# Patient Record
Sex: MALE | Race: BLACK OR AFRICAN AMERICAN | Employment: UNEMPLOYED
[De-identification: names, ages, dates, MRNs, and addresses within clinical notes are randomized per-mention and may not be internally consistent; named-entity substitution may affect disease eponyms.]

---

## 2017-01-01 ENCOUNTER — HOME CARE VISIT (OUTPATIENT)
Dept: SCHEDULING | Facility: HOME HEALTH | Age: 62
End: 2017-01-01
Payer: MEDICARE

## 2017-01-02 ENCOUNTER — HOME CARE VISIT (OUTPATIENT)
Dept: SCHEDULING | Facility: HOME HEALTH | Age: 62
End: 2017-01-02
Payer: MEDICARE

## 2017-01-02 VITALS
HEART RATE: 85 BPM | OXYGEN SATURATION: 99 % | SYSTOLIC BLOOD PRESSURE: 190 MMHG | TEMPERATURE: 98 F | DIASTOLIC BLOOD PRESSURE: 94 MMHG | RESPIRATION RATE: 18 BRPM

## 2017-01-02 VITALS
TEMPERATURE: 98 F | SYSTOLIC BLOOD PRESSURE: 166 MMHG | HEART RATE: 77 BPM | DIASTOLIC BLOOD PRESSURE: 81 MMHG | OXYGEN SATURATION: 98 % | RESPIRATION RATE: 17 BRPM

## 2017-01-02 PROCEDURE — G0151 HHCP-SERV OF PT,EA 15 MIN: HCPCS

## 2017-01-02 PROCEDURE — G0300 HHS/HOSPICE OF LPN EA 15 MIN: HCPCS

## 2017-01-04 ENCOUNTER — HOME CARE VISIT (OUTPATIENT)
Dept: SCHEDULING | Facility: HOME HEALTH | Age: 62
End: 2017-01-04
Payer: MEDICARE

## 2017-01-04 VITALS
SYSTOLIC BLOOD PRESSURE: 190 MMHG | DIASTOLIC BLOOD PRESSURE: 88 MMHG | RESPIRATION RATE: 18 BRPM | HEART RATE: 82 BPM | OXYGEN SATURATION: 98 %

## 2017-01-04 PROCEDURE — G0151 HHCP-SERV OF PT,EA 15 MIN: HCPCS

## 2017-01-04 PROCEDURE — G0300 HHS/HOSPICE OF LPN EA 15 MIN: HCPCS

## 2017-01-05 ENCOUNTER — OFFICE VISIT (OUTPATIENT)
Dept: SURGERY | Age: 62
End: 2017-01-05

## 2017-01-05 VITALS
HEIGHT: 77 IN | DIASTOLIC BLOOD PRESSURE: 87 MMHG | OXYGEN SATURATION: 99 % | BODY MASS INDEX: 37.19 KG/M2 | SYSTOLIC BLOOD PRESSURE: 158 MMHG | HEART RATE: 99 BPM | WEIGHT: 315 LBS

## 2017-01-05 DIAGNOSIS — S31.819D WOUND OF BUTTOCK, RIGHT, SUBSEQUENT ENCOUNTER: ICD-10-CM

## 2017-01-05 DIAGNOSIS — S31.829D WOUND OF BUTTOCK, LEFT, SUBSEQUENT ENCOUNTER: Primary | ICD-10-CM

## 2017-01-05 NOTE — MR AVS SNAPSHOT
Visit Information Date & Time Provider Department Dept. Phone Encounter #  
 1/5/2017  1:00 PM Ela Escobedo MD [facility] [address]. Beltran Jones [phone] [id number] Follow-up Instructions Return in about 3 weeks (around 1/26/2017). Follow-up and Disposition History Your Appointments 1/19/2017 10:20 AM  
ESTABLISHED PATIENT with Ela Escobedo MD  
[facility] Dr. Beltran Jones ([facility]) Appt Note: abscess (2wk f/u) [address] [facility] [address] [address]  
[address], [address], [address] [facility] [address] [address] Upcoming Health Maintenance Date Due Hepatitis C Screening 1955 LIPID PANEL Q1 1955 FOOT EXAM Q1 4/8/1963 MICROALBUMIN Q1 4/8/1963 EYE EXAM RETINAL OR DILATED Q1 4/8/1963 DTaP/Tdap/Td series (1 - Tdap) 4/8/1974 FOBT Q 1 YEAR AGE 50-75 4/8/2003 ZOSTER VACCINE AGE 60> 4/8/2013 Pneumococcal 19-64 Highest Risk (2 of 3 - PCV13) 8/1/2013 INFLUENZA AGE 9 TO ADULT 8/1/2016 HEMOGLOBIN A1C Q6M 11/10/2016 Allergies as of 1/5/2017  Review Complete On: 1/5/2017 By: Ela Escobedo MD  
 No Known Allergies Current Immunizations  Reviewed on 8/23/2013 Name Date Pneumococcal Vaccine (Unspecified Type) 8/1/2012 Not reviewed this visit You Were Diagnosed With   
  
 Codes Comments Wound of buttock, left, subsequent encounter    -  Primary ICD-10-CM: B06.597I ICD-9-CM: V58.89, 877.0 Wound of buttock, right, subsequent encounter     ICD-10-CM: S31.819D ICD-9-CM: V58.89, 877.0 Vitals BP Pulse Height(growth percentile) Weight(growth percentile) SpO2 BMI  
 158/87 99 6' 5\" (1.956 m) 316 lb (143.3 kg) 99% 37.47 kg/m2 Smoking Status Never Smoker BMI and BSA Data Body Mass Index Body Surface Area  
 37.47 kg/m 2 2.79 m 2 Preferred Pharmacy Pharmacy Name Phone [facility] [address], [place] [address] Enri[place] [phone] Your Updated Medication List  
  
   
This list is accurate as of: 1/5/17  4:34 PM.  Always use your most recent med list.  
  
  
  
  
 acetaminophen 325 mg tablet Commonly known as:  TYLENOL Take 2 Tabs by mouth every four (4) hours as needed for Pain or Fever. alcohol swabs Padm Commonly known as:  ALCOHOL PADS  
1 Each by Apply Externally route two (2) times a day. amLODIPine 10 mg tablet Commonly known as:  Stacey Citron Take 1 Tab by mouth daily. carvedilol 25 mg tablet Commonly known as:  Romelia Golas Take 1 Tab by mouth two (2) times daily (with meals) for 30 days. digoxin 0.125 mg tablet Commonly known as:  LANOXIN Take 0.125 mg by mouth daily. Indications: afib  
  
 docusate sodium 100 mg capsule Commonly known as:  Gabriela Mule Take 1 Cap by mouth daily as needed for Constipation for up to 30 days. ferrous sulfate 325 mg (65 mg iron) tablet Take 1 Tab by mouth two (2) times daily (with meals). insulin lispro protamine/insulin lispro 100 unit/mL (75-25) injection Commonly known as:  HUMALOG MIX 75/25  
60 Units by SubCUTAneous route Before breakfast and dinner. Insulin Syringe-Needle U-100 0.5 mL 29 gauge x 1/2\" Syrg Commonly known as:  INSULIN SYRINGE  
1 Each by Does Not Apply route two (2) times a day. L. acidoph & paracasei- S therm- Bifido 8 billion cell Cap cap Commonly known as:  ROSY-Q Take 1 Cap by mouth daily. oxyCODONE-acetaminophen 5-325 mg per tablet Commonly known as:  PERCOCET Take 1-2 Tabs by mouth every four (4) hours as needed. Max Daily Amount: 12 Tabs. polyethylene glycol 17 gram packet Commonly known as:  Phyllis Boer Take 1 Packet by mouth daily for 20 days. pravastatin 20 mg tablet Commonly known as:  PRAVACHOL Take 20 mg by mouth nightly. Follow-up Instructions Return in about 3 weeks (around 1/26/2017). To-Do List   
 01/06/2017 To Be Determined Appointment with Belinda Mcgrath LPN at [place] [address]  
  
 01/08/2017 To Be Determined Appointment with Josephine Jiménez RN at [place] [address]  
  
 01/09/2017 To Be Determined Appointment with Efraín Marshall PT at [place] [address]  
  
 01/11/2017 To Be Determined Appointment with Josephine Jiménez RN at [place] [address]  
  
 01/11/2017 To Be Determined Appointment with Efraín Marshall PT at [place] [address]  
  
 01/13/2017 To Be Determined Appointment with Josephine Jiménez RN at [place] [address]  
  
 01/15/2017 To Be Determined Appointment with Josephine Jiménez RN at [place] [address]  
  
 01/17/2017 To Be Determined Appointment with Josephine Jiménez RN at [place] [address]  
  
 01/19/2017 To Be Determined Appointment with Josephine Jiménez RN at [place] [address]  
  
 01/21/2017 To Be Determined Appointment with Josephine Jiménez RN at [place] [address]  
  
 01/23/2017 To Be Determined Appointment with Josephine Jiménez RN at [place] [address]  
  
 01/25/2017 To Be Determined Appointment with Josephine Jiménez RN at [place] [address]  
  
 01/27/2017 To Be Determined Appointment with Josephine Jiménez RN at [place] [address] Introducing [facility] & HEALTH SERVICES! Snehal Deluca introduces Witsbits patient portal. Now you can access parts of your medical record, email your doctor's office, and request medication refills online. 1. In your internet browser, go to [email or web address] 2. Click on the First Time User? Click Here link in the Sign In box. You will see the New Member Sign Up page. 3. Enter your Revokom Access Code exactly as it appears below. You will not need to use this code after youve completed the sign-up process. If you do not sign up before the expiration date, you must request a new code. · Revokom Access Code: [email or web address] Expires: 3/18/2017 11:16 AM 
 
4. Enter the last four digits of your Social Security Number (xxxx) and Date of Birth (mm/dd/yyyy) as indicated and click Submit. You will be taken to the next sign-up page. 5. Create a Revokom ID. This will be your Revokom login ID and cannot be changed, so think of one that is secure and easy to remember. 6. Create a Revokom password. You can change your password at any time. 7. Enter your Password Reset Question and Answer. This can be used at a later time if you forget your password. 8. Enter your e-mail address. You will receive e-mail notification when new information is available in [address] 9. Click Sign Up. You can now view and download portions of your medical record. 10. Click the Download Summary menu link to download a portable copy of your medical information. If you have questions, please visit the Frequently Asked Questions section of the Revokom website. Remember, Revokom is NOT to be used for urgent needs. For medical emergencies, dial 911. Now available from your iPhone and Android! Please provide this summary of care documentation to your next provider. Your primary care clinician is listed as Phys Other. If you have any questions after today's visit, please call [phone].

## 2017-01-05 NOTE — PROGRESS NOTES
The patient was seen in the hospital where he had incision and drainage of bilateral buttock abscesses associated with diabetes out of control. He has been followed by home health with packing changes. He was converted to 3 times per week, alginate silver packing and dry overlay dressing. The patient reports no problems. On examination both cavity sites show clean granulation and look quite good. On the left buttock the cavity is about 5 cm deep x 4 cm x 1 cm. It was repacked with Aquacel AG and covered by dry gauze. The right buttock cavity is about 4 x 5 cm width and length by about 1.5 cm deep. It was packed with Aquacel AG and covered with dry gauze. The patient will continue with 3 time per week alginate silver packing and dressing. He will see me again in follow up in 3 weeks. Final Diagnosis:  Open wounds right and left buttocks. MedDATA/gwo     Total Evaluation and Management time utilized (excluding any procedure time)  was 15 minutes, with 55 % in counseling and/or coordination of care.     Kyle Castanon MD

## 2017-01-06 ENCOUNTER — HOME CARE VISIT (OUTPATIENT)
Dept: SCHEDULING | Facility: HOME HEALTH | Age: 62
End: 2017-01-06
Payer: MEDICARE

## 2017-01-06 VITALS
RESPIRATION RATE: 18 BRPM | SYSTOLIC BLOOD PRESSURE: 168 MMHG | OXYGEN SATURATION: 97 % | TEMPERATURE: 98.2 F | HEART RATE: 89 BPM | DIASTOLIC BLOOD PRESSURE: 74 MMHG

## 2017-01-06 PROCEDURE — G0300 HHS/HOSPICE OF LPN EA 15 MIN: HCPCS

## 2017-01-08 ENCOUNTER — HOME CARE VISIT (OUTPATIENT)
Dept: SCHEDULING | Facility: HOME HEALTH | Age: 62
End: 2017-01-08
Payer: MEDICARE

## 2017-01-08 PROCEDURE — G0300 HHS/HOSPICE OF LPN EA 15 MIN: HCPCS

## 2017-01-09 VITALS
RESPIRATION RATE: 12 BRPM | DIASTOLIC BLOOD PRESSURE: 88 MMHG | SYSTOLIC BLOOD PRESSURE: 172 MMHG | OXYGEN SATURATION: 97 % | TEMPERATURE: 98 F | HEART RATE: 74 BPM

## 2017-01-09 VITALS
HEART RATE: 77 BPM | TEMPERATURE: 97.8 F | DIASTOLIC BLOOD PRESSURE: 88 MMHG | SYSTOLIC BLOOD PRESSURE: 160 MMHG | OXYGEN SATURATION: 98 % | RESPIRATION RATE: 17 BRPM

## 2017-01-10 ENCOUNTER — HOME CARE VISIT (OUTPATIENT)
Dept: SCHEDULING | Facility: HOME HEALTH | Age: 62
End: 2017-01-10
Payer: MEDICARE

## 2017-01-10 PROCEDURE — G0300 HHS/HOSPICE OF LPN EA 15 MIN: HCPCS

## 2017-01-11 ENCOUNTER — HOME CARE VISIT (OUTPATIENT)
Dept: SCHEDULING | Facility: HOME HEALTH | Age: 62
End: 2017-01-11
Payer: MEDICARE

## 2017-01-11 VITALS
OXYGEN SATURATION: 97 % | HEART RATE: 92 BPM | RESPIRATION RATE: 16 BRPM | DIASTOLIC BLOOD PRESSURE: 84 MMHG | SYSTOLIC BLOOD PRESSURE: 168 MMHG | TEMPERATURE: 97 F

## 2017-01-11 VITALS
SYSTOLIC BLOOD PRESSURE: 150 MMHG | DIASTOLIC BLOOD PRESSURE: 82 MMHG | OXYGEN SATURATION: 98 % | RESPIRATION RATE: 16 BRPM | HEART RATE: 72 BPM

## 2017-01-11 PROCEDURE — G0151 HHCP-SERV OF PT,EA 15 MIN: HCPCS

## 2017-01-12 ENCOUNTER — HOME CARE VISIT (OUTPATIENT)
Dept: SCHEDULING | Facility: HOME HEALTH | Age: 62
End: 2017-01-12
Payer: MEDICARE

## 2017-01-12 PROCEDURE — G0300 HHS/HOSPICE OF LPN EA 15 MIN: HCPCS

## 2017-01-13 VITALS
SYSTOLIC BLOOD PRESSURE: 170 MMHG | DIASTOLIC BLOOD PRESSURE: 82 MMHG | TEMPERATURE: 97.9 F | OXYGEN SATURATION: 98 % | RESPIRATION RATE: 17 BRPM | HEART RATE: 77 BPM

## 2017-01-14 ENCOUNTER — HOME CARE VISIT (OUTPATIENT)
Dept: SCHEDULING | Facility: HOME HEALTH | Age: 62
End: 2017-01-14
Payer: MEDICARE

## 2017-01-14 VITALS
RESPIRATION RATE: 18 BRPM | OXYGEN SATURATION: 98 % | SYSTOLIC BLOOD PRESSURE: 180 MMHG | DIASTOLIC BLOOD PRESSURE: 96 MMHG | HEART RATE: 80 BPM | TEMPERATURE: 98.6 F

## 2017-01-14 PROCEDURE — G0299 HHS/HOSPICE OF RN EA 15 MIN: HCPCS

## 2017-01-16 ENCOUNTER — HOME CARE VISIT (OUTPATIENT)
Dept: SCHEDULING | Facility: HOME HEALTH | Age: 62
End: 2017-01-16
Payer: MEDICARE

## 2017-01-16 PROCEDURE — G0300 HHS/HOSPICE OF LPN EA 15 MIN: HCPCS

## 2017-01-17 VITALS
DIASTOLIC BLOOD PRESSURE: 80 MMHG | TEMPERATURE: 98.2 F | OXYGEN SATURATION: 97 % | SYSTOLIC BLOOD PRESSURE: 170 MMHG | HEART RATE: 77 BPM | RESPIRATION RATE: 18 BRPM

## 2017-01-18 ENCOUNTER — HOME CARE VISIT (OUTPATIENT)
Dept: SCHEDULING | Facility: HOME HEALTH | Age: 62
End: 2017-01-18
Payer: MEDICARE

## 2017-01-18 PROCEDURE — G0300 HHS/HOSPICE OF LPN EA 15 MIN: HCPCS

## 2017-01-19 ENCOUNTER — OFFICE VISIT (OUTPATIENT)
Dept: SURGERY | Age: 62
End: 2017-01-19

## 2017-01-19 DIAGNOSIS — Z91.199 NO-SHOW FOR APPOINTMENT: Primary | ICD-10-CM

## 2017-01-20 ENCOUNTER — HOME CARE VISIT (OUTPATIENT)
Dept: SCHEDULING | Facility: HOME HEALTH | Age: 62
End: 2017-01-20
Payer: MEDICARE

## 2017-01-21 VITALS
HEART RATE: 76 BPM | OXYGEN SATURATION: 97 % | SYSTOLIC BLOOD PRESSURE: 146 MMHG | DIASTOLIC BLOOD PRESSURE: 92 MMHG | TEMPERATURE: 98 F | RESPIRATION RATE: 18 BRPM

## 2017-01-21 VITALS
TEMPERATURE: 97.8 F | HEART RATE: 77 BPM | RESPIRATION RATE: 17 BRPM | SYSTOLIC BLOOD PRESSURE: 170 MMHG | DIASTOLIC BLOOD PRESSURE: 80 MMHG | OXYGEN SATURATION: 97 %

## 2017-01-23 ENCOUNTER — HOME CARE VISIT (OUTPATIENT)
Dept: SCHEDULING | Facility: HOME HEALTH | Age: 62
End: 2017-01-23
Payer: MEDICARE

## 2017-01-23 PROCEDURE — G0300 HHS/HOSPICE OF LPN EA 15 MIN: HCPCS

## 2017-01-24 VITALS
TEMPERATURE: 97.8 F | DIASTOLIC BLOOD PRESSURE: 80 MMHG | OXYGEN SATURATION: 97 % | HEART RATE: 77 BPM | SYSTOLIC BLOOD PRESSURE: 172 MMHG | RESPIRATION RATE: 18 BRPM

## 2017-01-25 ENCOUNTER — HOME CARE VISIT (OUTPATIENT)
Dept: SCHEDULING | Facility: HOME HEALTH | Age: 62
End: 2017-01-25
Payer: MEDICARE

## 2017-01-25 PROCEDURE — G0300 HHS/HOSPICE OF LPN EA 15 MIN: HCPCS

## 2017-01-26 VITALS
SYSTOLIC BLOOD PRESSURE: 168 MMHG | OXYGEN SATURATION: 97 % | TEMPERATURE: 97.8 F | HEART RATE: 9 BPM | DIASTOLIC BLOOD PRESSURE: 80 MMHG | RESPIRATION RATE: 17 BRPM

## 2017-01-27 ENCOUNTER — HOME CARE VISIT (OUTPATIENT)
Dept: HOME HEALTH SERVICES | Facility: HOME HEALTH | Age: 62
End: 2017-01-27
Payer: MEDICARE

## 2017-01-27 PROCEDURE — G0300 HHS/HOSPICE OF LPN EA 15 MIN: HCPCS

## 2017-01-30 ENCOUNTER — HOME CARE VISIT (OUTPATIENT)
Dept: SCHEDULING | Facility: HOME HEALTH | Age: 62
End: 2017-01-30
Payer: MEDICARE

## 2017-01-30 VITALS
SYSTOLIC BLOOD PRESSURE: 170 MMHG | HEART RATE: 87 BPM | TEMPERATURE: 97.8 F | OXYGEN SATURATION: 98 % | RESPIRATION RATE: 17 BRPM | DIASTOLIC BLOOD PRESSURE: 68 MMHG

## 2017-01-30 PROCEDURE — G0300 HHS/HOSPICE OF LPN EA 15 MIN: HCPCS

## 2017-01-31 VITALS
OXYGEN SATURATION: 98 % | DIASTOLIC BLOOD PRESSURE: 86 MMHG | TEMPERATURE: 98.2 F | SYSTOLIC BLOOD PRESSURE: 170 MMHG | HEART RATE: 88 BPM | RESPIRATION RATE: 17 BRPM

## 2017-02-03 ENCOUNTER — HOME CARE VISIT (OUTPATIENT)
Dept: SCHEDULING | Facility: HOME HEALTH | Age: 62
End: 2017-02-03
Payer: MEDICARE

## 2017-02-03 PROCEDURE — G0300 HHS/HOSPICE OF LPN EA 15 MIN: HCPCS

## 2017-02-06 ENCOUNTER — HOSPITAL ENCOUNTER (INPATIENT)
Age: 62
LOS: 4 days | Discharge: HOME HEALTH | DRG: 623 | End: 2017-02-10
Attending: SURGERY | Admitting: SURGERY
Payer: MEDICARE

## 2017-02-06 ENCOUNTER — HOME CARE VISIT (OUTPATIENT)
Dept: SCHEDULING | Facility: HOME HEALTH | Age: 62
End: 2017-02-06
Payer: MEDICARE

## 2017-02-06 ENCOUNTER — ANESTHESIA EVENT (OUTPATIENT)
Dept: SURGERY | Age: 62
End: 2017-02-06

## 2017-02-06 ENCOUNTER — ANESTHESIA (OUTPATIENT)
Dept: SURGERY | Age: 62
End: 2017-02-06

## 2017-02-06 ENCOUNTER — HOSPITAL ENCOUNTER (INPATIENT)
Age: 62
Setting detail: SURGERY ADMIT
End: 2017-02-06
Attending: SURGERY | Admitting: SURGERY

## 2017-02-06 ENCOUNTER — HOME CARE VISIT (OUTPATIENT)
Dept: HOME HEALTH SERVICES | Facility: HOME HEALTH | Age: 62
End: 2017-02-06
Payer: MEDICARE

## 2017-02-06 ENCOUNTER — ANESTHESIA EVENT (OUTPATIENT)
Dept: SURGERY | Age: 62
DRG: 623 | End: 2017-02-06
Payer: MEDICARE

## 2017-02-06 ENCOUNTER — ANESTHESIA (OUTPATIENT)
Dept: SURGERY | Age: 62
DRG: 623 | End: 2017-02-06
Payer: MEDICARE

## 2017-02-06 VITALS
HEART RATE: 72 BPM | RESPIRATION RATE: 16 BRPM | OXYGEN SATURATION: 98 % | TEMPERATURE: 97.8 F | DIASTOLIC BLOOD PRESSURE: 82 MMHG | SYSTOLIC BLOOD PRESSURE: 170 MMHG

## 2017-02-06 PROBLEM — L02.212 ABSCESS OF LOWER BACK: Status: ACTIVE | Noted: 2017-02-06

## 2017-02-06 PROBLEM — L02.415 ABSCESS OF RIGHT THIGH: Status: ACTIVE | Noted: 2017-02-06

## 2017-02-06 LAB
ALBUMIN SERPL BCP-MCNC: 2.9 G/DL (ref 3.5–5)
ALBUMIN/GLOB SERPL: 0.9 {RATIO} (ref 1.1–2.2)
ALP SERPL-CCNC: 160 U/L (ref 45–117)
ALT SERPL-CCNC: 27 U/L (ref 12–78)
ANION GAP BLD CALC-SCNC: 8 MMOL/L (ref 5–15)
AST SERPL W P-5'-P-CCNC: 13 U/L (ref 15–37)
BILIRUB SERPL-MCNC: 0.3 MG/DL (ref 0.2–1)
BUN SERPL-MCNC: 27 MG/DL (ref 6–20)
BUN/CREAT SERPL: 9 (ref 12–20)
CALCIUM SERPL-MCNC: 8.8 MG/DL (ref 8.5–10.1)
CHLORIDE SERPL-SCNC: 105 MMOL/L (ref 97–108)
CO2 SERPL-SCNC: 25 MMOL/L (ref 21–32)
CREAT SERPL-MCNC: 2.88 MG/DL (ref 0.7–1.3)
ERYTHROCYTE [DISTWIDTH] IN BLOOD BY AUTOMATED COUNT: 13.1 % (ref 11.5–14.5)
GLOBULIN SER CALC-MCNC: 3.4 G/DL (ref 2–4)
GLUCOSE BLD STRIP.AUTO-MCNC: 224 MG/DL (ref 65–100)
GLUCOSE BLD STRIP.AUTO-MCNC: 284 MG/DL (ref 65–100)
GLUCOSE SERPL-MCNC: 337 MG/DL (ref 65–100)
HCT VFR BLD AUTO: 31.2 % (ref 36.6–50.3)
HGB BLD-MCNC: 10.4 G/DL (ref 12.1–17)
MCH RBC QN AUTO: 29.2 PG (ref 26–34)
MCHC RBC AUTO-ENTMCNC: 33.3 G/DL (ref 30–36.5)
MCV RBC AUTO: 87.6 FL (ref 80–99)
PLATELET # BLD AUTO: 151 K/UL (ref 150–400)
POTASSIUM SERPL-SCNC: 4.2 MMOL/L (ref 3.5–5.1)
PROT SERPL-MCNC: 6.3 G/DL (ref 6.4–8.2)
RBC # BLD AUTO: 3.56 M/UL (ref 4.1–5.7)
SERVICE CMNT-IMP: ABNORMAL
SERVICE CMNT-IMP: ABNORMAL
SODIUM SERPL-SCNC: 138 MMOL/L (ref 136–145)
WBC # BLD AUTO: 6.7 K/UL (ref 4.1–11.1)

## 2017-02-06 PROCEDURE — 77030019908 HC STETH ESOPH SIMS -A: Performed by: ANESTHESIOLOGY

## 2017-02-06 PROCEDURE — 87186 SC STD MICRODIL/AGAR DIL: CPT | Performed by: SURGERY

## 2017-02-06 PROCEDURE — 74011000250 HC RX REV CODE- 250

## 2017-02-06 PROCEDURE — 80053 COMPREHEN METABOLIC PANEL: CPT | Performed by: SURGERY

## 2017-02-06 PROCEDURE — 93005 ELECTROCARDIOGRAM TRACING: CPT

## 2017-02-06 PROCEDURE — 77030018836 HC SOL IRR NACL ICUM -A: Performed by: SURGERY

## 2017-02-06 PROCEDURE — 77030026438 HC STYL ET INTUB CARD -A: Performed by: ANESTHESIOLOGY

## 2017-02-06 PROCEDURE — 77030008684 HC TU ET CUF COVD -B: Performed by: ANESTHESIOLOGY

## 2017-02-06 PROCEDURE — 74011636637 HC RX REV CODE- 636/637: Performed by: SURGERY

## 2017-02-06 PROCEDURE — 77030019895 HC PCKNG STRP IODO -A: Performed by: SURGERY

## 2017-02-06 PROCEDURE — 87077 CULTURE AEROBIC IDENTIFY: CPT | Performed by: SURGERY

## 2017-02-06 PROCEDURE — 77030011640 HC PAD GRND REM COVD -A: Performed by: SURGERY

## 2017-02-06 PROCEDURE — 87205 SMEAR GRAM STAIN: CPT | Performed by: SURGERY

## 2017-02-06 PROCEDURE — 74011250636 HC RX REV CODE- 250/636

## 2017-02-06 PROCEDURE — G0300 HHS/HOSPICE OF LPN EA 15 MIN: HCPCS

## 2017-02-06 PROCEDURE — 65270000029 HC RM PRIVATE

## 2017-02-06 PROCEDURE — 87147 CULTURE TYPE IMMUNOLOGIC: CPT | Performed by: SURGERY

## 2017-02-06 PROCEDURE — 82962 GLUCOSE BLOOD TEST: CPT

## 2017-02-06 PROCEDURE — 0JBL0ZZ EXCISION OF RIGHT UPPER LEG SUBCUTANEOUS TISSUE AND FASCIA, OPEN APPROACH: ICD-10-PCS | Performed by: SURGERY

## 2017-02-06 PROCEDURE — 74011250636 HC RX REV CODE- 250/636: Performed by: SURGERY

## 2017-02-06 PROCEDURE — 36415 COLL VENOUS BLD VENIPUNCTURE: CPT | Performed by: SURGERY

## 2017-02-06 PROCEDURE — 76210000006 HC OR PH I REC 0.5 TO 1 HR: Performed by: SURGERY

## 2017-02-06 PROCEDURE — 76060000033 HC ANESTHESIA 1 TO 1.5 HR: Performed by: SURGERY

## 2017-02-06 PROCEDURE — 0JB70ZZ EXCISION OF BACK SUBCUTANEOUS TISSUE AND FASCIA, OPEN APPROACH: ICD-10-PCS | Performed by: SURGERY

## 2017-02-06 PROCEDURE — 85027 COMPLETE CBC AUTOMATED: CPT | Performed by: SURGERY

## 2017-02-06 PROCEDURE — 76010000149 HC OR TIME 1 TO 1.5 HR: Performed by: SURGERY

## 2017-02-06 RX ORDER — LIDOCAINE HYDROCHLORIDE 10 MG/ML
0.1 INJECTION, SOLUTION EPIDURAL; INFILTRATION; INTRACAUDAL; PERINEURAL AS NEEDED
Status: CANCELLED | OUTPATIENT
Start: 2017-02-06

## 2017-02-06 RX ORDER — SODIUM CHLORIDE, SODIUM LACTATE, POTASSIUM CHLORIDE, CALCIUM CHLORIDE 600; 310; 30; 20 MG/100ML; MG/100ML; MG/100ML; MG/100ML
25 INJECTION, SOLUTION INTRAVENOUS CONTINUOUS
Status: CANCELLED | OUTPATIENT
Start: 2017-02-06 | End: 2017-02-07

## 2017-02-06 RX ORDER — FENTANYL CITRATE 50 UG/ML
50 INJECTION, SOLUTION INTRAMUSCULAR; INTRAVENOUS AS NEEDED
Status: CANCELLED | OUTPATIENT
Start: 2017-02-06

## 2017-02-06 RX ORDER — MIDAZOLAM HYDROCHLORIDE 1 MG/ML
1 INJECTION, SOLUTION INTRAMUSCULAR; INTRAVENOUS AS NEEDED
Status: CANCELLED | OUTPATIENT
Start: 2017-02-06

## 2017-02-06 RX ORDER — HYDROMORPHONE HYDROCHLORIDE 2 MG/ML
INJECTION, SOLUTION INTRAMUSCULAR; INTRAVENOUS; SUBCUTANEOUS AS NEEDED
Status: DISCONTINUED | OUTPATIENT
Start: 2017-02-06 | End: 2017-02-07 | Stop reason: HOSPADM

## 2017-02-06 RX ORDER — DIPHENHYDRAMINE HYDROCHLORIDE 50 MG/ML
12.5 INJECTION, SOLUTION INTRAMUSCULAR; INTRAVENOUS AS NEEDED
Status: ACTIVE | OUTPATIENT
Start: 2017-02-06 | End: 2017-02-06

## 2017-02-06 RX ORDER — FENTANYL CITRATE 50 UG/ML
25 INJECTION, SOLUTION INTRAMUSCULAR; INTRAVENOUS
Status: CANCELLED | OUTPATIENT
Start: 2017-02-06

## 2017-02-06 RX ORDER — HYDROCODONE BITARTRATE AND ACETAMINOPHEN 5; 325 MG/1; MG/1
1 TABLET ORAL
Status: DISCONTINUED | OUTPATIENT
Start: 2017-02-06 | End: 2017-02-07

## 2017-02-06 RX ORDER — MIDAZOLAM HYDROCHLORIDE 1 MG/ML
INJECTION, SOLUTION INTRAMUSCULAR; INTRAVENOUS AS NEEDED
Status: DISCONTINUED | OUTPATIENT
Start: 2017-02-06 | End: 2017-02-07 | Stop reason: HOSPADM

## 2017-02-06 RX ORDER — SODIUM CHLORIDE 9 MG/ML
50 INJECTION, SOLUTION INTRAVENOUS CONTINUOUS
Status: DISCONTINUED | OUTPATIENT
Start: 2017-02-06 | End: 2017-02-07

## 2017-02-06 RX ORDER — DEXTROSE 50 % IN WATER (D50W) INTRAVENOUS SYRINGE
12.5-25 AS NEEDED
Status: DISCONTINUED | OUTPATIENT
Start: 2017-02-06 | End: 2017-02-10 | Stop reason: HOSPADM

## 2017-02-06 RX ORDER — SODIUM CHLORIDE, SODIUM LACTATE, POTASSIUM CHLORIDE, CALCIUM CHLORIDE 600; 310; 30; 20 MG/100ML; MG/100ML; MG/100ML; MG/100ML
25 INJECTION, SOLUTION INTRAVENOUS CONTINUOUS
Status: DISCONTINUED | OUTPATIENT
Start: 2017-02-07 | End: 2017-02-07 | Stop reason: HOSPADM

## 2017-02-06 RX ORDER — NALOXONE HYDROCHLORIDE 0.4 MG/ML
0.4 INJECTION, SOLUTION INTRAMUSCULAR; INTRAVENOUS; SUBCUTANEOUS AS NEEDED
Status: DISCONTINUED | OUTPATIENT
Start: 2017-02-06 | End: 2017-02-10 | Stop reason: HOSPADM

## 2017-02-06 RX ORDER — SODIUM CHLORIDE 0.9 % (FLUSH) 0.9 %
5-10 SYRINGE (ML) INJECTION EVERY 8 HOURS
Status: DISCONTINUED | OUTPATIENT
Start: 2017-02-06 | End: 2017-02-07

## 2017-02-06 RX ORDER — MAGNESIUM SULFATE 100 %
4 CRYSTALS MISCELLANEOUS AS NEEDED
Status: DISCONTINUED | OUTPATIENT
Start: 2017-02-06 | End: 2017-02-10 | Stop reason: HOSPADM

## 2017-02-06 RX ORDER — METOPROLOL TARTRATE 25 MG/1
TABLET, FILM COATED ORAL 2 TIMES DAILY
Status: ON HOLD | COMMUNITY
End: 2017-02-07 | Stop reason: CLARIF

## 2017-02-06 RX ORDER — ONDANSETRON 2 MG/ML
INJECTION INTRAMUSCULAR; INTRAVENOUS AS NEEDED
Status: DISCONTINUED | OUTPATIENT
Start: 2017-02-06 | End: 2017-02-07 | Stop reason: HOSPADM

## 2017-02-06 RX ORDER — LIDOCAINE HYDROCHLORIDE 20 MG/ML
INJECTION, SOLUTION EPIDURAL; INFILTRATION; INTRACAUDAL; PERINEURAL AS NEEDED
Status: DISCONTINUED | OUTPATIENT
Start: 2017-02-06 | End: 2017-02-07 | Stop reason: HOSPADM

## 2017-02-06 RX ORDER — HYDROMORPHONE HYDROCHLORIDE 1 MG/ML
.2-.5 INJECTION, SOLUTION INTRAMUSCULAR; INTRAVENOUS; SUBCUTANEOUS
Status: CANCELLED | OUTPATIENT
Start: 2017-02-06

## 2017-02-06 RX ORDER — PROPOFOL 10 MG/ML
INJECTION, EMULSION INTRAVENOUS AS NEEDED
Status: DISCONTINUED | OUTPATIENT
Start: 2017-02-06 | End: 2017-02-07 | Stop reason: HOSPADM

## 2017-02-06 RX ORDER — SUCCINYLCHOLINE CHLORIDE 20 MG/ML
INJECTION INTRAMUSCULAR; INTRAVENOUS AS NEEDED
Status: DISCONTINUED | OUTPATIENT
Start: 2017-02-06 | End: 2017-02-07 | Stop reason: HOSPADM

## 2017-02-06 RX ORDER — GLIMEPIRIDE 1 MG/1
TABLET ORAL
Status: ON HOLD | COMMUNITY
End: 2017-02-07

## 2017-02-06 RX ORDER — GLYCOPYRROLATE 0.2 MG/ML
INJECTION INTRAMUSCULAR; INTRAVENOUS AS NEEDED
Status: DISCONTINUED | OUTPATIENT
Start: 2017-02-06 | End: 2017-02-07 | Stop reason: HOSPADM

## 2017-02-06 RX ORDER — HYDROMORPHONE HYDROCHLORIDE 1 MG/ML
.2-.5 INJECTION, SOLUTION INTRAMUSCULAR; INTRAVENOUS; SUBCUTANEOUS
Status: DISCONTINUED | OUTPATIENT
Start: 2017-02-06 | End: 2017-02-07 | Stop reason: HOSPADM

## 2017-02-06 RX ORDER — FENTANYL CITRATE 50 UG/ML
25 INJECTION, SOLUTION INTRAMUSCULAR; INTRAVENOUS
Status: DISCONTINUED | OUTPATIENT
Start: 2017-02-06 | End: 2017-02-07 | Stop reason: HOSPADM

## 2017-02-06 RX ORDER — FENTANYL CITRATE 50 UG/ML
INJECTION, SOLUTION INTRAMUSCULAR; INTRAVENOUS AS NEEDED
Status: DISCONTINUED | OUTPATIENT
Start: 2017-02-06 | End: 2017-02-07 | Stop reason: HOSPADM

## 2017-02-06 RX ORDER — HYDROMORPHONE HYDROCHLORIDE 1 MG/ML
0.2 INJECTION, SOLUTION INTRAMUSCULAR; INTRAVENOUS; SUBCUTANEOUS
Status: DISCONTINUED | OUTPATIENT
Start: 2017-02-06 | End: 2017-02-07

## 2017-02-06 RX ORDER — ONDANSETRON 2 MG/ML
4 INJECTION INTRAMUSCULAR; INTRAVENOUS AS NEEDED
Status: CANCELLED | OUTPATIENT
Start: 2017-02-06

## 2017-02-06 RX ORDER — SODIUM CHLORIDE 0.9 % (FLUSH) 0.9 %
5-10 SYRINGE (ML) INJECTION AS NEEDED
Status: DISCONTINUED | OUTPATIENT
Start: 2017-02-06 | End: 2017-02-07

## 2017-02-06 RX ORDER — ONDANSETRON 2 MG/ML
4 INJECTION INTRAMUSCULAR; INTRAVENOUS AS NEEDED
Status: DISCONTINUED | OUTPATIENT
Start: 2017-02-06 | End: 2017-02-07 | Stop reason: HOSPADM

## 2017-02-06 RX ORDER — ROCURONIUM BROMIDE 10 MG/ML
INJECTION, SOLUTION INTRAVENOUS AS NEEDED
Status: DISCONTINUED | OUTPATIENT
Start: 2017-02-06 | End: 2017-02-07 | Stop reason: HOSPADM

## 2017-02-06 RX ORDER — SODIUM CHLORIDE, SODIUM LACTATE, POTASSIUM CHLORIDE, CALCIUM CHLORIDE 600; 310; 30; 20 MG/100ML; MG/100ML; MG/100ML; MG/100ML
25 INJECTION, SOLUTION INTRAVENOUS CONTINUOUS
Status: CANCELLED | OUTPATIENT
Start: 2017-02-06

## 2017-02-06 RX ORDER — INSULIN LISPRO 100 [IU]/ML
INJECTION, SOLUTION INTRAVENOUS; SUBCUTANEOUS EVERY 6 HOURS
Status: DISCONTINUED | OUTPATIENT
Start: 2017-02-06 | End: 2017-02-07

## 2017-02-06 RX ORDER — DIPHENHYDRAMINE HYDROCHLORIDE 50 MG/ML
12.5 INJECTION, SOLUTION INTRAMUSCULAR; INTRAVENOUS AS NEEDED
Status: CANCELLED | OUTPATIENT
Start: 2017-02-06 | End: 2017-02-06

## 2017-02-06 RX ORDER — VANCOMYCIN HYDROCHLORIDE
1250 EVERY 24 HOURS
Status: DISCONTINUED | OUTPATIENT
Start: 2017-02-07 | End: 2017-02-08 | Stop reason: DRUGHIGH

## 2017-02-06 RX ORDER — SODIUM CHLORIDE, SODIUM LACTATE, POTASSIUM CHLORIDE, CALCIUM CHLORIDE 600; 310; 30; 20 MG/100ML; MG/100ML; MG/100ML; MG/100ML
INJECTION, SOLUTION INTRAVENOUS
Status: DISCONTINUED | OUTPATIENT
Start: 2017-02-06 | End: 2017-02-07 | Stop reason: HOSPADM

## 2017-02-06 RX ADMIN — INSULIN LISPRO 3 UNITS: 100 INJECTION, SOLUTION INTRAVENOUS; SUBCUTANEOUS at 18:59

## 2017-02-06 RX ADMIN — GLYCOPYRROLATE 0.2 MG: 0.2 INJECTION INTRAMUSCULAR; INTRAVENOUS at 22:39

## 2017-02-06 RX ADMIN — INSULIN LISPRO 5 UNITS: 100 INJECTION, SOLUTION INTRAVENOUS; SUBCUTANEOUS at 16:24

## 2017-02-06 RX ADMIN — SUCCINYLCHOLINE CHLORIDE 180 MG: 20 INJECTION INTRAMUSCULAR; INTRAVENOUS at 22:47

## 2017-02-06 RX ADMIN — Medication 10 ML: at 16:26

## 2017-02-06 RX ADMIN — SODIUM CHLORIDE, SODIUM LACTATE, POTASSIUM CHLORIDE, CALCIUM CHLORIDE: 600; 310; 30; 20 INJECTION, SOLUTION INTRAVENOUS at 22:36

## 2017-02-06 RX ADMIN — ONDANSETRON 4 MG: 2 INJECTION INTRAMUSCULAR; INTRAVENOUS at 23:11

## 2017-02-06 RX ADMIN — LIDOCAINE HYDROCHLORIDE 80 MG: 20 INJECTION, SOLUTION EPIDURAL; INFILTRATION; INTRACAUDAL; PERINEURAL at 22:47

## 2017-02-06 RX ADMIN — MIDAZOLAM HYDROCHLORIDE 2 MG: 1 INJECTION, SOLUTION INTRAMUSCULAR; INTRAVENOUS at 22:39

## 2017-02-06 RX ADMIN — ROCURONIUM BROMIDE 10 MG: 10 INJECTION, SOLUTION INTRAVENOUS at 22:47

## 2017-02-06 RX ADMIN — PROPOFOL 200 MG: 10 INJECTION, EMULSION INTRAVENOUS at 22:47

## 2017-02-06 RX ADMIN — FENTANYL CITRATE 100 MCG: 50 INJECTION, SOLUTION INTRAMUSCULAR; INTRAVENOUS at 22:47

## 2017-02-06 RX ADMIN — SODIUM CHLORIDE 50 ML/HR: 900 INJECTION, SOLUTION INTRAVENOUS at 18:27

## 2017-02-06 RX ADMIN — VANCOMYCIN HYDROCHLORIDE 3000 MG: 10 INJECTION, POWDER, LYOPHILIZED, FOR SOLUTION INTRAVENOUS at 18:28

## 2017-02-06 RX ADMIN — HYDROMORPHONE HYDROCHLORIDE 1 MG: 2 INJECTION, SOLUTION INTRAMUSCULAR; INTRAVENOUS; SUBCUTANEOUS at 23:30

## 2017-02-06 NOTE — PROGRESS NOTES
Primary Nurse Butch Kinsey and Nahomi River RN performed a dual skin assessment on this patient . Patient has dressings on right thigh, right and left lower back area.

## 2017-02-06 NOTE — PROGRESS NOTES
Pharmacy Automatic Renal Dosing Protocol - Antimicrobials      Indication for Antimicrobials: Abcess thigh and back  Current Regimen of Each Antimicrobial (Start Day & Day of Therapy):  Vancomycin (started 2/ day1)    Significant cultures: ND    CAPD, Intermittent Hemodialysis or Renal Replacement Therapy: NA  Paralysis, amputations, malnutrition: NA  Recent Labs      17   1426   CREA  2.88*   BUN  27*   WBC  6.7     Temp (24hrs), Av.4 °F (36.9 °C), Min:98 °F (36.7 °C), Max:98.7 °F (37.1 °C)    Creatinine Clearance (ml/min): 33      Goal Vancomycin Level(s): 10-15      Impression/Plan: Vancomycin loading dose of 3 grams then 1250 mg every 24 hours. Pharmacy will follow daily and adjust doses of monitored medications as appropriate for renal function and/or serum levels.     Thank you,  Nga Nagel, [facility]

## 2017-02-06 NOTE — ANESTHESIA PREPROCEDURE EVALUATION
Anesthetic History   No history of anesthetic complications            Review of Systems / Medical History  Patient summary reviewed, nursing notes reviewed and pertinent labs reviewed    Pulmonary  Within defined limits                 Neuro/Psych   Within defined limits           Cardiovascular    Hypertension        Dysrhythmias : atrial fibrillation      Exercise tolerance: >4 METS  Comments: ? Septal MI    Ejection fraction was  estimated to be 55 %.    GI/Hepatic/Renal         Renal disease: CRI       Endo/Other    Diabetes    Obesity and morbid obesity     Other Findings   Comments: Abscess Right Thigh and Right Lower Back  Hx splenic lymphoma  Hx Hairy cell leukemia, in remission   Vitamin D deficiency         Physical Exam    Airway  Mallampati: III  TM Distance: 4 - 6 cm  Neck ROM: normal range of motion   Mouth opening: Normal     Cardiovascular  Regular rate and rhythm,  S1 and S2 normal,  no murmur, click, rub, or gallop             Dental  No notable dental hx       Pulmonary  Breath sounds clear to auscultation               Abdominal  GI exam deferred       Other Findings            Anesthetic Plan    ASA: 3  Anesthesia type: general          Induction: Intravenous  Anesthetic plan and risks discussed with: Patient

## 2017-02-06 NOTE — PROGRESS NOTES
Surgery    Patient with abscesses of right thigh outer aspect and right lower back. Plan I and D and debridement in OR. Risks and benefits reviewed. Start Vanc emperically.     Mirza Meyers MD

## 2017-02-06 NOTE — IP AVS SNAPSHOT
Current Discharge Medication List  
  
Take these medications at their scheduled times Dose & Instructions Dispensing Information Comments Morning Noon Evening Bedtime  
 amLODIPine 10 mg tablet Commonly known as:  Francisca Eagles Your next dose is: Today, Tomorrow Other:  ____________ Dose:  10 mg Take 10 mg by mouth daily. Refills:  0  
     
   
   
   
  
 aspirin delayed-release 81 mg tablet Your next dose is: Today, Tomorrow Other:  ____________ Dose:  81 mg Take 1 Tab by mouth daily. Quantity:  30 Tab Refills:  0  
     
   
   
   
  
 carvedilol 25 mg tablet Commonly known as:  Gillermina Begun Your next dose is: Today, Tomorrow Other:  ____________ Dose:  25 mg Take 25 mg by mouth two (2) times daily (with meals). Refills:  0  
     
   
   
   
  
 cephALEXin 500 mg capsule Commonly known as:  Pavan Drum Your next dose is: Today, Tomorrow Other:  ____________ Dose:  500 mg Take 1 Cap by mouth four (4) times daily for 7 days. Quantity:  28 Cap Refills:  0  
     
   
   
   
  
 digoxin 0.125 mg tablet Commonly known as:  LANOXIN Your next dose is: Today, Tomorrow Other:  ____________ Dose:  0.125 mg Take 0.125 mg by mouth daily. Refills:  0  
     
   
   
   
  
 pravastatin 20 mg tablet Commonly known as:  PRAVACHOL Your next dose is: Today, Tomorrow Other:  ____________ Dose:  20 mg Take 20 mg by mouth nightly. Refills:  0 Take these medications as needed Dose & Instructions Dispensing Information Comments Morning Noon Evening Bedtime  
 oxyCODONE-acetaminophen 5-325 mg per tablet Commonly known as:  PERCOCET Your next dose is: Today, Tomorrow Other:  ____________ Dose:  1-2 Tab Take 1-2 Tabs by mouth every four (4) hours as needed for Pain.  Max Daily Amount: 12 Tabs. Quantity:  20 Tab Refills:  0 Take these medications as directed Dose & Instructions Dispensing Information Comments Morning Noon Evening Bedtime  
 insulin lispro protamine/insulin lispro 100 unit/mL (75-25) injection Commonly known as:  HUMALOG MIX 75/25 Your next dose is: Today, Tomorrow Other:  ____________ 65 units subcutaneous injection twice daily before meals (before breakfast and dinner) Quantity:  1 Vial  
Refills:  2 Where to Get Your Medications These medications were sent to [facility]/pharmacy #[id number]- [address], [address], [address] [place] [address] Hours:  24-hours Phone:  [phone]  
  cephALEXin 500 mg capsule  
 insulin lispro protamine/insulin lispro 100 unit/mL (75-25) injection Information about where to get these medications is not yet available ! Ask your nurse or doctor about these medications  
  aspirin delayed-release 81 mg tablet  
 oxyCODONE-acetaminophen 5-325 mg per tablet

## 2017-02-06 NOTE — IP AVS SNAPSHOT
[address] [place] [phone] Patient: Long Estrada 
MRN: [id number] :1955 You are allergic to the following No active allergies Recent Documentation Height Weight BMI Smoking Status 1.956 m 138 kg 36.08 kg/m2 Never Smoker Emergency Contacts Name Discharge Info Relation Home Work Mobile Margareth Donaldson     [phone] About your hospitalization You were admitted on:  2017 You last received care in the:  \[facility]\"" 2 GENERAL SURGERY You were discharged on:  February 10, 2017 Unit phone number:  [phone] Why you were hospitalized Your primary diagnosis was:  Not on File Your diagnoses also included:  Abscess Of Lower Back, Abscess Of Right Thigh Providers Seen During Your Hospitalizations Provider Role Specialty Primary office phone Julian Nye MD Attending Provider General Surgery [phone] Your Primary Care Physician (PCP) Primary Care Physician Office Phone Office Fax Atiya Jay [phone] Follow-up Information Follow up With Details Comments Contact Info Tyrone Velarde NP   [address] [place] [address] [phone] [place] [facility] 227 On 2017 THIS IS YOUR HOME HEALTH AGENCY. IF YOU DO NOT HEAR FROM THEM WITHIN 24-48 HOURS PLEASE CONTACT THEM DIRECTLY. [address] [address] [phone] Current Discharge Medication List  
  
START taking these medications Dose & Instructions Dispensing Information Comments Morning Noon Evening Bedtime  
 aspirin delayed-release 81 mg tablet Your next dose is: Today, Tomorrow Other:  _________ Dose:  81 mg Take 1 Tab by mouth daily. Quantity:  30 Tab Refills:  0  
     
   
   
   
  
 cephALEXin 500 mg capsule Commonly known as:  Ruth Zahidaper Your next dose is: Today, Tomorrow Other:  _________ Dose:  500 mg Take 1 Cap by mouth four (4) times daily for 7 days. Quantity:  28 Cap Refills:  0  
     
   
   
   
  
 insulin lispro protamine/insulin lispro 100 unit/mL (75-25) injection Commonly known as:  HUMALOG MIX 75/25 Your next dose is: Today, Tomorrow Other:  _________ 65 units subcutaneous injection twice daily before meals (before breakfast and dinner) Quantity:  1 Vial  
Refills:  2 CONTINUE these medications which have NOT CHANGED Dose & Instructions Dispensing Information Comments Morning Noon Evening Bedtime  
 amLODIPine 10 mg tablet Commonly known as:  Rudene Post Your next dose is: Today, Tomorrow Other:  _________ Dose:  10 mg Take 10 mg by mouth daily. Refills:  0  
     
   
   
   
  
 carvedilol 25 mg tablet Commonly known as:  Shiva Lawman Your next dose is: Today, Tomorrow Other:  _________ Dose:  25 mg Take 25 mg by mouth two (2) times daily (with meals). Refills:  0  
     
   
   
   
  
 digoxin 0.125 mg tablet Commonly known as:  LANOXIN Your next dose is: Today, Tomorrow Other:  _________ Dose:  0.125 mg Take 0.125 mg by mouth daily. Refills:  0  
     
   
   
   
  
 oxyCODONE-acetaminophen 5-325 mg per tablet Commonly known as:  PERCOCET Your next dose is: Today, Tomorrow Other:  _________ Dose:  1-2 Tab Take 1-2 Tabs by mouth every four (4) hours as needed for Pain. Max Daily Amount: 12 Tabs. Quantity:  20 Tab Refills:  0  
     
   
   
   
  
 pravastatin 20 mg tablet Commonly known as:  PRAVACHOL Your next dose is: Today, Tomorrow Other:  _________ Dose:  20 mg Take 20 mg by mouth nightly. Refills:  0 STOP taking these medications HumuLIN 70/30 100 unit/mL (70-30) injection Generic drug:  insulin NPH/insulin regular Where to Get Your Medications These medications were sent to [facility]/pharmacy #[id number]- [address], [address] Emilee Friedman  [address] [place] [address], [address] [place] [address] Hours:  24-hours Phone:  [phone]  
  cephALEXin 500 mg capsule  
 insulin lispro protamine/insulin lispro 100 unit/mL (75-25) injection Information on where to get these meds will be given to you by the nurse or doctor. ! Ask your nurse or doctor about these medications  
  aspirin delayed-release 81 mg tablet  
 oxyCODONE-acetaminophen 5-325 mg per tablet Discharge Instructions Discharge Instructions After Debridement of Abscess Take antibiotics as prescribed. Do not drive while taking narcotic pain medication. Home Health for dressing changes daily. It is OK to shower. Follow diabetic diet. Monitor your blood sugar at home. Take pain medicines as prescribed as needed. Call for follow up appointment in 1 week  - [phone] See your primary MD in 1 week. If you have any problems, call Dr Jamie Monzon at [phone] or [phone] (after hours) EPI Garvin MD 
 
 
 
 
 
 
Discharge Orders None Introducing [facility] & HEALTH SERVICES! Jessica Nevarez introduces Video Passports patient portal. Now you can access parts of your medical record, email your doctor's office, and request medication refills online. 1. In your internet browser, go to [email or web address] 2. Click on the First Time User? Click Here link in the Sign In box. You will see the New Member Sign Up page. 3. Enter your Video Passports Access Code exactly as it appears below. You will not need to use this code after youve completed the sign-up process. If you do not sign up before the expiration date, you must request a new code. · gogamingo Access Code: [email or web address] Expires: 5/7/2017 12:30 PM 
 
4. Enter the last four digits of your Social Security Number (xxxx) and Date of Birth (mm/dd/yyyy) as indicated and click Submit. You will be taken to the next sign-up page. 5. Create a gogamingo ID. This will be your gogamingo login ID and cannot be changed, so think of one that is secure and easy to remember. 6. Create a gogamingo password. You can change your password at any time. 7. Enter your Password Reset Question and Answer. This can be used at a later time if you forget your password. 8. Enter your e-mail address. You will receive e-mail notification when new information is available in [address] 9. Click Sign Up. You can now view and download portions of your medical record. 10. Click the Download Summary menu link to download a portable copy of your medical information. If you have questions, please visit the Frequently Asked Questions section of the gogamingo website. Remember, gogamingo is NOT to be used for urgent needs. For medical emergencies, dial 911. Now available from your iPhone and Android! General Information Please provide this summary of care documentation to your next provider. Patient Signature:  ____________________________________________________________ Date:  ____________________________________________________________  
  
Angela Wooten Provider Signature:  ____________________________________________________________ Date:  ____________________________________________________________

## 2017-02-07 VITALS
OXYGEN SATURATION: 97 % | SYSTOLIC BLOOD PRESSURE: 168 MMHG | HEART RATE: 68 BPM | TEMPERATURE: 97.8 F | DIASTOLIC BLOOD PRESSURE: 88 MMHG | RESPIRATION RATE: 17 BRPM

## 2017-02-07 LAB
ANION GAP BLD CALC-SCNC: 10 MMOL/L (ref 5–15)
ATRIAL RATE: 79 BPM
BUN SERPL-MCNC: 23 MG/DL (ref 6–20)
BUN/CREAT SERPL: 9 (ref 12–20)
CALCIUM SERPL-MCNC: 8.2 MG/DL (ref 8.5–10.1)
CALCULATED P AXIS, ECG09: 57 DEGREES
CALCULATED R AXIS, ECG10: 9 DEGREES
CALCULATED T AXIS, ECG11: 12 DEGREES
CHLORIDE SERPL-SCNC: 107 MMOL/L (ref 97–108)
CO2 SERPL-SCNC: 22 MMOL/L (ref 21–32)
CREAT SERPL-MCNC: 2.45 MG/DL (ref 0.7–1.3)
DIAGNOSIS, 93000: NORMAL
GLUCOSE BLD STRIP.AUTO-MCNC: 172 MG/DL (ref 65–100)
GLUCOSE BLD STRIP.AUTO-MCNC: 233 MG/DL (ref 65–100)
GLUCOSE BLD STRIP.AUTO-MCNC: 270 MG/DL (ref 65–100)
GLUCOSE BLD STRIP.AUTO-MCNC: 307 MG/DL (ref 65–100)
GLUCOSE BLD STRIP.AUTO-MCNC: 340 MG/DL (ref 65–100)
GLUCOSE SERPL-MCNC: 201 MG/DL (ref 65–100)
P-R INTERVAL, ECG05: 168 MS
POTASSIUM SERPL-SCNC: 4.3 MMOL/L (ref 3.5–5.1)
Q-T INTERVAL, ECG07: 362 MS
QRS DURATION, ECG06: 102 MS
QTC CALCULATION (BEZET), ECG08: 415 MS
SERVICE CMNT-IMP: ABNORMAL
SODIUM SERPL-SCNC: 139 MMOL/L (ref 136–145)
VENTRICULAR RATE, ECG03: 79 BPM

## 2017-02-07 PROCEDURE — 82962 GLUCOSE BLOOD TEST: CPT

## 2017-02-07 PROCEDURE — 74011250637 HC RX REV CODE- 250/637: Performed by: SURGERY

## 2017-02-07 PROCEDURE — 74011636637 HC RX REV CODE- 636/637: Performed by: HOSPITALIST

## 2017-02-07 PROCEDURE — 77010033678 HC OXYGEN DAILY

## 2017-02-07 PROCEDURE — 36415 COLL VENOUS BLD VENIPUNCTURE: CPT | Performed by: SURGERY

## 2017-02-07 PROCEDURE — 65270000029 HC RM PRIVATE

## 2017-02-07 PROCEDURE — 80048 BASIC METABOLIC PNL TOTAL CA: CPT | Performed by: SURGERY

## 2017-02-07 PROCEDURE — 74011250636 HC RX REV CODE- 250/636: Performed by: SURGERY

## 2017-02-07 PROCEDURE — 74011636637 HC RX REV CODE- 636/637: Performed by: SURGERY

## 2017-02-07 PROCEDURE — 74011250637 HC RX REV CODE- 250/637: Performed by: HOSPITALIST

## 2017-02-07 RX ORDER — CLONIDINE HYDROCHLORIDE 0.1 MG/1
0.1 TABLET ORAL 2 TIMES DAILY
Status: ON HOLD | COMMUNITY
End: 2017-02-08

## 2017-02-07 RX ORDER — DIGOXIN 125 MCG
0.12 TABLET ORAL DAILY
Status: DISCONTINUED | OUTPATIENT
Start: 2017-02-07 | End: 2017-02-10 | Stop reason: HOSPADM

## 2017-02-07 RX ORDER — METOPROLOL TARTRATE 25 MG/1
25 TABLET, FILM COATED ORAL 2 TIMES DAILY
Status: DISCONTINUED | OUTPATIENT
Start: 2017-02-07 | End: 2017-02-08

## 2017-02-07 RX ORDER — OXYCODONE AND ACETAMINOPHEN 5; 325 MG/1; MG/1
1-2 TABLET ORAL
Status: DISCONTINUED | OUTPATIENT
Start: 2017-02-07 | End: 2017-02-10 | Stop reason: HOSPADM

## 2017-02-07 RX ORDER — DIGOXIN 125 MCG
0.12 TABLET ORAL DAILY
COMMUNITY
End: 2017-03-08 | Stop reason: SDUPTHER

## 2017-02-07 RX ORDER — ONDANSETRON 2 MG/ML
4 INJECTION INTRAMUSCULAR; INTRAVENOUS
Status: DISCONTINUED | OUTPATIENT
Start: 2017-02-07 | End: 2017-02-10 | Stop reason: HOSPADM

## 2017-02-07 RX ORDER — AMLODIPINE BESYLATE 5 MG/1
10 TABLET ORAL DAILY
Status: DISCONTINUED | OUTPATIENT
Start: 2017-02-07 | End: 2017-02-10 | Stop reason: HOSPADM

## 2017-02-07 RX ORDER — OXYCODONE AND ACETAMINOPHEN 5; 325 MG/1; MG/1
1-2 TABLET ORAL
Status: ON HOLD | COMMUNITY
End: 2017-02-10

## 2017-02-07 RX ORDER — SODIUM CHLORIDE 0.9 % (FLUSH) 0.9 %
5-10 SYRINGE (ML) INJECTION EVERY 8 HOURS
Status: DISCONTINUED | OUTPATIENT
Start: 2017-02-07 | End: 2017-02-10 | Stop reason: HOSPADM

## 2017-02-07 RX ORDER — SODIUM CHLORIDE 9 MG/ML
50 INJECTION, SOLUTION INTRAVENOUS CONTINUOUS
Status: DISCONTINUED | OUTPATIENT
Start: 2017-02-07 | End: 2017-02-07

## 2017-02-07 RX ORDER — AMLODIPINE BESYLATE 10 MG/1
10 TABLET ORAL DAILY
COMMUNITY
End: 2017-03-08 | Stop reason: SDUPTHER

## 2017-02-07 RX ORDER — SODIUM CHLORIDE 0.9 % (FLUSH) 0.9 %
5-10 SYRINGE (ML) INJECTION AS NEEDED
Status: DISCONTINUED | OUTPATIENT
Start: 2017-02-07 | End: 2017-02-10 | Stop reason: HOSPADM

## 2017-02-07 RX ORDER — ASPIRIN 81 MG/1
81 TABLET ORAL DAILY
Status: DISCONTINUED | OUTPATIENT
Start: 2017-02-07 | End: 2017-02-10 | Stop reason: HOSPADM

## 2017-02-07 RX ORDER — PRAVASTATIN SODIUM 20 MG/1
20 TABLET ORAL
COMMUNITY
End: 2017-03-08 | Stop reason: SDUPTHER

## 2017-02-07 RX ORDER — CARVEDILOL 25 MG/1
25 TABLET ORAL 2 TIMES DAILY WITH MEALS
Status: ON HOLD | COMMUNITY
End: 2017-05-24 | Stop reason: DRUGHIGH

## 2017-02-07 RX ORDER — OXYCODONE HYDROCHLORIDE 5 MG/1
5 CAPSULE ORAL
Status: ON HOLD | COMMUNITY
End: 2017-02-07

## 2017-02-07 RX ORDER — DIPHENHYDRAMINE HYDROCHLORIDE 50 MG/ML
12.5 INJECTION, SOLUTION INTRAMUSCULAR; INTRAVENOUS
Status: DISCONTINUED | OUTPATIENT
Start: 2017-02-07 | End: 2017-02-10 | Stop reason: HOSPADM

## 2017-02-07 RX ORDER — INSULIN LISPRO 100 [IU]/ML
INJECTION, SOLUTION INTRAVENOUS; SUBCUTANEOUS
Status: DISCONTINUED | OUTPATIENT
Start: 2017-02-07 | End: 2017-02-10 | Stop reason: HOSPADM

## 2017-02-07 RX ADMIN — Medication 10 ML: at 14:50

## 2017-02-07 RX ADMIN — INSULIN LISPRO 60 UNITS: 100 INJECTION, SUSPENSION SUBCUTANEOUS at 10:01

## 2017-02-07 RX ADMIN — INSULIN LISPRO 3 UNITS: 100 INJECTION, SOLUTION INTRAVENOUS; SUBCUTANEOUS at 12:36

## 2017-02-07 RX ADMIN — INSULIN LISPRO 60 UNITS: 100 INJECTION, SUSPENSION SUBCUTANEOUS at 18:30

## 2017-02-07 RX ADMIN — INSULIN LISPRO 5 UNITS: 100 INJECTION, SOLUTION INTRAVENOUS; SUBCUTANEOUS at 06:28

## 2017-02-07 RX ADMIN — SODIUM CHLORIDE 50 ML/HR: 900 INJECTION, SOLUTION INTRAVENOUS at 01:13

## 2017-02-07 RX ADMIN — METOPROLOL TARTRATE 25 MG: 25 TABLET ORAL at 23:02

## 2017-02-07 RX ADMIN — AMLODIPINE BESYLATE 10 MG: 5 TABLET ORAL at 10:01

## 2017-02-07 RX ADMIN — INSULIN LISPRO 7 UNITS: 100 INJECTION, SOLUTION INTRAVENOUS; SUBCUTANEOUS at 18:30

## 2017-02-07 RX ADMIN — METOPROLOL TARTRATE 25 MG: 25 TABLET ORAL at 10:01

## 2017-02-07 RX ADMIN — DIGOXIN 0.12 MG: 125 TABLET ORAL at 10:01

## 2017-02-07 RX ADMIN — INSULIN LISPRO 2 UNITS: 100 INJECTION, SOLUTION INTRAVENOUS; SUBCUTANEOUS at 00:30

## 2017-02-07 RX ADMIN — Medication 10 ML: at 23:02

## 2017-02-07 RX ADMIN — VANCOMYCIN HYDROCHLORIDE 1250 MG: 10 INJECTION, POWDER, LYOPHILIZED, FOR SOLUTION INTRAVENOUS at 18:59

## 2017-02-07 RX ADMIN — INSULIN LISPRO 4 UNITS: 100 INJECTION, SOLUTION INTRAVENOUS; SUBCUTANEOUS at 23:08

## 2017-02-07 RX ADMIN — ASPIRIN 81 MG: 81 TABLET, COATED ORAL at 10:01

## 2017-02-07 NOTE — PERIOP NOTES
Handoff Report from Operating Room to PACU    Report received from Gracy Guallpa CRNA and Radha Carney RN regarding Edouard López.      Surgeon(s):  Ela Escobedo MD  And Procedure(s) (LRB):  INCISION AND Marcelino Calmar lower back and right thigh abscesses (Right)  confirmed   with allergies and dressings discussed. Anesthesia type, drugs, patient history, complications, estimated blood loss, vital signs, intake and output, and last pain medication, lines and temperature were reviewed.

## 2017-02-07 NOTE — ANESTHESIA POSTPROCEDURE EVALUATION
Post-Anesthesia Evaluation and Assessment    Patient: Madie Torres MRN: [id number]  SSN: [id number]    YOB: 1955  Age: 59 y.o. Sex: male       Cardiovascular Function/Vital Signs  Visit Vitals    /83 (BP 1 Location: Left arm, BP Patient Position: At rest)    Pulse 82    Temp 37.2 °C (98.9 °F)    Resp 11    Ht 6' 5\" (1.956 m)    Wt 138 kg (304 lb 3.8 oz)    SpO2 100%    BMI 36.08 kg/m2       Patient is status post general anesthesia for Procedure(s):  INCISION AND DRAINAGEright lower back and right thigh abscesses. Nausea/Vomiting: None    Postoperative hydration reviewed and adequate. Pain:  Pain Scale 1: Numeric (0 - 10) (02/07/17 0045)  Pain Intensity 1: 0 (02/07/17 0045)   Managed    Neurological Status:   Neuro (WDL): Exceptions to WDL (02/07/17 0045)  Neuro  Neurologic State: Alert;Drowsy; Eyes open spontaneously (02/07/17 0045)  Orientation Level: Oriented to person;Oriented to place;Oriented to situation (02/07/17 0045)  Cognition: Follows commands (02/07/17 0045)  Speech: Clear (02/07/17 0045)  LUE Motor Response: Purposeful (02/07/17 0045)  LLE Motor Response: Purposeful (02/07/17 0045)  RUE Motor Response: Purposeful (02/07/17 0045)  RLE Motor Response: Purposeful (02/07/17 0045)   At baseline    Mental Status and Level of Consciousness: Arousable    Pulmonary Status:   O2 Device: Nasal cannula (02/07/17 0045)   Adequate oxygenation and airway patent    Complications related to anesthesia: None    Post-anesthesia assessment completed.  No concerns    Signed By: Ray De Jesus MD     February 7, 2017

## 2017-02-07 NOTE — PROGRESS NOTES
End of Shift Nursing Note    Bedside shift change report given to Meka(oncoming nurse) by Rachelle Arshad (offgoing nurse). Report included the following information SBAR, Kardex, Intake/Output and MAR. Zone Phone:       Significant changes during shift:       Non-emergent issues for physician to address:        Number times ambulated in hallway past shift: 0      Number of times OOB to chair past shift: 1    POD #:      Vital Signs:    Temp: 98.6 °F (37 °C)     Pulse (Heart Rate): 74     BP: 155/80     Resp Rate: 16     O2 Sat (%): 98 %    Lines & Drains:     Urinary Catheter? NO   Placement Date:    Medical Necessity:   Central Line? NO   Placement Date:    Medical Necessity:   PICC Line? NO   Placement Date:    Medical Necessity:     NG tube [] in [] removed [] not applicable   Drains [] in [] removed [] not applicable     Skin Integrity:      Wounds: yes   Dressings Present: yes    Wound Concerns: no      GI:    Current diet:  DIET NPO Except Meds    Nausea: NO  Vomiting: NO  Bowel Sounds: YES  Flatus: YES  Last Bowel Movement: yesterday   Appearance:     Respiratory:  Supplemental O2: NO      Device:    via  Liters/min     Incentive Spirometer: NO  Volume:   Coughing and Deep Breathing: YES  Oral Care: YES  Understanding (patient/family education): YES   Getting out of bed: YES  Head of bed elevation: YES    Patient Safety:    Falls Score: 1  Mobility Score: 1  Bed Alarm On? NO  Sitter? NO      Opportunity for questions and clarification was given to oncoming nurse. Patient bed is in semi fowlers position, side rails are up x 2  , door & observation blinds open as needed, call bell within reach and patient not in distress.     [facility]

## 2017-02-07 NOTE — PROGRESS NOTES
Surgery    No complaint. T max 99, VSS. Dressings in place at operative sites. BID outer dressing changes. I will change packing tomorrow.     Preet Hernandez MD

## 2017-02-07 NOTE — CONSULTS
Hospitalist Consultation Note    NAME: Cesar Leigh   :  1955   MRN:  [id number]     Date/Time:  2017 9:28 AM    Patient PCP: Pearl Fong NP    I have been asked to see this patient by the attending, Dr. Courtney Avalos , for advice/opinion re: DM.  My advice is:  ________________________________________________________________________   Assessment &  Plan:  NOTE:  Patient has a second MRN  [id number]    Uncontrolled DM type 2 with hyperglycemia, POA  --uncontrolled A1c 10.3 5/10/16, down from 12.1 10/15  --this is in part what is contributing to recurrent skin abscesses. Fasting serum glucose this .  --resume home insulin 70/30 60 units SQ BID. Continue medium dose SSI. Check A1c. Would like to see A1c come down to 6 to 7 eventually. --encourage patient to start exercise regimen of brisk walking 30 minutes each day for weight loss and diabetes control.  --he needs to get new glucometer and monitor his blood sugars at discharge; report he keeps losing glucometer at home      CKD stage 3-4  --baseline Cr at 2.3. Was 2.88 yesterday and now down to 2.44 with IVF. Continue to monitor. --ideally should be on an ACE-I or ARB given DM and proteinuria but defer this to Dr. Ruchi Ozuna his nephrologist since always has some degree of ARF in last 2 admissions. HTN  --resume amlodipine    P. afib  --currently in SR. Resume metoprolol, digoxin. --CHADS2 score = 2. He should be on aspirin. Used to be on it but had epistaxis last year and was told to stop. Will resume aspirin 81mg daily and monitor. Chronic anemia  --hgb 10.4, baseline 9-11. Hx hairy cell leukemia  and splenic lymphoma  --treated with chemotherapy by Dr. Marily Marroquin but has not followed up with her in long time. CT abdomen/pelvis 2016 with normal spleen    Recurrent skin abscesses, POA  --s/p debridement right lateral thigh and right lower back. On vancomycin d#2.   Await wound cultures, Gram stain with GPC. Attempted to do med reconciliation with patient. He reports having bag of meds with him yesterday (only 2 meds entered in current chart by nurse) which has been sent home. Reviewed discharge meds from 12/16 and tried to piece together his PTA med list as best could be done from patient's memory recall. Pharmacy consulted to reconcile pta med. Dr. Curt Jensen will follow with you starting 2/8/17       Subjective:   CHIEF COMPLAINT:  \"skin abscesses\"    HISTORY OF PRESENT ILLNESS:     Zoltan Espinoza is a 59 y.o.  male admitted yesterday from Dr. Ojeda [facility] office for right lateral thigh and right lower back skin abscess and underwent debridement of skin and SQ tissues. Started on vancomycin. WBC 6.7. Admitted 12/2016 with sepsis and buttock abscess; wound culture at that time grew out MSSA, was treated with vancomycin and Unasyn and discharged on Augmentin. Report wound care done by [facility] every other day. The current new abscesses arose about 3 weeks ago. Denies fever or chills. Eating well. Does not check his blood sugars at home because has lost 2 glucometers. Denies any hypoglycemia symptoms. Says blood sugars were better on last admission than before and has lost a little weight. We are consulted for DM management. He reports taking 70/30 units twice day. Does not think he is taking glimepiride as is currently entered into his chart or any other oral DM meds. Past Medical History   Diagnosis Date    Abscess of buttock 12/2016     wound culture grew out MSSA    CKD stage 4 due to type 2 diabetes mellitus (HCC)     Diabetes (HCC)     Paroxysmal a-fib ([facility] [address]       History reviewed. No pertinent past surgical history. Social History   Substance Use Topics    Smoking status: Not on file    Smokeless tobacco: Not on file    Alcohol use Not on file    , retired.     Family History   Problem Relation Age of Onset    Diabetes Mother     Hypertension Father       No Known Allergies     Prior to Admission medications    Medication Sig Start Date End Date Taking? Authorizing Provider   oxyCODONE (OXYIR) 5 mg capsule Take 5 mg by mouth every six (6) hours as needed. Yes Historical Provider   insulin NPH/insulin regular (HUMULIN 70/30) 100 unit/mL (70-30) injection 60 Units by SubCUTAneous route Before breakfast and dinner. Yes Historical Provider   amLODIPine (NORVASC) 10 mg tablet Take 10 mg by mouth daily. Yes Historical Provider   digoxin (LANOXIN) 0.125 mg tablet Take 0.125 mg by mouth daily. Yes Historical Provider   pravastatin (PRAVACHOL) 20 mg tablet Take 20 mg by mouth nightly. Yes Historical Provider   metoprolol tartrate (LOPRESSOR) 25 mg tablet Take  by mouth two (2) times a day. Dosage unknown   Yes Historical Provider   glimepiride (AMARYL) 1 mg tablet Take  by mouth Daily (before breakfast).  Dosage unknown    Historical Provider     Current Facility-Administered Medications   Medication Dose Route Frequency    metoprolol tartrate (LOPRESSOR) tablet 25 mg  25 mg Oral BID    sodium chloride (NS) flush 5-10 mL  5-10 mL IntraVENous Q8H    sodium chloride (NS) flush 5-10 mL  5-10 mL IntraVENous PRN    oxyCODONE-acetaminophen (PERCOCET) 5-325 mg per tablet 1-2 Tab  1-2 Tab Oral Q4H PRN    ondansetron (ZOFRAN) injection 4 mg  4 mg IntraVENous Q4H PRN    diphenhydrAMINE (BENADRYL) injection 12.5 mg  12.5 mg IntraVENous Q4H PRN    insulin lispro protamine/insulin lispro (HUMALOG MIX 75/25) injection 60 Units  60 Units SubCUTAneous ACB&D    insulin lispro (HUMALOG) injection   SubCUTAneous AC&HS    digoxin (LANOXIN) tablet 0.125 mg  0.125 mg Oral DAILY    amLODIPine (NORVASC) tablet 10 mg  10 mg Oral DAILY    naloxone (NARCAN) injection 0.4 mg  0.4 mg IntraVENous PRN    0.9% sodium chloride infusion  50 mL/hr IntraVENous CONTINUOUS    glucose chewable tablet 16 g  4 Tab Oral PRN    dextrose (D50W) injection syrg 12.5-25 g  12.5-25 g IntraVENous PRN    glucagon (GLUCAGEN) injection 1 mg  1 mg IntraMUSCular PRN    vancomycin (VANCOCIN) 1250 mg in  ml infusion  1,250 mg IntraVENous Q24H      REVIEW OF SYSTEMS:  BOLD = POSITIVE; negative = normal text  General:  fever, chills, sweats, weakness, weight loss/gain  Eyes: blurred vision, eye pain, loss of vision, diplopia  Ear Nose and Throat: rhinorrhea, pharyngitis, otalgia, tinnitus, speech or swallowing difficulties  Respiratory: cough, sputum production, SOB, wheezing, NAVARRO, pleuritic pain  Cardiology:   chest pain, palpitations, orthopnea, PND, edema, syncope   Gastrointestinal: abdominal pain, N/V, dysphagia, change in bowel habits, bleeding  Genitourinary: frequency, urgency, dysuria, hematuria, incontinence  Muskuloskeletal : arthralgia, myalgia  Hematology:  easy bruising, bleeding, lymphadenopathy  Dermatological: rash, ulceration, mole change, new lesion  Endocrine: hot flashes or polydipsia  Neurological:  headache, dizziness, confusion, focal weakness, paresthesia, memory loss, gait disturbance  Psychological:  anxiety, depression, agitation      Objective:   VITALS:    Visit Vitals    /82 (BP 1 Location: Left arm, BP Patient Position: At rest)    Pulse 76    Temp 98.1 °F (36.7 °C)    Resp 18    Ht 6' 5\" (1.956 m)    Wt 138 kg (304 lb 3.8 oz)    SpO2 96%    BMI 36.08 kg/m2     Temp (24hrs), Av.5 °F (36.9 °C), Min:97.8 °F (36.6 °C), Max:99.1 °F (37.3 °C)    PHYSICAL EXAM:    General:    Alert, overweight pleasant male, cooperative, no distress, appears stated age. HEENT: Atraumatic, anicteric sclerae, pink conjunctivae     No oral ulcers, mucosa moist, throat clear. Hearing intact. Neck:  Supple, symmetrical,  thyroid: non tender  Lungs:   Clear to auscultation bilaterally. No Wheezing or Rhonchi. No rales. Chest wall:  No tenderness  No Accessory muscle use. Heart:   Regular  rhythm,  No  murmur   No gallop. No edema  Abdomen:   Protuberant, non-tender. Bowel sounds normal. No masses  Extremities: No cyanosis. No clubbing  Skin:     Not pale Not Jaundiced  Dressing on right thigh clean. Psych:  Good insight. Not depressed. Not anxious or agitated. Neurologic: EOMs intact. No facial asymmetry. No aphasia or slurred speech. Symmetrical strength, Alert and oriented X 3.     ________________________________________________________________________  Care Plan discussed with:    Comments   Patient x    Family      RN x    Care Manager                    Consultant:      ________________________________________________________________________  TOTAL TIME:  45 minutes  ________________________________________________________________________  Citlalli Maynard MD      Procedures: see electronic medical records for all procedures/Xrays and details which were not copied into this note but were reviewed prior to creation of Plan. LAB DATA REVIEWED:    Recent Results (from the past 24 hour(s))   CBC W/O DIFF    Collection Time: 02/06/17  2:26 PM   Result Value Ref Range    WBC 6.7 4.1 - 11.1 K/uL    RBC 3.56 (L) 4.10 - 5.70 M/uL    HGB 10.4 (L) 12.1 - 17.0 g/dL    HCT 31.2 (L) 36.6 - 50.3 %    MCV 87.6 80.0 - 99.0 FL    MCH 29.2 26.0 - 34.0 PG    MCHC 33.3 30.0 - 36.5 g/dL    RDW 13.1 11.5 - 14.5 %    PLATELET 371 761 - 609 K/uL   METABOLIC PANEL, COMPREHENSIVE    Collection Time: 02/06/17  2:26 PM   Result Value Ref Range    Sodium 138 136 - 145 mmol/L    Potassium 4.2 3.5 - 5.1 mmol/L    Chloride 105 97 - 108 mmol/L    CO2 25 21 - 32 mmol/L    Anion gap 8 5 - 15 mmol/L    Glucose 337 (H) 65 - 100 mg/dL    BUN 27 (H) 6 - 20 MG/DL    Creatinine 2.88 (H) 0.70 - 1.30 MG/DL    BUN/Creatinine ratio 9 (L) 12 - 20      GFR est AA 27 (L) >60 ml/min/1.73m2    GFR est non-AA 22 (L) >60 ml/min/1.73m2    Calcium 8.8 8.5 - 10.1 MG/DL    Bilirubin, total 0.3 0.2 - 1.0 MG/DL    ALT (SGPT) 27 12 - 78 U/L    AST (SGOT) 13 (L) 15 - 37 U/L    Alk.  phosphatase 160 (H) 45 - 117 U/L    Protein, total 6.3 (L) 6.4 - 8.2 g/dL    Albumin 2.9 (L) 3.5 - 5.0 g/dL    Globulin 3.4 2.0 - 4.0 g/dL    A-G Ratio 0.9 (L) 1.1 - 2.2     EKG, 12 LEAD, INITIAL    Collection Time: 02/06/17  3:19 PM   Result Value Ref Range    Ventricular Rate 79 BPM    Atrial Rate 79 BPM    P-R Interval 168 ms    QRS Duration 102 ms    Q-T Interval 362 ms    QTC Calculation (Bezet) 415 ms    Calculated P Axis 57 degrees    Calculated R Axis 9 degrees    Calculated T Axis 12 degrees    Diagnosis       Normal sinus rhythm  Nonspecific T wave abnormality  No previous ECGs available     GLUCOSE, POC    Collection Time: 02/06/17  4:17 PM   Result Value Ref Range    Glucose (POC) 284 (H) 65 - 100 mg/dL    Performed by Amelie Bustillos, POC    Collection Time: 02/06/17  6:55 PM   Result Value Ref Range    Glucose (POC) 224 (H) 65 - 100 mg/dL    Performed by [id number] [employer] , WOUND Walker Fluke STAIN    Collection Time: 02/06/17 11:22 PM   Result Value Ref Range    Special Requests: NO SPECIAL REQUESTS      GRAM STAIN OCCASIONAL  WBCS SEEN        GRAM STAIN OCCASIONAL  GRAM POSITIVE COCCI  IN CLUSTERS        Culture result: PENDING    CULTURE, WOUND Walker Fluke STAIN    Collection Time: 02/06/17 11:28 PM   Result Value Ref Range    Special Requests: NO SPECIAL REQUESTS      GRAM STAIN 4+  WBCS SEEN        GRAM STAIN 4+  GRAM POSITIVE COCCI  IN CLUSTERS        Culture result: PENDING    GLUCOSE, POC    Collection Time: 02/07/17 12:14 AM   Result Value Ref Range    Glucose (POC) 172 (H) 65 - 100 mg/dL    Performed by [id number], BASIC    Collection Time: 02/07/17  4:44 AM   Result Value Ref Range    Sodium 139 136 - 145 mmol/L    Potassium 4.3 3.5 - 5.1 mmol/L    Chloride 107 97 - 108 mmol/L    CO2 22 21 - 32 mmol/L    Anion gap 10 5 - 15 mmol/L    Glucose 201 (H) 65 - 100 mg/dL    BUN 23 (H) 6 - 20 MG/DL    Creatinine 2.45 (H) 0.70 - 1.30 MG/DL    BUN/Creatinine ratio 9 (L) 12 - 20      GFR est AA 33 (L) >60 ml/min/1.73m2    GFR est non-AA 27 (L) >60 ml/min/1.73m2    Calcium 8.2 (L) 8.5 - 10.1 MG/DL   GLUCOSE, POC    Collection Time: 02/07/17  6:04 AM   Result Value Ref Range    Glucose (POC) 270 (H) 65 - 100 mg/dL    Performed by Alvin Rowell (PCT)

## 2017-02-07 NOTE — ANESTHESIA PREPROCEDURE EVALUATION
Anesthetic History   No history of anesthetic complications            Review of Systems / Medical History  Patient summary reviewed, nursing notes reviewed and pertinent labs reviewed    Pulmonary  Within defined limits                 Neuro/Psych   Within defined limits           Cardiovascular    Hypertension              Exercise tolerance: >4 METS     GI/Hepatic/Renal         Renal disease: CRI       Endo/Other    Diabetes    Morbid obesity     Other Findings   Comments: Hairy cell leukemia           Physical Exam    Airway  Mallampati: III  TM Distance: 4 - 6 cm  Neck ROM: normal range of motion   Mouth opening: Normal     Cardiovascular  Regular rate and rhythm,  S1 and S2 normal,  no murmur, click, rub, or gallop             Dental  No notable dental hx       Pulmonary  Breath sounds clear to auscultation               Abdominal  GI exam deferred       Other Findings            Anesthetic Plan    ASA: 3  Anesthesia type: general    Monitoring Plan: BIS      Induction: Intravenous  Anesthetic plan and risks discussed with: Patient

## 2017-02-07 NOTE — PROGRESS NOTES
Pharmacy Medication Reconciliation     The patient's significant other, Nannette Cuevas was interviewed regarding current PTA medication list.    Recommendations/Findings: The following amendments were made to the patient's active medication list on file at [address]:     1)  Additions:       Clonidine       Carvedilol       Oxy/APAP    2)  Deletions:       Glimepiride       Metoprolol       Oxycodone    3)  Changes:       none      The patient was questioned regarding use of any other inhalers, topical products, over the counter medications, herbal medications, vitamin products or ophthalmic/nasal/otic medication use. Prior to Admission Medications   Prescriptions Last Dose Informant Patient Reported? Taking? amLODIPine (NORVASC) 10 mg tablet   Yes Yes   Sig: Take 10 mg by mouth daily. carvedilol (COREG) 25 mg tablet   Yes Yes   Sig: Take 25 mg by mouth two (2) times daily (with meals). cloNIDine HCl (CATAPRES) 0.1 mg tablet   Yes Yes   Sig: Take 0.1 mg by mouth two (2) times a day. digoxin (LANOXIN) 0.125 mg tablet   Yes Yes   Sig: Take 0.125 mg by mouth daily. insulin NPH/insulin regular (HUMULIN 70/30) 100 unit/mL (70-30) injection   Yes Yes   Si Units by SubCUTAneous route Before breakfast and dinner. oxyCODONE-acetaminophen (PERCOCET) 5-325 mg per tablet   Yes Yes   Sig: Take 1-2 Tabs by mouth every four (4) hours as needed for Pain.   pravastatin (PRAVACHOL) 20 mg tablet   Yes Yes   Sig: Take 20 mg by mouth nightly.       Facility-Administered Medications: None          Thank you,  Marek Anthony, [facility]

## 2017-02-07 NOTE — BRIEF OP NOTE
BRIEF OPERATIVE NOTE    Date of Procedure: 2/6/2017   Preoperative Diagnosis: abscess right hip  Postoperative Diagnosis: abscess right thigh and right lower back    Procedure(s):  Debridement of skin and subcutaneous tissues right lower back and right thigh abscesses  Surgeon(s) and Role:     * Scott Mahmood MD - Primary            Surgical Staff:  Circ-1: Tony Rabago RN  Circ-Relief: Naseem Markham RN  Scrub Tech-1: [employer]  Event Time In   Incision Start 2312   Incision Close 2352     Anesthesia: General   Estimated Blood Loss: 75 ml  Specimens:   ID Type Source Tests Collected by Time Destination   1 : right lower back abscess Abscess Abscess  Scott Mahmood MD 2/6/2017 2322 Microbiology   2 : right thigh abscess Abscess Abscess  Scott Mahmood MD 2/6/2017 2328 Microbiology      Findings: Purulence in subcutaneous tissues. Sites packed open.    Complications: none  Implants: * No implants in log *

## 2017-02-07 NOTE — PROGRESS NOTES
TRANSFER - IN REPORT:    Verbal report received from [address] [place] [address] (name) on Carin Means  being received from PACU (unit) for routine post - op      Report consisted of patients Situation, Background, Assessment and   Recommendations(SBAR). Information from the following report(s) SBAR, Kardex, OR Summary, Intake/Output, MAR and Recent Results was reviewed with the receiving nurse. Opportunity for questions and clarification was provided. Assessment completed upon patients arrival to unit and care assumed.

## 2017-02-07 NOTE — PROGRESS NOTES
The patient is a 59year old male who was admitted for with history of sepsis from abscesses of both buttocks associated with diabetes out of control. He has a history of PEDRO on ckd Baseline   Lactic acid normal, Acute on chronic renal failure, Hypertension, Diabetes uncontrolled, yperlipidemia: POA, Hairy cell leukemia on remission and Morbid obesity.   Care Management Interventions  PCP Verified by CM: Yes (Dr. Teri Stout, a month ago)  Transition of Care Consult (CM Consult): Discharge Planning  MyChart Signup: No  Discharge Durable Medical Equipment: No  Physical Therapy Consult: No  Occupational Therapy Consult: No  Speech Therapy Consult: No  Current Support Network: Other (lives with friend who assists patient as needed. received home health from [facility] for wound care PTA)  Plan discussed with Pt/Family/Caregiver: Yes (the patient plans to go home at discharge. )   CM will follow for discharge planning.   Pb Sharma RN #[phone]

## 2017-02-07 NOTE — WOUND CARE
Wound care consulted by staff nurse for x2 abscesses I&D'd last evening by Dr Naya Rosenberg. Dr Naya Rosenberg has since rounded on patient and wants outer dressings changed BID and states in his note that he will remove packing tomorrow. Dr Naya Rosenberg can re-consult wound care nurses if our services are needed.   Loree Xavier RN, CWON, zone ph# [phone]

## 2017-02-07 NOTE — OP NOTES
[facility]   [address] [facility], [address] [place] [address] Ave   OP NOTE       Name:  Kristen Quiñonez   MR#:  [id number]   :  1955   Account #:  [de-identified]    Surgery Date:  2017   Date of Adm:  2017       PREOPERATIVE DIAGNOSES: Abscesses, right anterior thigh and   right lower back. POSTOPERATIVE DIAGNOSES: Abscesses, right anterior thigh and   right lower back. PROCEDURES PERFORMED: Excisional debridement of skin and   subcutaneous tissue at abscess sites, right anterior thigh and right   lower back. ANESTHESIA: General.    SURGEON: Ela Escobedo MD      DESCRIPTION OF PROCEDURE: The patient was taken to the   operating room, placed on the operating table in supine position. General endotracheal anesthesia was induced. The patient was then   turned to a left lateral decubitus position. The patient had had draining sites, anterior thigh on the right and the   right lower back with associated pain. He had history of previous   subcutaneous abscesses. The patient had poorly controlled diabetes. Attention was first turned to the back. There was an opening about 5   mm across from which pus was draining. There was induration in this   area about 4 cm across. There was no overt necrosis of the skin. A clamp was passed through the opening and the cavity was palpated   with the clamp. An incision was then made longitudinally, which was   about 4 cm in length and covered the entire width of the cavity. This   exposed some necrotic subcutaneous tissue. There was also some   tissue which was almost sponge like and containing pus. I excised a   portion of the overlying skin using a scalpel and performed excisional   debridement of all the necrotic subcutaneous tissue as well as the   most severely infected subcutaneous tissue back to normal-appearing   subcutaneous tissue.  After debridement, the wound was opened about   4 cm across and was about 2 cm deep with an irregular contour. Hemostasis was obtained by electrocautery. The wound was then   packed first with a sterile 4 x 4. Attention was then turned to the right anterior thigh. Induration here   was about 5 cm across. There were also a 5 cm opening. The cavity   was explored by clamp. I had taken a culture of the right lower back   site and I also took culture from the right anterior thigh site. A   longitudinal incision was made, opening the cavity to its full diameter. . The necrotic   subcutaneous tissue in small quantities was found and this was   debrided away sharply with scissors. All the tissues then appeared viable. Then,   hemostasis was obtained by electrocautery. Some skin was also   excised with subcutaneous tissue at the thigh. The remaining wound at   the end of the procedure was about 4 cm across and about 2 cm deep   with irregular contour. I did not find evidence of any further deep   tracking at either site. The process seemed limited to the skin and   subcutaneous tissue. Sharp excisional debridement had been carried   out with a scalpel and scissors at the thigh site. Both sites were then packed with 1/2 inch iodoform and covered by dry   gauze. The patient tolerated the surgery and was taken to recovery   room in stable condition. SPECIMENS REMOVED: Debrided skin and subcutaneous tissue   from abscess sites to the right thigh and right lower back. ESTIMATED BLOOD LOSS: 75 mL.         MD JUAN Calderon / MARY GRACE   D:  02/07/2017   15:12   T:  02/07/2017   15:49   Job #:  [id number]

## 2017-02-07 NOTE — H&P
Surgery History and Physical    Subjective:      Porsha Shrestha is a 59 y.o. male with history of sepsis from abscesses of both buttocks associated with diabetes out of control. He presented to the office today with draining abscesses, one lateral right thigh and the other right lower back. He has not had fever. He is eating well. He denies anginal chest pain or dyspnea. PMH - PEDRO on ckd Baseline   Lactic acid normal  Acute on chronic renal failure  Hypertension  Diabetes uncontrolled  Hyperlipidemia: POA   Hairy cell leukemia on remission  Morbid obesity         PSH - drainage of abscess right and left buttocks Dec 2016     No family history on file. Social History   Substance Use Topics    Smoking status: Not on file    Smokeless tobacco: Not on file    Alcohol use Not on file      Prior to Admission medications    Not on File      No Known Allergies    Review of Systems:  Pertinent items are noted in the History of Present Illness. Objective:      Patient Vitals for the past 8 hrs:   BP Temp Pulse Resp SpO2 Height Weight   17 1922 155/80 98.6 °F (37 °C) 74 16 98 % - -   17 1539 - - - - - 6' 5\" (1.956 m) 138 kg (304 lb 3.8 oz)   17 1532 165/87 98 °F (36.7 °C) 75 16 93 % - -   17 1316 (!) 154/92 98.7 °F (37.1 °C) 83 16 97 % - -       Temp (24hrs), Av.4 °F (36.9 °C), Min:98 °F (36.7 °C), Max:98.7 °F (37.1 °C)      Physical Exam:  WD AA man,  NAD  HEENT - No jaundice. Lungs - Clear right and left  Heart - Regular without murmur  Abd - Soft, non tender, no mass. Ext - Purulent draining site right lateral thigh with induration diameter 4 cm. Back - right lower back at top of buttock - 5 cm induration with central open area draining pus. Left buttock - granulating 0.5 x 2.5 cm site, clean  Neuro - Alert, O x 3, moves all ext, speech normal        Assessment:     Two abscesses with some possible local necrosis, diabetes.     Plan:     Incision and drainage and debridement of abscess right thigh and right lower back. Risks discussed with patient in the office today.     Signed By: Stuart Garcia MD     February 6, 2017

## 2017-02-07 NOTE — PERIOP NOTES
TRANSFER - OUT REPORT:    Verbal report given to EMMY Ackerman (name) on Elsa Harrington  being transferred to [place] [id number] (unit) for routine post - op       Report consisted of patients Situation, Background, Assessment and   Recommendations(SBAR). Information from the following report(s) SBAR, Kardex, OR Summary, Procedure Summary, Intake/Output and MAR was reviewed with the receiving nurse. Lines:   Peripheral IV 02/06/17 Right Hand (Active)   Site Assessment Clean, dry, & intact 2/7/2017 12:48 AM   Phlebitis Assessment 0 2/7/2017 12:48 AM   Infiltration Assessment 0 2/7/2017 12:48 AM   Dressing Status Clean, dry, & intact 2/7/2017 12:48 AM   Dressing Type Tape;Transparent 2/7/2017 12:48 AM   Hub Color/Line Status Pink; Infusing 2/7/2017 12:48 AM        Opportunity for questions and clarification was provided.       Patient transported with:   O2 @ 3 liters  Registered Nurse

## 2017-02-08 LAB
ANION GAP BLD CALC-SCNC: 9 MMOL/L (ref 5–15)
BUN SERPL-MCNC: 20 MG/DL (ref 6–20)
BUN/CREAT SERPL: 8 (ref 12–20)
CALCIUM SERPL-MCNC: 8.7 MG/DL (ref 8.5–10.1)
CHLORIDE SERPL-SCNC: 108 MMOL/L (ref 97–108)
CO2 SERPL-SCNC: 23 MMOL/L (ref 21–32)
CREAT SERPL-MCNC: 2.38 MG/DL (ref 0.7–1.3)
DATE LAST DOSE: ABNORMAL
EST. AVERAGE GLUCOSE BLD GHB EST-MCNC: 235 MG/DL
GLUCOSE BLD STRIP.AUTO-MCNC: 205 MG/DL (ref 65–100)
GLUCOSE BLD STRIP.AUTO-MCNC: 215 MG/DL (ref 65–100)
GLUCOSE BLD STRIP.AUTO-MCNC: 231 MG/DL (ref 65–100)
GLUCOSE BLD STRIP.AUTO-MCNC: 252 MG/DL (ref 65–100)
GLUCOSE BLD STRIP.AUTO-MCNC: 255 MG/DL (ref 65–100)
GLUCOSE SERPL-MCNC: 232 MG/DL (ref 65–100)
HBA1C MFR BLD: 9.8 % (ref 4.2–6.3)
POTASSIUM SERPL-SCNC: 3.9 MMOL/L (ref 3.5–5.1)
REPORTED DOSE,DOSE: ABNORMAL UNITS
REPORTED DOSE/TIME,TMG: ABNORMAL
SERVICE CMNT-IMP: ABNORMAL
SODIUM SERPL-SCNC: 140 MMOL/L (ref 136–145)
VANCOMYCIN TROUGH SERPL-MCNC: 10.8 UG/ML (ref 5–10)

## 2017-02-08 PROCEDURE — 74011250637 HC RX REV CODE- 250/637: Performed by: HOSPITALIST

## 2017-02-08 PROCEDURE — 74011636637 HC RX REV CODE- 636/637: Performed by: HOSPITALIST

## 2017-02-08 PROCEDURE — 74011250637 HC RX REV CODE- 250/637: Performed by: SURGERY

## 2017-02-08 PROCEDURE — 36415 COLL VENOUS BLD VENIPUNCTURE: CPT | Performed by: SURGERY

## 2017-02-08 PROCEDURE — 74011250637 HC RX REV CODE- 250/637: Performed by: INTERNAL MEDICINE

## 2017-02-08 PROCEDURE — 80202 ASSAY OF VANCOMYCIN: CPT | Performed by: SURGERY

## 2017-02-08 PROCEDURE — 74011636637 HC RX REV CODE- 636/637: Performed by: INTERNAL MEDICINE

## 2017-02-08 PROCEDURE — 82962 GLUCOSE BLOOD TEST: CPT

## 2017-02-08 PROCEDURE — 83036 HEMOGLOBIN GLYCOSYLATED A1C: CPT | Performed by: HOSPITALIST

## 2017-02-08 PROCEDURE — 74011250636 HC RX REV CODE- 250/636: Performed by: SURGERY

## 2017-02-08 PROCEDURE — 80048 BASIC METABOLIC PNL TOTAL CA: CPT | Performed by: SURGERY

## 2017-02-08 PROCEDURE — 65270000029 HC RM PRIVATE

## 2017-02-08 RX ORDER — VANCOMYCIN/0.9 % SOD CHLORIDE 1.5G/250ML
1500 PLASTIC BAG, INJECTION (ML) INTRAVENOUS EVERY 24 HOURS
Status: DISCONTINUED | OUTPATIENT
Start: 2017-02-09 | End: 2017-02-09

## 2017-02-08 RX ORDER — CARVEDILOL 12.5 MG/1
25 TABLET ORAL 2 TIMES DAILY WITH MEALS
Status: DISCONTINUED | OUTPATIENT
Start: 2017-02-08 | End: 2017-02-10 | Stop reason: HOSPADM

## 2017-02-08 RX ORDER — ASPIRIN 81 MG/1
81 TABLET ORAL DAILY
Qty: 30 TAB | Refills: 0 | Status: SHIPPED
Start: 2017-02-08 | End: 2017-02-10

## 2017-02-08 RX ORDER — HYDRALAZINE HYDROCHLORIDE 20 MG/ML
10 INJECTION INTRAMUSCULAR; INTRAVENOUS
Status: DISCONTINUED | OUTPATIENT
Start: 2017-02-08 | End: 2017-02-10 | Stop reason: HOSPADM

## 2017-02-08 RX ADMIN — ASPIRIN 81 MG: 81 TABLET, COATED ORAL at 08:35

## 2017-02-08 RX ADMIN — INSULIN LISPRO 60 UNITS: 100 INJECTION, SUSPENSION SUBCUTANEOUS at 08:36

## 2017-02-08 RX ADMIN — INSULIN LISPRO 3 UNITS: 100 INJECTION, SOLUTION INTRAVENOUS; SUBCUTANEOUS at 08:36

## 2017-02-08 RX ADMIN — INSULIN LISPRO 3 UNITS: 100 INJECTION, SOLUTION INTRAVENOUS; SUBCUTANEOUS at 16:23

## 2017-02-08 RX ADMIN — INSULIN LISPRO 3 UNITS: 100 INJECTION, SOLUTION INTRAVENOUS; SUBCUTANEOUS at 23:25

## 2017-02-08 RX ADMIN — VANCOMYCIN HYDROCHLORIDE 1250 MG: 10 INJECTION, POWDER, LYOPHILIZED, FOR SOLUTION INTRAVENOUS at 17:33

## 2017-02-08 RX ADMIN — DIGOXIN 0.12 MG: 125 TABLET ORAL at 08:35

## 2017-02-08 RX ADMIN — INSULIN LISPRO 5 UNITS: 100 INJECTION, SOLUTION INTRAVENOUS; SUBCUTANEOUS at 12:15

## 2017-02-08 RX ADMIN — METOPROLOL TARTRATE 25 MG: 25 TABLET ORAL at 08:36

## 2017-02-08 RX ADMIN — AMLODIPINE BESYLATE 10 MG: 5 TABLET ORAL at 08:35

## 2017-02-08 RX ADMIN — CARVEDILOL 25 MG: 12.5 TABLET, FILM COATED ORAL at 16:21

## 2017-02-08 RX ADMIN — INSULIN LISPRO 65 UNITS: 100 INJECTION, SUSPENSION SUBCUTANEOUS at 16:23

## 2017-02-08 RX ADMIN — Medication 10 ML: at 23:25

## 2017-02-08 RX ADMIN — Medication 10 ML: at 16:22

## 2017-02-08 NOTE — ADT AUTH CERT NOTES
Cellulitis - Care Day 2 (2/7/2017) by Elian Chappell RN        Review Status Review Entered       Completed 2/8/2017       Details              Care Day: 2 Care Date: 2/7/2017 Level of Care: Inpatient Floor       Guideline Day 2        Clinical Status       (X) * Dehydration absent       (X) * Mental status at baseline       (X) * Hemodynamic stability       2/8/2017 8:21 AM EST by Hedy Moran         98.8, 81, 16, 93%, 182/87              (X) * Fever absent or improved              Routes       (X) * Oral hydration, medications       2/8/2017 8:21 AM EST by Hedy Moran         po bp meds, asp, lanoxin, lopressor                     Medications       (X) IV antibiotics       2/8/2017 8:21 AM EST by Hedy Moran         vanc ivpb q d                     2/8/2017 11:04 AM EST by Hedy Moran       Subject: Additional Clinical Information       GLUCOSE LEVELS on 2/7: 172, 201, 270, 233, 307, 340              2/8/2017 8:21 AM EST by Hedy Moran       Subject: Additional Clinical Information       Labs: glu 201, bun 23, creat 2.45, ca 8.2Meds: insulin sc q 4 h, insulin sc q 6 h (discontinued)                                   * Milestone              Additional Notes       Internal Med       CHIEF COMPLAINT: \"skin abscesses\"               HISTORY OF PRESENT ILLNESS:        Savage Fitzgerald is a 59 y.o. male admitted yesterday from Dr. Dony Hollins office for right lateral thigh and right lower back skin abscess and underwent debridement of skin and SQ tissues. Started on vancomycin. WBC 6.7. Admitted 12/2016 with sepsis and buttock abscess; wound culture at that time grew out MSSA, was treated with vancomycin and Unasyn and discharged on Augmentin. Report wound care done by Andry Ferrara [facility] every other day. The current new abscesses arose about 3 weeks ago. Denies fever or chills. Eating well. Does not check his blood sugars at home because has lost 2 glucometers. Denies any hypoglycemia symptoms. Says blood sugars were better on last admission than before and has lost a little weight.               We are consulted for DM management. He reports taking 70/30 units twice day. Does not think he is taking glimepiride as is currently entered into his chart or any other oral DM meds.              Assessment & Plan:       NOTE: Patient has a second MRN [id number]               Uncontrolled DM type 2 with hyperglycemia, POA       --uncontrolled A1c 10.3 5/10/16, down from 12.1 10/15       --this is in part what is contributing to recurrent skin abscesses. Fasting serum glucose this .       --resume home insulin 70/30 60 units SQ BID. Continue medium dose SSI. Check A1c. Would like to see A1c come down to 6 to 7 eventually.       --encourage patient to start exercise regimen of brisk walking 30 minutes each day for weight loss and diabetes control.       --he needs to get new glucometer and monitor his blood sugars at discharge; report he keeps losing glucometer at home                CKD stage 3-4       --baseline Cr at 2.3. Was 2.88 yesterday and now down to 2.44 with IVF. Continue to monitor.       --ideally should be on an ACE-I or ARB given DM and proteinuria but defer this to Dr. Paul Beth his nephrologist since always has some degree of ARF in last 2 admissions.               HTN       --resume amlodipine               P. afib       --currently in SR. Resume metoprolol, digoxin.       --CHADS2 score = 2. He should be on aspirin. Used to be on it but had epistaxis last year and was told to stop. Will resume aspirin 81mg daily and monitor.               Chronic anemia       --hgb 10.4, baseline 9-11.               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 Hernandez Lobato but has not followed up with her in long time.  CT abdomen/pelvis 5/2016 with normal spleen               Recurrent skin abscesses, POA       --s/p debridement right lateral thigh and right lower back. On vancomycin d#2. Await wound cultures, Gram stain with GPC.               Attempted to do med reconciliation with patient. He reports having bag of meds with him yesterday (only 2 meds entered in current chart by nurse) which has been sent home. Reviewed discharge meds from 12/16 and tried to piece together his PTA med list as best could be done from patient's memory recall. Pharmacy consulted to reconcile pta med. [facility] Plaza will follow with you starting 2/8/17              Surgery               No complaint.               T max 99, VSS.               Dressings in place at operative sites.               BID outer dressing changes.  I will change packing tomorrow.           Cellulitis - Care Day 1 (2/6/2017) by Adriana Mackay RN        Review Status Review Entered       Completed 2/8/2017       Details              Care Day: 1 Care Date: 2/6/2017 Level of Care: Inpatient Floor       Guideline Day 1        Clinical Status       (X) * Clinical Indications met [D]                                   * Milestone

## 2017-02-08 NOTE — DIABETES MGMT
[facility]    Elevated A1C Visit Note    Recommendations/ Comments: Met with pt for A1C >9%. Pt stated that he has functioning meter at home but just needs to start checking. He stated that he can feel when his BG is up and that is why he does not always check, encouraged to check BG at least 2x/day. Pt stated that last summer he was switched from Lantus to Novolin 70/30 and has been trying to get his A1c down. Provided pt with education materials and encouraged to call outpt office and schedule education after d/c. Patient is a 59 y.o. male with known history of Type 2 Diabetes on intensive insulin injection program at home. A1c:   Lab Results   Component Value Date/Time    Hemoglobin A1c 9.8 02/08/2017 04:13 AM       Recent Glucose Results:   Lab Results   Component Value Date/Time     (H) 02/08/2017 04:13 AM    GLUCPOC 255 (H) 02/08/2017 11:27 AM    GLUCPOC 231 (H) 02/08/2017 07:43 AM    GLUCPOC 252 (H) 02/08/2017 06:20 AM        Lab Results   Component Value Date/Time    Creatinine 2.38 02/08/2017 04:13 AM       Active Orders   Diet    DIET DIABETIC WITH OPTIONS Consistent Carb 1800kcal; Regular          Assessed and instructed patient on the following:   ·  interpretation of lab results, blood sugar goals, complications of diabetes mellitus, SMBG skills, nutrition, referred to Diabetes Educator, site rotation and use of insulin pen    Provided patient with the following: [x]          Diabetes Self-Care Guide               [x]          Insulin education materials               []          Diabetes survival skills handout               []          BG guidelines for post-op patients               []          \"Decreasing  the Cost of Diabetes Care\"               [x]          Outpatient DTC contact number            Will continue to follow as needed. Thank you.     Kobi Marie, [facility]  Office: [phone]

## 2017-02-08 NOTE — PROGRESS NOTES
Surgery    T max 99, VSS. No complaint. Dressing and packing changed on operative wounds. Both clean. Repacked with Iodoform. Start BID Carrasyn gel soaked packing tomorrow.     Leti Barrientos MD

## 2017-02-08 NOTE — PROGRESS NOTES
Hospitalist Progress Note    NAME: An Prado   :  1955   MRN:  [id number]       Interim Hospital Summary: 59 y.o. male whom presented on 2017 with      Assessment & Plan:  Patient has a second MRN [id number]     Uncontrolled DM type 2 with hyperglycemia POA  A1c 9.8 this admission with 10.3 5/10/16, down from 12.1 10/15  BS in 200s  Increase 70/30 to 65units BID, if goes home then will need to be on the same and closely followup with PCP for further adjustments (he understands)  Continue SSI       CKD stage 3-4  Cr stable  Recommend continue Op followup with dr Bernard Montilla  Will leave decision for ACE/ARBs to nephrology OP in settings of CKD and DM     HTN  Continue amlodipine  Takes coreg at home rather metoprolol, will change back  Pt declines to be on clonidine any more, will continue to hold off  Add PRn IV hydralazine     P. afib  Currently in SR  Coreg and digoxin as above  Started ASA     Chronic anemia  Hb stable     Hx hairy cell leukemia  and splenic lymphoma  Treated with chemotherapy by Dr. Giovanni Lomas but has not followed up with her in long time. CT abdomen/pelvis 2016 with normal spleen     Recurrent skin abscesses, POA  s/p debridement right lateral thigh and right lower back. On vancomycin d#3  Wound cultures with Staph Aureus, awaiting final sensitivities      Subjective: Pt seen and examined at bedside. NAD. Feels the same. Overnight events d/w RN   CHIEF COMPLAINT: f/u \"skin abscesses\"       Review of Systems:  Symptom Y/N Comments  Symptom Y/N Comments   Fever/Chills n   Chest Pain n    Poor Appetite    Edema     Cough n   Abdominal Pain n    Sputum    Joint Pain     SOB/NAVARRO    Pruritis/Rash     Nausea/vomit n   Tolerating PT/OT     Diarrhea    Tolerating Diet y    Constipation    Other       Could NOT obtain due to:      Objective:     VITALS:   Last 24hrs VS reviewed since prior progress note.  Most recent are:  Patient Vitals for the past 24 hrs:   Temp Pulse Resp BP SpO2 02/08/17 1218 - 73 - (!) 160/92 -   02/08/17 1122 98.9 °F (37.2 °C) 70 18 (!) 190/101 94 %   02/08/17 0738 98.7 °F (37.1 °C) 75 18 (!) 196/97 96 %   02/08/17 0623 99 °F (37.2 °C) 76 18 (!) 176/92 96 %   02/07/17 2312 98.7 °F (37.1 °C) 84 18 182/89 92 %   02/07/17 1910 98.8 °F (37.1 °C) 81 16 182/87 93 %       Intake/Output Summary (Last 24 hours) at 02/08/17 1510  Last data filed at 02/08/17 1217   Gross per 24 hour   Intake             1410 ml   Output             2750 ml   Net            -1340 ml        PHYSICAL EXAM:  General: WD, WN. Alert, cooperative, no acute distress    EENT:  EOMI. Anicteric sclerae. MMM  Resp:  CTA bilaterally, no wheezing or rales. No accessory muscle use  CV:  Regular  rhythm,  No edema  GI:  Soft, Non distended, Non tender.  +Bowel sounds  Neurologic:  Alert and oriented X 3, normal speech,   Psych:   Good insight. Not anxious nor agitated  Skin:  Right leg wounds, No rashes. No jaundice    Reviewed most current lab test results and cultures  YES  Reviewed most current radiology test results   YES  Review and summation of old records today    NO  Reviewed patient's current orders and MAR    YES  PMH/SH reviewed - no change compared to H&P  ________________________________________________________________________  Care Plan discussed with:    Comments   Patient y    Family      RN y    Care Manager     Consultant                        Multidiciplinary team rounds were held today with , nursing, pharmacist and clinical coordinator. Patient's plan of care was discussed; medications were reviewed and discharge planning was addressed.      ________________________________________________________________________  Total NON critical care TIME: 35   Minutes    Total CRITICAL CARE TIME Spent:   Minutes non procedure based      Comments   >50% of visit spent in counseling and coordination of care     ________________________________________________________________________  Katarina Echoows Cesar Sandoval MD     Procedures: see electronic medical records for all procedures/Xrays and details which were not copied into this note but were reviewed prior to creation of Plan. LABS:  I reviewed today's most current labs and imaging studies.   Pertinent labs include:  Recent Labs      02/06/17   1426   WBC  6.7   HGB  10.4*   HCT  31.2*   PLT  151     Recent Labs      02/08/17   0413  02/07/17   0444  02/06/17   1426   NA  140  139  138   K  3.9  4.3  4.2   CL  108  107  105   CO2  23  22  25   GLU  232*  201*  337*   BUN  20  23*  27*   CREA  2.38*  2.45*  2.88*   CA  8.7  8.2*  8.8   ALB   --    --   2.9*   TBILI   --    --   0.3   SGOT   --    --   13*   ALT   --    --   27       Signed: Jenny Godoy MD

## 2017-02-08 NOTE — ADT AUTH CERT NOTES
[phone]         Utilization Review           Cellulitis - Care Day 2 (2/7/2017) by Fabiana Collado RN        Review Status Review Entered       Completed 2/8/2017       Details              Care Day: 2 Care Date: 2/7/2017 Level of Care: Inpatient Floor       Guideline Day 2        Clinical Status       (X) * Dehydration absent       (X) * Mental status at baseline       (X) * Hemodynamic stability       2/8/2017 8:21 AM EST by Jessie Mcarthur         98.8, 81, 16, 93%, 182/87              (X) * Fever absent or improved              Routes       (X) * Oral hydration, medications       2/8/2017 8:21 AM EST by Jessie Mcarthur         po bp meds, asp, lanoxin, lopressor                     Medications       (X) IV antibiotics       2/8/2017 8:21 AM EST by Jessie Mcarthur         vanc ivpb q d                     2/8/2017 8:21 AM EST by Jessie Mcarthur       Subject: Additional Clinical Information       Labs: glu 201, bun 23, creat 2.45, ca 8.2Meds: insulin sc q 4 h, insulin sc q 6 h (discontinued)                                   * Milestone              Additional Notes       Internal Med       CHIEF COMPLAINT: \"skin abscesses\"               HISTORY OF PRESENT ILLNESS:        Jeromy Buitrago is a 59 y.o. male admitted yesterday from Dr. Trey Luo office for right lateral thigh and right lower back skin abscess and underwent debridement of skin and SQ tissues. Started on vancomycin. WBC 6.7. Admitted 12/2016 with sepsis and buttock abscess; wound culture at that time grew out MSSA, was treated with vancomycin and Unasyn and discharged on Augmentin. Report wound care done by [facility] every other day. The current new abscesses arose about 3 weeks ago. Denies fever or chills. Eating well. Does not check his blood sugars at home because has lost 2 glucometers. Denies any hypoglycemia symptoms.  Says blood sugars were better on last admission than before and has lost a little weight.               We are consulted for DM management. He reports taking 70/30 units twice day. Does not think he is taking glimepiride as is currently entered into his chart or any other oral DM meds.              Assessment & Plan:       NOTE: Patient has a second MRN [id number]               Uncontrolled DM type 2 with hyperglycemia, POA       --uncontrolled A1c 10.3 5/10/16, down from 12.1 10/15       --this is in part what is contributing to recurrent skin abscesses. Fasting serum glucose this .       --resume home insulin 70/30 60 units SQ BID. Continue medium dose SSI. Check A1c. Would like to see A1c come down to 6 to 7 eventually.       --encourage patient to start exercise regimen of brisk walking 30 minutes each day for weight loss and diabetes control.       --he needs to get new glucometer and monitor his blood sugars at discharge; report he keeps losing glucometer at home                CKD stage 3-4       --baseline Cr at 2.3. Was 2.88 yesterday and now down to 2.44 with IVF. Continue to monitor.       --ideally should be on an ACE-I or ARB given DM and proteinuria but defer this to Dr. Bradford Gooden his nephrologist since always has some degree of ARF in last 2 admissions.               HTN       --resume amlodipine               P. afib       --currently in SR. Resume metoprolol, digoxin.       --CHADS2 score = 2. He should be on aspirin. Used to be on it but had epistaxis last year and was told to stop. Will resume aspirin 81mg daily and monitor.               Chronic anemia       --hgb 10.4, baseline 9-11.               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 Sukhwinder Harris but has not followed up with her in long time. CT abdomen/pelvis 5/2016 with normal spleen               Recurrent skin abscesses, POA       --s/p debridement right lateral thigh and right lower back. On vancomycin d#2. Await wound cultures, Gram stain with GPC.                Attempted to do med reconciliation with patient. He reports having bag of meds with him yesterday (only 2 meds entered in current chart by nurse) which has been sent home. Reviewed discharge meds from 12/16 and tried to piece together his PTA med list as best could be done from patient's memory recall. Pharmacy consulted to reconcile pta med. [address]bara [id number] [facility] will follow with you starting 2/8/17              Surgery               No complaint.               T max 99, VSS.               Dressings in place at operative sites.               BID outer dressing changes.  I will change packing tomorrow.           Cellulitis - Care Day 1 (2/6/2017) by Alonzo Jaimes RN        Review Status Review Entered       Completed 2/8/2017       Details              Care Day: 1 Care Date: 2/6/2017 Level of Care: Inpatient Floor       Guideline Day 1        Clinical Status       (X) * Clinical Indications met [D]                                   * Milestone

## 2017-02-08 NOTE — DIABETES MGMT
DTC Progress Note    Recommendations/ Comments: If appropriate, please consider continuing to titrate insulin until fasting BG <200mg/dL. Also may consider changing correctional insulin scale to insulin resistant. Chart reviewed on Leona Ca for hyperglycemia. Patient is a 59 y.o. male with known history of Type 2 Diabetes on Humulin 70/30 60 units ac breakfast and dinner at home. A1c:   Lab Results   Component Value Date/Time    Hemoglobin A1c 9.8 02/08/2017 04:13 AM       Recent Glucose Results: Lab Results   Component Value Date/Time     (H) 02/08/2017 04:13 AM    GLUCPOC 231 (H) 02/08/2017 07:43 AM    GLUCPOC 252 (H) 02/08/2017 06:20 AM    GLUCPOC 340 (H) 02/07/2017 09:50 PM        Lab Results   Component Value Date/Time    Creatinine 2.38 02/08/2017 04:13 AM       Active Orders   Diet    DIET DIABETIC WITH OPTIONS Consistent Carb 1800kcal; Regular        PO intake: Patient Vitals for the past 72 hrs:   % Diet Eaten   02/08/17 0840 100 %   02/07/17 1250 100 %   02/07/17 0910 100 %       Current hospital DM medication:   -Humalog Mix 75/25 - 60 units bid  -Humalog normal sensitivity correction     Will continue to follow as needed.     Thank you    Eugene Hackett, [id number]  Office: [phone]

## 2017-02-08 NOTE — PROGRESS NOTES
End of Shift Nursing Note    Bedside shift change report given to Francisca Moore (oncoming nurse) by Marbella Rice (offgoing nurse). Report included the following information SBAR. Zone Phone:       Significant changes during shift:    none   Non-emergent issues for physician to address:   none     Number times ambulated in hallway past shift: 0      Number of times OOB to chair past shift: 0    POD #:      Vital Signs:    Temp: 98.7 °F (37.1 °C)     Pulse (Heart Rate): 84     BP: 182/89     Resp Rate: 18     O2 Sat (%): 92 %    Lines & Drains:     Urinary Catheter? NO       NG tube [] in [] removed [x] not applicable   Drains [] in [] removed [x] not applicable     Skin Integrity:      Wounds: yes   Dressings Present: yes    Wound Concerns: no      GI:    Current diet:  DIET DIABETIC WITH OPTIONS Consistent Carb 1800kcal; Regular    Nausea: NO  Vomiting: NO  Bowel Sounds: YES  Flatus: YES  Last Bowel Movement: several days ago   Appearance:     Respiratory:  Supplemental O2: NO       Incentive Spirometer: YES  Volume:   Coughing and Deep Breathing: YES  Oral Care: YES  Understanding (patient/family education): YES   Getting out of bed: YES  Head of bed elevation: YES    Patient Safety:    Falls Score: 2  Mobility Score: 3  Bed Alarm On? NO  Sitter? NO      Opportunity for questions and clarification was given to oncoming nurse. Patient bed is in lowest position, side rails are up x 2, door & observation blinds open as needed, call bell within reach and patient not in distress.     Haylie Alexander

## 2017-02-09 LAB
ANION GAP BLD CALC-SCNC: 9 MMOL/L (ref 5–15)
BACTERIA SPEC CULT: ABNORMAL
BUN SERPL-MCNC: 21 MG/DL (ref 6–20)
BUN/CREAT SERPL: 9 (ref 12–20)
CALCIUM SERPL-MCNC: 8.5 MG/DL (ref 8.5–10.1)
CHLORIDE SERPL-SCNC: 111 MMOL/L (ref 97–108)
CO2 SERPL-SCNC: 23 MMOL/L (ref 21–32)
CREAT SERPL-MCNC: 2.43 MG/DL (ref 0.7–1.3)
GLUCOSE BLD STRIP.AUTO-MCNC: 163 MG/DL (ref 65–100)
GLUCOSE BLD STRIP.AUTO-MCNC: 195 MG/DL (ref 65–100)
GLUCOSE BLD STRIP.AUTO-MCNC: 237 MG/DL (ref 65–100)
GLUCOSE BLD STRIP.AUTO-MCNC: 259 MG/DL (ref 65–100)
GLUCOSE SERPL-MCNC: 154 MG/DL (ref 65–100)
GRAM STN SPEC: ABNORMAL
GRAM STN SPEC: ABNORMAL
POTASSIUM SERPL-SCNC: 4 MMOL/L (ref 3.5–5.1)
SERVICE CMNT-IMP: ABNORMAL
SODIUM SERPL-SCNC: 143 MMOL/L (ref 136–145)

## 2017-02-09 PROCEDURE — 82962 GLUCOSE BLOOD TEST: CPT

## 2017-02-09 PROCEDURE — 65270000029 HC RM PRIVATE

## 2017-02-09 PROCEDURE — 74011250636 HC RX REV CODE- 250/636: Performed by: SURGERY

## 2017-02-09 PROCEDURE — 74011636637 HC RX REV CODE- 636/637: Performed by: HOSPITALIST

## 2017-02-09 PROCEDURE — 74011250636 HC RX REV CODE- 250/636: Performed by: INTERNAL MEDICINE

## 2017-02-09 PROCEDURE — 74011250637 HC RX REV CODE- 250/637: Performed by: INTERNAL MEDICINE

## 2017-02-09 PROCEDURE — 74011250637 HC RX REV CODE- 250/637: Performed by: HOSPITALIST

## 2017-02-09 PROCEDURE — 36415 COLL VENOUS BLD VENIPUNCTURE: CPT | Performed by: SURGERY

## 2017-02-09 PROCEDURE — 74011636637 HC RX REV CODE- 636/637: Performed by: INTERNAL MEDICINE

## 2017-02-09 PROCEDURE — 80048 BASIC METABOLIC PNL TOTAL CA: CPT | Performed by: SURGERY

## 2017-02-09 RX ORDER — CEFAZOLIN SODIUM IN 0.9 % NACL 2 G/100 ML
2 PLASTIC BAG, INJECTION (ML) INTRAVENOUS EVERY 8 HOURS
Status: DISCONTINUED | OUTPATIENT
Start: 2017-02-09 | End: 2017-02-10 | Stop reason: HOSPADM

## 2017-02-09 RX ADMIN — HYDRALAZINE HYDROCHLORIDE 10 MG: 20 INJECTION INTRAMUSCULAR; INTRAVENOUS at 04:38

## 2017-02-09 RX ADMIN — DIGOXIN 0.12 MG: 125 TABLET ORAL at 08:59

## 2017-02-09 RX ADMIN — HYDRALAZINE HYDROCHLORIDE 10 MG: 20 INJECTION INTRAMUSCULAR; INTRAVENOUS at 23:12

## 2017-02-09 RX ADMIN — INSULIN LISPRO 65 UNITS: 100 INJECTION, SUSPENSION SUBCUTANEOUS at 18:59

## 2017-02-09 RX ADMIN — INSULIN LISPRO 65 UNITS: 100 INJECTION, SUSPENSION SUBCUTANEOUS at 08:58

## 2017-02-09 RX ADMIN — Medication 10 ML: at 15:21

## 2017-02-09 RX ADMIN — INSULIN LISPRO 3 UNITS: 100 INJECTION, SOLUTION INTRAVENOUS; SUBCUTANEOUS at 12:17

## 2017-02-09 RX ADMIN — CEFAZOLIN 2 G: 10 INJECTION, POWDER, FOR SOLUTION INTRAVENOUS; PARENTERAL at 18:41

## 2017-02-09 RX ADMIN — CARVEDILOL 25 MG: 12.5 TABLET, FILM COATED ORAL at 18:39

## 2017-02-09 RX ADMIN — AMLODIPINE BESYLATE 10 MG: 5 TABLET ORAL at 08:59

## 2017-02-09 RX ADMIN — INSULIN LISPRO 2 UNITS: 100 INJECTION, SOLUTION INTRAVENOUS; SUBCUTANEOUS at 08:58

## 2017-02-09 RX ADMIN — CARVEDILOL 25 MG: 12.5 TABLET, FILM COATED ORAL at 08:59

## 2017-02-09 RX ADMIN — INSULIN LISPRO 5 UNITS: 100 INJECTION, SOLUTION INTRAVENOUS; SUBCUTANEOUS at 18:59

## 2017-02-09 RX ADMIN — ASPIRIN 81 MG: 81 TABLET, COATED ORAL at 08:59

## 2017-02-09 RX ADMIN — VANCOMYCIN HYDROCHLORIDE 1500 MG: 10 INJECTION, POWDER, LYOPHILIZED, FOR SOLUTION INTRAVENOUS at 15:28

## 2017-02-09 NOTE — PROGRESS NOTES
End of Shift Nursing Note    Bedside shift change report given to Benito Pelayo (oncoming nurse) by Laya Gold (offgoing nurse). Report included the following information SBAR. Zone Phone:       Significant changes during shift:    none   Non-emergent issues for physician to address:   none     Number times ambulated in hallway past shift: 0      Number of times OOB to chair past shift: 0    POD #:      Vital Signs:    Temp: 98.8 °F (37.1 °C)     Pulse (Heart Rate): 75     BP: 153/63     Resp Rate: 18     O2 Sat (%): 93 %    Lines & Drains:     Urinary Catheter? No       NG tube [] in [] removed [x] not applicable   Drains [] in [] removed [x] not applicable     Skin Integrity:      Wounds: yes   Dressings Present: yes    Wound Concerns: no      GI:    Current diet:  DIET DIABETIC WITH OPTIONS Consistent Carb 1800kcal; Regular    Nausea: NO  Vomiting: NO  Bowel Sounds: YESFlatus: YES  Last Bowel Movement: several days ago   Appearance:     Respiratory:  Supplemental O2: No      Incentive Spirometer: YES  Volume:   Coughing and Deep Breathing: YES  Oral Care: YES  Understanding (patient/family education): YES   Getting out of bed: YES  Head of bed elevation: YES    Patient Safety:    Falls Score: 2  Mobility Score: 3  Bed Alarm On? No  Sitter? No      Opportunity for questions and clarification was given to oncoming nurse. Patient bed is in low position, side rails are up x 2, door & observation blinds open as needed, call bell within reach and patient not in distress.     Richard Collado

## 2017-02-09 NOTE — PROGRESS NOTES
End of Shift Nursing Note    Bedside shift change report given to Gay RN (oncoming nurse) by Sariah Valladares RN (offgoing nurse). Report included the following information SBAR, Kardex, Intake/Output and MAR. Zone Phone:   [phone]    Significant changes during shift:    none   Non-emergent issues for physician to address:   None     Number times ambulated in hallway past shift: 0; Patient ambulates in room    Number of times OOB to chair past shift: 0    POD #: admitted 2-6-17     Vital Signs:    Temp: 98.7 °F (37.1 °C)     Pulse (Heart Rate): 75     BP: 195/84     Resp Rate: 18     O2 Sat (%): 95 %    Lines & Drains:     Urinary Catheter? No     Central Line? No     PICC Line? No       NG tube [] in [] removed [x] not applicable   Drains [] in [] removed [x] not applicable     Skin Integrity:      Wounds: yes   Dressings Present: yes    Wound Concerns: no      GI:    Current diet:  DIET DIABETIC WITH OPTIONS Consistent Carb 1800kcal; Regular    Nausea: NO  Vomiting: NO  Bowel Sounds: YES  Flatus: YES  Last Bowel Movement: today     Respiratory:  Supplemental O2: No     Incentive Spirometer: YES    Coughing and Deep Breathing: YES  Oral Care: YES  Understanding (patient/family education): YES   Getting out of bed: YES  Head of bed elevation: YES    Patient Safety:    Falls Score: 2  Mobility Score: 1  Bed Alarm On? No  Sitter? No      Opportunity for questions and clarification was given to oncoming nurse. Patient bed is in supine position, side rails are up x 2, call bell within reach and patient not in distress.     Christen Bay

## 2017-02-09 NOTE — PROGRESS NOTES
Surgery    No complaint. Culture growing MSSA. Switch to Ancef. I will inspect wounds tomorrow to determine when patient can be discharged.     Catia Devries MD

## 2017-02-09 NOTE — HOME CARE
Please note that this patient is open to [address] and PT services under the orders of Dr Naya Rosenberg. His home care episodes are open under the chart of Geneva Woodall. Please enter an order at hospital d/c to resume home care services under the chart of Geneva Woodall MRN [id number] as well.    Thank you,  Loy

## 2017-02-09 NOTE — PROGRESS NOTES
Pharmacy Automatic Renal Dosing Protocol - Antimicrobials      Indication for Antimicrobials: Abcess thigh and back, s/p debridement 2/6    Current Regimen of Each Antimicrobial (Start Day & Day of Therapy):  Vancomycin 3gm IV x1, then 1.25gm IV q24h; start 2/; Day #3     Goal Vancomycin Level (if needed): 10-15  Level(s) Ordered (if needed): n/a  Measured / Extrapolated Vancomycin Levels (if drawn):  10.8 at 17:08 17. True trough 10.1    Significant cultures:   : Abscesses: GPC clusters, Heavy probable staph aureus (pending)      CAPD, Intermittent Hemodialysis or Renal Replacement Therapy: NA  Paralysis, amputations, malnutrition: NA    Recent Labs      17   0413  17   0444  17   1426   CREA  2.38*  2.45*  2.88*   BUN  20  23*  27*   WBC   --    --   6.7     Temp (24hrs), Av.8 °F (37.1 °C), Min:98.7 °F (37.1 °C), Max:99 °F (37.2 °C)    Creatinine Clearance (ml/min): ~40    Impression/Plan: POD #2 Debridements. Will increase Vancomycin dose to 1500 mg IV q 24 hr for estimated trough of 12. SCr trending down. Pharmacy will follow daily and adjust doses of monitored medications as appropriate for renal function and/or serum levels.     Thank you,  Efrain Pearce, PHARMD

## 2017-02-09 NOTE — PROGRESS NOTES
Hospitalist Progress Note    NAME: Pineda Laws   :  1955   MRN:  [id number]       Interim Hospital Summary: 59 y.o. male whom presented on 2017 with      Assessment & Plan:  Patient has a second MRN [id number]     Uncontrolled DM type 2 with hyperglycemia POA  A1c 9.8 this admission with 10.3 5/10/16, down from 12.1 10/15  BS in 200s  Increased 70/30 to 65units BID, if goes home then will need to be on the same and closely followup with PCP for further adjustments (he understands)  Continue SSI       CKD stage 3-4  Cr stable  Recommend continue Op followup with dr Angelina Goodson  Will leave decision for ACE/ARBs to nephrology OP in settings of CKD and DM     HTN  Continue amlodipine  Takes coreg at home rather metoprolol, will change back  Pt declines to be on clonidine any more, will continue to hold off  Add PRn IV hydralazine     P. afib  Currently in SR  Coreg and digoxin as above  Started ASA     Chronic anemia  Hb stable     Hx hairy cell leukemia  and splenic lymphoma  Treated with chemotherapy by Dr. Jo Traore but has not followed up with her in long time. CT abdomen/pelvis 2016 with normal spleen     Recurrent skin abscesses, POA  s/p debridement right lateral thigh and right lower back. On vancomycin d#3  Wound cultures with Staph Aureus, awaiting final sensitivities      Subjective: Pt seen and examined at bedside. NAD. Feels the same. Overnight events d/w RN   CHIEF COMPLAINT: f/u \"skin abscesses\"       Review of Systems:  Symptom Y/N Comments  Symptom Y/N Comments   Fever/Chills n   Chest Pain n    Poor Appetite    Edema     Cough n   Abdominal Pain n    Sputum    Joint Pain     SOB/NAVARRO    Pruritis/Rash     Nausea/vomit n   Tolerating PT/OT     Diarrhea    Tolerating Diet y    Constipation    Other       Could NOT obtain due to:      Objective:     VITALS:   Last 24hrs VS reviewed since prior progress note.  Most recent are:  Patient Vitals for the past 24 hrs:   Temp Pulse Resp BP SpO2   02/09/17 0758 97.3 °F (36.3 °C) 90 18 (!) 167/95 98 %   02/09/17 0416 98.7 °F (37.1 °C) 76 18 (!) 185/101 97 %   02/08/17 2252 98.8 °F (37.1 °C) 75 18 153/63 93 %   02/08/17 1909 98.7 °F (37.1 °C) 73 18 146/76 93 %   02/08/17 1544 98.7 °F (37.1 °C) 75 18 195/84 95 %   02/08/17 1218 - 73 - (!) 160/92 -       Intake/Output Summary (Last 24 hours) at 02/09/17 1127  Last data filed at 02/09/17 0611   Gross per 24 hour   Intake              850 ml   Output             1850 ml   Net            -1000 ml        PHYSICAL EXAM:  General: WD, WN. Alert, cooperative, no acute distress    EENT:  EOMI. Anicteric sclerae. MMM  Resp:  CTA bilaterally, no wheezing or rales. No accessory muscle use  CV:  Regular  rhythm,  No edema  GI:  Soft, Non distended, Non tender.  +Bowel sounds  Neurologic:  Alert and oriented X 3, normal speech,   Psych:   Good insight. Not anxious nor agitated  Skin:  Right leg wounds, No rashes. No jaundice    Reviewed most current lab test results and cultures  YES  Reviewed most current radiology test results   YES  Review and summation of old records today    NO  Reviewed patient's current orders and MAR    YES  PMH/ reviewed - no change compared to H&P  ________________________________________________________________________  Care Plan discussed with:    Comments   Patient y    Family      RN y    Care Manager     Consultant                        Multidiciplinary team rounds were held today with , nursing, pharmacist and clinical coordinator. Patient's plan of care was discussed; medications were reviewed and discharge planning was addressed.      ________________________________________________________________________  Total NON critical care TIME: 20  Minutes    Total CRITICAL CARE TIME Spent:   Minutes non procedure based      Comments   >50% of visit spent in counseling and coordination of care ________________________________________________________________________  Jg Aguilera MD     Procedures: see electronic medical records for all procedures/Xrays and details which were not copied into this note but were reviewed prior to creation of Plan. LABS:  I reviewed today's most current labs and imaging studies.   Pertinent labs include:  Recent Labs      02/06/17   1426   WBC  6.7   HGB  10.4*   HCT  31.2*   PLT  151     Recent Labs      02/09/17   0428  02/08/17   0413  02/07/17   0444  02/06/17   1426   NA  143  140  139  138   K  4.0  3.9  4.3  4.2   CL  111*  108  107  105   CO2  23  23  22  25   GLU  154*  232*  201*  337*   BUN  21*  20  23*  27*   CREA  2.43*  2.38*  2.45*  2.88*   CA  8.5  8.7  8.2*  8.8   ALB   --    --    --   2.9*   TBILI   --    --    --   0.3   SGOT   --    --    --   13*   ALT   --    --    --   27       Signed: Jg Aguilera MD

## 2017-02-10 ENCOUNTER — HOME CARE VISIT (OUTPATIENT)
Dept: HOME HEALTH SERVICES | Facility: HOME HEALTH | Age: 62
End: 2017-02-10
Payer: MEDICARE

## 2017-02-10 VITALS
DIASTOLIC BLOOD PRESSURE: 97 MMHG | HEIGHT: 77 IN | TEMPERATURE: 98.4 F | HEART RATE: 80 BPM | WEIGHT: 304.24 LBS | SYSTOLIC BLOOD PRESSURE: 166 MMHG | OXYGEN SATURATION: 96 % | RESPIRATION RATE: 18 BRPM | BODY MASS INDEX: 35.92 KG/M2

## 2017-02-10 LAB
ANION GAP BLD CALC-SCNC: 9 MMOL/L (ref 5–15)
BACTERIA SPEC CULT: ABNORMAL
BACTERIA SPEC CULT: ABNORMAL
BUN SERPL-MCNC: 19 MG/DL (ref 6–20)
BUN/CREAT SERPL: 8 (ref 12–20)
CALCIUM SERPL-MCNC: 8.8 MG/DL (ref 8.5–10.1)
CHLORIDE SERPL-SCNC: 109 MMOL/L (ref 97–108)
CO2 SERPL-SCNC: 23 MMOL/L (ref 21–32)
CREAT SERPL-MCNC: 2.36 MG/DL (ref 0.7–1.3)
GLUCOSE BLD STRIP.AUTO-MCNC: 143 MG/DL (ref 65–100)
GLUCOSE BLD STRIP.AUTO-MCNC: 229 MG/DL (ref 65–100)
GLUCOSE SERPL-MCNC: 122 MG/DL (ref 65–100)
GRAM STN SPEC: ABNORMAL
GRAM STN SPEC: ABNORMAL
POTASSIUM SERPL-SCNC: 3.5 MMOL/L (ref 3.5–5.1)
SERVICE CMNT-IMP: ABNORMAL
SODIUM SERPL-SCNC: 141 MMOL/L (ref 136–145)

## 2017-02-10 PROCEDURE — 77030011256 HC DRSG MEPILEX <16IN NO BORD MOLN -A

## 2017-02-10 PROCEDURE — 74011250636 HC RX REV CODE- 250/636: Performed by: SURGERY

## 2017-02-10 PROCEDURE — 74011250637 HC RX REV CODE- 250/637: Performed by: HOSPITALIST

## 2017-02-10 PROCEDURE — 82962 GLUCOSE BLOOD TEST: CPT

## 2017-02-10 PROCEDURE — 74011636637 HC RX REV CODE- 636/637: Performed by: HOSPITALIST

## 2017-02-10 PROCEDURE — 36415 COLL VENOUS BLD VENIPUNCTURE: CPT | Performed by: SURGERY

## 2017-02-10 PROCEDURE — 74011250637 HC RX REV CODE- 250/637: Performed by: INTERNAL MEDICINE

## 2017-02-10 PROCEDURE — 74011636637 HC RX REV CODE- 636/637: Performed by: INTERNAL MEDICINE

## 2017-02-10 PROCEDURE — 77030009526 HC GEL CARSYN MDII -A

## 2017-02-10 PROCEDURE — 80048 BASIC METABOLIC PNL TOTAL CA: CPT | Performed by: SURGERY

## 2017-02-10 RX ORDER — OXYCODONE AND ACETAMINOPHEN 5; 325 MG/1; MG/1
1-2 TABLET ORAL
Qty: 20 TAB | Refills: 0 | Status: SHIPPED | OUTPATIENT
Start: 2017-02-10 | End: 2017-04-01

## 2017-02-10 RX ORDER — CEPHALEXIN 500 MG/1
500 CAPSULE ORAL 4 TIMES DAILY
Qty: 28 CAP | Refills: 0 | Status: SHIPPED | OUTPATIENT
Start: 2017-02-10 | End: 2017-02-17

## 2017-02-10 RX ORDER — ASPIRIN 81 MG/1
81 TABLET ORAL DAILY
Qty: 30 TAB | Refills: 0 | Status: SHIPPED
Start: 2017-02-10 | End: 2017-07-29

## 2017-02-10 RX ADMIN — CEFAZOLIN 2 G: 10 INJECTION, POWDER, FOR SOLUTION INTRAVENOUS; PARENTERAL at 09:12

## 2017-02-10 RX ADMIN — INSULIN LISPRO 65 UNITS: 100 INJECTION, SUSPENSION SUBCUTANEOUS at 08:56

## 2017-02-10 RX ADMIN — AMLODIPINE BESYLATE 10 MG: 5 TABLET ORAL at 08:48

## 2017-02-10 RX ADMIN — ASPIRIN 81 MG: 81 TABLET, COATED ORAL at 08:47

## 2017-02-10 RX ADMIN — CEFAZOLIN 2 G: 10 INJECTION, POWDER, FOR SOLUTION INTRAVENOUS; PARENTERAL at 03:10

## 2017-02-10 RX ADMIN — DIGOXIN 0.12 MG: 125 TABLET ORAL at 08:48

## 2017-02-10 RX ADMIN — INSULIN LISPRO 3 UNITS: 100 INJECTION, SOLUTION INTRAVENOUS; SUBCUTANEOUS at 11:57

## 2017-02-10 RX ADMIN — CARVEDILOL 25 MG: 12.5 TABLET, FILM COATED ORAL at 08:48

## 2017-02-10 RX ADMIN — Medication 10 ML: at 14:39

## 2017-02-10 RX ADMIN — INSULIN LISPRO 2 UNITS: 100 INJECTION, SOLUTION INTRAVENOUS; SUBCUTANEOUS at 08:51

## 2017-02-10 NOTE — PROGRESS NOTES
Surgery    No complaint. Afebrile. Wounds granulating. Redressed. OK for discharge on po antibiotic,  for wound care.     Miracle Maddox MD

## 2017-02-10 NOTE — PROGRESS NOTES
End of Shift Nursing Note    Bedside shift change report given to Viraj Stack RN (oncoming nurse) by Chandrakant Quan RN (offgoing nurse). Report included the following information SBAR, Kardex, Procedure Summary and Recent Results. Zone Phone:   [phone]    Significant changes during shift:    nil   Non-emergent issues for physician to address:   nil     Number times ambulated in hallway past shift: 1      Number of times OOB to chair past shift: 3    POD #: 6     Vital Signs:    Temp: 98.1 °F (36.7 °C)     Pulse (Heart Rate): 83     BP: (!) 195/96     Resp Rate: 18     O2 Sat (%): 96 %    Lines & Drains:     Urinary Catheter? No   Placement Date: 0   Medical Necessity: 0  Central Line? No   Placement Date: 0   Medical Necessity: 0  PICC Line? No   Placement Date: 0   Medical Necessity: 0    NG tube [] in [] removed [] not applicable   Drains [] in [] removed [] not applicable     Skin Integrity:      Wounds: yes   Dressings Present: yes    Wound Concerns: no      GI:    Current diet:  DIET DIABETIC WITH OPTIONS Consistent Carb 1800kcal; Regular    Nausea: NO  Vomiting: NO  Bowel Sounds: YES  Flatus: YES  Last Bowel Movement: yesterday   Appearance: 0    Respiratory:  Supplemental O2: No      Device: [id number]   via [id number] Liters/min     Incentive Spirometer: YES  Volume: 1250  Coughing and Deep Breathing: YES  Oral Care: YES  Understanding (patient/family education): YES   Getting out of bed: YES  Head of bed elevation: YES    Patient Safety:    Falls Score: 1  Mobility Score: 1  Bed Alarm On? No  Sitter? No      Opportunity for questions and clarification was given to oncoming nurse. Patient bed is in low position, side rails are up x 2, door & observation blinds open as needed, call bell within reach and patient not in distress.     Zina Llanos RN

## 2017-02-10 NOTE — PROGRESS NOTES
CM acknowledge consult. Pt was alert and oriented upon CM room visit. Pt was aware that he would be d/c today, with recommendations of HH. Pt was informed that his services with [facility] will resume, and they will be to his home on 2/11/17. Pt informed CM that he will scheduled his own follow up appointments once he is d/c. Pt was informed that his nurse will review d/c plans with him. Care Management Interventions  PCP Verified by CM: Yes  Mode of Transport at Discharge:  Other (see comment)  Transition of Care Consult (CM Consult): Home Health, Discharge  [address] [address]: Yes  MyChart Signup: No  Discharge Durable Medical Equipment: No  Physical Therapy Consult: No  Occupational Therapy Consult: No  Speech Therapy Consult: No  Current Support Network: Lives with Spouse, Own Home  Confirm Follow Up Transport: Self  Plan discussed with Pt/Family/Caregiver: Yes  Discharge Location  Discharge Placement: Home with home health (Yue Robles )    [facility]   [phone]

## 2017-02-10 NOTE — PROGRESS NOTES
CM conducted a room visit, to introduce herself to the pt. Pt was alert and oriented, upon CM visit. Pt reported that his stay at [facility] has been pleasant and everyone has been nice. Pt reported that he maybe d/c today. Pt reported that upon d/c he will be transported by family. Pt reported that if he is in need of HH, once he d/c he will like to use [facility] as a option because he has used [facility] in the past.    UPDATE: 12:10PM    CM spoke with Curt Ballard in regards to pt. Ms. Nia Soliz requested CM to send an FYI for a resumption of [facility] services once pt d/c's. Ms. Nia Soliz informed CM to include pt's old MRN number in order for physician to include [facility] resumption in pt's past charts. CM will continue to follow up with pt, upon d/c, and make referrals as deemed necessary. CM will send MD FYI in regards to pt's resumption of services.

## 2017-02-10 NOTE — PROGRESS NOTES
Hospitalist Progress Note    NAME: Abdi Busmoshe   :  1955   MRN:  [id number]       Interim Hospital Summary: 59 y.o. male whom presented on 2017 with      Assessment & Plan:  Patient has a second MRN [id number]     Uncontrolled DM type 2 with hyperglycemia POA  A1c 9.8 this admission with 10.3 5/10/16, down from 12.1 10/15  BS in 200s  Increased 70/30 to 65units BID, if goes home then will need to be on the same and closely followup with PCP for further adjustments (he understands)  Continue SSI  No further change in plan and management from medical perspective. If he goes home today, I have recommended him to monitor his blood sugars at home and have close followup with PCP and nephrology and he understands  Nothing else to add, will sign off. Please call hospitalist service if further medical assistance needed     CKD stage 3-4  Cr stable  Recommend continue Op followup with dr Rico Fearing  Will leave decision for ACE/ARBs to nephrology OP in settings of CKD and DM     HTN  Continue amlodipine  Takes coreg at home rather metoprolol, will change back  Pt declines to be on clonidine any more, will continue to hold off  Add PRn IV hydralazine     P. afib  Currently in SR  Coreg and digoxin as above  Started ASA     Chronic anemia  Hb stable     Hx hairy cell leukemia  and splenic lymphoma  Treated with chemotherapy by Dr. Re Rowley but has not followed up with her in long time. CT abdomen/pelvis 2016 with normal spleen     Recurrent skin abscesses, POA  s/p debridement right lateral thigh and right lower back. On vancomycin d#5  Wound cultures with MSSA      Subjective: Pt seen and examined at bedside. NAD. Feels the same.  Overnight events d/w RN   CHIEF COMPLAINT: f/u \"skin abscesses\"       Review of Systems:  Symptom Y/N Comments  Symptom Y/N Comments   Fever/Chills n   Chest Pain n    Poor Appetite    Edema     Cough n   Abdominal Pain n    Sputum    Joint Pain     SOB/NAVARRO    Pruritis/Rash Nausea/vomit n   Tolerating PT/OT     Diarrhea    Tolerating Diet y    Constipation    Other       Could NOT obtain due to:      Objective:     VITALS:   Last 24hrs VS reviewed since prior progress note. Most recent are:  Patient Vitals for the past 24 hrs:   Temp Pulse Resp BP SpO2   02/10/17 0811 98.3 °F (36.8 °C) 76 18 (!) 207/104 93 %   02/09/17 1924 98.1 °F (36.7 °C) 83 18 (!) 195/96 96 %   02/09/17 1839 - 78 - 164/83 -   02/09/17 1139 98.3 °F (36.8 °C) 75 18 158/78 97 %       Intake/Output Summary (Last 24 hours) at 02/10/17 0816  Last data filed at 02/10/17 0553   Gross per 24 hour   Intake              850 ml   Output             1350 ml   Net             -500 ml        PHYSICAL EXAM:  General: WD, WN. Alert, cooperative, no acute distress    EENT:  EOMI. Anicteric sclerae. MMM  Resp:  CTA bilaterally, no wheezing or rales. No accessory muscle use  CV:  Regular  rhythm,  No edema  GI:  Soft, Non distended, Non tender.  +Bowel sounds  Neurologic:  Alert and oriented X 3, normal speech,   Psych:   Good insight. Not anxious nor agitated  Skin:  Right leg wounds, No rashes. No jaundice    Reviewed most current lab test results and cultures  YES  Reviewed most current radiology test results   YES  Review and summation of old records today    NO  Reviewed patient's current orders and MAR    YES  PMH/ reviewed - no change compared to H&P  ________________________________________________________________________  Care Plan discussed with:    Comments   Patient y    Family      RN y    Care Manager     Consultant                        Multidiciplinary team rounds were held today with , nursing, pharmacist and clinical coordinator. Patient's plan of care was discussed; medications were reviewed and discharge planning was addressed.      ________________________________________________________________________  Total NON critical care TIME: 20  Minutes    Total CRITICAL CARE TIME Spent:   Minutes non procedure based      Comments   >50% of visit spent in counseling and coordination of care     ________________________________________________________________________  Eulalio Shook MD     Procedures: see electronic medical records for all procedures/Xrays and details which were not copied into this note but were reviewed prior to creation of Plan. LABS:  I reviewed today's most current labs and imaging studies. Pertinent labs include:  No results for input(s): WBC, HGB, HCT, PLT, HGBEXT, HCTEXT, PLTEXT, HGBEXT, HCTEXT, PLTEXT in the last 72 hours.   Recent Labs      02/10/17   0456  02/09/17   0428  02/08/17   0413   NA  141  143  140   K  3.5  4.0  3.9   CL  109*  111*  108   CO2  23  23  23   GLU  122*  154*  232*   BUN  19  21*  20   CREA  2.36*  2.43*  2.38*   CA  8.8  8.5  8.7       Signed: Eulalio Shook MD

## 2017-02-10 NOTE — DISCHARGE INSTRUCTIONS
Discharge Instructions After Debridement of Abscess          Take antibiotics as prescribed. Do not drive while taking narcotic pain medication. Home Health for dressing changes daily. It is OK to shower. Follow diabetic diet. Monitor your blood sugar at home. Take pain medicines as prescribed as needed. Call for follow up appointment in 1 week  - [phone]    See your primary MD in 1 week. If you have any problems, call Dr Jignesh Castro at [phone] or [phone] (after hours)        EPI Brown MD

## 2017-02-11 ENCOUNTER — HOME CARE VISIT (OUTPATIENT)
Dept: HOME HEALTH SERVICES | Facility: HOME HEALTH | Age: 62
End: 2017-02-11
Payer: MEDICARE

## 2017-02-12 ENCOUNTER — HOME CARE VISIT (OUTPATIENT)
Dept: SCHEDULING | Facility: HOME HEALTH | Age: 62
End: 2017-02-12
Payer: MEDICARE

## 2017-02-12 PROCEDURE — G0162 HHC RN E&M PLAN SVS, 15 MIN: HCPCS

## 2017-02-13 ENCOUNTER — HOME CARE VISIT (OUTPATIENT)
Dept: SCHEDULING | Facility: HOME HEALTH | Age: 62
End: 2017-02-13
Payer: MEDICARE

## 2017-02-13 VITALS
SYSTOLIC BLOOD PRESSURE: 174 MMHG | HEART RATE: 77 BPM | RESPIRATION RATE: 17 BRPM | DIASTOLIC BLOOD PRESSURE: 82 MMHG | OXYGEN SATURATION: 97 % | TEMPERATURE: 98.9 F

## 2017-02-13 VITALS
OXYGEN SATURATION: 97 % | RESPIRATION RATE: 22 BRPM | SYSTOLIC BLOOD PRESSURE: 158 MMHG | HEART RATE: 88 BPM | TEMPERATURE: 98 F | DIASTOLIC BLOOD PRESSURE: 84 MMHG

## 2017-02-13 PROCEDURE — G0300 HHS/HOSPICE OF LPN EA 15 MIN: HCPCS

## 2017-02-14 ENCOUNTER — HOME CARE VISIT (OUTPATIENT)
Dept: SCHEDULING | Facility: HOME HEALTH | Age: 62
End: 2017-02-14
Payer: MEDICARE

## 2017-02-14 PROCEDURE — G0300 HHS/HOSPICE OF LPN EA 15 MIN: HCPCS

## 2017-02-15 ENCOUNTER — HOME CARE VISIT (OUTPATIENT)
Dept: SCHEDULING | Facility: HOME HEALTH | Age: 62
End: 2017-02-15
Payer: MEDICARE

## 2017-02-15 PROCEDURE — G0300 HHS/HOSPICE OF LPN EA 15 MIN: HCPCS

## 2017-02-16 ENCOUNTER — HOME CARE VISIT (OUTPATIENT)
Dept: SCHEDULING | Facility: HOME HEALTH | Age: 62
End: 2017-02-16
Payer: MEDICARE

## 2017-02-16 VITALS
TEMPERATURE: 98.1 F | HEART RATE: 77 BPM | HEART RATE: 78 BPM | OXYGEN SATURATION: 97 % | TEMPERATURE: 98.2 F | SYSTOLIC BLOOD PRESSURE: 170 MMHG | SYSTOLIC BLOOD PRESSURE: 172 MMHG | RESPIRATION RATE: 17 BRPM | OXYGEN SATURATION: 97 % | DIASTOLIC BLOOD PRESSURE: 88 MMHG | DIASTOLIC BLOOD PRESSURE: 86 MMHG | RESPIRATION RATE: 18 BRPM

## 2017-02-16 PROCEDURE — G0300 HHS/HOSPICE OF LPN EA 15 MIN: HCPCS

## 2017-02-17 ENCOUNTER — HOME CARE VISIT (OUTPATIENT)
Dept: SCHEDULING | Facility: HOME HEALTH | Age: 62
End: 2017-02-17
Payer: MEDICARE

## 2017-02-17 VITALS
RESPIRATION RATE: 17 BRPM | TEMPERATURE: 98.1 F | SYSTOLIC BLOOD PRESSURE: 176 MMHG | DIASTOLIC BLOOD PRESSURE: 86 MMHG | HEART RATE: 66 BPM | OXYGEN SATURATION: 97 %

## 2017-02-17 PROCEDURE — G0300 HHS/HOSPICE OF LPN EA 15 MIN: HCPCS

## 2017-02-19 ENCOUNTER — HOME CARE VISIT (OUTPATIENT)
Dept: SCHEDULING | Facility: HOME HEALTH | Age: 62
End: 2017-02-19
Payer: MEDICARE

## 2017-02-19 VITALS
DIASTOLIC BLOOD PRESSURE: 86 MMHG | WEIGHT: 315 LBS | RESPIRATION RATE: 20 BRPM | OXYGEN SATURATION: 97 % | TEMPERATURE: 98.1 F | HEART RATE: 69 BPM | BODY MASS INDEX: 38.78 KG/M2 | SYSTOLIC BLOOD PRESSURE: 158 MMHG

## 2017-02-19 VITALS
SYSTOLIC BLOOD PRESSURE: 170 MMHG | TEMPERATURE: 97.8 F | OXYGEN SATURATION: 98 % | DIASTOLIC BLOOD PRESSURE: 82 MMHG | HEART RATE: 78 BPM | RESPIRATION RATE: 17 BRPM

## 2017-02-22 ENCOUNTER — HOME CARE VISIT (OUTPATIENT)
Dept: SCHEDULING | Facility: HOME HEALTH | Age: 62
End: 2017-02-22
Payer: MEDICARE

## 2017-02-22 VITALS
OXYGEN SATURATION: 99 % | RESPIRATION RATE: 20 BRPM | DIASTOLIC BLOOD PRESSURE: 90 MMHG | SYSTOLIC BLOOD PRESSURE: 148 MMHG | TEMPERATURE: 97.3 F | HEART RATE: 71 BPM

## 2017-02-22 PROCEDURE — A6407 PACKING STRIPS, NON-IMPREG: HCPCS

## 2017-02-22 PROCEDURE — G0299 HHS/HOSPICE OF RN EA 15 MIN: HCPCS

## 2017-02-22 PROCEDURE — 3331090001 HH PPS REVENUE CREDIT

## 2017-02-22 PROCEDURE — 3331090002 HH PPS REVENUE DEBIT

## 2017-02-22 PROCEDURE — 400014 HH F/U

## 2017-02-22 PROCEDURE — A6248 HYDROGEL DRSG GEL FILLER: HCPCS

## 2017-02-23 PROCEDURE — 3331090001 HH PPS REVENUE CREDIT

## 2017-02-23 PROCEDURE — 3331090002 HH PPS REVENUE DEBIT

## 2017-02-24 PROCEDURE — 3331090001 HH PPS REVENUE CREDIT

## 2017-02-24 PROCEDURE — 3331090002 HH PPS REVENUE DEBIT

## 2017-02-25 PROCEDURE — 3331090002 HH PPS REVENUE DEBIT

## 2017-02-25 PROCEDURE — 3331090001 HH PPS REVENUE CREDIT

## 2017-02-26 PROCEDURE — 3331090001 HH PPS REVENUE CREDIT

## 2017-02-26 PROCEDURE — 3331090002 HH PPS REVENUE DEBIT

## 2017-02-27 ENCOUNTER — HOME CARE VISIT (OUTPATIENT)
Dept: SCHEDULING | Facility: HOME HEALTH | Age: 62
End: 2017-02-27
Payer: MEDICARE

## 2017-02-27 VITALS
RESPIRATION RATE: 18 BRPM | SYSTOLIC BLOOD PRESSURE: 142 MMHG | DIASTOLIC BLOOD PRESSURE: 80 MMHG | HEART RATE: 71 BPM | OXYGEN SATURATION: 91 % | TEMPERATURE: 97.7 F

## 2017-02-27 PROCEDURE — G0299 HHS/HOSPICE OF RN EA 15 MIN: HCPCS

## 2017-02-27 PROCEDURE — 3331090002 HH PPS REVENUE DEBIT

## 2017-02-27 PROCEDURE — 3331090001 HH PPS REVENUE CREDIT

## 2017-02-27 NOTE — DISCHARGE SUMMARY
.  Physician Discharge Summary     Patient ID:  Awais Wang  [id number]  31 y.o.  1955    Admit Date: 2/6/2017    Discharge Date: 2/10/2017    * Admission Diagnoses: Abscess; Abscess of right thigh; Abscess of lower back;absces*    * Discharge Diagnoses:    Hospital Problems as of 2/10/2017  Date Reviewed: 2/6/2017          Codes Class Noted - Resolved POA    Abscess of lower back ICD-10-CM: L02.212  ICD-9-CM: 682.2  2/6/2017 - Present Unknown        Abscess of right thigh ICD-10-CM: L02.415  ICD-9-CM: 682.6  2/6/2017 - Present Unknown               Admission Condition: Fair    * Discharge Condition: improved    * Procedures: Procedure(s):  INCISION AND DRAINAGEright lower back and right thigh abscesses    [facility] Course:   Normal hospital course for this procedure. [facility] consult for poorly controlled diabetes. Consults: Hospitalist    Significant Diagnostic Studies: none    * Disposition: Home    Discharge Medications:   Discharge Medication List as of 2/10/2017  3:12 PM      START taking these medications    Details   insulin lispro protamine/insulin lispro (HUMALOG MIX 75/25) 100 unit/mL (75-25) injection 65 units subcutaneous injection twice daily before meals (before breakfast and dinner), Normal, Disp-1 Vial, R-2      cephALEXin (KEFLEX) 500 mg capsule Take 1 Cap by mouth four (4) times daily for 7 days. , Normal, Disp-28 Cap, R-0         CONTINUE these medications which have CHANGED    Details   aspirin delayed-release 81 mg tablet Take 1 Tab by mouth daily. , No Print, Disp-30 Tab, R-0      oxyCODONE-acetaminophen (PERCOCET) 5-325 mg per tablet Take 1-2 Tabs by mouth every four (4) hours as needed for Pain. Max Daily Amount: 12 Tabs., Print, Disp-20 Tab, R-0         CONTINUE these medications which have NOT CHANGED    Details   amLODIPine (NORVASC) 10 mg tablet Take 10 mg by mouth daily. , Historical Med      digoxin (LANOXIN) 0.125 mg tablet Take 0.125 mg by mouth daily. , Historical Med      pravastatin (PRAVACHOL) 20 mg tablet Take 20 mg by mouth nightly., Historical Med      carvedilol (COREG) 25 mg tablet Take 25 mg by mouth two (2) times daily (with meals). , Historical Med         STOP taking these medications       insulin NPH/insulin regular (HUMULIN 70/30) 100 unit/mL (70-30) injection Comments:   Reason for Stopping:               * Follow-up Care/Patient Instructions: Activity: Activity as tolerated  Diet: Diabetic Diet  Wound Care: Home Health for packing changes. Follow-up Information     Follow up With Details Comments [address] Enriquez NP   [address] [place] On 2/11/2017 THIS  [address] IF YOU DO NOT HEAR FROM THEM WITHIN 24-48 HOURS PLEASE CONTACT THEM DIRECTLY.  [address] [facility]         Follow-up tests/labs none    Signed:  Ryan Pino MD  2/26/2017  9:08 PM

## 2017-02-28 PROCEDURE — 3331090001 HH PPS REVENUE CREDIT

## 2017-02-28 PROCEDURE — 3331090002 HH PPS REVENUE DEBIT

## 2017-03-01 PROCEDURE — 3331090002 HH PPS REVENUE DEBIT

## 2017-03-01 PROCEDURE — 3331090001 HH PPS REVENUE CREDIT

## 2017-03-02 ENCOUNTER — HOME CARE VISIT (OUTPATIENT)
Dept: SCHEDULING | Facility: HOME HEALTH | Age: 62
End: 2017-03-02
Payer: MEDICARE

## 2017-03-02 VITALS
RESPIRATION RATE: 20 BRPM | DIASTOLIC BLOOD PRESSURE: 86 MMHG | SYSTOLIC BLOOD PRESSURE: 142 MMHG | TEMPERATURE: 97.5 F | HEART RATE: 76 BPM | OXYGEN SATURATION: 98 %

## 2017-03-02 PROCEDURE — 3331090002 HH PPS REVENUE DEBIT

## 2017-03-02 PROCEDURE — G0299 HHS/HOSPICE OF RN EA 15 MIN: HCPCS

## 2017-03-02 PROCEDURE — 3331090001 HH PPS REVENUE CREDIT

## 2017-03-03 ENCOUNTER — HOME CARE VISIT (OUTPATIENT)
Dept: HOME HEALTH SERVICES | Facility: HOME HEALTH | Age: 62
End: 2017-03-03
Payer: MEDICARE

## 2017-03-03 PROCEDURE — 3331090001 HH PPS REVENUE CREDIT

## 2017-03-03 PROCEDURE — 3331090002 HH PPS REVENUE DEBIT

## 2017-03-04 PROCEDURE — 3331090001 HH PPS REVENUE CREDIT

## 2017-03-04 PROCEDURE — 3331090002 HH PPS REVENUE DEBIT

## 2017-03-05 PROCEDURE — 3331090002 HH PPS REVENUE DEBIT

## 2017-03-05 PROCEDURE — 3331090001 HH PPS REVENUE CREDIT

## 2017-03-06 ENCOUNTER — HOME CARE VISIT (OUTPATIENT)
Dept: SCHEDULING | Facility: HOME HEALTH | Age: 62
End: 2017-03-06
Payer: MEDICARE

## 2017-03-06 VITALS
DIASTOLIC BLOOD PRESSURE: 90 MMHG | RESPIRATION RATE: 18 BRPM | OXYGEN SATURATION: 98 % | SYSTOLIC BLOOD PRESSURE: 160 MMHG | TEMPERATURE: 98.2 F | HEART RATE: 78 BPM

## 2017-03-06 PROCEDURE — G0299 HHS/HOSPICE OF RN EA 15 MIN: HCPCS

## 2017-03-06 PROCEDURE — 3331090001 HH PPS REVENUE CREDIT

## 2017-03-06 PROCEDURE — 3331090002 HH PPS REVENUE DEBIT

## 2017-03-07 PROCEDURE — 3331090002 HH PPS REVENUE DEBIT

## 2017-03-07 PROCEDURE — 3331090001 HH PPS REVENUE CREDIT

## 2017-03-08 ENCOUNTER — HOME CARE VISIT (OUTPATIENT)
Dept: SCHEDULING | Facility: HOME HEALTH | Age: 62
End: 2017-03-08
Payer: MEDICARE

## 2017-03-08 VITALS
DIASTOLIC BLOOD PRESSURE: 78 MMHG | OXYGEN SATURATION: 97 % | HEART RATE: 87 BPM | TEMPERATURE: 97.9 F | RESPIRATION RATE: 20 BRPM | SYSTOLIC BLOOD PRESSURE: 136 MMHG

## 2017-03-08 PROCEDURE — 3331090001 HH PPS REVENUE CREDIT

## 2017-03-08 PROCEDURE — G0299 HHS/HOSPICE OF RN EA 15 MIN: HCPCS

## 2017-03-08 PROCEDURE — 3331090002 HH PPS REVENUE DEBIT

## 2017-03-08 NOTE — CDMP QUERY
Account Number: [de-identified]  MRN: [id number]  Patient: Adolfo Jacosb  Created: [id number]  Clinician Name: Sheila Love RN, CCDS   Dr. Kristie Turpin MD :  Please clarify if this patient is being treated/managed for:    => Excisional Debridement of Skin & Sq tissue to R lower back & R thigh  => Non-Excisional Debridement   =>Other Explanation of clinical findings  =>Unable to Determine (no explanation of clinical findings)    The medical record reflects the following:    Noted Op note  Debridement of skin and subcutaneous tissues right lower back and right thigh abscesses          Please clarify and document your clinical opinion in the progress notes and discharge summary including the definitive and/or presumptive diagnosis, (suspected or probable), related to the above clinical findings. Please include clinical findings supporting your diagnosis.     Thank Jennie Fisher RN, CCDS

## 2017-03-09 PROCEDURE — 3331090002 HH PPS REVENUE DEBIT

## 2017-03-09 PROCEDURE — 3331090001 HH PPS REVENUE CREDIT

## 2017-03-10 ENCOUNTER — HOME CARE VISIT (OUTPATIENT)
Dept: SCHEDULING | Facility: HOME HEALTH | Age: 62
End: 2017-03-10
Payer: MEDICARE

## 2017-03-10 VITALS
HEART RATE: 67 BPM | SYSTOLIC BLOOD PRESSURE: 118 MMHG | TEMPERATURE: 96.9 F | DIASTOLIC BLOOD PRESSURE: 70 MMHG | OXYGEN SATURATION: 96 %

## 2017-03-10 PROCEDURE — 3331090001 HH PPS REVENUE CREDIT

## 2017-03-10 PROCEDURE — G0299 HHS/HOSPICE OF RN EA 15 MIN: HCPCS

## 2017-03-10 PROCEDURE — 3331090002 HH PPS REVENUE DEBIT

## 2017-03-11 PROCEDURE — 3331090001 HH PPS REVENUE CREDIT

## 2017-03-11 PROCEDURE — 3331090002 HH PPS REVENUE DEBIT

## 2017-03-12 PROCEDURE — 3331090001 HH PPS REVENUE CREDIT

## 2017-03-12 PROCEDURE — 3331090002 HH PPS REVENUE DEBIT

## 2017-03-13 PROCEDURE — 3331090001 HH PPS REVENUE CREDIT

## 2017-03-13 PROCEDURE — 3331090002 HH PPS REVENUE DEBIT

## 2017-03-14 PROCEDURE — 3331090001 HH PPS REVENUE CREDIT

## 2017-03-14 PROCEDURE — 3331090002 HH PPS REVENUE DEBIT

## 2017-03-15 ENCOUNTER — HOME CARE VISIT (OUTPATIENT)
Dept: SCHEDULING | Facility: HOME HEALTH | Age: 62
End: 2017-03-15
Payer: MEDICARE

## 2017-03-15 VITALS
DIASTOLIC BLOOD PRESSURE: 86 MMHG | SYSTOLIC BLOOD PRESSURE: 142 MMHG | TEMPERATURE: 98.6 F | OXYGEN SATURATION: 98 % | RESPIRATION RATE: 20 BRPM | HEART RATE: 65 BPM

## 2017-03-15 PROCEDURE — 3331090002 HH PPS REVENUE DEBIT

## 2017-03-15 PROCEDURE — G0299 HHS/HOSPICE OF RN EA 15 MIN: HCPCS

## 2017-03-15 PROCEDURE — 3331090001 HH PPS REVENUE CREDIT

## 2017-03-16 ENCOUNTER — OFFICE VISIT (OUTPATIENT)
Dept: SURGERY | Age: 62
End: 2017-03-16

## 2017-03-16 VITALS
OXYGEN SATURATION: 96 % | DIASTOLIC BLOOD PRESSURE: 83 MMHG | HEIGHT: 77 IN | WEIGHT: 315 LBS | HEART RATE: 76 BPM | RESPIRATION RATE: 24 BRPM | BODY MASS INDEX: 37.19 KG/M2 | SYSTOLIC BLOOD PRESSURE: 188 MMHG

## 2017-03-16 DIAGNOSIS — L02.212 ABSCESS OF LOWER BACK: ICD-10-CM

## 2017-03-16 DIAGNOSIS — L02.415 ABSCESS OF RIGHT THIGH: Primary | ICD-10-CM

## 2017-03-16 PROCEDURE — 3331090001 HH PPS REVENUE CREDIT

## 2017-03-16 PROCEDURE — 3331090002 HH PPS REVENUE DEBIT

## 2017-03-16 NOTE — PROGRESS NOTES
The patient is status post debridement of skin and subcutaneous tissue where he had abscess sites that have spontaneously drained on the right buttock and right thigh. Visiting nurse is changing dressings every other day. On examination the sites look quite good. On the buttock there are two adjacent sites, each is just perhaps 1 cm across with clean granulation. These were dressed with gel dressings. On the right lateral thigh there is a wound which is about 2 x 3-1/2 cm across which has no depth and has clean granulation. Gel dressing applied there. The patient will continue with Carrasyn gel dressings. No packing is needed. The patient will see me in follow up in one month if the wounds are not all healed. The patient has had problems with uncontrolled diabetes. I reiterated the importance of following medication management and following a strict diabetic diet for control of blood sugar to avoid recurrent infections. Final Diagnosis:  Open wounds right lateral thigh and right buttock at debridement sites.      MedEDILSON/mikey     cc: Tyrone Velarde NP

## 2017-03-16 NOTE — MR AVS SNAPSHOT
Visit Information Date & Time Provider Department Dept. Phone Encounter #  
 3/16/2017  2:00 PM Adry Mcmahon MD [facility] [id number] [id number] Your Appointments 4/25/2017  1:30 PM  
New Patient with Breezy Velarde MD  
[facility] [address]) Appt Note: New pt appointment, Diabetic, referred by NP Bree Pitt [phone], AB, 03/06/17  
 [address] [phone] [facility] Upcoming Health Maintenance Date Due Hepatitis C Screening 1955 LIPID PANEL Q1 1955 FOOT EXAM Q1 4/8/1963 MICROALBUMIN Q1 4/8/1963 EYE EXAM RETINAL OR DILATED Q1 4/8/1963 DTaP/Tdap/Td series (1 - Tdap) 4/8/1974 FOBT Q 1 YEAR AGE 50-75 4/8/2003 ZOSTER VACCINE AGE 60> 4/8/2013 Pneumococcal 19-64 Highest Risk (2 of 3 - PCV13) 8/1/2013 INFLUENZA AGE 9 TO ADULT 8/1/2016 HEMOGLOBIN A1C Q6M 8/8/2017 Allergies as of 3/16/2017  Review Complete On: 3/16/2017 By: Adry Mcmahon MD  
 No Known Allergies Current Immunizations  Reviewed on 8/23/2013 Name Date Pneumococcal Vaccine (Unspecified Type) 8/1/2012 Not reviewed this visit Vitals BP Pulse Resp Height(growth percentile) Weight(growth percentile) SpO2  
 188/83 (BP 1 Location: Right arm, BP Patient Position: Sitting) 76 24 6' 5\" (1.956 m) 322 lb (146.1 kg) 96% BMI Smoking Status 38.18 kg/m2 Never Smoker BMI and BSA Data Body Mass Index Body Surface Area  
 38.18 kg/m 2 2.82 m 2 Preferred Pharmacy Pharmacy Name Phone [phone] [phone] Your Updated Medication List  
  
   
This list is accurate as of: 3/16/17  2:44 PM.  Always use your most recent med list.  
  
 acetaminophen 325 mg tablet Commonly known as:  TYLENOL Take 2 Tabs by mouth every four (4) hours as needed for Pain or Fever. alcohol swabs Padm Commonly known as:  ALCOHOL PADS  
1 Each by Apply Externally route two (2) times a day. amLODIPine 10 mg tablet Commonly known as:  [place] Take 1 Tab by mouth daily. aspirin delayed-release 81 mg tablet Take 1 Tab by mouth daily. carvedilol 25 mg tablet Commonly known as:  Layman Goldman Take 25 mg by mouth two (2) times daily (with meals). cloNIDine HCl 0.1 mg tablet Commonly known as:  CATAPRES Take 0.1 mg by mouth two (2) times a day. digoxin 0.125 mg tablet Commonly known as:  LANOXIN Take 0.125 mg by mouth daily. Indications: afib  
  
 ferrous sulfate 325 mg (65 mg iron) tablet Take 1 Tab by mouth two (2) times daily (with meals). * insulin lispro protamine/insulin lispro 100 unit/mL (75-25) injection Commonly known as:  HUMALOG MIX 75/25  
60 Units by SubCUTAneous route Before breakfast and dinner. * insulin lispro protamine/insulin lispro 100 unit/mL (75-25) injection Commonly known as:  HUMALOG MIX 75/25  
65 units subcutaneous injection twice daily before meals (before breakfast and dinner) Insulin Syringe-Needle U-100 0.5 mL 29 gauge x 1/2\" Syrg Commonly known as:  INSULIN SYRINGE  
1 Each by Does Not Apply route two (2) times a day. L. acidoph & paracasei- S therm- Bifido 8 billion cell Cap cap Commonly known as:  ROSY-Q Take 1 Cap by mouth daily. * oxyCODONE-acetaminophen 5-325 mg per tablet Commonly known as:  PERCOCET Take 1-2 Tabs by mouth every four (4) hours as needed. Max Daily Amount: 12 Tabs. * oxyCODONE-acetaminophen 5-325 mg per tablet Commonly known as:  PERCOCET Take 1-2 Tabs by mouth every four (4) hours as needed for Pain. Max Daily Amount: 12 Tabs. pravastatin 20 mg tablet Commonly known as:  PRAVACHOL  
 Take 20 mg by mouth nightly. * Notice: This list has 4 medication(s) that are the same as other medications prescribed for you. Read the directions carefully, and ask your doctor or other care provider to review them with you. Introducing [facility]! [employer] introduces LOFTY patient portal. Now you can access parts of your medical record, email your doctor's office, and request medication refills online. 1. In your internet browser, go to [email or web address] 2. Click on the First Time User? Click Here link in the Sign In box. You will see the New Member Sign Up page. 3. Enter your LOFTY Access Code exactly as it appears below. You will not need to use this code after youve completed the sign-up process. If you do not sign up before the expiration date, you must request a new code. · LOFTY Access Code: [email or web address] Expires: 3/18/2017 12:16 PM 
 
4. Enter the last four digits of your Social Security Number (xxxx) and Date of Birth (mm/dd/yyyy) as indicated and click Submit. You will be taken to the next sign-up page. 5. Create a LOFTY ID. This will be your LOFTY login ID and cannot be changed, so think of one that is secure and easy to remember. 6. Create a LOFTY password. You can change your password at any time. 7. Enter your Password Reset Question and Answer. This can be used at a later time if you forget your password. 8. Enter your e-mail address. You will receive e-mail notification when new information is available in [address]. 9. Click Sign Up. You can now view and download portions of your medical record. 10. Click the Download Summary menu link to download a portable copy of your medical information. If you have questions, please visit the Frequently Asked Questions section of the LOFTY website. Remember, LOFTY is NOT to be used for urgent needs. For medical emergencies, dial 911. Now available from your iPhone and Android! Please provide this summary of care documentation to your next provider. Your primary care clinician is listed as Uli Adair. If you have any questions after today's visit, please call [phone].

## 2017-03-17 ENCOUNTER — HOME CARE VISIT (OUTPATIENT)
Dept: SCHEDULING | Facility: HOME HEALTH | Age: 62
End: 2017-03-17
Payer: MEDICARE

## 2017-03-17 VITALS
DIASTOLIC BLOOD PRESSURE: 70 MMHG | RESPIRATION RATE: 20 BRPM | TEMPERATURE: 97.8 F | SYSTOLIC BLOOD PRESSURE: 160 MMHG | OXYGEN SATURATION: 98 % | HEART RATE: 68 BPM

## 2017-03-17 PROCEDURE — 3331090002 HH PPS REVENUE DEBIT

## 2017-03-17 PROCEDURE — 3331090001 HH PPS REVENUE CREDIT

## 2017-03-17 PROCEDURE — G0299 HHS/HOSPICE OF RN EA 15 MIN: HCPCS

## 2017-03-18 PROCEDURE — 3331090002 HH PPS REVENUE DEBIT

## 2017-03-18 PROCEDURE — 3331090001 HH PPS REVENUE CREDIT

## 2017-03-19 PROCEDURE — 3331090002 HH PPS REVENUE DEBIT

## 2017-03-19 PROCEDURE — 3331090001 HH PPS REVENUE CREDIT

## 2017-03-20 PROCEDURE — 3331090002 HH PPS REVENUE DEBIT

## 2017-03-20 PROCEDURE — 3331090001 HH PPS REVENUE CREDIT

## 2017-03-21 ENCOUNTER — HOME CARE VISIT (OUTPATIENT)
Dept: SCHEDULING | Facility: HOME HEALTH | Age: 62
End: 2017-03-21
Payer: MEDICARE

## 2017-03-21 VITALS
SYSTOLIC BLOOD PRESSURE: 148 MMHG | TEMPERATURE: 97.3 F | HEART RATE: 71 BPM | DIASTOLIC BLOOD PRESSURE: 90 MMHG | OXYGEN SATURATION: 98 %

## 2017-03-21 PROCEDURE — G0299 HHS/HOSPICE OF RN EA 15 MIN: HCPCS

## 2017-03-21 PROCEDURE — 3331090001 HH PPS REVENUE CREDIT

## 2017-03-21 PROCEDURE — 3331090002 HH PPS REVENUE DEBIT

## 2017-03-22 PROBLEM — L02.212 ABSCESS OF LOWER BACK: Status: RESOLVED | Noted: 2017-02-06 | Resolved: 2017-03-22

## 2017-03-22 PROBLEM — L02.415 ABSCESS OF RIGHT THIGH: Status: RESOLVED | Noted: 2017-02-06 | Resolved: 2017-03-22

## 2017-03-22 PROCEDURE — 3331090002 HH PPS REVENUE DEBIT

## 2017-03-22 PROCEDURE — 3331090001 HH PPS REVENUE CREDIT

## 2017-03-23 PROCEDURE — 3331090001 HH PPS REVENUE CREDIT

## 2017-03-23 PROCEDURE — 3331090002 HH PPS REVENUE DEBIT

## 2017-04-01 ENCOUNTER — HOSPITAL ENCOUNTER (EMERGENCY)
Age: 62
Discharge: HOME | End: 2017-04-01
Attending: EMERGENCY MEDICINE
Payer: MEDICARE

## 2017-04-01 ENCOUNTER — APPOINTMENT (OUTPATIENT)
Dept: GENERAL RADIOLOGY | Age: 62
End: 2017-04-01
Attending: EMERGENCY MEDICINE
Payer: MEDICARE

## 2017-04-01 VITALS
DIASTOLIC BLOOD PRESSURE: 77 MMHG | HEIGHT: 77 IN | RESPIRATION RATE: 20 BRPM | BODY MASS INDEX: 36.96 KG/M2 | TEMPERATURE: 97.3 F | SYSTOLIC BLOOD PRESSURE: 162 MMHG | OXYGEN SATURATION: 96 % | WEIGHT: 313.05 LBS | HEART RATE: 74 BPM

## 2017-04-01 DIAGNOSIS — R55 VASOVAGAL SYNCOPE: ICD-10-CM

## 2017-04-01 DIAGNOSIS — L02.91 ABSCESS: Primary | ICD-10-CM

## 2017-04-01 LAB
ALBUMIN SERPL BCP-MCNC: 3.1 G/DL (ref 3.5–5)
ALBUMIN/GLOB SERPL: 0.8 {RATIO} (ref 1.1–2.2)
ALP SERPL-CCNC: 188 U/L (ref 45–117)
ALT SERPL-CCNC: 25 U/L (ref 12–78)
ANION GAP BLD CALC-SCNC: 13 MMOL/L (ref 5–15)
APPEARANCE UR: CLEAR
AST SERPL W P-5'-P-CCNC: 10 U/L (ref 15–37)
BACTERIA URNS QL MICRO: NEGATIVE /HPF
BASOPHILS # BLD AUTO: 0 K/UL (ref 0–0.1)
BASOPHILS # BLD: 0 % (ref 0–1)
BILIRUB SERPL-MCNC: 0.4 MG/DL (ref 0.2–1)
BILIRUB UR QL: NEGATIVE
BUN SERPL-MCNC: 26 MG/DL (ref 6–20)
BUN/CREAT SERPL: 8 (ref 12–20)
CALCIUM SERPL-MCNC: 9 MG/DL (ref 8.5–10.1)
CHLORIDE SERPL-SCNC: 100 MMOL/L (ref 97–108)
CK MB CFR SERPL CALC: 1.5 % (ref 0–2.5)
CK MB SERPL-MCNC: 2.1 NG/ML (ref 5–25)
CK SERPL-CCNC: 140 U/L (ref 39–308)
CO2 SERPL-SCNC: 24 MMOL/L (ref 21–32)
COLOR UR: ABNORMAL
CREAT BLD-MCNC: 2.7 MG/DL (ref 0.6–1.3)
CREAT SERPL-MCNC: 3.13 MG/DL (ref 0.7–1.3)
EOSINOPHIL # BLD: 0.1 K/UL (ref 0–0.4)
EOSINOPHIL NFR BLD: 1 % (ref 0–7)
EPITH CASTS URNS QL MICRO: ABNORMAL /LPF
ERYTHROCYTE [DISTWIDTH] IN BLOOD BY AUTOMATED COUNT: 13.5 % (ref 11.5–14.5)
GLOBULIN SER CALC-MCNC: 3.9 G/DL (ref 2–4)
GLUCOSE BLD STRIP.AUTO-MCNC: 262 MG/DL (ref 65–100)
GLUCOSE BLD STRIP.AUTO-MCNC: 390 MG/DL (ref 65–100)
GLUCOSE SERPL-MCNC: 361 MG/DL (ref 65–100)
GLUCOSE UR STRIP.AUTO-MCNC: >1000 MG/DL
HCT VFR BLD AUTO: 33.1 % (ref 36.6–50.3)
HGB BLD-MCNC: 11.6 G/DL (ref 12.1–17)
HGB UR QL STRIP: ABNORMAL
KETONES UR QL STRIP.AUTO: NEGATIVE MG/DL
LACTATE SERPL-SCNC: 1.6 MMOL/L (ref 0.4–2)
LEUKOCYTE ESTERASE UR QL STRIP.AUTO: NEGATIVE
LYMPHOCYTES # BLD AUTO: 15 % (ref 12–49)
LYMPHOCYTES # BLD: 1.3 K/UL (ref 0.8–3.5)
MCH RBC QN AUTO: 30.1 PG (ref 26–34)
MCHC RBC AUTO-ENTMCNC: 35 G/DL (ref 30–36.5)
MCV RBC AUTO: 85.8 FL (ref 80–99)
MONOCYTES # BLD: 0.7 K/UL (ref 0–1)
MONOCYTES NFR BLD AUTO: 8 % (ref 5–13)
NEUTS SEG # BLD: 6.7 K/UL (ref 1.8–8)
NEUTS SEG NFR BLD AUTO: 76 % (ref 32–75)
NITRITE UR QL STRIP.AUTO: NEGATIVE
PH UR STRIP: 6 [PH] (ref 5–8)
PLATELET # BLD AUTO: 189 K/UL (ref 150–400)
POTASSIUM SERPL-SCNC: 4 MMOL/L (ref 3.5–5.1)
PROT SERPL-MCNC: 7 G/DL (ref 6.4–8.2)
PROT UR STRIP-MCNC: >300 MG/DL
RBC # BLD AUTO: 3.86 M/UL (ref 4.1–5.7)
RBC #/AREA URNS HPF: ABNORMAL /HPF (ref 0–5)
SERVICE CMNT-IMP: ABNORMAL
SERVICE CMNT-IMP: ABNORMAL
SODIUM SERPL-SCNC: 137 MMOL/L (ref 136–145)
SP GR UR REFRACTOMETRY: 1.02 (ref 1–1.03)
TROPONIN I SERPL-MCNC: <0.04 NG/ML
UA: UC IF INDICATED,UAUC: ABNORMAL
UROBILINOGEN UR QL STRIP.AUTO: 0.2 EU/DL (ref 0.2–1)
WBC # BLD AUTO: 8.9 K/UL (ref 4.1–11.1)
WBC URNS QL MICRO: ABNORMAL /HPF (ref 0–4)

## 2017-04-01 PROCEDURE — 96360 HYDRATION IV INFUSION INIT: CPT

## 2017-04-01 PROCEDURE — 36415 COLL VENOUS BLD VENIPUNCTURE: CPT | Performed by: EMERGENCY MEDICINE

## 2017-04-01 PROCEDURE — 87186 SC STD MICRODIL/AGAR DIL: CPT | Performed by: EMERGENCY MEDICINE

## 2017-04-01 PROCEDURE — 87077 CULTURE AEROBIC IDENTIFY: CPT | Performed by: EMERGENCY MEDICINE

## 2017-04-01 PROCEDURE — 82565 ASSAY OF CREATININE: CPT

## 2017-04-01 PROCEDURE — 87040 BLOOD CULTURE FOR BACTERIA: CPT | Performed by: EMERGENCY MEDICINE

## 2017-04-01 PROCEDURE — 77030019895 HC PCKNG STRP IODO -A

## 2017-04-01 PROCEDURE — 82962 GLUCOSE BLOOD TEST: CPT

## 2017-04-01 PROCEDURE — 83605 ASSAY OF LACTIC ACID: CPT | Performed by: EMERGENCY MEDICINE

## 2017-04-01 PROCEDURE — 84484 ASSAY OF TROPONIN QUANT: CPT | Performed by: EMERGENCY MEDICINE

## 2017-04-01 PROCEDURE — 74011250636 HC RX REV CODE- 250/636: Performed by: EMERGENCY MEDICINE

## 2017-04-01 PROCEDURE — 99285 EMERGENCY DEPT VISIT HI MDM: CPT

## 2017-04-01 PROCEDURE — 80053 COMPREHEN METABOLIC PANEL: CPT | Performed by: EMERGENCY MEDICINE

## 2017-04-01 PROCEDURE — 85025 COMPLETE CBC W/AUTO DIFF WBC: CPT | Performed by: EMERGENCY MEDICINE

## 2017-04-01 PROCEDURE — 93005 ELECTROCARDIOGRAM TRACING: CPT

## 2017-04-01 PROCEDURE — 81001 URINALYSIS AUTO W/SCOPE: CPT | Performed by: EMERGENCY MEDICINE

## 2017-04-01 PROCEDURE — 71010 XR CHEST PORT: CPT

## 2017-04-01 PROCEDURE — 87205 SMEAR GRAM STAIN: CPT | Performed by: EMERGENCY MEDICINE

## 2017-04-01 PROCEDURE — 74011250637 HC RX REV CODE- 250/637: Performed by: EMERGENCY MEDICINE

## 2017-04-01 PROCEDURE — 75810000289 HC I&D ABSCESS SIMP/COMP/MULT

## 2017-04-01 PROCEDURE — 82550 ASSAY OF CK (CPK): CPT | Performed by: EMERGENCY MEDICINE

## 2017-04-01 RX ORDER — SODIUM CHLORIDE 0.9 % (FLUSH) 0.9 %
5-10 SYRINGE (ML) INJECTION AS NEEDED
Status: DISCONTINUED | OUTPATIENT
Start: 2017-04-01 | End: 2017-04-02 | Stop reason: HOSPADM

## 2017-04-01 RX ORDER — OXYCODONE AND ACETAMINOPHEN 5; 325 MG/1; MG/1
1 TABLET ORAL
Qty: 20 TAB | Refills: 0 | Status: SHIPPED | OUTPATIENT
Start: 2017-04-01 | End: 2017-05-29

## 2017-04-01 RX ORDER — SODIUM CHLORIDE 0.9 % (FLUSH) 0.9 %
5-10 SYRINGE (ML) INJECTION EVERY 8 HOURS
Status: DISCONTINUED | OUTPATIENT
Start: 2017-04-01 | End: 2017-04-02 | Stop reason: HOSPADM

## 2017-04-01 RX ORDER — SULFAMETHOXAZOLE AND TRIMETHOPRIM 800; 160 MG/1; MG/1
1 TABLET ORAL 2 TIMES DAILY
Qty: 14 TAB | Refills: 0 | Status: SHIPPED | OUTPATIENT
Start: 2017-04-01 | End: 2017-04-08

## 2017-04-01 RX ORDER — SULFAMETHOXAZOLE AND TRIMETHOPRIM 800; 160 MG/1; MG/1
1 TABLET ORAL
Status: COMPLETED | OUTPATIENT
Start: 2017-04-01 | End: 2017-04-01

## 2017-04-01 RX ORDER — ONDANSETRON 4 MG/1
4 TABLET, ORALLY DISINTEGRATING ORAL
Qty: 10 TAB | Refills: 0 | Status: SHIPPED | OUTPATIENT
Start: 2017-04-01 | End: 2017-05-29

## 2017-04-01 RX ADMIN — SULFAMETHOXAZOLE AND TRIMETHOPRIM 1 TABLET: 800; 160 TABLET ORAL at 20:36

## 2017-04-01 RX ADMIN — SODIUM CHLORIDE 1000 ML: 900 INJECTION, SOLUTION INTRAVENOUS at 17:02

## 2017-04-01 NOTE — ED NOTES
Pt reports has had a boil on the back of his right hip for the past 2 weeks. Was planning to see PCP yesterday and today but was closed. Pt has since had increased pain. Has had some nausea and vomiting. Denies any fever or chills. Pt begging to try and vomit and had a syncopal episode while sitting in bed. Pt then laid down at turned to right side. Dr. Yuri Wilde at the bedside to evaluate pt. No injuries occurred at this time. Pt alert and oriented x4.

## 2017-04-01 NOTE — ED PROVIDER NOTES
HPI Comments: Annamarie Sam is a 59 y.o. male with a PMHx of atrial fibrillation, abscess with positive MSSA culture, splenic lymphoma, insulin dependent DM, HTN, CKD stage 4 presenting ambulatory to the ED from home C/O abscess to left back for the past several weeks, with increased pain and nausea today. He notes that he vomited yesterday, with none today. Patient was admitted in 12/2016 due to ARF on CKD, and sepsis secondary to abscesses on right and left buttock. Patient's culture at the time grew MSSA. Patient was readmitted on 2/6/2017 to have excisional debridement of abscess right thigh & right lower back. Pt was recently seen in office by General Surgery on 3/16/2017 and abscesses appeared to be significantly improving. He was planning to see PCP yesterday and today but office was closed. Pt was previously having wound care nurse applying dressings every other day. Patient was to continue carrasyn gel dressings. He denies any recent antibiotic or diuretic use. Pt denies any fever, diarrhea, chest pain, SOB, cough, rhinorrhea or dysuria. PCP: Kya Fernandez NP  General Surgery: Dr. Yung Haq. Alvarado    He declines any pain medication at this time. There are no other complaints, changes or physical findings at this time. The history is provided by the patient. Past Medical History:   Diagnosis Date    A-fib [facility]) 2009    Resolved.    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     Cancer ([facility]     splenic lymphoma    CKD stage 4 due to type 2 diabetes mellitus ([address]     Diabetes ([address]     Hypertension     Neuropathy     Other ill-defined conditions     spinal meningitis    Other ill-defined conditions     broken blood vessel to nose    Paroxysmal a-fib (HCC)     Vitamin D deficiency        Past Surgical History:   Procedure Laterality Date    HX OTHER SURGICAL  02/06/2017    Excisional debridement of abscess right thigh & right lower back          Family History:   Problem Relation Age of Onset    Diabetes Mother     Hypertension Father     Hypertension Sister        Social History     Social History    Marital status:      Spouse name: N/A    Number of children: N/A    Years of education: N/A     Occupational History    Not on file. Social History Main Topics    Smoking status: Never Smoker    Smokeless tobacco: Not on file    Alcohol use No    Drug use: No    Sexual activity: Not on file     Other Topics Concern    Not on file     Social History Narrative    ** Merged History Encounter **              ALLERGIES: Review of patient's allergies indicates no known allergies. Review of Systems   Constitutional: Negative. Negative for appetite change, chills, fatigue and fever. HENT: Negative. Negative for congestion, rhinorrhea, sinus pressure and sore throat. Eyes: Negative. Respiratory: Negative. Negative for cough, choking, chest tightness, shortness of breath and wheezing. Cardiovascular: Negative. Negative for chest pain, palpitations and leg swelling. Gastrointestinal: Positive for nausea. Negative for abdominal pain, constipation, diarrhea and vomiting (Had yesterday, none today). Endocrine: Negative. Genitourinary: Negative. Negative for difficulty urinating, dysuria, flank pain and urgency. Musculoskeletal: Negative. Skin:        Positive for abscess   Neurological: Negative. Negative for dizziness, speech difficulty, weakness, light-headedness, numbness and headaches. Psychiatric/Behavioral: Negative. All other systems reviewed and are negative. Patient Vitals for the past 12 hrs:   Temp Pulse Resp BP SpO2   04/01/17 1703 - 74 20 162/77 96 %   04/01/17 1630 - 75 22 (!) 130/108 95 %   04/01/17 1600 - 77 18 133/57 96 %   04/01/17 1515 97.3 °F (36.3 °C) 84 14 166/77 100 %            Physical Exam   Constitutional: He is oriented to person, place, and time.  He appears well-developed and well-nourished. No distress. HENT:   Head: Normocephalic and atraumatic. Mouth/Throat: Oropharynx is clear and moist. No oropharyngeal exudate. Eyes: Conjunctivae and EOM are normal. Pupils are equal, round, and reactive to light. Neck: Normal range of motion. Neck supple. No JVD present. No tracheal deviation present. Cardiovascular: Normal rate, regular rhythm, normal heart sounds and intact distal pulses. No murmur heard. Pulmonary/Chest: Effort normal and breath sounds normal. No stridor. No respiratory distress. He has no wheezes. He has no rales. He exhibits no tenderness. Abdominal: Soft. He exhibits no distension. There is no tenderness. There is no rebound and no guarding. Obese male     Musculoskeletal: Normal range of motion. He exhibits no edema or tenderness. Neurological: He is alert and oriented to person, place, and time. No cranial nerve deficit. No gross motor or sensory deficits    Skin: Skin is warm and dry. He is not diaphoretic. Abscess to left upper buttock indurated erythematous, with skin sloughing superficially, + exudative drainage     Psychiatric: He has a normal mood and affect. His behavior is normal.   Nursing note and vitals reviewed. MDM  Number of Diagnoses or Management Options  Diagnosis management comments: DDx: Abscess, Cellulitis, Sepsis, DKA. Amount and/or Complexity of Data Reviewed  Clinical lab tests: ordered and reviewed  Tests in the radiology section of CPT®: ordered and reviewed  Tests in the medicine section of CPT®: ordered and reviewed  Review and summarize past medical records: yes  Independent visualization of images, tracings, or specimens: yes    Patient Progress  Patient progress: stable    ED Course       Procedures     ED EKG interpretation:  Rhythm: normal sinus rhythm; and regular .  Rate (approx.): 76; Axis: normal; P wave: normal; QRS interval: normal ; ST/T wave: normal; This EKG was interpreted by Fay Opitz, DO,ED Provider. PROGRESS NOTE:  8:19 PM  Pt reevaluated. Pt is feeling better and is ready for discharge. Written by Abner Hunter. Rob Ellis, ED Scribe, as dictated by Fay Opitz, DO    PROGRESS NOTE:  8:40 PM  Pt reevaluated. Pt's creatinine levels and blood sugar are improved. Patient wanting to attempt outpatient treatment first. He has an appointment with Dr. Nelida Pittman on Wednesday. Was told to come back to ED Mon to get packing removed, or if sx worsen. Written by Abner Hunter. Rob Ellis, ED Scribe, as dictated by Fay Opitz, DO    LABORATORY TESTS:  Recent Results (from the past 12 hour(s))   GLUCOSE, POC    Collection Time: 04/01/17  3:43 PM   Result Value Ref Range    Glucose (POC) 390 (H) 65 - 100 mg/dL    Performed by Janett Haynes    CBC WITH AUTOMATED DIFF    Collection Time: 04/01/17  3:57 PM   Result Value Ref Range    WBC 8.9 4.1 - 11.1 K/uL    RBC 3.86 (L) 4.10 - 5.70 M/uL    HGB 11.6 (L) 12.1 - 17.0 g/dL    HCT 33.1 (L) 36.6 - 50.3 %    MCV 85.8 80.0 - 99.0 FL    MCH 30.1 26.0 - 34.0 PG    MCHC 35.0 30.0 - 36.5 g/dL    RDW 13.5 11.5 - 14.5 %    PLATELET 285 519 - 462 K/uL    NEUTROPHILS 76 (H) 32 - 75 %    LYMPHOCYTES 15 12 - 49 %    MONOCYTES 8 5 - 13 %    EOSINOPHILS 1 0 - 7 %    BASOPHILS 0 0 - 1 %    ABS. NEUTROPHILS 6.7 1.8 - 8.0 K/UL    ABS. LYMPHOCYTES 1.3 0.8 - 3.5 K/UL    ABS. MONOCYTES 0.7 0.0 - 1.0 K/UL    ABS. EOSINOPHILS 0.1 0.0 - 0.4 K/UL    ABS.  BASOPHILS 0.0 0.0 - 0.1 K/UL   METABOLIC PANEL, COMPREHENSIVE    Collection Time: 04/01/17  3:57 PM   Result Value Ref Range    Sodium 137 136 - 145 mmol/L    Potassium 4.0 3.5 - 5.1 mmol/L    Chloride 100 97 - 108 mmol/L    CO2 24 21 - 32 mmol/L    Anion gap 13 5 - 15 mmol/L    Glucose 361 (H) 65 - 100 mg/dL    BUN 26 (H) 6 - 20 MG/DL    Creatinine 3.13 (H) 0.70 - 1.30 MG/DL    BUN/Creatinine ratio 8 (L) 12 - 20      GFR est AA 25 (L) >60 ml/min/1.73m2    GFR est non-AA 20 (L) >60 ml/min/1.73m2    Calcium 9.0 8.5 - 10.1 MG/DL    Bilirubin, total 0.4 0.2 - 1.0 MG/DL    ALT (SGPT) 25 12 - 78 U/L    AST (SGOT) 10 (L) 15 - 37 U/L    Alk.  phosphatase 188 (H) 45 - 117 U/L    Protein, total 7.0 6.4 - 8.2 g/dL    Albumin 3.1 (L) 3.5 - 5.0 g/dL    Globulin 3.9 2.0 - 4.0 g/dL    A-G Ratio 0.8 (L) 1.1 - 2.2     TROPONIN I    Collection Time: 04/01/17  3:57 PM   Result Value Ref Range    Troponin-I, Qt. <0.04 <0.05 ng/mL   LACTIC ACID, PLASMA    Collection Time: 04/01/17  3:57 PM   Result Value Ref Range    Lactic acid 1.6 0.4 - 2.0 MMOL/L   CK W/ CKMB & INDEX    Collection Time: 04/01/17  3:57 PM   Result Value Ref Range     39 - 308 U/L    CK - MB 2.1 <3.6 NG/ML    CK-MB Index 1.5 0 - 2.5     EKG, 12 LEAD, INITIAL    Collection Time: 04/01/17  4:11 PM   Result Value Ref Range    Ventricular Rate 76 BPM    Atrial Rate 76 BPM    P-R Interval 190 ms    QRS Duration 102 ms    Q-T Interval 374 ms    QTC Calculation (Bezet) 420 ms    Calculated P Axis 24 degrees    Calculated R Axis -7 degrees    Calculated T Axis 0 degrees    Diagnosis       Normal sinus rhythm  Nonspecific T wave abnormality  Abnormal ECG  When compared with ECG of 18-DEC-2016 14:49,  No significant change was found     URINALYSIS W/ REFLEX CULTURE    Collection Time: 04/01/17  6:49 PM   Result Value Ref Range    Color YELLOW/STRAW      Appearance CLEAR CLEAR      Specific gravity 1.024 1.003 - 1.030      pH (UA) 6.0 5.0 - 8.0      Protein >300 (A) NEG mg/dL    Glucose >1000 (A) NEG mg/dL    Ketone NEGATIVE  NEG mg/dL    Bilirubin NEGATIVE  NEG      Blood SMALL (A) NEG      Urobilinogen 0.2 0.2 - 1.0 EU/dL    Nitrites NEGATIVE  NEG      Leukocyte Esterase NEGATIVE  NEG      WBC 0-4 0 - 4 /hpf    RBC 5-10 0 - 5 /hpf    Epithelial cells FEW FEW /lpf    Bacteria NEGATIVE  NEG /hpf    UA:UC IF INDICATED CULTURE NOT INDICATED BY UA RESULT CNI     GLUCOSE, POC    Collection Time: 04/01/17  7:56 PM   Result Value Ref Range    Glucose (POC) 262 (H) 65 - 100 mg/dL Performed by Funmilayo Valero    POC CREATININE    Collection Time: 04/01/17  7:57 PM   Result Value Ref Range    Creatinine (POC) 2.7 (H) 0.6 - 1.3 MG/DL    GFR-AA (POC) 29 (L) >60 ml/min/1.73m2    GFR, non-AA (POC) 24 (L) >60 ml/min/1.73m2       IMAGING RESULTS:  XR CHEST PORT   Final Result   EXAM: XR CHEST PORT     INDICATION: syncope, n/v     COMPARISON: 12/18/2016     FINDINGS: A portable AP radiograph of the chest was obtained at 1703 hours. The  patient is on a cardiac monitor. The lungs are clear. The cardiac and  mediastinal contours and pulmonary vascularity are normal. There is no  pneumothorax or pleural effusion. .      IMPRESSION  IMPRESSION: No acute findings. MEDICATIONS GIVEN:  Medications   sodium chloride (NS) flush 5-10 mL (not administered)   sodium chloride (NS) flush 5-10 mL (not administered)   sodium chloride 0.9 % bolus infusion 1,000 mL (0 mL IntraVENous IV Completed 4/1/17 1802)   trimethoprim-sulfamethoxazole (BACTRIM DS, SEPTRA DS) 160-800 mg per tablet 1 Tab (1 Tab Oral Given 4/1/17 2036)       PROCEDURES:  Procedure Note - Incision and Drainage:   6:17 PM  Performed by: Melody Pozo PA-C  Complexity: simple  Skin prepped with Chlorprep. Sterile field established. Anesthesia achieved with 5 mLs of Lidocaine 1% with epinephrine using a local infiltration. Abscess to left lower back was incised with # 11 blade, and 20mLs of purulent drainage was expressed. Wound probed and irrigated. Area was packed using 1/2 inch iodoform gauze. Sterile dressing applied. Estimated blood loss: < 5 mL  The procedure took 15 minutes, and pt tolerated well. IMPRESSION:  1. Abscess    2. Vasovagal syncope        PLAN:  1. Current Discharge Medication List      START taking these medications    Details   ondansetron (ZOFRAN ODT) 4 mg disintegrating tablet Take 1 Tab by mouth every eight (8) hours as needed for Nausea.   Qty: 10 Tab, Refills: 0      oxyCODONE-acetaminophen (PERCOCET) 5-325 mg per tablet Take 1 Tab by mouth every four (4) hours as needed for Pain. Max Daily Amount: 6 Tabs. Qty: 20 Tab, Refills: 0      trimethoprim-sulfamethoxazole (BACTRIM DS) 160-800 mg per tablet Take 1 Tab by mouth two (2) times a day for 7 days. Qty: 14 Tab, Refills: 0           2. Follow-up Information     Follow up With Details Comments [address], [place]   [address] [place] [address] [address]  [phone]      [facility] EMERGENCY DEPT In 2 days or sooner if symptoms worsen [address] [place] [address]  [phone]        Return to ED if worse     DISCHARGE NOTE:  8:50 PM  The patient's results have been reviewed with family and/or caregiver. They verbally convey their understanding and agreement of the patient's signs, symptoms, diagnosis, treatment, and prognosis. They additionally agree to follow up as recommended in the discharge instructions or to return to the Emergency Room should the patient's condition change prior to their follow-up appointment. The family and/or caregiver verbally agrees with the care-plan and all of their questions have been answered. The discharge instructions have also been provided to the them along with educational information regarding the patient's diagnosis and a list of reasons why the patient would want to return to the ER prior to their follow-up appointment should their condition change. Written by Dane Payne. Alexander Lim ED Scribe, as dictated by Radames Martinez DO. Attestations: This note is prepared by Dane Payne. Anastasiia, acting as Scribe for Radames Martinez, [address] [place] [address]: The scribe's documentation has been prepared under my direction and personally reviewed by me in its entirety. I confirm that the note above accurately reflects all work, treatment, procedures, and medical decision making performed by me.

## 2017-04-01 NOTE — ED NOTES
Pt resting in stretcher. Call bell within reach. Updated on plan of care. Fluids initiated. Culture swab obtained and sent to lab. Pt aware of need for urine. Radiology at the bedside to obtain portable xray. No other complaints voiced at this time. Awaiting results.

## 2017-04-01 NOTE — ED NOTES
Dr. Moni Bennett and Brook Orr, [facility] at the bedside to I&D on pt abscess.

## 2017-04-01 NOTE — ED NOTES
Pt resting in stretcher. Call bell within reach. Pt updated on plan of care. Urine sent to lab for testing. Fluids completed and disconnected. No other complaints voiced at this time.

## 2017-04-02 LAB
ATRIAL RATE: 76 BPM
CALCULATED P AXIS, ECG09: 24 DEGREES
CALCULATED R AXIS, ECG10: -7 DEGREES
CALCULATED T AXIS, ECG11: 0 DEGREES
DIAGNOSIS, 93000: NORMAL
P-R INTERVAL, ECG05: 190 MS
Q-T INTERVAL, ECG07: 374 MS
QRS DURATION, ECG06: 102 MS
QTC CALCULATION (BEZET), ECG08: 420 MS
VENTRICULAR RATE, ECG03: 76 BPM

## 2017-04-02 NOTE — DISCHARGE INSTRUCTIONS
Skin Abscess: Care Instructions  Your Care Instructions    A skin abscess is a bacterial infection that forms a pocket of pus. A boil is a kind of skin abscess. The doctor may have cut an opening in the abscess so that the pus can drain out. You may have gauze in the cut so that the abscess will stay open and keep draining. You may need antibiotics. You will need to follow up with your doctor to make sure the infection has gone away. The doctor has checked you carefully, but problems can develop later. If you notice any problems or new symptoms, get medical treatment right away. Follow-up care is a key part of your treatment and safety. Be sure to make and go to all appointments, and call your doctor if you are having problems. It's also a good idea to know your test results and keep a list of the medicines you take. How can you care for yourself at home? · Apply warm and dry compresses, a heating pad set on low, or a hot water bottle 3 or 4 times a day for pain. Keep a cloth between the heat source and your skin. · If your doctor prescribed antibiotics, take them as directed. Do not stop taking them just because you feel better. You need to take the full course of antibiotics. · Take pain medicines exactly as directed. ¨ If the doctor gave you a prescription medicine for pain, take it as prescribed. ¨ If you are not taking a prescription pain medicine, ask your doctor if you can take an over-the-counter medicine. · Keep your bandage clean and dry. Change the bandage whenever it gets wet or dirty, or at least one time a day. · If the abscess was packed with gauze:  ¨ Keep follow-up appointments to have the gauze changed or removed. If the doctor instructed you to remove the gauze, gently pull out all of the gauze when your doctor tells you to. ¨ After the gauze is removed, soak the area in warm water for 15 to 20 minutes 2 times a day, until the wound closes. When should you call for help?   Call your doctor now or seek immediate medical care if:  · You have signs of worsening infection, such as:  ¨ Increased pain, swelling, warmth, or redness. ¨ Red streaks leading from the infected skin. ¨ Pus draining from the wound. ¨ A fever. Watch closely for changes in your health, and be sure to contact your doctor if:  · You do not get better as expected. Where can you learn more? Go to http://nader-star.info/. Enter A688 in the search box to learn more about \"Skin Abscess: Care Instructions. \"  Current as of: October 13, 2016  Content Version: 11.2  © 2610-6916 [employer]. Care instructions adapted under license by [employer] (which disclaims liability or warranty for this information). If you have questions about a medical condition or this instruction, always ask your healthcare professional. [place] [address] any warranty or liability for your use of this information. Vasovagal Syncope: Care Instructions  Your Care Instructions    Vasovagal syncope (say \"irt-ouh-XII-gul DSSI-aqc-lpk\") is sudden dizziness or fainting that can be set off by things such as pain, stress, fear, or trauma. You may sweat or feel lightheaded, sick to your stomach, or tingly. The problem causes the heart rate to slow and the blood vessels to widen, or dilate, for a short time. When this happens, blood pools in the lower body, and less blood goes to the brain. You can usually get relief by lying down with your legs raised (elevated). This helps more blood to flow to your brain and may help relieve symptoms like feeling dizzy. Some doctors may recommend a technique that involves tensing your fists and arms. This type of fainting is often easy to predict. For example, it happens to some people when they see blood or have to get a shot. They may feel symptoms before they faint. An episode of vasovagal syncope usually responds well to self-care.  Other treatment often isn't needed. But if the fainting keeps happening, your doctor may suggest further treatments. Follow-up care is a key part of your treatment and safety. Be sure to make and go to all appointments, and call your doctor if you are having problems. It's also a good idea to know your test results and keep a list of the medicines you take. How can you care for yourself at home? · Drink plenty of fluids to prevent dehydration. If you have kidney, heart, or liver disease and have to limit fluids, talk with your doctor before you increase your fluid intake. · Try to avoid things that you think may set off vasovagal syncope. · Talk to your doctor about any medicines you take. Some medicines may increase the chance of this condition occurring. · If you feel symptoms, lie down with your legs raised. Talk to your doctor about what to do if your symptoms come back. When should you call for help? Call 911 anytime you think you may need emergency care. For example, call if:  · You have symptoms of a heart problem. These may include:  ¨ Chest pain or pressure. ¨ Severe trouble breathing. ¨ A fast or irregular heartbeat. Watch closely for changes in your health, and be sure to contact your doctor if:  · You have more episodes of fainting at home. · You do not get better as expected. Where can you learn more? Go to http://nader-star.info/. Enter L754 in the search box to learn more about \"Vasovagal Syncope: Care Instructions. \"  Current as of: May 27, 2016  Content Version: 11.2  © 1259-3449 [employer]. Care instructions adapted under license by [employer] (which disclaims liability or warranty for this information). If you have questions about a medical condition or this instruction, always ask your healthcare professional. [address] any warranty or liability for your use of this information.

## 2017-04-02 NOTE — ED NOTES
Pt ambulated with walker and steady gait out of ED, after declining wheelchair ride. No other complaints voiced at this time.

## 2017-04-03 ENCOUNTER — HOSPITAL ENCOUNTER (EMERGENCY)
Age: 62
Discharge: ANOTHER HOSPITAL | End: 2017-04-03
Attending: EMERGENCY MEDICINE
Payer: MEDICARE

## 2017-04-03 VITALS
RESPIRATION RATE: 16 BRPM | HEIGHT: 77 IN | SYSTOLIC BLOOD PRESSURE: 143 MMHG | DIASTOLIC BLOOD PRESSURE: 69 MMHG | HEART RATE: 79 BPM | WEIGHT: 315 LBS | TEMPERATURE: 97.7 F | BODY MASS INDEX: 37.19 KG/M2 | OXYGEN SATURATION: 99 %

## 2017-04-03 DIAGNOSIS — L02.91 ABSCESS: Primary | ICD-10-CM

## 2017-04-03 DIAGNOSIS — Z51.89 WOUND CHECK, ABSCESS: ICD-10-CM

## 2017-04-03 PROCEDURE — 75810000289 HC I&D ABSCESS SIMP/COMP/MULT

## 2017-04-03 PROCEDURE — 99283 EMERGENCY DEPT VISIT LOW MDM: CPT

## 2017-04-03 PROCEDURE — 74011250637 HC RX REV CODE- 250/637: Performed by: PHYSICIAN ASSISTANT

## 2017-04-03 RX ORDER — SULFAMETHOXAZOLE AND TRIMETHOPRIM 800; 160 MG/1; MG/1
2 TABLET ORAL
Status: COMPLETED | OUTPATIENT
Start: 2017-04-03 | End: 2017-04-03

## 2017-04-03 RX ADMIN — SULFAMETHOXAZOLE AND TRIMETHOPRIM 2 TABLET: 800; 160 TABLET ORAL at 17:28

## 2017-04-03 NOTE — DISCHARGE INSTRUCTIONS

## 2017-04-03 NOTE — ED PROVIDER NOTES
HPI Comments: Audelia Collado is a 59 y.o. male with hx of MSSA , IDDM, HTN, and lymphoma who presents ambulatory to the ED for wound check of I&D site. Per records, pt was seen in ED on 4/1/17 for abscess to left lower back which was lanced and packed with half inch gauze. He was given dose of Bactrim/Septra DS in ED and sent home on Bactrim DS, Zofran ODT, and Percocet 5-325mg, however, pt has not yet filled Rx. Pt was also instructed to follow up with General surgeon Dr Avis Clarke on 4/5/17. Pt states he has been feeling fine and denies any fevers or chills. PCP: Mercedes Means NP    There are no other complaints, changes or physical findings at this time. The history is provided by the patient. Past Medical History:   Diagnosis Date    A-fib [facility]) 2009    Resolved. 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     Cancer ([facility]     splenic lymphoma    CKD stage 4 due to type 2 diabetes mellitus ([facility]     Diabetes ([facility]     Hypertension     Neuropathy     Other ill-defined conditions     spinal meningitis    Other ill-defined conditions     broken blood vessel to nose    Paroxysmal a-fib (HCC)     Vitamin D deficiency        Past Surgical History:   Procedure Laterality Date    HX OTHER SURGICAL  02/06/2017    Excisional debridement of abscess right thigh & right lower back          Family History:   Problem Relation Age of Onset    Diabetes Mother     Hypertension Father     Hypertension Sister        Social History     Social History    Marital status:      Spouse name: N/A    Number of children: N/A    Years of education: N/A     Occupational History    Not on file.      Social History Main Topics    Smoking status: Never Smoker    Smokeless tobacco: Not on file    Alcohol use No    Drug use: No    Sexual activity: Not on file     Other Topics Concern    Not on file     Social History Narrative    ** Merged History Encounter **              ALLERGIES: Review of patient's allergies indicates no known allergies. Review of Systems   Constitutional: Negative for fatigue and fever. HENT: Negative for congestion, ear pain and rhinorrhea. Eyes: Negative for pain and redness. Respiratory: Negative for cough and wheezing. Cardiovascular: Negative for chest pain and palpitations. Gastrointestinal: Negative for abdominal pain, nausea and vomiting. Genitourinary: Negative for dysuria, frequency and urgency. Musculoskeletal: Negative for back pain, neck pain and neck stiffness. Skin: Positive for wound (abscess to L lower back). Negative for rash. Neurological: Negative for weakness, light-headedness, numbness and headaches. Vitals:    04/03/17 1516 04/03/17 1609   BP: 156/64 143/69   Pulse: 79 79   Resp: 16 16   Temp: 97.7 °F (36.5 °C)    SpO2: 100% 99%   Weight: 143.1 kg (315 lb 7.7 oz)    Height: 6' 5\" (1.956 m)             Physical Exam   Constitutional: He is oriented to person, place, and time. He appears well-developed and well-nourished. No distress. HENT:   Head: Normocephalic and atraumatic. Right Ear: External ear normal.   Left Ear: External ear normal.   Nose: Nose normal.   Mouth/Throat: Uvula is midline. No trismus in the jaw. Eyes: Conjunctivae and EOM are normal. Pupils are equal, round, and reactive to light. Neck: Normal range of motion and full passive range of motion without pain. Cardiovascular: Normal rate, regular rhythm and normal heart sounds. Pulmonary/Chest: Effort normal and breath sounds normal. No respiratory distress. Abdominal: There is no tenderness. Neurological: He is alert and oriented to person, place, and time. Skin: No rash noted. Dressing and packing of left flank removed. There is a draining abscess with an adequate opening. No significant surrounding erythema. Psychiatric: He has a normal mood and affect.  His speech is normal.   Nursing note and vitals reviewed. MDM  Number of Diagnoses or Management Options  Abscess:   Diagnosis management comments: Afebrile, well-appearing. Wound is actively draining. Will re-pack and have follow up with surgery on 4/5/17. Amount and/or Complexity of Data Reviewed  Review and summarize past medical records: yes    Patient Progress  Patient progress: stable    ED Course       Procedures    Procedure Note - Wound check/Incision & Drainage:   5:11 PM  Performed by: Nely Springer  Complexity: Complicated  Removed packing from I&D site. Applied gentle pressure with moderate amount of purulent drainage expressed. Area was re-probed. Area re-packed using half inch iodoform gauze. Sterile dressing applied. Estimated blood loss: less than 1 mL  The procedure took 1-15 minutes, and pt tolerated moderately. Written by Rosalino Mackey, ED Scribe, as dictated by Nely Springer. PROGRESS NOTE:  5:42 PM  Pt ambulates down the dewitt and to the restroom with a steady gait and no appreciable limp. Written by Rosalino Mackey, ED Scribe, as dictated by Nely Springer. MEDICATIONS GIVEN:  Medications   trimethoprim-sulfamethoxazole (BACTRIM DS, SEPTRA DS) 160-800 mg per tablet 2 Tab (2 Tabs Oral Given 4/3/17 1728)       IMPRESSION:  1. Abscess    2. Wound check, abscess        PLAN:  1. Discharge home   Follow-up Information     Follow up With Details Comments [address], [place] Schedule an appointment as soon as possible for a visit SURGERY: keep your appointment on Wednesday [phone]      Nile Nava NP Schedule an appointment as soon as possible for a visit As needed [address]         Return to ED if worse     DISCHARGE NOTE  5:28 PM  The patient has been re-evaluated and is ready for discharge. Reviewed available results with patient.  Counseled pt on diagnosis and care plan. Pt has expressed understanding, and all questions have been answered. Pt agrees with plan and agrees to F/U as recommended, or return to the ED if their sxs worsen. Discharge instructions have been provided and explained to the pt, along with reasons to return to the ED. This note is prepared by Farrah Ceja, acting as Scribe for Isabel Theodore. SUKHDEEP Fuller: The scribe's documentation has been prepared under my direction and personally reviewed by me in its entirety. I confirm that the note above accurately reflects all work, treatment, procedures, and medical decision making performed by me.

## 2017-04-03 NOTE — ED NOTES
MAYRA Morel at bedside to provide discharge paperwork. Vital signs stable. Pt in no apparent distress at this time. Mental status at baseline. Ambulatory to waiting room with steady gate, discharge paperwork in hand.

## 2017-04-05 ENCOUNTER — OFFICE VISIT (OUTPATIENT)
Dept: SURGERY | Age: 62
End: 2017-04-05

## 2017-04-05 VITALS
OXYGEN SATURATION: 100 % | WEIGHT: 315 LBS | HEIGHT: 77 IN | SYSTOLIC BLOOD PRESSURE: 149 MMHG | BODY MASS INDEX: 37.19 KG/M2 | HEART RATE: 53 BPM | DIASTOLIC BLOOD PRESSURE: 65 MMHG

## 2017-04-05 DIAGNOSIS — L02.91 ABSCESS: Primary | ICD-10-CM

## 2017-04-05 RX ORDER — LIDOCAINE HYDROCHLORIDE AND EPINEPHRINE 20; 10 MG/ML; UG/ML
20 INJECTION, SOLUTION INFILTRATION; PERINEURAL ONCE
Qty: 20 ML | Refills: 0
Start: 2017-04-05 | End: 2017-04-05

## 2017-04-05 NOTE — MR AVS SNAPSHOT
Visit Information Date & Time Provider Department Dept. Phone Encounter #  
 4/5/2017  8:40 AM Kylie Soria MD [facility] [address] [facility] [phone] [id number] Your Appointments 4/10/2017  9:40 AM  
ESTABLISHED PATIENT with Kylie Soria MD  
[facility] [address] [facility] ([facility]) Appt Note: follow up  
 [address], [address], [address] [facility] [address]  
[address], [address], [address] [facility] [address]  
  
    
 4/25/2017  1:30 PM  
New Patient with [facility] Appt Note: New pt appointment, Diabetic, referred by ANICETO Honeycutt [phone], AB, 03/06/17  
 [address] [facility] [address] [address] [facility] Upcoming Health Maintenance Date Due Hepatitis C Screening 1955 LIPID PANEL Q1 1955 FOOT EXAM Q1 4/8/1963 MICROALBUMIN Q1 4/8/1963 EYE EXAM RETINAL OR DILATED Q1 4/8/1963 DTaP/Tdap/Td series (1 - Tdap) 4/8/1974 FOBT Q 1 YEAR AGE 50-75 4/8/2003 ZOSTER VACCINE AGE 60> 4/8/2013 Pneumococcal 19-64 Highest Risk (2 of 3 - PCV13) 8/1/2013 INFLUENZA AGE 9 TO ADULT 8/1/2016 HEMOGLOBIN A1C Q6M 8/8/2017 Allergies as of 4/5/2017  Review Complete On: 7/0/7031 By: Laure Dangelo No Known Allergies Current Immunizations  Reviewed on 8/23/2013 Name Date Pneumococcal Vaccine (Unspecified Type) 8/1/2012 Not reviewed this visit Vitals BP Pulse Height(growth percentile) Weight(growth percentile) SpO2 BMI  
 149/65 (BP 1 Location: Left arm, BP Patient Position: Sitting) (!) 53 6' 5\" (1.956 m) 316 lb 6.4 oz (143.5 kg) 100% 37.52 kg/m2 Smoking Status Never Smoker BMI and BSA Data Body Mass Index Body Surface Area 37.52 kg/m 2 2.79 m 2 Preferred Pharmacy Pharmacy Name Phone [id number] [facility] [phone] Your Updated Medication List  
  
   
This list is accurate as of: 4/5/17 12:50 PM.  Always use your most recent med list.  
  
  
  
  
 acetaminophen 325 mg tablet Commonly known as:  TYLENOL Take 2 Tabs by mouth every four (4) hours as needed for Pain or Fever. alcohol swabs Padm Commonly known as:  ALCOHOL PADS  
1 Each by Apply Externally route two (2) times a day. amLODIPine 10 mg tablet Commonly known as:  Fozia Dural Take 1 Tab by mouth daily. aspirin delayed-release 81 mg tablet Take 1 Tab by mouth daily. carvedilol 25 mg tablet Commonly known as:  Jeanella Meline Take 25 mg by mouth two (2) times daily (with meals). cloNIDine HCl 0.1 mg tablet Commonly known as:  CATAPRES Take 0.1 mg by mouth two (2) times a day. digoxin 0.125 mg tablet Commonly known as:  LANOXIN Take 0.125 mg by mouth daily. Indications: afib * insulin lispro protamine/insulin lispro 100 unit/mL (75-25) injection Commonly known as:  HUMALOG MIX 75/25  
60 Units by SubCUTAneous route Before breakfast and dinner. * insulin lispro protamine/insulin lispro 100 unit/mL (75-25) injection Commonly known as:  HUMALOG MIX 75/25  
65 units subcutaneous injection twice daily before meals (before breakfast and dinner) Insulin Syringe-Needle U-100 0.5 mL 29 gauge x 1/2\" Syrg Commonly known as:  INSULIN SYRINGE  
1 Each by Does Not Apply route two (2) times a day. ondansetron 4 mg disintegrating tablet Commonly known as:  ZOFRAN ODT Take 1 Tab by mouth every eight (8) hours as needed for Nausea. oxyCODONE-acetaminophen 5-325 mg per tablet Commonly known as:  PERCOCET Take 1 Tab by mouth every four (4) hours as needed for Pain. Max Daily Amount: 6 Tabs. pravastatin 20 mg tablet Commonly known as:  PRAVACHOL Take 20 mg by mouth nightly. trimethoprim-sulfamethoxazole 160-800 mg per tablet Commonly known as:  BACTRIM DS Take 1 Tab by mouth two (2) times a day for 7 days. * Notice: This list has 2 medication(s) that are the same as other medications prescribed for you. Read the directions carefully, and ask your doctor or other care provider to review them with you. Patient Instructions Instructions from 4/5/17 appointment with Dr Chelsie Livingston: 
 
Brittanie Mathew up antibiotic today & start taking medication Follow up with Dr Chelsie Livingston, Monday, April 10, 2017 - for that appointment, nothing to eat or drink after midnight Sunday, except take medications on Monday morning with a small amount of water. Introducing [facility] & [facility] SERVICES! Orin Olguin introduces [employer] patient portal. Now you can access parts of your medical record, email your doctor's office, and request medication refills online. 1. In your internet browser, go to [email or web address] 2. Click on the First Time User? Click Here link in the Sign In box. You will see the New Member Sign Up page. 3. Enter your [employer] Access Code exactly as it appears below. You will not need to use this code after youve completed the sign-up process. If you do not sign up before the expiration date, you must request a new code. · [employer] Access Code: [email or web address] Expires: 6/20/2017 11:21 AM 
 
4. Enter the last four digits of your Social Security Number (xxxx) and Date of Birth (mm/dd/yyyy) as indicated and click Submit. You will be taken to the next sign-up page. 5. Create a [employer] ID. This will be your [employer] login ID and cannot be changed, so think of one that is secure and easy to remember. 6. Create a [employer] password. You can change your password at any time. 7. Enter your Password Reset Question and Answer.  This can be used at a later time if you forget your password. 8. Enter your e-mail address. You will receive e-mail notification when new information is available in [address] 9. Click Sign Up. You can now view and download portions of your medical record. 10. Click the Download Summary menu link to download a portable copy of your medical information. If you have questions, please visit the Frequently Asked Questions section of the [email or web address] website. Remember, [email or web address] is NOT to be used for urgent needs. For medical emergencies, dial 911. Now available from your iPhone and Android! Please provide this summary of care documentation to your next provider. Your primary care clinician is listed as Vannesa Clifton. If you have any questions after today's visit, please call [phone].

## 2017-04-05 NOTE — PROGRESS NOTES
[facility]  OFFICE PROCEDURE PROGRESS NOTE        Chart reviewed for the following:   Edita MUSE, have reviewed the History, Physical and updated the Allergic reactions for [address] performed immediately prior to start of procedure:   Edita MUSE, have performed the following reviews on [place] prior to the start of the procedure:            * Patient was identified by name and date of birth   * Agreement on procedure being performed was verified  * Risks and Benefits explained to the patient  * Procedure site verified and marked as necessary  * Patient was positioned for comfort  * Consent was signed and verified     Time: 12:20 pm      Date of procedure: 4/5/2017    Procedure performed by:  Trish Amin MD    Provider assisted by: none     Patient assisted by: self    How tolerated by patient: tolerated the procedure well with no complications    Post Procedural Pain Scale: 0 - No Hurt    Comments: none

## 2017-04-05 NOTE — PATIENT INSTRUCTIONS
Instructions from 4/5/17 appointment with Dr Walter Kumar:    [place] up antibiotic today & start taking medication  Follow up with Dr Walter Kumar, Monday, April 10, 2017 - for that appointment, nothing to eat or drink after midnight Sunday, except take medications on Monday morning with a small amount of water.

## 2017-04-05 NOTE — PROGRESS NOTES
The patient had been to the ER recently and had I & D of an abscess on the left back. He had been prescribed Bactrim po but has not as of yet picked it up from the pharmacy. He reports his blood sugars are improved, but not fully normalized at home. On examination on the left lower back, there is an opening about 5 mm wide with pus draining out of it. There is necrosis of skin for about a 1-1/2 cm radius. There is no real tenderness in the area. I recommended debridement of the site and also obtaining better drainage of the abscess. Risks versus benefits were reviewed with the patient. The site was marked and time out was performed. After the site was sterilely prepared and draped, local anesthesia Xylocaine 1% was infiltrated into the margin of the viable and non-viable tissue. With a #15 blade scalpel, I performed excisional debridement of the skin and some subcutaneous tissue so that the final wound is about 3 cm in diameter and was circular. The interior surface of the cavity actually was mostly granulation tissue. There was a small amount of necrotic subcutaneous tissue that was sharply excised with scalpel. There was also at the lateral end two small pockets in the subcutaneous tissue that contained pus and these were opened into the cavity so they would thoroughly drain. There was no other necrosis seen. The great majority of necrotic tissue at the skin level was removed circumferentially. The wound was then packed open with gauze. A dry gauze dressing was applied. Home health was ordered for daily Carrasyn gel dressing and packing changes. The patient is to see me in follow up in one week. Final Diagnosis:  Abscess of left back. Procedure:  Excisional debridement of skin and subcutaneous tissue.     [place]/mikey     cc: Kya Fernandez NP

## 2017-04-06 LAB
BACTERIA SPEC CULT: ABNORMAL
BACTERIA SPEC CULT: ABNORMAL
GRAM STN SPEC: ABNORMAL
SERVICE CMNT-IMP: ABNORMAL

## 2017-04-07 ENCOUNTER — TELEPHONE (OUTPATIENT)
Dept: SURGERY | Age: 62
End: 2017-04-07

## 2017-04-07 LAB
BACTERIA SPEC CULT: NORMAL
SERVICE CMNT-IMP: NORMAL

## 2017-04-07 NOTE — TELEPHONE ENCOUNTER
Spoke with Mr Molly De Santiago.  Was able to schedule appt for today with Lil Monkey Butt for packing change. Information faxed to [phone], confirmation received. Pt has appt Monday 4/10/17 with Dr Avis Clarke.

## 2017-04-10 ENCOUNTER — OFFICE VISIT (OUTPATIENT)
Dept: SURGERY | Age: 62
End: 2017-04-10

## 2017-04-10 VITALS
HEART RATE: 74 BPM | BODY MASS INDEX: 37.19 KG/M2 | HEIGHT: 77 IN | WEIGHT: 315 LBS | DIASTOLIC BLOOD PRESSURE: 81 MMHG | RESPIRATION RATE: 20 BRPM | OXYGEN SATURATION: 99 % | SYSTOLIC BLOOD PRESSURE: 171 MMHG

## 2017-04-10 DIAGNOSIS — L02.212 ABSCESS OF LOWER BACK: Primary | ICD-10-CM

## 2017-04-10 NOTE — PROGRESS NOTES
The patient is status post debridement of skin and subcutaneous tissue at abscess of left lower back. He has no complaints. Home health nurses are changing dressing three times per week. On examination there is good granulation tissue in about 85% of the surface. There is a small amount of necrotic subacute debris which is still adherent. Gel soaked gauze was packed into the wound. Wound dimension now is about 4 cm long, 3 cm wide and 1 cm deep. Dry dressing applied over that. Tape was applied. The patient will continue current dressing changes and follow up in one week. The patient says his blood sugars are running around 200. I recommended he comply strictly with diabetic diet and his medications and continue follow up with his primary care physicians. Final Diagnosis:  Abscess back, status post recent debridement.     MedDATA/gwo

## 2017-04-10 NOTE — MR AVS SNAPSHOT
Visit Information Date & Time Provider Department Dept. Phone Encounter #  
 4/10/2017  9:40 AM Margoth Patel MD [facility]  [address] [phone] [id number] Your Appointments 4/25/2017  1:30 PM  
New Patient with Leonor Blum MD  
[facility] Diabetes and Endocrinology [facility]) Appt Note: New pt appointment, Diabetic, referred by NP Mercedes Means [phone], AB, 03/06/17  
 [address] [phone] [address] Upcoming Health Maintenance Date Due Hepatitis C Screening 1955 LIPID PANEL Q1 1955 FOOT EXAM Q1 4/8/1963 MICROALBUMIN Q1 4/8/1963 EYE EXAM RETINAL OR DILATED Q1 4/8/1963 DTaP/Tdap/Td series (1 - Tdap) 4/8/1974 FOBT Q 1 YEAR AGE 50-75 4/8/2003 ZOSTER VACCINE AGE 60> 4/8/2013 Pneumococcal 19-64 Highest Risk (2 of 3 - PCV13) 8/1/2013 INFLUENZA AGE 9 TO ADULT 8/1/2016 HEMOGLOBIN A1C Q6M 8/8/2017 Allergies as of 4/10/2017  Review Complete On: 4/10/2017 By: Margoth Patel MD  
 No Known Allergies Current Immunizations  Reviewed on 8/23/2013 Name Date Pneumococcal Vaccine (Unspecified Type) 8/1/2012 Not reviewed this visit You Were Diagnosed With   
  
 Codes Comments Abscess of lower back    -  Primary ICD-10-CM: L02.212 ICD-9-CM: 879. 2 Vitals BP Pulse Resp Height(growth percentile) Weight(growth percentile) SpO2  
 171/81 74 20 6' 5\" (1.956 m) 316 lb (143.3 kg) 99% BMI Smoking Status 37.47 kg/m2 Never Smoker BMI and BSA Data Body Mass Index Body Surface Area  
 37.47 kg/m 2 2.79 m 2 Preferred Pharmacy Pharmacy Name Phone [id number] [facility] [phone] Your Updated Medication List  
  
   
 This list is accurate as of: 4/10/17 11:18 AM.  Always use your most recent med list.  
  
  
  
  
 acetaminophen 325 mg tablet Commonly known as:  TYLENOL Take 2 Tabs by mouth every four (4) hours as needed for Pain or Fever. alcohol swabs Padm Commonly known as:  ALCOHOL PADS  
1 Each by Apply Externally route two (2) times a day. amLODIPine 10 mg tablet Commonly known as:  Marta Rafter Take 1 Tab by mouth daily. aspirin delayed-release 81 mg tablet Take 1 Tab by mouth daily. carvedilol 25 mg tablet Commonly known as:  Elin Spinner Take 25 mg by mouth two (2) times daily (with meals). cloNIDine HCl 0.1 mg tablet Commonly known as:  CATAPRES Take 0.1 mg by mouth two (2) times a day. digoxin 0.125 mg tablet Commonly known as:  LANOXIN Take 0.125 mg by mouth daily. Indications: afib * insulin lispro protamine/insulin lispro 100 unit/mL (75-25) injection Commonly known as:  HUMALOG MIX 75/25  
60 Units by SubCUTAneous route Before breakfast and dinner. * insulin lispro protamine/insulin lispro 100 unit/mL (75-25) injection Commonly known as:  HUMALOG MIX 75/25  
65 units subcutaneous injection twice daily before meals (before breakfast and dinner) Insulin Syringe-Needle U-100 0.5 mL 29 gauge x 1/2\" Syrg Commonly known as:  INSULIN SYRINGE  
1 Each by Does Not Apply route two (2) times a day. ondansetron 4 mg disintegrating tablet Commonly known as:  ZOFRAN ODT Take 1 Tab by mouth every eight (8) hours as needed for Nausea. oxyCODONE-acetaminophen 5-325 mg per tablet Commonly known as:  PERCOCET Take 1 Tab by mouth every four (4) hours as needed for Pain. Max Daily Amount: 6 Tabs. pravastatin 20 mg tablet Commonly known as:  PRAVACHOL Take 20 mg by mouth nightly. * Notice: This list has 2 medication(s) that are the same as other medications prescribed for you.  Read the directions carefully, and ask your doctor or other care provider to review them with you. Introducing hospitals & HEALTH SERVICES! Ivette Feliciano introduces Blue Source patient portal. Now you can access parts of your medical record, email your doctor's office, and request medication refills online. 1. In your internet browser, go to [email or web address] 2. Click on the First Time User? Click Here link in the Sign In box. You will see the New Member Sign Up page. 3. Enter your Blue Source Access Code exactly as it appears below. You will not need to use this code after youve completed the sign-up process. If you do not sign up before the expiration date, you must request a new code. · Blue Source Access Code: [email or web address] Expires: 6/20/2017 11:21 AM 
 
4. Enter the last four digits of your Social Security Number (xxxx) and Date of Birth (mm/dd/yyyy) as indicated and click Submit. You will be taken to the next sign-up page. 5. Create a Blue Source ID. This will be your Blue Source login ID and cannot be changed, so think of one that is secure and easy to remember. 6. Create a Blue Source password. You can change your password at any time. 7. Enter your Password Reset Question and Answer. This can be used at a later time if you forget your password. 8. Enter your e-mail address. You will receive e-mail notification when new information is available in [address] 9. Click Sign Up. You can now view and download portions of your medical record. 10. Click the Download Summary menu link to download a portable copy of your medical information. If you have questions, please visit the Frequently Asked Questions section of the Blue Source website. Remember, Blue Source is NOT to be used for urgent needs. For medical emergencies, dial 911. Now available from your iPhone and Android! Please provide this summary of care documentation to your next provider. Your primary care clinician is listed as Megan Adames.  If you have any questions after today's visit, please call [phone].

## 2017-04-17 ENCOUNTER — OFFICE VISIT (OUTPATIENT)
Dept: SURGERY | Age: 62
End: 2017-04-17

## 2017-04-17 VITALS
SYSTOLIC BLOOD PRESSURE: 165 MMHG | HEART RATE: 67 BPM | OXYGEN SATURATION: 100 % | HEIGHT: 77 IN | BODY MASS INDEX: 37.19 KG/M2 | WEIGHT: 315 LBS | DIASTOLIC BLOOD PRESSURE: 72 MMHG

## 2017-04-17 DIAGNOSIS — L02.212 ABSCESS OF LOWER BACK: Primary | ICD-10-CM

## 2017-04-17 RX ORDER — CARVEDILOL 12.5 MG/1
TABLET ORAL
COMMUNITY
Start: 2017-04-07 | End: 2017-08-02

## 2017-04-17 NOTE — PROGRESS NOTES
The patient is status post debridement of skin and subcutaneous tissue at abscess of the left lower back. He reports the area is feeling better. Blood sugars are still running over 200. He says he is trying to follow a diabetic diet and does take his medications. On examination of the left lower back site, there is granulation of about 90% of the surface. There was some necrotic debris which is yellow in color scattered in various areas on the surface. Using scissors, I performed sharp excisional debridement of the yellow necrotic subcutaneous tissue. Wound size was unchanged before and after the procedure with that size being approximately 3-1/2 cm long, 3 cm wide and 1/2 cm deep. The surface had improved appearance after debridement. Gel dressing applied. The patient will continue with gel dressings changed at home. He will follow up in 2 weeks. I urged the patient to adhere strictly to diabetic diet, follow his medications and follow up closely with his primary care physician. Blood sugars at that level would increase risk of development of other abscesses. Final Diagnosis:  Abscess left lower back, debridement of wound left lower back (sharp excisional).     [place]/mikey

## 2017-04-17 NOTE — MR AVS SNAPSHOT
Visit Information Date & Time Provider Department Dept. Phone Encounter #  
 4/17/2017 10:40 AM Kristen Farias MD [facility] [address] [phone] [id number] Follow-up Instructions Return in about 2 weeks (around 5/1/2017). Follow-up and Disposition History Your Appointments 4/25/2017  1:30 PM  
New Patient with MD Mitesh [facility] Endocr[facility] Appt Note: New pt appointment, Diabetic, referred by NP Radhames Dorsey [phone], AB, 03/06/17  
 [address] [facility] [address] [phone] [address]  
  
    
 5/4/2017 11:40 AM  
ESTABLISHED PATIENT with Kristen Farias MD  
[facility] [address] ([facility]) Appt Note: 2 week f/u  
 [address] [place] [address] Drive, [address], [address] [address]  
[address], [address], [address] [facility] [address] [address] Upcoming Health Maintenance Date Due Hepatitis C Screening 1955 LIPID PANEL Q1 1955 FOOT EXAM Q1 4/8/1963 MICROALBUMIN Q1 4/8/1963 EYE EXAM RETINAL OR DILATED Q1 4/8/1963 DTaP/Tdap/Td series (1 - Tdap) 4/8/1974 FOBT Q 1 YEAR AGE 50-75 4/8/2003 ZOSTER VACCINE AGE 60> 4/8/2013 Pneumococcal 19-64 Highest Risk (2 of 3 - PCV13) 8/1/2013 INFLUENZA AGE 9 TO ADULT 8/1/2016 HEMOGLOBIN A1C Q6M 8/8/2017 Allergies as of 4/17/2017  Review Complete On: 4/17/2017 By: Kristen Farias MD  
 No Known Allergies Current Immunizations  Reviewed on 8/23/2013 Name Date Pneumococcal Vaccine (Unspecified Type) 8/1/2012 Not reviewed this visit You Were Diagnosed With   
  
 Codes Comments Abscess of lower back    -  Primary ICD-10-CM: L02.212 ICD-9-CM: 509. 2 Vitals BP Pulse Height(growth percentile) Weight(growth percentile) SpO2 BMI  
 165/72 (BP 1 Location: Left arm, BP Patient Position: Sitting) 67 6' 5\" (1.956 m) 320 lb 9.6 oz (145.4 kg) 100% 38.02 kg/m2 Smoking Status Never Smoker BMI and BSA Data Body Mass Index Body Surface Area 38.02 kg/m 2 2.81 m 2 Preferred Pharmacy Pharmacy Name Phone [phone] [phone] [facility] [phone] Your Updated Medication List  
  
   
This list is accurate as of: 4/17/17  4:20 PM.  Always use your most recent med list.  
  
  
  
  
 acetaminophen 325 mg tablet Commonly known as:  TYLENOL Take 2 Tabs by mouth every four (4) hours as needed for Pain or Fever. alcohol swabs Padm Commonly known as:  ALCOHOL PADS  
1 Each by Apply Externally route two (2) times a day. amLODIPine 10 mg tablet Commonly known as:  Serena Brenna Take 1 Tab by mouth daily. aspirin delayed-release 81 mg tablet Take 1 Tab by mouth daily. * carvedilol 25 mg tablet Commonly known as:  Esaw Math Take 25 mg by mouth two (2) times daily (with meals). * carvedilol 12.5 mg tablet Commonly known as:  COREG  
  
 cloNIDine HCl 0.1 mg tablet Commonly known as:  CATAPRES Take 0.1 mg by mouth two (2) times a day. digoxin 0.125 mg tablet Commonly known as:  LANOXIN Take 0.125 mg by mouth daily. Indications: afib * insulin lispro protamine/insulin lispro 100 unit/mL (75-25) injection Commonly known as:  HUMALOG MIX 75/25  
60 Units by SubCUTAneous route Before breakfast and dinner. * insulin lispro protamine/insulin lispro 100 unit/mL (75-25) injection Commonly known as:  HUMALOG MIX 75/25  
65 units subcutaneous injection twice daily before meals (before breakfast and dinner) Insulin Syringe-Needle U-100 0.5 mL 29 gauge x 1/2\" Syrg Commonly known as:  INSULIN SYRINGE  
 1 Each by Does Not Apply route two (2) times a day. ondansetron 4 mg disintegrating tablet Commonly known as:  ZOFRAN ODT Take 1 Tab by mouth every eight (8) hours as needed for Nausea. oxyCODONE-acetaminophen 5-325 mg per tablet Commonly known as:  PERCOCET Take 1 Tab by mouth every four (4) hours as needed for Pain. Max Daily Amount: 6 Tabs. pravastatin 20 mg tablet Commonly known as:  PRAVACHOL Take 20 mg by mouth nightly. * Notice: This list has 4 medication(s) that are the same as other medications prescribed for you. Read the directions carefully, and ask your doctor or other care provider to review them with you. Follow-up Instructions Return in about 2 weeks (around 5/1/2017). Introducing [facility] SERVICES! Meagan Vidal introduces BI[employer]AFE patient portal. Now you can access parts of your medical record, email your doctor's office, and request medication refills online. 1. In your internet browser, go to [email or web address] 2. Click on the First Time User? Click Here link in the Sign In box. You will see the New Member Sign Up page. 3. Enter your BIOSAFE Access Code exactly as it appears below. You will not need to use this code after youve completed the sign-up process. If you do not sign up before the expiration date, you must request a new code. · BI[employer]AFE Access Code: [email or web address] Expires: 6/20/2017 11:21 AM 
 
4. Enter the last four digits of your Social Security Number (xxxx) and Date of Birth (mm/dd/yyyy) as indicated and click Submit. You will be taken to the next sign-up page. 5. Create a BIOSAFE ID. This will be your BIOSAFE login ID and cannot be changed, so think of one that is secure and easy to remember. 6. Create a BIOSAFE password. You can change your password at any time. 7. Enter your Password Reset Question and Answer. This can be used at a later time if you forget your password. 8. Enter your e-mail address. You will receive e-mail notification when new information is available in [address] 9. Click Sign Up. You can now view and download portions of your medical record. 10. Click the Download Summary menu link to download a portable copy of your medical information. If you have questions, please visit the Frequently Asked Questions section of the [employer] website. Remember, [employer] is NOT to be used for urgent needs. For medical emergencies, dial 911. Now available from your iPhone and Android! Please provide this summary of care documentation to your next provider. Your primary care clinician is listed as Clay Hodge. If you have any questions after today's visit, please call [phone].

## 2017-04-28 ENCOUNTER — HOSPITAL ENCOUNTER (EMERGENCY)
Age: 62
Discharge: HOME | End: 2017-04-29
Attending: EMERGENCY MEDICINE
Payer: MEDICARE

## 2017-04-28 VITALS — SYSTOLIC BLOOD PRESSURE: 174 MMHG | OXYGEN SATURATION: 96 % | DIASTOLIC BLOOD PRESSURE: 93 MMHG

## 2017-04-28 DIAGNOSIS — R73.9 HYPERGLYCEMIA: Primary | ICD-10-CM

## 2017-04-28 DIAGNOSIS — R11.2 NON-INTRACTABLE VOMITING WITH NAUSEA, UNSPECIFIED VOMITING TYPE: ICD-10-CM

## 2017-04-28 LAB
ALBUMIN SERPL BCP-MCNC: 2.8 G/DL (ref 3.5–5)
ALBUMIN/GLOB SERPL: 0.7 {RATIO} (ref 1.1–2.2)
ALP SERPL-CCNC: 153 U/L (ref 45–117)
ALT SERPL-CCNC: 19 U/L (ref 12–78)
ANION GAP BLD CALC-SCNC: 8 MMOL/L (ref 5–15)
AST SERPL W P-5'-P-CCNC: 8 U/L (ref 15–37)
BASOPHILS # BLD AUTO: 0 K/UL (ref 0–0.1)
BASOPHILS # BLD: 0 % (ref 0–1)
BILIRUB SERPL-MCNC: 0.4 MG/DL (ref 0.2–1)
BUN SERPL-MCNC: 20 MG/DL (ref 6–20)
BUN/CREAT SERPL: 7 (ref 12–20)
CALCIUM SERPL-MCNC: 9.2 MG/DL (ref 8.5–10.1)
CHLORIDE SERPL-SCNC: 104 MMOL/L (ref 97–108)
CO2 SERPL-SCNC: 26 MMOL/L (ref 21–32)
CREAT SERPL-MCNC: 2.86 MG/DL (ref 0.7–1.3)
EOSINOPHIL # BLD: 0.1 K/UL (ref 0–0.4)
EOSINOPHIL NFR BLD: 1 % (ref 0–7)
ERYTHROCYTE [DISTWIDTH] IN BLOOD BY AUTOMATED COUNT: 13.7 % (ref 11.5–14.5)
GLOBULIN SER CALC-MCNC: 4 G/DL (ref 2–4)
GLUCOSE BLD STRIP.AUTO-MCNC: 341 MG/DL (ref 65–100)
GLUCOSE SERPL-MCNC: 290 MG/DL (ref 65–100)
HCT VFR BLD AUTO: 29.1 % (ref 36.6–50.3)
HGB BLD-MCNC: 10 G/DL (ref 12.1–17)
LYMPHOCYTES # BLD AUTO: 12 % (ref 12–49)
LYMPHOCYTES # BLD: 0.9 K/UL (ref 0.8–3.5)
MCH RBC QN AUTO: 29.8 PG (ref 26–34)
MCHC RBC AUTO-ENTMCNC: 34.4 G/DL (ref 30–36.5)
MCV RBC AUTO: 86.6 FL (ref 80–99)
MONOCYTES # BLD: 0.7 K/UL (ref 0–1)
MONOCYTES NFR BLD AUTO: 9 % (ref 5–13)
NEUTS SEG # BLD: 6.3 K/UL (ref 1.8–8)
NEUTS SEG NFR BLD AUTO: 78 % (ref 32–75)
PLATELET # BLD AUTO: 142 K/UL (ref 150–400)
POTASSIUM SERPL-SCNC: 4 MMOL/L (ref 3.5–5.1)
PROT SERPL-MCNC: 6.8 G/DL (ref 6.4–8.2)
RBC # BLD AUTO: 3.36 M/UL (ref 4.1–5.7)
SERVICE CMNT-IMP: ABNORMAL
SODIUM SERPL-SCNC: 138 MMOL/L (ref 136–145)
WBC # BLD AUTO: 8 K/UL (ref 4.1–11.1)

## 2017-04-28 PROCEDURE — 85025 COMPLETE CBC W/AUTO DIFF WBC: CPT | Performed by: EMERGENCY MEDICINE

## 2017-04-28 PROCEDURE — 82962 GLUCOSE BLOOD TEST: CPT

## 2017-04-28 PROCEDURE — 74011636637 HC RX REV CODE- 636/637: Performed by: EMERGENCY MEDICINE

## 2017-04-28 PROCEDURE — 96361 HYDRATE IV INFUSION ADD-ON: CPT

## 2017-04-28 PROCEDURE — 74011250636 HC RX REV CODE- 250/636: Performed by: EMERGENCY MEDICINE

## 2017-04-28 PROCEDURE — 99283 EMERGENCY DEPT VISIT LOW MDM: CPT

## 2017-04-28 PROCEDURE — 36415 COLL VENOUS BLD VENIPUNCTURE: CPT | Performed by: EMERGENCY MEDICINE

## 2017-04-28 PROCEDURE — 96374 THER/PROPH/DIAG INJ IV PUSH: CPT

## 2017-04-28 PROCEDURE — 80053 COMPREHEN METABOLIC PANEL: CPT | Performed by: EMERGENCY MEDICINE

## 2017-04-28 RX ADMIN — INSULIN HUMAN 10 UNITS: 100 INJECTION, SOLUTION PARENTERAL at 21:48

## 2017-04-28 RX ADMIN — SODIUM CHLORIDE 1000 ML: 900 INJECTION, SOLUTION INTRAVENOUS at 20:54

## 2017-04-28 NOTE — ED NOTES
Verbal report was given to Elver Callejas RN who will assume care of patient at this time. Receiving nurse had an opportunity to ask questions and seek clarifications. Receiving nurse aware of pending lab results and orders that need to be completed.

## 2017-04-28 NOTE — ED TRIAGE NOTES
Patient last ate around 3pm and began feeling nauseated around 5pm. He took his normal 65 units of insulin this afternoon then called his home health nurse who told him to take another 65 units because his blood sugar was in the low 300's (patient cannot recall exactly). Patient alert and oriented with BG of 322 en route with EMS. He denies feeling nauseated or weak at this time.

## 2017-04-29 NOTE — DISCHARGE INSTRUCTIONS
Nausea and Vomiting: Care Instructions  Your Care Instructions    When you are nauseated, you may feel weak and sweaty and notice a lot of saliva in your mouth. Nausea often leads to vomiting. Most of the time you do not need to worry about nausea and vomiting, but they can be signs of other illnesses. Two common causes of nausea and vomiting are stomach flu and food poisoning. Nausea and vomiting from viral stomach flu will usually start to improve within 24 hours. Nausea and vomiting from food poisoning may last from 12 to 48 hours. The doctor has checked you carefully, but problems can develop later. If you notice any problems or new symptoms, get medical treatment right away. Follow-up care is a key part of your treatment and safety. Be sure to make and go to all appointments, and call your doctor if you are having problems. It's also a good idea to know your test results and keep a list of the medicines you take. How can you care for yourself at home? · To prevent dehydration, drink plenty of fluids, enough so that your urine is light yellow or clear like water. Choose water and other caffeine-free clear liquids until you feel better. If you have kidney, heart, or liver disease and have to limit fluids, talk with your doctor before you increase the amount of fluids you drink. · Rest in bed until you feel better. · When you are able to eat, try clear soups, mild foods, and liquids until all symptoms are gone for 12 to 48 hours. Other good choices include dry toast, crackers, cooked cereal, and gelatin dessert, such as Jell-O. When should you call for help? Call 911 anytime you think you may need emergency care. For example, call if:  · You passed out (lost consciousness). Call your doctor now or seek immediate medical care if:  · You have symptoms of dehydration, such as:  ¨ Dry eyes and a dry mouth. ¨ Passing only a little dark urine.   ¨ Feeling thirstier than usual.  · You have new or worsening belly pain. · You have a new or higher fever. · You vomit blood or what looks like coffee grounds. Watch closely for changes in your health, and be sure to contact your doctor if:  · You have ongoing nausea and vomiting. · Your vomiting is getting worse. · Your vomiting lasts longer than 2 days. · You are not getting better as expected. Where can you learn more? Go to http://nader-star.info/. Enter 25 [id number] in the search box to learn more about \"Nausea and Vomiting: Care Instructions. \"  Current as of: May 27, 2016  Content Version: 11.2  © 5310-2320 [employer]. Care instructions adapted under license by [employer] (which disclaims liability or warranty for this information). If you have questions about a medical condition or this instruction, always ask your healthcare professional. [address] any warranty or liability for your use of this information. Learning About High Blood Sugar  What is high blood sugar? Your body turns the food you eat into glucose (sugar), which it uses for energy. But if your body isn't able to use the sugar right away, it can build up in your blood and lead to high blood sugar. When the amount of sugar in your blood stays too high for too much of the time, you may have diabetes. Diabetes is a disease that can cause serious health problems. The good news is that lifestyle changes may help you get your blood sugar back to normal and avoid or delay diabetes. What causes high blood sugar? Sugar (glucose) can build up in your blood if you:  · Are overweight. · Have a family history of diabetes. · Take certain medicines, such as steroids. What are the symptoms? Having high blood sugar may not cause any symptoms at all. Or it may make you feel very thirsty or very hungry. You may also urinate more often than usual, have blurry vision, or lose weight without trying. How is high blood sugar treated?   You can take steps to lower your blood sugar level if you understand what makes it get higher. Your doctor may want you to learn how to test your blood sugar level at home. Then you can see how illness, stress, or different kinds of food or medicine raise or lower your blood sugar level. Other tests may be needed to see if you have diabetes. How can you prevent high blood sugar? · Watch your weight. If you're overweight, losing just a small amount of weight may help. Reducing fat around your waist is most important. · Limit the amount of calories, sweets, and unhealthy fat you eat. Ask your doctor if a dietitian can help you. A registered dietitian can help you create meal plans that fit your lifestyle. · Get at least 30 minutes of exercise on most days of the week. Exercise helps control your blood sugar. It also helps you maintain a healthy weight. Walking is a good choice. You also may want to do other activities, such as running, swimming, cycling, or playing tennis or team sports. · If your doctor prescribed medicines, take them exactly as prescribed. Call your doctor if you think you are having a problem with your medicine. You will get more details on the specific medicines your doctor prescribes. Follow-up care is a key part of your treatment and safety. Be sure to make and go to all appointments, and call your doctor if you are having problems. It's also a good idea to know your test results and keep a list of the medicines you take. Where can you learn more? Go to http://nader-star.info/. Enter O108 in the search box to learn more about \"Learning About High Blood Sugar. \"  Current as of: May 23, 2016  Content Version: 11.2  © 7802-1840 [employer]. Care instructions adapted under license by [employer] (which disclaims liability or warranty for this information).  If you have questions about a medical condition or this instruction, always ask your healthcare professional. [address] any warranty or liability for your use of this information.

## 2017-04-29 NOTE — ED PROVIDER NOTES
HPI Comments: Andrew Rowley is a 57 y.o. male who presents via EMS to [facility] ED with cc of acute onset of nausea and vomiting x 3-4 episodes since approximately 1700 today. He states that he was relaxing at home when he noticed sudden onset of symptoms. Pt is also currently c/o a persistent cough and notes that symptoms are not relieved from OTC medications at home. He also notes that his BGL is elevated in the 300s earlier this morning even after taking his daily 65 units of Humalog 75/25, prompting his home health nurse to administer a second dose earlier this afternoon. Per EMS, pt BGL was 322 mg/dL en route to the ED. Pt is also currently c/o recurring abscesses to the thighs and gluteal folds. He states that he has been followed by General Surgery with numerous I&D and is scheduled for follow up with General Surgery, Dr. Tai Sheets, in two weeks. He is currently c/o pain and drainage to the upper right gluteal fold. Pt states that he has been attempting to keep the area clean, but still notes recurrence of symptoms. He currently denies any fever, diarrhea, constipation, or abdominal pain. PCP:Frannie Bauer NP  General Surgery: Dr. Yari Jaquez    PMHx: IDDM, HTN, atrial fibrillation, CKD, meningitis, MRSA, leukemia, splenic CA  Social Hx: - smoking; - EtOH; - illicit drug use    There are no other complains, changes, or physical findings at this time. The history is provided by the patient. No  was used. Past Medical History:   Diagnosis Date    A-fib [facility]) 2009    Resolved.    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     Cancer [facility]     splenic lymphoma    CKD stage 4 due to type 2 diabetes mellitus ([facility]     Diabetes [facility]     Hypertension     Neuropathy     Other ill-defined conditions     spinal meningitis    Other ill-defined conditions     broken blood vessel to nose    Paroxysmal a-fib (HCC)     Vitamin D deficiency        Past Surgical History:   Procedure Laterality Date    HX OTHER SURGICAL  02/06/2017    Excisional debridement of abscess right thigh & right lower back          Family History:   Problem Relation Age of Onset    Diabetes Mother     Hypertension Father     Hypertension Sister        Social History     Social History    Marital status:      Spouse name: N/A    Number of children: N/A    Years of education: N/A     Occupational History    Not on file. Social History Main Topics    Smoking status: Never Smoker    Smokeless tobacco: Not on file    Alcohol use No    Drug use: No    Sexual activity: Not on file     Other Topics Concern    Not on file     Social History Narrative    ** Merged History Encounter **              ALLERGIES: Review of patient's allergies indicates no known allergies. Review of Systems   Constitutional: Negative for chills and fever. HENT: Negative for congestion, rhinorrhea, sneezing and sore throat. Eyes: Negative for redness and visual disturbance. Respiratory: Negative for shortness of breath. Cardiovascular: Negative for chest pain and leg swelling. Gastrointestinal: Positive for nausea and vomiting. Negative for abdominal pain, constipation and diarrhea. Genitourinary: Negative for difficulty urinating and frequency. Musculoskeletal: Negative for back pain, myalgias and neck stiffness. Skin: Positive for color change and wound. Negative for rash. Neurological: Negative for dizziness, syncope, weakness and headaches. Hematological: Negative for adenopathy. Vitals:    04/28/17 2030   BP: (!) 184/97   SpO2: 93%            Physical Exam   Constitutional: He is oriented to person, place, and time. He appears well-developed and well-nourished. obese   HENT:   Head: Normocephalic and atraumatic.    Mouth/Throat: Mucous membranes are normal.   Dry mucosa   Eyes: EOM are normal.   Neck: Normal range of motion and full passive range of motion without pain. Neck supple. Cardiovascular: Normal rate, regular rhythm, normal heart sounds, intact distal pulses and normal pulses. No murmur heard. Pulmonary/Chest: Effort normal and breath sounds normal. No respiratory distress. He exhibits no tenderness. Abdominal: Soft. Normal appearance and bowel sounds are normal. There is no tenderness. There is no rebound and no guarding. Neurological: He is alert and oriented to person, place, and time. He has normal strength. Skin: Skin is warm, dry and intact. No rash noted. No erythema. Abscess on left inner gluteal fold draining clear fluids, skin is thickened and scabbed over. 1 cm x 1 cm abscess on the right gluteal fold draining purulent fluid. Psychiatric: He has a normal mood and affect. His speech is normal and behavior is normal. Judgment and thought content normal.   Nursing note and vitals reviewed. MDM  Number of Diagnoses or Management Options  Hyperglycemia:   Non-intractable vomiting with nausea, unspecified vomiting type:   Diagnosis management comments: DDx; hyperglycemia, DKA, abscess, cellulitis       Amount and/or Complexity of Data Reviewed  Clinical lab tests: ordered and reviewed  Review and summarize past medical records: yes    Patient Progress  Patient progress: stable    ED Course       Procedures    Procedure Note - Limited Bedside Ultrasound- Soft Tissue   8:20 PM  Performed by: Juan Antonio Velez MD  Indication: evaluation of abscess  Interpretation: Negative  The leftt gluteal fold portion of the body was visualized using bedside US. There is not  fluid collections appreciated on exam.  Foreign body(ies) are not appreciated. The procedure took 1-15 minutes, and pt tolerated well.    Written by Teresa Michelle ED Scribe, as dictated by Juan Antonio Velez MD    LABORATORY TESTS:  Recent Results (from the past 12 hour(s))   GLUCOSE, POC    Collection Time: 04/28/17  7:11 PM   Result Value Ref Range    Glucose (POC) 341 (H) 65 - 100 mg/dL    Performed by Barbra Burnham    CBC WITH AUTOMATED DIFF    Collection Time: 04/28/17  8:55 PM   Result Value Ref Range    WBC 8.0 4.1 - 11.1 K/uL    RBC 3.36 (L) 4.10 - 5.70 M/uL    HGB 10.0 (L) 12.1 - 17.0 g/dL    HCT 29.1 (L) 36.6 - 50.3 %    MCV 86.6 80.0 - 99.0 FL    MCH 29.8 26.0 - 34.0 PG    MCHC 34.4 30.0 - 36.5 g/dL    RDW 13.7 11.5 - 14.5 %    PLATELET 835 (L) 001 - 400 K/uL    NEUTROPHILS 78 (H) 32 - 75 %    LYMPHOCYTES 12 12 - 49 %    MONOCYTES 9 5 - 13 %    EOSINOPHILS 1 0 - 7 %    BASOPHILS 0 0 - 1 %    ABS. NEUTROPHILS 6.3 1.8 - 8.0 K/UL    ABS. LYMPHOCYTES 0.9 0.8 - 3.5 K/UL    ABS. MONOCYTES 0.7 0.0 - 1.0 K/UL    ABS. EOSINOPHILS 0.1 0.0 - 0.4 K/UL    ABS. BASOPHILS 0.0 0.0 - 0.1 K/UL   METABOLIC PANEL, COMPREHENSIVE    Collection Time: 04/28/17  8:55 PM   Result Value Ref Range    Sodium 138 136 - 145 mmol/L    Potassium 4.0 3.5 - 5.1 mmol/L    Chloride 104 97 - 108 mmol/L    CO2 26 21 - 32 mmol/L    Anion gap 8 5 - 15 mmol/L    Glucose 290 (H) 65 - 100 mg/dL    BUN 20 6 - 20 MG/DL    Creatinine 2.86 (H) 0.70 - 1.30 MG/DL    BUN/Creatinine ratio 7 (L) 12 - 20      GFR est AA 27 (L) >60 ml/min/1.73m2    GFR est non-AA 22 (L) >60 ml/min/1.73m2    Calcium 9.2 8.5 - 10.1 MG/DL    Bilirubin, total 0.4 0.2 - 1.0 MG/DL    ALT (SGPT) 19 12 - 78 U/L    AST (SGOT) 8 (L) 15 - 37 U/L    Alk. phosphatase 153 (H) 45 - 117 U/L    Protein, total 6.8 6.4 - 8.2 g/dL    Albumin 2.8 (L) 3.5 - 5.0 g/dL    Globulin 4.0 2.0 - 4.0 g/dL    A-G Ratio 0.7 (L) 1.1 - 2.2         VITALS:  Patient Vitals for the past 12 hrs:   BP SpO2   04/28/17 2030 (!) 184/97 93 %       MEDICATIONS GIVEN:  Medications   sodium chloride 0.9 % bolus infusion 1,000 mL (1,000 mL IntraVENous New Bag 4/28/17 2054)   insulin regular (NOVOLIN R, HUMULIN R) injection 10 Units (not administered)       IMPRESSION:  1. Hyperglycemia    2.  Non-intractable vomiting with nausea, unspecified vomiting type PLAN:  1. Current Discharge Medication List        2. Follow-up Information     Follow up With Details Comments 5[address] Nata Enriquez NP Call  [address] [facility],[place] [address]      Ismael Cuadra MD Call in 3 days for your next follow up appointment [address] [facility]  [address]  [phone]      [facility] EMERGENCY DEPT  As needed, If symptoms worsen [address] [facility]  [address] [facility]  [phone]        3. Return to ED if worse     Discharge Note:  9:42 PM  The patient has been re-evaluated and is ready for discharge. Reviewed available results with patient. Counseled patient on diagnosis and care plan. Patient has expressed understanding, and all questions have been answered. Patient agrees with plan and agrees to follow up as recommended, or to return to the ED if their symptoms worsen. Discharge instructions have been provided and explained to the patient, along with reasons to return to the ED. This note is prepared by Damion Ardon, acting as Scribe for Jeromy Schulz MD.    Jeromy Schulz MD: The scribe's documentation has been prepared under my direction and personally reviewed by me in its entirety. I confirm that the note above accurately reflects all work, treatment, procedures, and medical decision making performed by me.

## 2017-05-04 ENCOUNTER — OFFICE VISIT (OUTPATIENT)
Dept: SURGERY | Age: 62
End: 2017-05-04

## 2017-05-04 ENCOUNTER — TELEPHONE (OUTPATIENT)
Dept: SURGERY | Age: 62
End: 2017-05-04

## 2017-05-04 VITALS
WEIGHT: 294 LBS | SYSTOLIC BLOOD PRESSURE: 179 MMHG | DIASTOLIC BLOOD PRESSURE: 82 MMHG | BODY MASS INDEX: 34.71 KG/M2 | HEART RATE: 70 BPM | OXYGEN SATURATION: 100 % | HEIGHT: 77 IN

## 2017-05-04 DIAGNOSIS — L02.91 ABSCESS: Primary | ICD-10-CM

## 2017-05-04 NOTE — PROGRESS NOTES
The patient is presenting in follow up with regard to the site of debridement of his left flank. He reports he has a new area on the buttock which has been draining and has local discomfort. He states he fell and struck the area a few weeks ago. On examination of the wound on the left flank it is about 3.2 cm in size and is flat and flush with the skin with excellent granulation. Gel dressing applied there. On the buttock on the right side just off the presacral area there was an area of necrosis with drainage of purulent material.  The area of visible necrosis of skin was about 3 x 2 cm. I recommended debridement of the area. Risks vs benefits were reviewed. The site was marked and standard time out was performed after consent was obtained. Under sterile conditions, I injected about 5 cc of 1% Xylocaine with epinephrine. I then performed with a #15 blade scalpel debridement of necrotic skin and subcutaneous tissue. Depth was about 2 cm. As the necrotic tissue was removed, there was purulence. There did not appear to be deep tracking. I was able to remove about 95% of the necrotic material and there was actually early granulation of some of the walls. No further purulence was noted. The wound size after debridement of skin and subcutaneous tissue was 3.2 x 2 cm. The wound was packed open with plain gauze and covered by dry gauze and tape. The patient will start having daily dressing changes by his home health nurse utilizing gel-soaked packing gauze in the buttock site near the midline. He will continue with the Carrasyn gel gauze dressing on the flank wound. The patient will follow up in one week. Final Diagnosis:  Debridement of posterior buttock abscess.     Easton/mikey

## 2017-05-04 NOTE — PROGRESS NOTES
[facility]  OFFICE PROCEDURE PROGRESS NOTE        Chart reviewed for the following:   [facility] MUSE, have reviewed the History, Physical and updated the Allergic reactions for [address] performed immediately prior to start of procedure:   [facility] MUSE, have performed the following reviews on Jimena Monique prior to the start of the procedure:            * Patient was identified by name and date of birth   * Agreement on procedure being performed was verified  * Risks and Benefits explained to the patient  * Procedure site verified and marked as necessary  * Patient was positioned for comfort  * Consent was signed and verified     Time: 2:00 pm      Date of procedure: 5/4/2017    Procedure performed by:  Ismael Cuadra MD    Provider assisted by: none       Patient assisted by: self    How tolerated by patient: tolerated the procedure well with no complications    Post Procedural Pain Scale: 0 - No Hurt    Comments: none

## 2017-05-04 NOTE — MR AVS SNAPSHOT
Visit Information Date & Time Provider Department Dept. Phone Encounter #  
 5/4/2017  1:00 PM Vickie Olguin MD [facility] [phone] [id number] Your Appointments 5/11/2017 10:40 AM  
POST OP with Vickie Olguin MD  
[facility] [facility] ([address]) Appt Note: DEBRIDEMENT ABSCESS MIDLINE LOWER BACK 5/4/17  
 [address] [facility], [address], [address] [phone]  
[address], [address] [facility] [address] [phone] Upcoming Health Maintenance Date Due Hepatitis C Screening 1955 LIPID PANEL Q1 1955 FOOT EXAM Q1 4/8/1963 MICROALBUMIN Q1 4/8/1963 EYE EXAM RETINAL OR DILATED Q1 4/8/1963 DTaP/Tdap/Td series (1 - Tdap) 4/8/1974 FOBT Q 1 YEAR AGE 50-75 4/8/2003 ZOSTER VACCINE AGE 60> 4/8/2013 Pneumococcal 19-64 Highest Risk (2 of 3 - PCV13) 8/1/2013 INFLUENZA AGE 9 TO ADULT 8/1/2017 HEMOGLOBIN A1C Q6M 8/8/2017 Allergies as of 5/4/2017  Review Complete On: 5/4/2017 By: [place] No Known Allergies Current Immunizations  Reviewed on 8/23/2013 Name Date Pneumococcal Vaccine (Unspecified Type) 8/1/2012 Not reviewed this visit You Were Diagnosed With   
  
 Codes Comments Abscess    -  Primary ICD-10-CM: L02.91 
ICD-9-CM: 574. 9 Vitals BP Pulse Height(growth percentile) Weight(growth percentile) SpO2 BMI  
 179/82 (BP 1 Location: Left arm, BP Patient Position: Sitting) 70 6' 5\" (1.956 m) 294 lb (133.4 kg) 100% 34.86 kg/m2 Smoking Status Never Smoker BMI and BSA Data Body Mass Index Body Surface Area 34.86 kg/m 2 2.69 m 2 Preferred Pharmacy Pharmacy Name Phone [phone] [facility] [phone] Your Updated Medication List  
  
   
 This list is accurate as of: 5/4/17  4:53 PM.  Always use your most recent med list.  
  
  
  
  
 acetaminophen 325 mg tablet Commonly known as:  TYLENOL Take 2 Tabs by mouth every four (4) hours as needed for Pain or Fever. alcohol swabs Padm Commonly known as:  ALCOHOL PADS  
1 Each by Apply Externally route two (2) times a day. amLODIPine 10 mg tablet Commonly known as:  Eward Sandy Take 1 Tab by mouth daily. aspirin delayed-release 81 mg tablet Take 1 Tab by mouth daily. * carvedilol 25 mg tablet Commonly known as:  Darletta Maiers Take 25 mg by mouth two (2) times daily (with meals). * carvedilol 12.5 mg tablet Commonly known as:  COREG  
  
 cloNIDine HCl 0.1 mg tablet Commonly known as:  CATAPRES Take 0.1 mg by mouth two (2) times a day. digoxin 0.125 mg tablet Commonly known as:  LANOXIN Take 0.125 mg by mouth daily. Indications: afib * insulin lispro protamine/insulin lispro 100 unit/mL (75-25) injection Commonly known as:  HUMALOG MIX 75/25  
60 Units by SubCUTAneous route Before breakfast and dinner. * insulin lispro protamine/insulin lispro 100 unit/mL (75-25) injection Commonly known as:  HUMALOG MIX 75/25  
65 units subcutaneous injection twice daily before meals (before breakfast and dinner) Insulin Syringe-Needle U-100 0.5 mL 29 gauge x 1/2\" Syrg Commonly known as:  INSULIN SYRINGE  
1 Each by Does Not Apply route two (2) times a day. ondansetron 4 mg disintegrating tablet Commonly known as:  ZOFRAN ODT Take 1 Tab by mouth every eight (8) hours as needed for Nausea. oxyCODONE-acetaminophen 5-325 mg per tablet Commonly known as:  PERCOCET Take 1 Tab by mouth every four (4) hours as needed for Pain. Max Daily Amount: 6 Tabs. pravastatin 20 mg tablet Commonly known as:  PRAVACHOL Take 20 mg by mouth nightly. * Notice:   This list has 4 medication(s) that are the same as other medications prescribed for you. Read the directions carefully, and ask your doctor or other care provider to review them with you. Introducing [facility]! Saul Melara introduces [employer] patient portal. Now you can access parts of your medical record, email your doctor's office, and request medication refills online. 1. In your internet browser, go to [email or web address] 2. Click on the First Time User? Click Here link in the Sign In box. You will see the New Member Sign Up page. 3. Enter your [employer] Access Code exactly as it appears below. You will not need to use this code after youve completed the sign-up process. If you do not sign up before the expiration date, you must request a new code. · [employer] Access Code: [email or web address] Expires: 6/20/2017 11:21 AM 
 
4. Enter the last four digits of your Social Security Number (xxxx) and Date of Birth (mm/dd/yyyy) as indicated and click Submit. You will be taken to the next sign-up page. 5. Create a [employer] ID. This will be your [employer] login ID and cannot be changed, so think of one that is secure and easy to remember. 6. Create a [employer] password. You can change your password at any time. 7. Enter your Password Reset Question and Answer. This can be used at a later time if you forget your password. 8. Enter your e-mail address. You will receive e-mail notification when new information is available in [address]. 9. Click Sign Up. You can now view and download portions of your medical record. 10. Click the Download Summary menu link to download a portable copy of your medical information. If you have questions, please visit the Frequently Asked Questions section of the [employer] website. Remember, [employer] is NOT to be used for urgent needs. For medical emergencies, dial 911. Now available from your iPhone and Android! Please provide this summary of care documentation to your next provider. Your primary care clinician is listed as Boyd Peralta. If you have any questions after today's visit, please call [phone].

## 2017-05-10 RX ORDER — LIDOCAINE HYDROCHLORIDE AND EPINEPHRINE 20; 10 MG/ML; UG/ML
10 INJECTION, SOLUTION INFILTRATION; PERINEURAL ONCE
Qty: 10 ML | Refills: 0
Start: 2017-05-10 | End: 2017-05-10

## 2017-05-11 ENCOUNTER — OFFICE VISIT (OUTPATIENT)
Dept: SURGERY | Age: 62
End: 2017-05-11

## 2017-05-11 VITALS
BODY MASS INDEX: 37.19 KG/M2 | WEIGHT: 315 LBS | OXYGEN SATURATION: 99 % | SYSTOLIC BLOOD PRESSURE: 166 MMHG | HEIGHT: 77 IN | HEART RATE: 69 BPM | DIASTOLIC BLOOD PRESSURE: 72 MMHG

## 2017-05-11 DIAGNOSIS — L02.212 ABSCESS OF LOWER BACK: Primary | ICD-10-CM

## 2017-05-11 NOTE — PROGRESS NOTES
The patient is status post debridement of abscess of lower back. He also had debridement of a similar abscess on the right lower back previously. Visiting nurses are changing packing at the lower back site. The patient has no complaints. Physical Examination:  On examination the prior debridement site is a clean, flat wound with granulation which is about 3 cm x 0.5 cm. Gel dressing was applied there. The lower back wound just to the left of midline is about 3 cm x 3 cm with a depth of 2.5 cm. There is granulation of about 90% of the surface. Gel packing was applied then dry gauze. Plan:  The patient will continue with dressing changes and follow-up in two weeks. I strongly urged him to follow a diabetic diet and diabetic medication. He reports blood sugars is slightly improved. Final Diagnosis:  Abscess site, lower back, and left lower back.

## 2017-05-11 NOTE — MR AVS SNAPSHOT
Visit Information Date & Time Provider Department Dept. Phone Encounter #  
 5/11/2017 10:40 AM Geeta Zaman MD [facility] [address] [phone] [id number] Follow-up Instructions Return in about 2 weeks (around 5/25/2017). Follow-up and Disposition History Upcoming Health Maintenance Date Due Hepatitis C Screening 1955 LIPID PANEL Q1 1955 FOOT EXAM Q1 4/8/1963 MICROALBUMIN Q1 4/8/1963 EYE EXAM RETINAL OR DILATED Q1 4/8/1963 DTaP/Tdap/Td series (1 - Tdap) 4/8/1974 FOBT Q 1 YEAR AGE 50-75 4/8/2003 ZOSTER VACCINE AGE 60> 4/8/2013 Pneumococcal 19-64 Highest Risk (2 of 3 - PCV13) 8/1/2013 INFLUENZA AGE 9 TO ADULT 8/1/2017 HEMOGLOBIN A1C Q6M 8/8/2017 Allergies as of 5/11/2017  Review Complete On: 5/11/2017 By: Geeta Zaman MD  
 No Known Allergies Current Immunizations  Reviewed on 8/23/2013 Name Date Pneumococcal Vaccine (Unspecified Type) 8/1/2012 Not reviewed this visit You Were Diagnosed With   
  
 Codes Comments Abscess of lower back    -  Primary ICD-10-CM: L02.212 ICD-9-CM: 528. 2 Vitals BP Pulse Height(growth percentile) Weight(growth percentile) SpO2 BMI  
 166/72 (BP 1 Location: Right arm, BP Patient Position: Sitting) 69 6' 5\" (1.956 m) 321 lb 12.8 oz (146 kg) 99% 38.16 kg/m2 Smoking Status Never Smoker BMI and BSA Data Body Mass Index Body Surface Area  
 38.16 kg/m 2 2.82 m 2 Preferred Pharmacy Pharmacy Name Phone [phone] [facility] [phone] Your Updated Medication List  
  
   
This list is accurate as of: 5/11/17 11:35 AM.  Always use your most recent med list.  
  
  
  
  
 acetaminophen 325 mg tablet Commonly known as:  TYLENOL Take 2 Tabs by mouth every four (4) hours as needed for Pain or Fever. alcohol swabs Padm Commonly known as:  ALCOHOL PADS  
1 Each by Apply Externally route two (2) times a day. amLODIPine 10 mg tablet Commonly known as:  Delphina Peat Take 1 Tab by mouth daily. aspirin delayed-release 81 mg tablet Take 1 Tab by mouth daily. * carvedilol 25 mg tablet Commonly known as:  Thais Sheron Take 25 mg by mouth two (2) times daily (with meals). * carvedilol 12.5 mg tablet Commonly known as:  COREG  
  
 cloNIDine HCl 0.1 mg tablet Commonly known as:  CATAPRES Take 0.1 mg by mouth two (2) times a day. digoxin 0.125 mg tablet Commonly known as:  LANOXIN Take 0.125 mg by mouth daily. Indications: afib * insulin lispro protamine/insulin lispro 100 unit/mL (75-25) injection Commonly known as:  HUMALOG MIX 75/25  
60 Units by SubCUTAneous route Before breakfast and dinner. * insulin lispro protamine/insulin lispro 100 unit/mL (75-25) injection Commonly known as:  HUMALOG MIX 75/25  
65 units subcutaneous injection twice daily before meals (before breakfast and dinner) Insulin Syringe-Needle U-100 0.5 mL 29 gauge x 1/2\" Syrg Commonly known as:  INSULIN SYRINGE  
1 Each by Does Not Apply route two (2) times a day. ondansetron 4 mg disintegrating tablet Commonly known as:  ZOFRAN ODT Take 1 Tab by mouth every eight (8) hours as needed for Nausea. oxyCODONE-acetaminophen 5-325 mg per tablet Commonly known as:  PERCOCET Take 1 Tab by mouth every four (4) hours as needed for Pain. Max Daily Amount: 6 Tabs. pravastatin 20 mg tablet Commonly known as:  PRAVACHOL Take 20 mg by mouth nightly. * Notice: This list has 4 medication(s) that are the same as other medications prescribed for you. Read the directions carefully, and ask your doctor or other care provider to review them with you. Follow-up Instructions Return in about 2 weeks (around 5/25/2017). Introducing [facility] SERVICES! Meagan williamson [employer] patient portal. Now you can access parts of your medical record, email your doctor's office, and request medication refills online. 1. In your internet browser, go to [email or web address] 2. Click on the First Time User? Click Here link in the Sign In box. You will see the New Member Sign Up page. 3. Enter your [employer] Access Code exactly as it appears below. You will not need to use this code after youve completed the sign-up process. If you do not sign up before the expiration date, you must request a new code. · [employer] Access Code: [email or web address] Expires: 6/20/2017 11:21 AM 
 
4. Enter the last four digits of your Social Security Number (xxxx) and Date of Birth (mm/dd/yyyy) as indicated and click Submit. You will be taken to the next sign-up page. 5. Create a [employer] ID. This will be your [employer] login ID and cannot be changed, so think of one that is secure and easy to remember. 6. Create a [employer] password. You can change your password at any time. 7. Enter your Password Reset Question and Answer. This can be used at a later time if you forget your password. 8. Enter your e-mail address. You will receive e-mail notification when new information is available in [address] 9. Click Sign Up. You can now view and download portions of your medical record. 10. Click the Download Summary menu link to download a portable copy of your medical information. If you have questions, please visit the Frequently Asked Questions section of the [employer] website. Remember, [employer] is NOT to be used for urgent needs. For medical emergencies, dial 911. Now available from your iPhone and Android! Please provide this summary of care documentation to your next provider. Your primary care clinician is listed as Clay Hodge. If you have any questions after today's visit, please call [phone].

## 2017-05-22 ENCOUNTER — HOSPITAL ENCOUNTER (INPATIENT)
Age: 62
LOS: 7 days | Discharge: HOME HEALTH | DRG: 854 | End: 2017-05-29
Attending: EMERGENCY MEDICINE | Admitting: INTERNAL MEDICINE
Payer: MEDICARE

## 2017-05-22 ENCOUNTER — APPOINTMENT (OUTPATIENT)
Dept: CT IMAGING | Age: 62
DRG: 854 | End: 2017-05-22
Attending: EMERGENCY MEDICINE
Payer: MEDICARE

## 2017-05-22 ENCOUNTER — APPOINTMENT (OUTPATIENT)
Dept: GENERAL RADIOLOGY | Age: 62
DRG: 854 | End: 2017-05-22
Attending: EMERGENCY MEDICINE
Payer: MEDICARE

## 2017-05-22 ENCOUNTER — TELEPHONE (OUTPATIENT)
Dept: SURGERY | Age: 62
End: 2017-05-22

## 2017-05-22 DIAGNOSIS — L02.212 BACK ABSCESS: ICD-10-CM

## 2017-05-22 PROBLEM — L02.91 ABSCESS: Status: ACTIVE | Noted: 2017-05-22

## 2017-05-22 LAB
ALBUMIN SERPL BCP-MCNC: 2.8 G/DL (ref 3.5–5)
ALBUMIN/GLOB SERPL: 0.6 {RATIO} (ref 1.1–2.2)
ALP SERPL-CCNC: 168 U/L (ref 45–117)
ALT SERPL-CCNC: 31 U/L (ref 12–78)
ANION GAP BLD CALC-SCNC: 7 MMOL/L (ref 5–15)
APPEARANCE UR: CLEAR
AST SERPL W P-5'-P-CCNC: 12 U/L (ref 15–37)
ATRIAL RATE: 83 BPM
BACTERIA URNS QL MICRO: NEGATIVE /HPF
BASOPHILS # BLD AUTO: 0 K/UL (ref 0–0.1)
BASOPHILS # BLD: 0 % (ref 0–1)
BILIRUB SERPL-MCNC: 0.7 MG/DL (ref 0.2–1)
BILIRUB UR QL: NEGATIVE
BUN SERPL-MCNC: 19 MG/DL (ref 6–20)
BUN/CREAT SERPL: 6 (ref 12–20)
CALCIUM SERPL-MCNC: 9.4 MG/DL (ref 8.5–10.1)
CALCULATED P AXIS, ECG09: 39 DEGREES
CALCULATED R AXIS, ECG10: 9 DEGREES
CALCULATED T AXIS, ECG11: -3 DEGREES
CHLORIDE SERPL-SCNC: 102 MMOL/L (ref 97–108)
CO2 SERPL-SCNC: 28 MMOL/L (ref 21–32)
COLOR UR: ABNORMAL
CREAT SERPL-MCNC: 3.03 MG/DL (ref 0.7–1.3)
DIAGNOSIS, 93000: NORMAL
EOSINOPHIL # BLD: 0 K/UL (ref 0–0.4)
EOSINOPHIL NFR BLD: 0 % (ref 0–7)
EPITH CASTS URNS QL MICRO: ABNORMAL /LPF
ERYTHROCYTE [DISTWIDTH] IN BLOOD BY AUTOMATED COUNT: 13.5 % (ref 11.5–14.5)
GLOBULIN SER CALC-MCNC: 4.4 G/DL (ref 2–4)
GLUCOSE BLD STRIP.AUTO-MCNC: 186 MG/DL (ref 65–100)
GLUCOSE BLD STRIP.AUTO-MCNC: 186 MG/DL (ref 65–100)
GLUCOSE BLD STRIP.AUTO-MCNC: 267 MG/DL (ref 65–100)
GLUCOSE BLD STRIP.AUTO-MCNC: 331 MG/DL (ref 65–100)
GLUCOSE BLD STRIP.AUTO-MCNC: 358 MG/DL (ref 65–100)
GLUCOSE SERPL-MCNC: 338 MG/DL (ref 65–100)
GLUCOSE UR STRIP.AUTO-MCNC: >1000 MG/DL
HCT VFR BLD AUTO: 30.4 % (ref 36.6–50.3)
HGB BLD-MCNC: 10 G/DL (ref 12.1–17)
HGB UR QL STRIP: ABNORMAL
KETONES UR QL STRIP.AUTO: NEGATIVE MG/DL
LACTATE SERPL-SCNC: 0.9 MMOL/L (ref 0.4–2)
LACTATE SERPL-SCNC: 2.1 MMOL/L (ref 0.4–2)
LEUKOCYTE ESTERASE UR QL STRIP.AUTO: NEGATIVE
LIPASE SERPL-CCNC: 65 U/L (ref 73–393)
LYMPHOCYTES # BLD AUTO: 9 % (ref 12–49)
LYMPHOCYTES # BLD: 1 K/UL (ref 0.8–3.5)
MAGNESIUM SERPL-MCNC: 2.2 MG/DL (ref 1.6–2.4)
MCH RBC QN AUTO: 28.8 PG (ref 26–34)
MCHC RBC AUTO-ENTMCNC: 32.9 G/DL (ref 30–36.5)
MCV RBC AUTO: 87.6 FL (ref 80–99)
MONOCYTES # BLD: 1.1 K/UL (ref 0–1)
MONOCYTES NFR BLD AUTO: 10 % (ref 5–13)
NEUTS SEG # BLD: 9.1 K/UL (ref 1.8–8)
NEUTS SEG NFR BLD AUTO: 81 % (ref 32–75)
NITRITE UR QL STRIP.AUTO: NEGATIVE
P-R INTERVAL, ECG05: 180 MS
PH UR STRIP: 7 [PH] (ref 5–8)
PLATELET # BLD AUTO: 161 K/UL (ref 150–400)
POTASSIUM SERPL-SCNC: 4.5 MMOL/L (ref 3.5–5.1)
PROT SERPL-MCNC: 7.2 G/DL (ref 6.4–8.2)
PROT UR STRIP-MCNC: >300 MG/DL
Q-T INTERVAL, ECG07: 346 MS
QRS DURATION, ECG06: 92 MS
QTC CALCULATION (BEZET), ECG08: 406 MS
RBC # BLD AUTO: 3.47 M/UL (ref 4.1–5.7)
RBC #/AREA URNS HPF: ABNORMAL /HPF (ref 0–5)
SERVICE CMNT-IMP: ABNORMAL
SODIUM SERPL-SCNC: 137 MMOL/L (ref 136–145)
SP GR UR REFRACTOMETRY: 1.02 (ref 1–1.03)
TROPONIN I SERPL-MCNC: <0.04 NG/ML
UA: UC IF INDICATED,UAUC: ABNORMAL
UROBILINOGEN UR QL STRIP.AUTO: 1 EU/DL (ref 0.2–1)
VENTRICULAR RATE, ECG03: 83 BPM
WBC # BLD AUTO: 11.3 K/UL (ref 4.1–11.1)
WBC URNS QL MICRO: ABNORMAL /HPF (ref 0–4)

## 2017-05-22 PROCEDURE — 81001 URINALYSIS AUTO W/SCOPE: CPT | Performed by: EMERGENCY MEDICINE

## 2017-05-22 PROCEDURE — 83605 ASSAY OF LACTIC ACID: CPT | Performed by: EMERGENCY MEDICINE

## 2017-05-22 PROCEDURE — 96360 HYDRATION IV INFUSION INIT: CPT

## 2017-05-22 PROCEDURE — 74011250636 HC RX REV CODE- 250/636: Performed by: EMERGENCY MEDICINE

## 2017-05-22 PROCEDURE — 74011000258 HC RX REV CODE- 258: Performed by: INTERNAL MEDICINE

## 2017-05-22 PROCEDURE — 74011636637 HC RX REV CODE- 636/637: Performed by: INTERNAL MEDICINE

## 2017-05-22 PROCEDURE — 87040 BLOOD CULTURE FOR BACTERIA: CPT | Performed by: EMERGENCY MEDICINE

## 2017-05-22 PROCEDURE — 74176 CT ABD & PELVIS W/O CONTRAST: CPT

## 2017-05-22 PROCEDURE — 82962 GLUCOSE BLOOD TEST: CPT

## 2017-05-22 PROCEDURE — 96374 THER/PROPH/DIAG INJ IV PUSH: CPT

## 2017-05-22 PROCEDURE — 96361 HYDRATE IV INFUSION ADD-ON: CPT

## 2017-05-22 PROCEDURE — 84484 ASSAY OF TROPONIN QUANT: CPT | Performed by: EMERGENCY MEDICINE

## 2017-05-22 PROCEDURE — 85025 COMPLETE CBC W/AUTO DIFF WBC: CPT | Performed by: EMERGENCY MEDICINE

## 2017-05-22 PROCEDURE — 83690 ASSAY OF LIPASE: CPT | Performed by: EMERGENCY MEDICINE

## 2017-05-22 PROCEDURE — 93005 ELECTROCARDIOGRAM TRACING: CPT

## 2017-05-22 PROCEDURE — 71010 XR CHEST PORT: CPT

## 2017-05-22 PROCEDURE — 36415 COLL VENOUS BLD VENIPUNCTURE: CPT | Performed by: EMERGENCY MEDICINE

## 2017-05-22 PROCEDURE — 99284 EMERGENCY DEPT VISIT MOD MDM: CPT

## 2017-05-22 PROCEDURE — 65270000029 HC RM PRIVATE

## 2017-05-22 PROCEDURE — 74011636637 HC RX REV CODE- 636/637: Performed by: EMERGENCY MEDICINE

## 2017-05-22 PROCEDURE — 80053 COMPREHEN METABOLIC PANEL: CPT | Performed by: EMERGENCY MEDICINE

## 2017-05-22 PROCEDURE — 74011250636 HC RX REV CODE- 250/636: Performed by: INTERNAL MEDICINE

## 2017-05-22 PROCEDURE — 74011250637 HC RX REV CODE- 250/637: Performed by: INTERNAL MEDICINE

## 2017-05-22 PROCEDURE — 83735 ASSAY OF MAGNESIUM: CPT | Performed by: EMERGENCY MEDICINE

## 2017-05-22 RX ORDER — OXYCODONE AND ACETAMINOPHEN 5; 325 MG/1; MG/1
1 TABLET ORAL
Status: DISCONTINUED | OUTPATIENT
Start: 2017-05-22 | End: 2017-05-23

## 2017-05-22 RX ORDER — HEPARIN SODIUM 5000 [USP'U]/ML
5000 INJECTION, SOLUTION INTRAVENOUS; SUBCUTANEOUS EVERY 12 HOURS
Status: DISCONTINUED | OUTPATIENT
Start: 2017-05-23 | End: 2017-05-23

## 2017-05-22 RX ORDER — ONDANSETRON 2 MG/ML
4 INJECTION INTRAMUSCULAR; INTRAVENOUS
Status: ACTIVE | OUTPATIENT
Start: 2017-05-22 | End: 2017-05-23

## 2017-05-22 RX ORDER — SODIUM CHLORIDE 0.9 % (FLUSH) 0.9 %
5-10 SYRINGE (ML) INJECTION AS NEEDED
Status: DISCONTINUED | OUTPATIENT
Start: 2017-05-22 | End: 2017-05-23

## 2017-05-22 RX ORDER — AMLODIPINE BESYLATE 5 MG/1
10 TABLET ORAL DAILY
Status: DISCONTINUED | OUTPATIENT
Start: 2017-05-23 | End: 2017-05-29 | Stop reason: HOSPADM

## 2017-05-22 RX ORDER — ASPIRIN 81 MG/1
81 TABLET ORAL DAILY
Status: DISCONTINUED | OUTPATIENT
Start: 2017-05-23 | End: 2017-05-23

## 2017-05-22 RX ORDER — VANCOMYCIN/0.9 % SOD CHLORIDE 1.5G/250ML
1500 PLASTIC BAG, INJECTION (ML) INTRAVENOUS EVERY 24 HOURS
Status: DISCONTINUED | OUTPATIENT
Start: 2017-05-23 | End: 2017-05-24

## 2017-05-22 RX ORDER — INSULIN LISPRO 100 [IU]/ML
INJECTION, SOLUTION INTRAVENOUS; SUBCUTANEOUS
Status: DISCONTINUED | OUTPATIENT
Start: 2017-05-22 | End: 2017-05-29 | Stop reason: HOSPADM

## 2017-05-22 RX ORDER — CLONIDINE HYDROCHLORIDE 0.1 MG/1
0.1 TABLET ORAL 2 TIMES DAILY
Status: DISCONTINUED | OUTPATIENT
Start: 2017-05-22 | End: 2017-05-28

## 2017-05-22 RX ORDER — PRAVASTATIN SODIUM 10 MG/1
20 TABLET ORAL
Status: DISCONTINUED | OUTPATIENT
Start: 2017-05-22 | End: 2017-05-29 | Stop reason: HOSPADM

## 2017-05-22 RX ORDER — ACETAMINOPHEN 325 MG/1
650 TABLET ORAL
Status: DISCONTINUED | OUTPATIENT
Start: 2017-05-22 | End: 2017-05-29 | Stop reason: HOSPADM

## 2017-05-22 RX ORDER — SODIUM CHLORIDE 450 MG/100ML
100 INJECTION, SOLUTION INTRAVENOUS CONTINUOUS
Status: DISPENSED | OUTPATIENT
Start: 2017-05-22 | End: 2017-05-23

## 2017-05-22 RX ORDER — SODIUM CHLORIDE 0.9 % (FLUSH) 0.9 %
5-10 SYRINGE (ML) INJECTION EVERY 8 HOURS
Status: DISCONTINUED | OUTPATIENT
Start: 2017-05-22 | End: 2017-05-23

## 2017-05-22 RX ORDER — VANCOMYCIN 2 GRAM/500 ML IN 0.9 % SODIUM CHLORIDE INTRAVENOUS
2000
Status: COMPLETED | OUTPATIENT
Start: 2017-05-22 | End: 2017-05-23

## 2017-05-22 RX ORDER — MAGNESIUM SULFATE 100 %
4 CRYSTALS MISCELLANEOUS AS NEEDED
Status: DISCONTINUED | OUTPATIENT
Start: 2017-05-22 | End: 2017-05-29 | Stop reason: HOSPADM

## 2017-05-22 RX ORDER — BISACODYL 5 MG
5 TABLET, DELAYED RELEASE (ENTERIC COATED) ORAL DAILY PRN
Status: DISCONTINUED | OUTPATIENT
Start: 2017-05-22 | End: 2017-05-29 | Stop reason: HOSPADM

## 2017-05-22 RX ORDER — ONDANSETRON 2 MG/ML
4 INJECTION INTRAMUSCULAR; INTRAVENOUS
Status: DISCONTINUED | OUTPATIENT
Start: 2017-05-22 | End: 2017-05-23

## 2017-05-22 RX ORDER — SODIUM CHLORIDE 0.9 % (FLUSH) 0.9 %
5-10 SYRINGE (ML) INJECTION EVERY 8 HOURS
Status: DISCONTINUED | OUTPATIENT
Start: 2017-05-22 | End: 2017-05-23 | Stop reason: SDUPTHER

## 2017-05-22 RX ORDER — HYDRALAZINE HYDROCHLORIDE 20 MG/ML
10 INJECTION INTRAMUSCULAR; INTRAVENOUS
Status: DISCONTINUED | OUTPATIENT
Start: 2017-05-22 | End: 2017-05-29 | Stop reason: HOSPADM

## 2017-05-22 RX ORDER — DEXTROSE 50 % IN WATER (D50W) INTRAVENOUS SYRINGE
12.5-25 AS NEEDED
Status: DISCONTINUED | OUTPATIENT
Start: 2017-05-22 | End: 2017-05-23

## 2017-05-22 RX ORDER — DIGOXIN 125 MCG
0.12 TABLET ORAL DAILY
Status: DISCONTINUED | OUTPATIENT
Start: 2017-05-23 | End: 2017-05-29 | Stop reason: HOSPADM

## 2017-05-22 RX ORDER — SODIUM CHLORIDE 0.9 % (FLUSH) 0.9 %
5-10 SYRINGE (ML) INJECTION AS NEEDED
Status: DISCONTINUED | OUTPATIENT
Start: 2017-05-22 | End: 2017-05-23 | Stop reason: SDUPTHER

## 2017-05-22 RX ORDER — INSULIN LISPRO 100 [IU]/ML
10 INJECTION, SOLUTION INTRAVENOUS; SUBCUTANEOUS ONCE
Status: COMPLETED | OUTPATIENT
Start: 2017-05-22 | End: 2017-05-22

## 2017-05-22 RX ORDER — CARVEDILOL 12.5 MG/1
12.5 TABLET ORAL 2 TIMES DAILY WITH MEALS
Status: DISCONTINUED | OUTPATIENT
Start: 2017-05-22 | End: 2017-05-29 | Stop reason: HOSPADM

## 2017-05-22 RX ADMIN — ONDANSETRON HYDROCHLORIDE 4 MG: 2 INJECTION, SOLUTION INTRAMUSCULAR; INTRAVENOUS at 21:39

## 2017-05-22 RX ADMIN — CLONIDINE HYDROCHLORIDE 0.1 MG: 0.1 TABLET ORAL at 20:02

## 2017-05-22 RX ADMIN — Medication 10 ML: at 15:34

## 2017-05-22 RX ADMIN — INSULIN LISPRO 65 UNITS: 100 INJECTION, SUSPENSION SUBCUTANEOUS at 21:51

## 2017-05-22 RX ADMIN — SODIUM CHLORIDE 1000 ML: 900 INJECTION, SOLUTION INTRAVENOUS at 17:21

## 2017-05-22 RX ADMIN — CEFTRIAXONE 1 G: 1 INJECTION, POWDER, FOR SOLUTION INTRAMUSCULAR; INTRAVENOUS at 20:03

## 2017-05-22 RX ADMIN — CARVEDILOL 12.5 MG: 12.5 TABLET, FILM COATED ORAL at 20:03

## 2017-05-22 RX ADMIN — VANCOMYCIN HYDROCHLORIDE 2000 MG: 10 INJECTION, POWDER, LYOPHILIZED, FOR SOLUTION INTRAVENOUS at 21:27

## 2017-05-22 RX ADMIN — SODIUM CHLORIDE 1000 ML: 900 INJECTION, SOLUTION INTRAVENOUS at 15:27

## 2017-05-22 RX ADMIN — SODIUM CHLORIDE 100 ML/HR: 450 INJECTION, SOLUTION INTRAVENOUS at 19:55

## 2017-05-22 RX ADMIN — INSULIN LISPRO 10 UNITS: 100 INJECTION, SOLUTION INTRAVENOUS; SUBCUTANEOUS at 17:53

## 2017-05-22 NOTE — H&P
Hospitalist Admission Note    NAME: Annamarie Sam   :  1955   MRN:  [id number]     Date/Time:  2017 6:59 PM    Patient PCP: Kya Fernandez, ANICETO  ________________________________________________________________________    My assessment of this patient's clinical condition and my plan of care is as follows. Assessment / Plan:  Sepsis due to back cellulitis with recurrent abscesses, present on admission:  Large area of induration mid back with some purulent drainage  - emperic rocephin, vancomycin (h/o MRSA)  - wound care consulted  - appreciate general surgery consult  - IV fluids  Insulin-dependent DM2 uncontrolled with nephropathy (CKD4):  - insulin now  - restart home 75/25 (confirmed takes 65u BID)  - lispro sliding scale  - tx underlying infection as above  Paroxysmal afib and benign/essential HTN:  - con't digoxin and ASA  - con't coreg, clonidine, norvasc  - prn hydralazine  Hyperlipidemia: con't statin  Morbid obesity    Code Status: Full  Surrogate Decision Maker: Marilee King [phone]  DVT Prophylaxis: heparin        Subjective:   CHIEF COMPLAINT:  Back infection    HISTORY OF PRESENT ILLNESS:     Solis Han is a 57 y.o.  male who presents with above. Pt with h/o recurrent subcutaneous abscesses in setting of diabetes. He says that he has been doing well recently. No fever, cough or URI sx. His blood sugars have been elevated, but he felt that was because he wasn't watching them closely enough. Pt was noted today to have a large area of induration on his back so a caregiver sent him to the ER. Pt denies pain with this. He is not aware of any drainage. He denies SOB. He denies nausea or change in appetite. No stool changes. 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 He denies focal weakness. We were asked to admit for work up and evaluation of the above problems.      Past Medical History:   Diagnosis Date    A-fib [facility])  Resolved. 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     Cancer ([facility]     splenic lymphoma    CKD stage 4 due to type 2 diabetes mellitus ([facility]     Diabetes ([facility] [facility]     Hypertension     Neuropathy     Other ill-defined conditions     spinal meningitis    Other ill-defined conditions     broken blood vessel to nose    Vitamin D deficiency         Past Surgical History:   Procedure Laterality Date    HX OTHER SURGICAL  02/06/2017    Excisional debridement of abscess right thigh & right lower back        Social History   Substance Use Topics    Smoking status: Never Smoker    Smokeless tobacco: Not on file    Alcohol use No        Family History   Problem Relation Age of Onset    Diabetes Mother     Hypertension Father     Hypertension Sister      No Known Allergies     Prior to Admission medications    Medication Sig Start Date End Date Taking? Authorizing Provider   carvedilol (COREG) 12.5 mg tablet  4/7/17   Historical Provider   ondansetron (ZOFRAN ODT) 4 mg disintegrating tablet Take 1 Tab by mouth every eight (8) hours as needed for Nausea. 4/1/17   Alvenia Moritz, DO   oxyCODONE-acetaminophen (PERCOCET) 5-325 mg per tablet Take 1 Tab by mouth every four (4) hours as needed for Pain. Max Daily Amount: 6 Tabs. 4/1/17   Alvenia Moritz, DO   cloNIDine HCl (CATAPRES) 0.1 mg tablet Take 0.1 mg by mouth two (2) times a day. 3/8/17   Aracelis Bangura NP   aspirin delayed-release 81 mg tablet Take 1 Tab by mouth daily. 2/10/17   Montana Sesay MD   insulin lispro protamine/insulin lispro (HUMALOG MIX 75/25) 100 unit/mL (75-25) injection 65 units subcutaneous injection twice daily before meals (before breakfast and dinner) 2/10/17   Montana Sesay MD   carvedilol (COREG) 25 mg tablet Take 25 mg by mouth two (2) times daily (with meals).     Historical Provider   insulin lispro protamine/insulin lispro (HUMALOG MIX 75/25) 100 unit/mL (75-25) injection 60 Units by SubCUTAneous route Before breakfast and dinner. 12/23/16   Austyn Price NP   amLODIPine (NORVASC) 10 mg tablet Take 1 Tab by mouth daily. 10/3/15   Levern Shone, MD   acetaminophen (TYLENOL) 325 mg tablet Take 2 Tabs by mouth every four (4) hours as needed for Pain or Fever. 10/3/15   Levern Shone, MD   Insulin Syringe-Needle U-100 (INSULIN SYRINGE) 1/2 mL 29 x 1/2\" syrg 1 Each by Does Not Apply route two (2) times a day. 10/3/15   Levern Shone, MD   alcohol swabs (ALCOHOL PADS) padm 1 Each by Apply Externally route two (2) times a day. 10/3/15   Levern Shone, MD   pravastatin (PRAVACHOL) 20 mg tablet Take 20 mg by mouth nightly. Magaly Lyons MD   digoxin (LANOXIN) 0.125 mg tablet Take 0.125 mg by mouth daily. Indications: afib    Historical Provider       REVIEW OF SYSTEMS:     I am not able to complete the review of systems because:    The patient is intubated and sedated    The patient has altered mental status due to his acute medical problems    The patient has baseline aphasia from prior stroke(s)    The patient has baseline dementia and is not reliable historian    The patient is in acute medical distress and unable to provide information           Total of 12 systems reviewed as follows:       POSITIVE= underlined text  Negative = text not underlined  General:  fever, chills, sweats, generalized weakness, weight loss/gain,      loss of appetite   Eyes:    blurred vision, eye pain, loss of vision, double vision  ENT:    rhinorrhea, pharyngitis   Respiratory:   cough, sputum production, SOB, NAVARRO, wheezing, pleuritic pain   Cardiology:   chest pain, palpitations, orthopnea, PND, edema, syncope   Gastrointestinal:  abdominal pain , N/V, diarrhea, dysphagia, constipation, bleeding   Genitourinary:  frequency, urgency, dysuria, hematuria, incontinence   Muskuloskeletal :  arthralgia, myalgia, back pain  Hematology:  easy bruising, nose or gum bleeding, lymphadenopathy Dermatological: rash, ulceration, pruritis, color change / jaundice  Endocrine:   hot flashes or polydipsia   Neurological:  headache, dizziness, confusion, focal weakness, paresthesia,     Speech difficulties, memory loss, gait difficulty  Psychological: Feelings of anxiety, depression, agitation    Objective:   VITALS:    Visit Vitals    /77 (BP 1 Location: Left arm)    Pulse 83    Temp 99.4 °F (37.4 °C)    Resp 18    Ht 6' 5\" (1.956 m)    Wt 147.4 kg (325 lb)    SpO2 98%    BMI 38.54 kg/m2       PHYSICAL EXAM:    General:    Obese, alert, cooperative, no distress, appears stated age. HEENT: Atraumatic, anicteric sclerae, pink conjunctivae     No oral ulcers, mucosa moist, throat clear, dentition fair  Neck:  Supple, symmetrical,  thyroid: non tender  Lungs:   Clear to auscultation bilaterally. No Wheezing or Rhonchi. No rales. Chest wall:  No tenderness  No Accessory muscle use. Large area of induration mid thoracic back with small purulent drainage  Heart:   Regular rhythm,  No  murmur   No edema  Abdomen:   Soft, non-tender. moderately distended. Bowel sounds normal  Extremities: No cyanosis. No clubbing    Skin turgor normal, Capillary refill normal, Radial dial pulse 2+  Skin:     Not pale. Not Jaundiced  No rashes   Psych:  Fair insight. Not depressed. Not anxious or agitated. Neurologic: EOMs intact. No facial asymmetry. No aphasia or slurred speech. Symmetrical strength, Sensation grossly intact.  Alert and oriented X 4.     _______________________________________________________________________  Care Plan discussed with:    Comments   Patient x    Family  x    RN x    Care Manager                    Consultant:      _______________________________________________________________________  Expected  Disposition:   Home with Family x   HH/PT/OT/RN x   SNF/LTC    DARIN    ________________________________________________________________________  TOTAL TIME:  48 Minutes    Critical Care Provided     Minutes non procedure based      Comments    x Reviewed previous records   >50% of visit spent in counseling and coordination of care x Discussion with patient and/or family and questions answered       ________________________________________________________________________  Signed: Sarika Adams MD    Procedures: see electronic medical records for all procedures/Xrays and details which were not copied into this note but were reviewed prior to creation of Plan. LAB DATA REVIEWED:    Recent Results (from the past 24 hour(s))   GLUCOSE, POC    Collection Time: 05/22/17  2:29 PM   Result Value Ref Range    Glucose (POC) 358 (H) 65 - 100 mg/dL    Performed by Cristal Alexander    EKG, 12 LEAD, INITIAL    Collection Time: 05/22/17  2:46 PM   Result Value Ref Range    Ventricular Rate 83 BPM    Atrial Rate 83 BPM    P-R Interval 180 ms    QRS Duration 92 ms    Q-T Interval 346 ms    QTC Calculation (Bezet) 406 ms    Calculated P Axis 39 degrees    Calculated R Axis 9 degrees    Calculated T Axis -3 degrees    Diagnosis       Normal sinus rhythm  Normal ECG  When compared with ECG of 01-APR-2017 16:11,  No significant change was found  Confirmed by Gunjan Black ([id number]) on 5/22/2017 5:33:12 PM     CBC WITH AUTOMATED DIFF    Collection Time: 05/22/17  2:56 PM   Result Value Ref Range    WBC 11.3 (H) 4.1 - 11.1 K/uL    RBC 3.47 (L) 4.10 - 5.70 M/uL    HGB 10.0 (L) 12.1 - 17.0 g/dL    HCT 30.4 (L) 36.6 - 50.3 %    MCV 87.6 80.0 - 99.0 FL    MCH 28.8 26.0 - 34.0 PG    MCHC 32.9 30.0 - 36.5 g/dL    RDW 13.5 11.5 - 14.5 %    PLATELET 278 956 - 149 K/uL    NEUTROPHILS 81 (H) 32 - 75 %    LYMPHOCYTES 9 (L) 12 - 49 %    MONOCYTES 10 5 - 13 %    EOSINOPHILS 0 0 - 7 %    BASOPHILS 0 0 - 1 %    ABS. NEUTROPHILS 9.1 (H) 1.8 - 8.0 K/UL    ABS. LYMPHOCYTES 1.0 0.8 - 3.5 K/UL    ABS. MONOCYTES 1.1 (H) 0.0 - 1.0 K/UL    ABS. EOSINOPHILS 0.0 0.0 - 0.4 K/UL    ABS.  BASOPHILS 0.0 0.0 - 0.1 K/UL   METABOLIC PANEL, COMPREHENSIVE    Collection Time: 05/22/17  2:56 PM   Result Value Ref Range    Sodium 137 136 - 145 mmol/L    Potassium 4.5 3.5 - 5.1 mmol/L    Chloride 102 97 - 108 mmol/L    CO2 28 21 - 32 mmol/L    Anion gap 7 5 - 15 mmol/L    Glucose 338 (H) 65 - 100 mg/dL    BUN 19 6 - 20 MG/DL    Creatinine 3.03 (H) 0.70 - 1.30 MG/DL    BUN/Creatinine ratio 6 (L) 12 - 20      GFR est AA 25 (L) >60 ml/min/1.73m2    GFR est non-AA 21 (L) >60 ml/min/1.73m2    Calcium 9.4 8.5 - 10.1 MG/DL    Bilirubin, total 0.7 0.2 - 1.0 MG/DL    ALT (SGPT) 31 12 - 78 U/L    AST (SGOT) 12 (L) 15 - 37 U/L    Alk.  phosphatase 168 (H) 45 - 117 U/L    Protein, total 7.2 6.4 - 8.2 g/dL    Albumin 2.8 (L) 3.5 - 5.0 g/dL    Globulin 4.4 (H) 2.0 - 4.0 g/dL    A-G Ratio 0.6 (L) 1.1 - 2.2     LACTIC ACID, PLASMA    Collection Time: 05/22/17  2:56 PM   Result Value Ref Range    Lactic acid 2.1 (HH) 0.4 - 2.0 MMOL/L   LIPASE    Collection Time: 05/22/17  2:56 PM   Result Value Ref Range    Lipase 65 (L) 73 - 393 U/L   MAGNESIUM    Collection Time: 05/22/17  2:56 PM   Result Value Ref Range    Magnesium 2.2 1.6 - 2.4 mg/dL   TROPONIN I    Collection Time: 05/22/17  2:56 PM   Result Value Ref Range    Troponin-I, Qt. <0.04 <0.05 ng/mL   URINALYSIS W/ REFLEX CULTURE    Collection Time: 05/22/17  3:29 PM   Result Value Ref Range    Color YELLOW/STRAW      Appearance CLEAR CLEAR      Specific gravity 1.020 1.003 - 1.030      pH (UA) 7.0 5.0 - 8.0      Protein >300 (A) NEG mg/dL    Glucose >1000 (A) NEG mg/dL    Ketone NEGATIVE  NEG mg/dL    Bilirubin NEGATIVE  NEG      Blood MODERATE (A) NEG      Urobilinogen 1.0 0.2 - 1.0 EU/dL    Nitrites NEGATIVE  NEG      Leukocyte Esterase NEGATIVE  NEG      WBC 0-4 0 - 4 /hpf    RBC 0-5 0 - 5 /hpf    Epithelial cells FEW FEW /lpf    Bacteria NEGATIVE  NEG /hpf    UA:UC IF INDICATED CULTURE NOT INDICATED BY UA RESULT CNI     GLUCOSE, POC    Collection Time: 05/22/17  5:19 PM   Result Value Ref Range    Glucose (POC) 331 (H) 65 - 100 mg/dL    Performed by Yasmeen Arguelles    LACTIC ACID, PLASMA    Collection Time: 05/22/17  6:01 PM   Result Value Ref Range    Lactic acid 0.9 0.4 - 2.0 MMOL/L

## 2017-05-22 NOTE — PROGRESS NOTES
Surgery Consult    Subjective:      Layla Baker is a 57 y.o. male with diabetes out of control, history of prior subcutaneous abscesses. He was noted to have glucose over 500 this AM, did not feel well. He has history of poor control of diabetes. His [facility] nurse noted increasing swelling in mid upper back without drainage. He denies chest pain or dyspnea. He had nausea earlier. Past Medical History:   Diagnosis Date    A-fib [facility]) 2009    Resolved.    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     Cancer ([facility] [address]     splenic lymphoma    CKD stage 4 due to type 2 diabetes mellitus ([facility]     Diabetes ([facility] [facility]     Hypertension     Neuropathy     Other ill-defined conditions     spinal meningitis    Other ill-defined conditions     broken blood vessel to nose    Paroxysmal a-fib ([facility]     Vitamin D deficiency      Past Surgical History:   Procedure Laterality Date    HX OTHER SURGICAL  02/06/2017    Excisional debridement of abscess right thigh & right lower back       Family History   Problem Relation Age of Onset    Diabetes Mother     Hypertension Father     Hypertension Sister      Social History     Social History    Marital status:      Spouse name: N/A    Number of children: N/A    Years of education: N/A     Social History Main Topics    Smoking status: Never Smoker    Smokeless tobacco: Not on file    Alcohol use No    Drug use: No    Sexual activity: Not on file     Other Topics Concern    Not on file     Social History Narrative    ** Merged History Encounter **           Current Facility-Administered Medications   Medication Dose Route Frequency Provider Last Rate Last Dose    sodium chloride (NS) flush 5-10 mL  5-10 mL IntraVENous Q8H Sade Worley MD   10 mL at 05/22/17 1534    sodium chloride (NS) flush 5-10 mL  5-10 mL IntraVENous PRN Sade Worley MD        ondansetron (ZOFRAN) injection 4 mg  4 mg IntraVENous NOW Jose Mullins MD   Stopped at 05/22/17 1430     Current Outpatient Prescriptions   Medication Sig Dispense Refill    carvedilol (COREG) 12.5 mg tablet       ondansetron (ZOFRAN ODT) 4 mg disintegrating tablet Take 1 Tab by mouth every eight (8) hours as needed for Nausea. 10 Tab 0    oxyCODONE-acetaminophen (PERCOCET) 5-325 mg per tablet Take 1 Tab by mouth every four (4) hours as needed for Pain. Max Daily Amount: 6 Tabs. 20 Tab 0    cloNIDine HCl (CATAPRES) 0.1 mg tablet Take 0.1 mg by mouth two (2) times a day.  aspirin delayed-release 81 mg tablet Take 1 Tab by mouth daily. 30 Tab 0    insulin lispro protamine/insulin lispro (HUMALOG MIX 75/25) 100 unit/mL (75-25) injection 65 units subcutaneous injection twice daily before meals (before breakfast and dinner) 1 Vial 2    carvedilol (COREG) 25 mg tablet Take 25 mg by mouth two (2) times daily (with meals).  insulin lispro protamine/insulin lispro (HUMALOG MIX 75/25) 100 unit/mL (75-25) injection 60 Units by SubCUTAneous route Before breakfast and dinner. 1 Vial 0    amLODIPine (NORVASC) 10 mg tablet Take 1 Tab by mouth daily. 30 Tab 1    acetaminophen (TYLENOL) 325 mg tablet Take 2 Tabs by mouth every four (4) hours as needed for Pain or Fever. 40 Tab 0    Insulin Syringe-Needle U-100 (INSULIN SYRINGE) 1/2 mL 29 x 1/2\" syrg 1 Each by Does Not Apply route two (2) times a day. 100 Syringe 1    alcohol swabs (ALCOHOL PADS) padm 1 Each by Apply Externally route two (2) times a day. 100 Each 1    pravastatin (PRAVACHOL) 20 mg tablet Take 20 mg by mouth nightly.  digoxin (LANOXIN) 0.125 mg tablet Take 0.125 mg by mouth daily. Indications: afib          No Known Allergies    Review of Systems:  Pertinent items are noted in the History of Present Illness.     Objective:      Patient Vitals for the past 8 hrs:   BP Temp Pulse Resp SpO2 Height Weight   05/22/17 1411 165/77 99.2 °F (37.3 °C) 83 20 98 % 6' 5\" (1.956 m) 147.4 kg (325 lb)       Temp (24hrs), Av.2 °F (37.3 °C), Min:99.2 °F (37.3 °C), Max:99.2 °F (37.3 °C)      Physical Exam:  WD Obese man,  NAD  HEAD/NECK: No jaundice, mass or thyromegaly. LUNGS: Clear bilaterally without wheeze, rhonchi or rales. Alvin Basket HEART: Regular without murmur, gallop or rub. Abdomen:  Soft, non tender, no mass or hernia noted. BACK - two healing open areas on back, lower left buttock and left flank. Mid upper back has area of induration 6 cm across with mild central tenderness. NEURO: Patient is alert and oriented x 3 and moves all extremities equally. Facial movement is symmetrical. Speech was normal.       Assessment:     Hospital Problems  Date Reviewed: 2017    None        Diabetes out of control. Cellulitis of back, possible developing abscess. Plan:     He will require admission for control of diabetes. Start emperic antibiotics (prior history MRSA). I will follow; he may end up requiring Incision and drainage.     Signed By: Kylie Soria MD     May 22, 2017

## 2017-05-22 NOTE — TELEPHONE ENCOUNTER
Home health nurse states Mr Juana Mendez has another abscess, right flank. Pt's blood sugar is also 538. He will be going to ED today regarding B/S. Mr Juana Mendez has appt with Dr Ta Castro, Thursday, 5/25/17 to follow up left lower back abscess.

## 2017-05-22 NOTE — ED PROVIDER NOTES
HPI Comments: Paulina Waters is a 57 y.o. male, pmhx significant for IDDM, CKD, HTN, and neuropathy, who presents via EMS to the ED c/o hyperglycemia ( per EMS). Associated sxs include increase in urinary frequency, 1 episodes of emesis, and feeling generally weak. Pt states that he woke up, at breakfast, went back to sleep, and upon waking up again, had a sudden episodes of emesis. Pt's daughter noted that he \"just looked off,\" and could tell something was wrong. Pt states that he has not taken his DM medications x 1 week. Pt does have h/o being in diabetic ketoacidosis. Of note, pt is being seen by home health for wound care of 2 abscesses on his back which were I&D at [facility]. Pt denies dysuria, hematuria, abdominal pain, SOB, chest pain, hematemesis, fever, or cough. PCP: Shoshana Alfonso NP    PSHx: Significant for excisional debridement of abscess R thigh & R lower back  Social Hx: (-) tobacco, (-) EtOH,  (-) Illicit Drugs    There are no other complaints, changes, or physical findings at this time. The history is provided by the patient. No  was used. Past Medical History:   Diagnosis Date    A-fib [facility]) 2009    Resolved.    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[address]     Cancer ([address]     splenic lymphoma    CKD stage 4 due to type 2 diabetes mellitus [facility]     Diabetes ([address]     Hypertension     Neuropathy     Other ill-defined conditions     spinal meningitis    Other ill-defined conditions     broken blood vessel to nose    Paroxysmal a-fib (HCC)     Vitamin D deficiency        Past Surgical History:   Procedure Laterality Date    HX OTHER SURGICAL  02/06/2017    Excisional debridement of abscess right thigh & right lower back          Family History:   Problem Relation Age of Onset    Diabetes Mother     Hypertension Father     Hypertension Sister        Social History     Social History    Marital status:      Spouse name: N/A    Number of children: N/A    Years of education: N/A     Occupational History    Not on file. Social History Main Topics    Smoking status: Never Smoker    Smokeless tobacco: Not on file    Alcohol use No    Drug use: No    Sexual activity: Not on file     Other Topics Concern    Not on file     Social History Narrative    ** Merged History Encounter **              ALLERGIES: Review of patient's allergies indicates no known allergies. Review of Systems   Constitutional: Negative for fever. HENT: Negative. Eyes: Negative. Respiratory: Negative. Negative for cough and shortness of breath. Cardiovascular: Negative. Gastrointestinal: Positive for nausea and vomiting. Negative for abdominal pain. Genitourinary: Positive for frequency. Negative for dysuria and hematuria. Musculoskeletal: Negative. Skin: Negative. Neurological: Positive for weakness (general). Psychiatric/Behavioral: Negative. All other systems reviewed and are negative. Patient Vitals for the past 12 hrs:   Temp Pulse Resp BP SpO2   05/22/17 1855 99.4 °F (37.4 °C) 83 18 177/77 -   05/22/17 1411 99.2 °F (37.3 °C) 83 20 165/77 98 %       Physical Exam   Constitutional: He is oriented to person, place, and time. He appears well-developed and well-nourished. No distress. HENT:   Head: Normocephalic and atraumatic. Nose: Nose normal.   Mouth/Throat: No oropharyngeal exudate. Eyes: Conjunctivae and EOM are normal. Pupils are equal, round, and reactive to light. No scleral icterus. Neck: Normal range of motion. Neck supple. No JVD present. No thyromegaly present. Cardiovascular: Normal rate, regular rhythm, normal heart sounds, intact distal pulses and normal pulses. PMI is not displaced. Exam reveals no gallop and no friction rub. No murmur heard. Pulmonary/Chest: Effort normal and breath sounds normal. No stridor. No respiratory distress.  He has no decreased breath sounds. He has no wheezes. He has no rhonchi. He has no rales. He exhibits no tenderness. Abdominal: Soft. Normal aorta and bowel sounds are normal. He exhibits no distension, no abdominal bruit and no mass. There is no hepatosplenomegaly. There is no tenderness. There is no rebound, no guarding and no CVA tenderness. No hernia. Neurological: He is alert and oriented to person, place, and time. He has normal reflexes. He displays no atrophy and no tremor. No cranial nerve deficit or sensory deficit. He exhibits normal muscle tone. He displays no seizure activity. Coordination normal. GCS eye subscore is 4. GCS verbal subscore is 5. GCS motor subscore is 6. Reflex Scores:       Patellar reflexes are 2+ on the right side and 2+ on the left side. Skin: Skin is warm. No rash noted. He is not diaphoretic. No erythema. 3 separate areas of resolving I and D no obvious signs of severe infection   Nursing note and vitals reviewed. MDM  Number of Diagnoses or Management Options  Back abscess:   Uncontrolled type 1 diabetes mellitus with other skin complication [facility]):   Diagnosis management comments:   DDx: diabetic ketoacidosis, uncontrolled DM, sepsis, abscess, electrolyte abnormality    Brittle diabetic presents with nausea/vomiting and poorly controlled DM. Seen by general surgery who is concerned about developing abscess. Recommends admission to hospitalist services for broad spectrum antibiotics, DM control, and possible surgical intervention.            Amount and/or Complexity of Data Reviewed  Clinical lab tests: ordered and reviewed  Tests in the radiology section of CPT®: ordered and reviewed  Tests in the medicine section of CPT®: reviewed and ordered  Obtain history from someone other than the patient: yes  Review and summarize past medical records: yes  Discuss the patient with other providers: yes (General surgery, hospitalist)  Independent visualization of images, tracings, or specimens: yes    Risk of Complications, Morbidity, and/or Mortality  Presenting problems: high  Diagnostic procedures: moderate  Management options: moderate    Critical Care  Total time providing critical care: 30-74 minutes    Patient Progress  Patient progress: stable    ED Course       Procedures     EKG interpretation: (Preliminary) 2:46 PM  Rhythm: normal sinus rhythm; and regular . Rate (approx.): 83; Axis: normal; SC interval: normal; QRS interval: normal ; ST/T wave: normal  Written by Marshall Reyes ED Scribe, as dictated by Regis Cota MD.     Progress Note:  6:59 PM  Pt re-evaluated. He is currently without complaints. Pt declined SL Zofran. Awaiting repeat BG and lactate. Written by Marshall Reyes ED Scribe, as dictated by Regis Cota MD.     CONSULT NOTE:   6:40 PM  Regis Cota MD spoke with Nita Jackson MD,   Specialty: General surgery  Discussed pt's hx, disposition, and available diagnostic and imaging results. Reviewed care plans. Consultant agrees with plans as outlined. Consultant evaluated pt in the ED. Recommend admission to hospitalist services for IV abx, and possible surgical drainage of an abscess on pt's back. Written by Marshall Reyes ED Scribe, as dictated by Regis Cota MD.    HOSPITALIST CONSULT NOTE:   6:55 PM  Regis Cota MD spoke with Delvis Anderson MD,   Specialty: Hospitalist  Discussed pt's hx, disposition, and available diagnostic and imaging results. Reviewed care plans. Consultant will evaluate pt for admission.   Written by DONAVAN Castillo, as dictated by Regis Cota MD.    CRITICAL CARE NOTE:    6:57 PM    IMPENDING DETERIORATION -Cardiovascular and Metabolic    ASSOCIATED RISK FACTORS - Hypotension, Metabolic changes, Dehydration and CNS Decompensation    MANAGEMENT- Bedside Assessment and Supervision of Care    INTERPRETATION -  Xrays, CT Scan, ECG and Blood Pressure    INTERVENTIONS - Metobolic interventions    CASE REVIEW - Hospitalist, Medical Sub-Specialist, Nursing and Family    TREATMENT RESPONSE -Stable    PERFORMED BY - Self      NOTES   :    I have spent 35 minutes of critical care time involved in lab review, consultations with specialist, family decision- making, bedside attention and documentation. During this entire length of time I was immediately available to the patient . Harper Rhodes MD      LABORATORY TESTS:  Recent Results (from the past 12 hour(s))   GLUCOSE, POC    Collection Time: 05/22/17  2:29 PM   Result Value Ref Range    Glucose (POC) 358 (H) 65 - 100 mg/dL    Performed by Joe Nixon    EKG, 12 LEAD, INITIAL    Collection Time: 05/22/17  2:46 PM   Result Value Ref Range    Ventricular Rate 83 BPM    Atrial Rate 83 BPM    P-R Interval 180 ms    QRS Duration 92 ms    Q-T Interval 346 ms    QTC Calculation (Bezet) 406 ms    Calculated P Axis 39 degrees    Calculated R Axis 9 degrees    Calculated T Axis -3 degrees    Diagnosis       Normal sinus rhythm  Normal ECG  When compared with ECG of 01-APR-2017 16:11,  No significant change was found  Confirmed by Dannie Haynes ([id number]) on 5/22/2017 5:33:12 PM     CBC WITH AUTOMATED DIFF    Collection Time: 05/22/17  2:56 PM   Result Value Ref Range    WBC 11.3 (H) 4.1 - 11.1 K/uL    RBC 3.47 (L) 4.10 - 5.70 M/uL    HGB 10.0 (L) 12.1 - 17.0 g/dL    HCT 30.4 (L) 36.6 - 50.3 %    MCV 87.6 80.0 - 99.0 FL    MCH 28.8 26.0 - 34.0 PG    MCHC 32.9 30.0 - 36.5 g/dL    RDW 13.5 11.5 - 14.5 %    PLATELET 428 483 - 498 K/uL    NEUTROPHILS 81 (H) 32 - 75 %    LYMPHOCYTES 9 (L) 12 - 49 %    MONOCYTES 10 5 - 13 %    EOSINOPHILS 0 0 - 7 %    BASOPHILS 0 0 - 1 %    ABS. NEUTROPHILS 9.1 (H) 1.8 - 8.0 K/UL    ABS. LYMPHOCYTES 1.0 0.8 - 3.5 K/UL    ABS. MONOCYTES 1.1 (H) 0.0 - 1.0 K/UL    ABS. EOSINOPHILS 0.0 0.0 - 0.4 K/UL    ABS.  BASOPHILS 0.0 0.0 - 0.1 K/UL   METABOLIC PANEL, COMPREHENSIVE    Collection Time: 05/22/17  2:56 PM   Result Value Ref Range    Sodium 137 136 - 145 mmol/L    Potassium 4.5 3.5 - 5.1 mmol/L    Chloride 102 97 - 108 mmol/L    CO2 28 21 - 32 mmol/L    Anion gap 7 5 - 15 mmol/L    Glucose 338 (H) 65 - 100 mg/dL    BUN 19 6 - 20 MG/DL    Creatinine 3.03 (H) 0.70 - 1.30 MG/DL    BUN/Creatinine ratio 6 (L) 12 - 20      GFR est AA 25 (L) >60 ml/min/1.73m2    GFR est non-AA 21 (L) >60 ml/min/1.73m2    Calcium 9.4 8.5 - 10.1 MG/DL    Bilirubin, total 0.7 0.2 - 1.0 MG/DL    ALT (SGPT) 31 12 - 78 U/L    AST (SGOT) 12 (L) 15 - 37 U/L    Alk.  phosphatase 168 (H) 45 - 117 U/L    Protein, total 7.2 6.4 - 8.2 g/dL    Albumin 2.8 (L) 3.5 - 5.0 g/dL    Globulin 4.4 (H) 2.0 - 4.0 g/dL    A-G Ratio 0.6 (L) 1.1 - 2.2     LACTIC ACID, PLASMA    Collection Time: 05/22/17  2:56 PM   Result Value Ref Range    Lactic acid 2.1 (HH) 0.4 - 2.0 MMOL/L   LIPASE    Collection Time: 05/22/17  2:56 PM   Result Value Ref Range    Lipase 65 (L) 73 - 393 U/L   MAGNESIUM    Collection Time: 05/22/17  2:56 PM   Result Value Ref Range    Magnesium 2.2 1.6 - 2.4 mg/dL   TROPONIN I    Collection Time: 05/22/17  2:56 PM   Result Value Ref Range    Troponin-I, Qt. <0.04 <0.05 ng/mL   URINALYSIS W/ REFLEX CULTURE    Collection Time: 05/22/17  3:29 PM   Result Value Ref Range    Color YELLOW/STRAW      Appearance CLEAR CLEAR      Specific gravity 1.020 1.003 - 1.030      pH (UA) 7.0 5.0 - 8.0      Protein >300 (A) NEG mg/dL    Glucose >1000 (A) NEG mg/dL    Ketone NEGATIVE  NEG mg/dL    Bilirubin NEGATIVE  NEG      Blood MODERATE (A) NEG      Urobilinogen 1.0 0.2 - 1.0 EU/dL    Nitrites NEGATIVE  NEG      Leukocyte Esterase NEGATIVE  NEG      WBC 0-4 0 - 4 /hpf    RBC 0-5 0 - 5 /hpf    Epithelial cells FEW FEW /lpf    Bacteria NEGATIVE  NEG /hpf    UA:UC IF INDICATED CULTURE NOT INDICATED BY UA RESULT CNI     GLUCOSE, POC    Collection Time: 05/22/17  5:19 PM   Result Value Ref Range    Glucose (POC) 331 (H) 65 - 100 mg/dL    Performed by [facility]    LACTIC ACID, PLASMA    Collection Time: 05/22/17  6:01 PM   Result Value Ref Range    Lactic acid 0.9 0.4 - 2.0 MMOL/L   GLUCOSE, POC    Collection Time: 05/22/17  6:53 PM   Result Value Ref Range    Glucose (POC) 267 (H) 65 - 100 mg/dL    Performed by [facility]        IMAGING RESULTS:  CT Results  (Last 48 hours)               05/22/17 1632  CT ABD PELV WO CONT Final result    Impression:  IMPRESSION:       1. 11 mm nodule at right posterior costophrenic sulcus unchanged from 5/9/2016   CT and new compared with 9/18/2009 CT. 2. Heterogeneous hepatic attenuation with prominent areas of diminished   attenuation in right and left hepatic lobes, probably nonuniform hepatic   steatosis given the previously diffuse hepatic steatosis is present. 3. Splenomegaly again shown. Narrative:  INDICATION: N/V,elevated bS       COMPARISON: 5/9/2016 CT, 9/18/2009       TECHNIQUE:    Thin axial images were obtained through the abdomen and pelvis. Coronal and   sagittal reconstructions were generated. Oral contrast was not , per order of   the ordering physician, administered. CT dose reduction was achieved through use   of a standardized protocol tailored for this examination and automatic exposure   control for dose modulation. The absence of intravenous and oral contrast material substantially reduces the   capacity of CT to evaluate abnormalities affecting the solid parenchymal organs   as well as the bowel. FINDINGS:    LUNG BASES: Unchanged 11 mm nodule at right posterior costophrenic sulcus   compared with 5/9/2016 CT. Finding not demonstrated on CT of 9/18/2009. INCIDENTALLY IMAGED HEART AND MEDIASTINUM: Unremarkable. LIVER: Heterogeneous attenuation with subtle areas of more localized diminished   attenuation within right hepatic lobe and medial left hepatic lobe probably   representing hepatic steatosis.  Previously, more diffuse hepatic steatosis was present. GALLBLADDER: Unremarkable. SPLEEN: Splenomegaly with splenic length of 16 cm. PANCREAS: No mass or ductal dilatation. ADRENALS: Unremarkable. KIDNEYS/URETERS: Unchanged wedge-shaped defect in lateral right mid kidney with   tiny calcification. Otherwise, unremarkable. STOMACH: Nondistended. SMALL BOWEL: No distended. COLON: Nondistended. APPENDIX: Normal.   PERITONEUM: No ascites or pneumoperitoneum. RETROPERITONEUM: No lymphadenopathy or aortic aneurysm. Scattered   atherosclerotic calcifications. URINARY BLADDER: No mass or calculus. BONES: Diffuse idiopathic skeletal hyperostosis. Mild degenerative findings in   spine and both hips. ADDITIONAL COMMENTS: N/A               CXR Results  (Last 48 hours)               05/22/17 1454  XR CHEST PORT Final result    Impression:  IMPRESSION: Normal chest.               Narrative:  EXAM:  XR CHEST PORT       INDICATION:  N/V,elevated BS       COMPARISON:  4/1/2017       FINDINGS: A portable AP radiograph of the chest was obtained at 1440 hours. The   patient is on a cardiac monitor. The lungs are clear. The cardiac and   mediastinal contours and pulmonary vascularity are normal.  The bones and soft   tissues are grossly within normal limits.                 MEDICATIONS GIVEN:  Medications   sodium chloride (NS) flush 5-10 mL (10 mL IntraVENous Given 5/22/17 1534)   sodium chloride (NS) flush 5-10 mL (not administered)   ondansetron (ZOFRAN) injection 4 mg (0 mg IntraVENous Held 5/22/17 1430)   vancomycin (VANCOCIN) 2000 mg in  ml infusion (not administered)   amLODIPine (NORVASC) tablet 10 mg (not administered)   aspirin delayed-release tablet 81 mg (not administered)   carvedilol (COREG) tablet 12.5 mg (12.5 mg Oral Given 5/22/17 2003)   cloNIDine HCl (CATAPRES) tablet 0.1 mg (0.1 mg Oral Given 5/22/17 2002)   digoxin (LANOXIN) tablet 0.125 mg (not administered)   insulin lispro protamine/insulin lispro (HUMALOG MIX 75/25) injection 65 Units (not administered)   oxyCODONE-acetaminophen (PERCOCET) 5-325 mg per tablet 1 Tab (not administered)   pravastatin (PRAVACHOL) tablet 20 mg (not administered)   sodium chloride (NS) flush 5-10 mL (not administered)   sodium chloride (NS) flush 5-10 mL (not administered)   acetaminophen (TYLENOL) tablet 650 mg (not administered)   ondansetron (ZOFRAN) injection 4 mg (not administered)   bisacodyl (DULCOLAX) tablet 5 mg (not administered)   heparin (porcine) injection 5,000 Units (not administered)   insulin lispro (HUMALOG) injection (not administered)   glucose chewable tablet 16 g (not administered)   dextrose (D50W) injection syrg 12.5-25 g (not administered)   glucagon (GLUCAGEN) injection 1 mg (not administered)   0.45% sodium chloride infusion (100 mL/hr IntraVENous New Bag 5/22/17 1955)   vancomycin (VANCOCIN) 1500 mg in  ml infusion (not administered)   cefTRIAXone (ROCEPHIN) 1 g in 0.9% sodium chloride (MBP/ADV) 50 mL MBP (not administered)   hydrALAZINE (APRESOLINE) 20 mg/mL injection 10 mg (not administered)   sodium chloride 0.9 % bolus infusion 1,000 mL (0 mL IntraVENous IV Completed 5/22/17 1600)   sodium chloride 0.9 % bolus infusion 1,000 mL (0 mL IntraVENous IV Completed 5/22/17 1910)   insulin lispro (HUMALOG) injection 10 Units (10 Units SubCUTAneous Given 5/22/17 1753)   cefTRIAXone (ROCEPHIN) 1 g in 0.9% sodium chloride (MBP/ADV) 50 mL MBP (0 g IntraVENous IV Completed 5/22/17 2053)       IMPRESSION:  1. Uncontrolled type 1 diabetes mellitus with other skin complication (HCC)    2. Back abscess        PLAN: Admit to hospitalist  Admission Note:  6:57 PM  Patient is being admitted to the hospital by Dr. Charity Fierro. The results of their tests and reasons for their admission have been discussed with them and/or available family. They convey agreement and understanding for the need to be admitted and for their admission diagnosis.    Written by Rozanna Cooks, ED Scribe, as dictated by Alon Genao MD.    Attestation: This note is prepared by Rhona Mcgovern, acting as Scribe for Alon Genao MD.    Alon Genao MD: The scribe's documentation has been prepared under my direction and personally reviewed by me in its entirety. I confirm that the note above accurately reflects all work, treatment, procedures, and medical decision making performed by me.

## 2017-05-22 NOTE — IP AVS SNAPSHOT
[address] [facility] 
[phone] Patient: Raymon Strong 
MRN: [id number] :1955 You are allergic to the following No active allergies Recent Documentation Height Weight BMI Smoking Status 1.956 m 147.4 kg 38.54 kg/m2 Never Smoker Emergency Contacts Name Discharge Info Relation Home Work Mobile Cathi Catherine  Other Relative [6] [phone] Ritesh Hobbs  Other Relative [6] [phone] Margareth Donaldson     [phone] About your hospitalization You were admitted on:  May 22, 2017 You last received care in the:  [facility] GENERAL SURGERY You were discharged on:  May 29, 2017 Unit phone number:  [phone] Why you were hospitalized Your primary diagnosis was:  Not on File Your diagnoses also included:  Abscess, Dm (Diabetes Mellitus) (Hcc) Providers Seen During Your Hospitalizations Provider Role Specialty Primary office phone Demetris Sprague MD Attending Provider Emergency Medicine [phone] Drea Wilkes MD Attending Provider Hospitalist [phone] Pradeep Marie MD Attending Provider Internal Medicine [phone] Hiren Wall MD Attending Provider Internal Medicine [phone] Your Primary Care Physician (PCP) Primary Care Physician Office Phone Office Fax Hayden Villalobos [phone] Follow-up Information Follow up With Details Comments Contact Info RileyAlec Agu[address]  On 2017 THIS IS YOUR HOME HEALTH AGENCY, IF YOU DONT HEAR FROM THEM WITHIN 24-48 HOURS, PLEASE CONTACT THEM DIRECTLY [phone] Kristen Farias MD In 1 week  [address] [facility] [address] [facility] 
[phone] Radhames Dorsey NP   [address] [facility] [phone] Current Discharge Medication List  
  
 START taking these medications Dose & Instructions Dispensing Information Comments Morning Noon Evening Bedtime  
 amoxicillin-clavulanate 875-125 mg per tablet Commonly known as:  AUGMENTIN Your last dose was: Your next dose is:    
   
   
 Dose:  1 Tab Take 1 Tab by mouth every twelve (12) hours for 8 days. Quantity:  16 Tab Refills:  0 CONTINUE these medications which have CHANGED Dose & Instructions Dispensing Information Comments Morning Noon Evening Bedtime  
 carvedilol 12.5 mg tablet Commonly known as:  Ivet Rivera What changed:  Another medication with the same name was removed. Continue taking this medication, and follow the directions you see here. Your last dose was: Your next dose is:    
   
   
  Refills:  0  
     
   
   
   
  
 cloNIDine HCl 0.2 mg tablet Commonly known as:  CATAPRES What changed:   
- medication strength 
- how much to take Your last dose was: Your next dose is:    
   
   
 Dose:  0.2 mg Take 1 Tab by mouth two (2) times a day. Quantity:  30 Tab Refills:  0  
     
   
   
   
  
 oxyCODONE-acetaminophen 5-325 mg per tablet Commonly known as:  PERCOCET What changed:   
- how much to take - when to take this 
- reasons to take this Your last dose was: Your next dose is:    
   
   
 Dose:  1-2 Tab Take 1-2 Tabs by mouth every six (6) hours as needed. Max Daily Amount: 8 Tabs. Quantity:  15 Tab Refills:  0 CONTINUE these medications which have NOT CHANGED Dose & Instructions Dispensing Information Comments Morning Noon Evening Bedtime  
 acetaminophen 325 mg tablet Commonly known as:  TYLENOL Your last dose was: Your next dose is:    
   
   
 Dose:  650 mg Take 2 Tabs by mouth every four (4) hours as needed for Pain or Fever. Quantity:  40 Tab Refills:  0 alcohol swabs Padm Commonly known as:  ALCOHOL PADS Your last dose was: Your next dose is:    
   
   
 Dose:  1 Each  
1 Each by Apply Externally route two (2) times a day. Quantity:  100 Each Refills:  1  
     
   
   
   
  
 amLODIPine 10 mg tablet Commonly known as:  Serena Ceja Your last dose was: Your next dose is:    
   
   
 Dose:  10 mg Take 1 Tab by mouth daily. Quantity:  30 Tab Refills:  1  
     
   
   
   
  
 aspirin delayed-release 81 mg tablet Your last dose was: Your next dose is:    
   
   
 Dose:  81 mg Take 1 Tab by mouth daily. Quantity:  30 Tab Refills:  0  
     
   
   
   
  
 digoxin 0.125 mg tablet Commonly known as:  LANOXIN Your last dose was: Your next dose is:    
   
   
 Dose:  0.125 mg Take 0.125 mg by mouth daily. Indications: afib Refills:  0  
     
   
   
   
  
 * insulin lispro protamine/insulin lispro 100 unit/mL (75-25) injection Commonly known as:  HUMALOG MIX 75/25 Your last dose was: Your next dose is:    
   
   
 Dose:  60 Units 60 Units by SubCUTAneous route Before breakfast and dinner. Quantity:  1 Vial  
Refills:  0  
     
   
   
   
  
 * insulin lispro protamine/insulin lispro 100 unit/mL (75-25) injection Commonly known as:  HUMALOG MIX 75/25 Your last dose was: Your next dose is:    
   
   
 65 units subcutaneous injection twice daily before meals (before breakfast and dinner) Quantity:  1 Vial  
Refills:  2  
     
   
   
   
  
 pravastatin 20 mg tablet Commonly known as:  PRAVACHOL Your last dose was: Your next dose is:    
   
   
 Dose:  20 mg Take 20 mg by mouth nightly. Refills:  0  
     
   
   
   
  
 * Notice: This list has 2 medication(s) that are the same as other medications prescribed for you. Read the directions carefully, and ask your doctor or other care provider to review them with you. STOP taking these medications Insulin Syringe-Needle U-100 0.5 mL 29 gauge x 1/2\" Syrg Commonly known as:  INSULIN SYRINGE  
   
  
 ondansetron 4 mg disintegrating tablet Commonly known as:  ZOFRAN ODT Where to Get Your Medications Information on where to get these meds will be given to you by the nurse or doctor. ! Ask your nurse or doctor about these medications  
  amoxicillin-clavulanate 875-125 mg per tablet  
 cloNIDine HCl 0.2 mg tablet  
 oxyCODONE-acetaminophen 5-325 mg per tablet Discharge Instructions None Discharge Orders None [employer] Announcement We are excited to announce that we are making your provider's discharge notes available to you in [employer]. You will see these notes when they are completed and signed by the physician that discharged you from your recent hospital stay. If you have any questions or concerns about any information you see in [employer], please call the Health Information Department where you were seen or reach out to your Primary Care Provider for more information about your plan of care. Introducing hospitals & HEALTH SERVICES! Sunitha Farr introduces [employer] patient portal. Now you can access parts of your medical record, email your doctor's office, and request medication refills online. 1. In your internet browser, go to [email or web address] 2. Click on the First Time User? Click Here link in the Sign In box. You will see the New Member Sign Up page. 3. Enter your [employer] Access Code exactly as it appears below. You will not need to use this code after youve completed the sign-up process. If you do not sign up before the expiration date, you must request a new code. · [employer] Access Code: [email or web address] Expires: 6/20/2017 11:21 AM 
 
4. Enter the last four digits of your Social Security Number (xxxx) and Date of Birth (mm/dd/yyyy) as indicated and click Submit.  You will be taken to the next sign-up page. 5. Create a [employer] ID. This will be your [employer] login ID and cannot be changed, so think of one that is secure and easy to remember. 6. Create a [employer] password. You can change your password at any time. 7. Enter your Password Reset Question and Answer. This can be used at a later time if you forget your password. 8. Enter your e-mail address. You will receive e-mail notification when new information is available in [address] 9. Click Sign Up. You can now view and download portions of your medical record. 10. Click the Download Summary menu link to download a portable copy of your medical information. If you have questions, please visit the Frequently Asked Questions section of the [employer] website. Remember, [employer] is NOT to be used for urgent needs. For medical emergencies, dial 911. Now available from your iPhone and Android! General Information Please provide this summary of care documentation to your next provider. Patient Signature:  ____________________________________________________________ Date:  ____________________________________________________________  
  
Odalys Acosta Provider Signature:  ____________________________________________________________ Date:  ____________________________________________________________

## 2017-05-22 NOTE — PROGRESS NOTES
Pharmacy Automatic Renal Dosing Protocol - Antimicrobials      Indication for Antimicrobials: Skin and Skin Structure Infection  Current Regimen of Each Antimicrobial (Start Day & Day of Therapy):  Vancomycin 2000 mg loading dose then 1500 mg Q24H ( day 1)    Significant cultures: None      CAPD, Intermittent Hemodialysis or Renal Replacement Therapy: No  Paralysis, amputations, malnutrition: No  Recent Labs      17   1456   CREA  3.03*   BUN  19   WBC  11.3*     Temp (24hrs), Av.3 °F (37.4 °C), Min:99.2 °F (37.3 °C), Max:99.4 °F (37.4 °C)    Creatinine Clearance (ml/min): 31      Impression/Plan: Vancomycin 2000 mg loading dose then 1500 mg Q24H for anticipated trough of approximately 15 mcg/ml. Pharmacy will follow daily and adjust doses of monitored medications as appropriate for renal function and/or serum levels.     Thank you,  Miko Whitehead, [facility]

## 2017-05-23 ENCOUNTER — ANESTHESIA EVENT (OUTPATIENT)
Dept: SURGERY | Age: 62
DRG: 854 | End: 2017-05-23
Payer: MEDICARE

## 2017-05-23 ENCOUNTER — ANESTHESIA (OUTPATIENT)
Dept: SURGERY | Age: 62
DRG: 854 | End: 2017-05-23
Payer: MEDICARE

## 2017-05-23 LAB
ANION GAP BLD CALC-SCNC: 9 MMOL/L (ref 5–15)
BASOPHILS # BLD AUTO: 0 K/UL (ref 0–0.1)
BASOPHILS # BLD: 0 % (ref 0–1)
BUN SERPL-MCNC: 20 MG/DL (ref 6–20)
BUN/CREAT SERPL: 7 (ref 12–20)
CALCIUM SERPL-MCNC: 8.6 MG/DL (ref 8.5–10.1)
CHLORIDE SERPL-SCNC: 108 MMOL/L (ref 97–108)
CO2 SERPL-SCNC: 24 MMOL/L (ref 21–32)
CREAT SERPL-MCNC: 2.91 MG/DL (ref 0.7–1.3)
EOSINOPHIL # BLD: 0 K/UL (ref 0–0.4)
EOSINOPHIL NFR BLD: 0 % (ref 0–7)
ERYTHROCYTE [DISTWIDTH] IN BLOOD BY AUTOMATED COUNT: 13.5 % (ref 11.5–14.5)
GLUCOSE BLD STRIP.AUTO-MCNC: 206 MG/DL (ref 65–100)
GLUCOSE BLD STRIP.AUTO-MCNC: 209 MG/DL (ref 65–100)
GLUCOSE BLD STRIP.AUTO-MCNC: 220 MG/DL (ref 65–100)
GLUCOSE BLD STRIP.AUTO-MCNC: 225 MG/DL (ref 65–100)
GLUCOSE BLD STRIP.AUTO-MCNC: 235 MG/DL (ref 65–100)
GLUCOSE BLD STRIP.AUTO-MCNC: 236 MG/DL (ref 65–100)
GLUCOSE SERPL-MCNC: 184 MG/DL (ref 65–100)
HCT VFR BLD AUTO: 26.5 % (ref 36.6–50.3)
HGB BLD-MCNC: 8.7 G/DL (ref 12.1–17)
LYMPHOCYTES # BLD AUTO: 11 % (ref 12–49)
LYMPHOCYTES # BLD: 1.3 K/UL (ref 0.8–3.5)
MAGNESIUM SERPL-MCNC: 2.2 MG/DL (ref 1.6–2.4)
MCH RBC QN AUTO: 28.8 PG (ref 26–34)
MCHC RBC AUTO-ENTMCNC: 32.8 G/DL (ref 30–36.5)
MCV RBC AUTO: 87.7 FL (ref 80–99)
MONOCYTES # BLD: 1.3 K/UL (ref 0–1)
MONOCYTES NFR BLD AUTO: 11 % (ref 5–13)
NEUTS SEG # BLD: 8.6 K/UL (ref 1.8–8)
NEUTS SEG NFR BLD AUTO: 78 % (ref 32–75)
PLATELET # BLD AUTO: 154 K/UL (ref 150–400)
POTASSIUM SERPL-SCNC: 4.1 MMOL/L (ref 3.5–5.1)
RBC # BLD AUTO: 3.02 M/UL (ref 4.1–5.7)
SERVICE CMNT-IMP: ABNORMAL
SODIUM SERPL-SCNC: 141 MMOL/L (ref 136–145)
WBC # BLD AUTO: 11.1 K/UL (ref 4.1–11.1)

## 2017-05-23 PROCEDURE — 36415 COLL VENOUS BLD VENIPUNCTURE: CPT | Performed by: INTERNAL MEDICINE

## 2017-05-23 PROCEDURE — 87077 CULTURE AEROBIC IDENTIFY: CPT | Performed by: INTERNAL MEDICINE

## 2017-05-23 PROCEDURE — 74011000258 HC RX REV CODE- 258: Performed by: INTERNAL MEDICINE

## 2017-05-23 PROCEDURE — 77030011640 HC PAD GRND REM COVD -A: Performed by: SURGERY

## 2017-05-23 PROCEDURE — 82962 GLUCOSE BLOOD TEST: CPT

## 2017-05-23 PROCEDURE — 76210000017 HC OR PH I REC 1.5 TO 2 HR: Performed by: SURGERY

## 2017-05-23 PROCEDURE — 76010000153 HC OR TIME 1.5 TO 2 HR: Performed by: SURGERY

## 2017-05-23 PROCEDURE — 87205 SMEAR GRAM STAIN: CPT | Performed by: INTERNAL MEDICINE

## 2017-05-23 PROCEDURE — 74011250637 HC RX REV CODE- 250/637: Performed by: INTERNAL MEDICINE

## 2017-05-23 PROCEDURE — 77030026438 HC STYL ET INTUB CARD -A: Performed by: ANESTHESIOLOGY

## 2017-05-23 PROCEDURE — 77030018836 HC SOL IRR NACL ICUM -A: Performed by: SURGERY

## 2017-05-23 PROCEDURE — 76060000034 HC ANESTHESIA 1.5 TO 2 HR: Performed by: SURGERY

## 2017-05-23 PROCEDURE — 77030002888 HC SUT CHRMC J&J -A: Performed by: SURGERY

## 2017-05-23 PROCEDURE — 0JB70ZZ EXCISION OF BACK SUBCUTANEOUS TISSUE AND FASCIA, OPEN APPROACH: ICD-10-PCS | Performed by: SURGERY

## 2017-05-23 PROCEDURE — 74011250636 HC RX REV CODE- 250/636

## 2017-05-23 PROCEDURE — 74011250636 HC RX REV CODE- 250/636: Performed by: SURGERY

## 2017-05-23 PROCEDURE — 0JBL0ZZ EXCISION OF RIGHT UPPER LEG SUBCUTANEOUS TISSUE AND FASCIA, OPEN APPROACH: ICD-10-PCS | Performed by: SURGERY

## 2017-05-23 PROCEDURE — 74011250636 HC RX REV CODE- 250/636: Performed by: INTERNAL MEDICINE

## 2017-05-23 PROCEDURE — 77030011992 HC AIRWY NASOPHGL TELE -A: Performed by: ANESTHESIOLOGY

## 2017-05-23 PROCEDURE — 77030031139 HC SUT VCRL2 J&J -A: Performed by: SURGERY

## 2017-05-23 PROCEDURE — 83735 ASSAY OF MAGNESIUM: CPT | Performed by: INTERNAL MEDICINE

## 2017-05-23 PROCEDURE — 87186 SC STD MICRODIL/AGAR DIL: CPT | Performed by: INTERNAL MEDICINE

## 2017-05-23 PROCEDURE — 77030019908 HC STETH ESOPH SIMS -A: Performed by: ANESTHESIOLOGY

## 2017-05-23 PROCEDURE — 65270000029 HC RM PRIVATE

## 2017-05-23 PROCEDURE — 74011000258 HC RX REV CODE- 258: Performed by: ANESTHESIOLOGY

## 2017-05-23 PROCEDURE — 85025 COMPLETE CBC W/AUTO DIFF WBC: CPT | Performed by: INTERNAL MEDICINE

## 2017-05-23 PROCEDURE — 77030008684 HC TU ET CUF COVD -B: Performed by: ANESTHESIOLOGY

## 2017-05-23 PROCEDURE — 74011636637 HC RX REV CODE- 636/637: Performed by: INTERNAL MEDICINE

## 2017-05-23 PROCEDURE — 80048 BASIC METABOLIC PNL TOTAL CA: CPT | Performed by: INTERNAL MEDICINE

## 2017-05-23 PROCEDURE — 74011636637 HC RX REV CODE- 636/637: Performed by: ANESTHESIOLOGY

## 2017-05-23 PROCEDURE — 76210000020 HC REC RM PH II FIRST 0.5 HR: Performed by: SURGERY

## 2017-05-23 PROCEDURE — 74011000250 HC RX REV CODE- 250

## 2017-05-23 PROCEDURE — 74011636637 HC RX REV CODE- 636/637

## 2017-05-23 RX ORDER — NALOXONE HYDROCHLORIDE 0.4 MG/ML
0.4 INJECTION, SOLUTION INTRAMUSCULAR; INTRAVENOUS; SUBCUTANEOUS
Status: DISCONTINUED | OUTPATIENT
Start: 2017-05-23 | End: 2017-05-29 | Stop reason: HOSPADM

## 2017-05-23 RX ORDER — FENTANYL CITRATE 50 UG/ML
50 INJECTION, SOLUTION INTRAMUSCULAR; INTRAVENOUS AS NEEDED
Status: DISCONTINUED | OUTPATIENT
Start: 2017-05-23 | End: 2017-05-23 | Stop reason: HOSPADM

## 2017-05-23 RX ORDER — SODIUM CHLORIDE 0.9 % (FLUSH) 0.9 %
5-10 SYRINGE (ML) INJECTION EVERY 8 HOURS
Status: DISCONTINUED | OUTPATIENT
Start: 2017-05-23 | End: 2017-05-29 | Stop reason: HOSPADM

## 2017-05-23 RX ORDER — HEPARIN SODIUM 5000 [USP'U]/ML
5000 INJECTION, SOLUTION INTRAVENOUS; SUBCUTANEOUS EVERY 8 HOURS
Status: DISCONTINUED | OUTPATIENT
Start: 2017-05-24 | End: 2017-05-29 | Stop reason: HOSPADM

## 2017-05-23 RX ORDER — MIDAZOLAM HYDROCHLORIDE 1 MG/ML
INJECTION, SOLUTION INTRAMUSCULAR; INTRAVENOUS AS NEEDED
Status: DISCONTINUED | OUTPATIENT
Start: 2017-05-23 | End: 2017-05-23 | Stop reason: HOSPADM

## 2017-05-23 RX ORDER — ACETAMINOPHEN 10 MG/ML
INJECTION, SOLUTION INTRAVENOUS AS NEEDED
Status: DISCONTINUED | OUTPATIENT
Start: 2017-05-23 | End: 2017-05-23 | Stop reason: HOSPADM

## 2017-05-23 RX ORDER — PROPOFOL 10 MG/ML
INJECTION, EMULSION INTRAVENOUS AS NEEDED
Status: DISCONTINUED | OUTPATIENT
Start: 2017-05-23 | End: 2017-05-23 | Stop reason: HOSPADM

## 2017-05-23 RX ORDER — SODIUM CHLORIDE, SODIUM LACTATE, POTASSIUM CHLORIDE, CALCIUM CHLORIDE 600; 310; 30; 20 MG/100ML; MG/100ML; MG/100ML; MG/100ML
100 INJECTION, SOLUTION INTRAVENOUS CONTINUOUS
Status: DISCONTINUED | OUTPATIENT
Start: 2017-05-23 | End: 2017-05-23 | Stop reason: HOSPADM

## 2017-05-23 RX ORDER — ROCURONIUM BROMIDE 10 MG/ML
INJECTION, SOLUTION INTRAVENOUS AS NEEDED
Status: DISCONTINUED | OUTPATIENT
Start: 2017-05-23 | End: 2017-05-23 | Stop reason: HOSPADM

## 2017-05-23 RX ORDER — SODIUM CHLORIDE 9 MG/ML
75 INJECTION, SOLUTION INTRAVENOUS CONTINUOUS
Status: DISPENSED | OUTPATIENT
Start: 2017-05-23 | End: 2017-05-24

## 2017-05-23 RX ORDER — ONDANSETRON 2 MG/ML
4 INJECTION INTRAMUSCULAR; INTRAVENOUS
Status: DISCONTINUED | OUTPATIENT
Start: 2017-05-23 | End: 2017-05-29 | Stop reason: HOSPADM

## 2017-05-23 RX ORDER — ONDANSETRON 2 MG/ML
INJECTION INTRAMUSCULAR; INTRAVENOUS AS NEEDED
Status: DISCONTINUED | OUTPATIENT
Start: 2017-05-23 | End: 2017-05-23 | Stop reason: HOSPADM

## 2017-05-23 RX ORDER — GUAIFENESIN 100 MG/5ML
81 LIQUID (ML) ORAL DAILY
Status: DISCONTINUED | OUTPATIENT
Start: 2017-05-24 | End: 2017-05-23

## 2017-05-23 RX ORDER — OXYCODONE AND ACETAMINOPHEN 5; 325 MG/1; MG/1
1-2 TABLET ORAL
Status: DISCONTINUED | OUTPATIENT
Start: 2017-05-23 | End: 2017-05-29 | Stop reason: HOSPADM

## 2017-05-23 RX ORDER — ONDANSETRON 2 MG/ML
4 INJECTION INTRAMUSCULAR; INTRAVENOUS AS NEEDED
Status: DISCONTINUED | OUTPATIENT
Start: 2017-05-23 | End: 2017-05-23 | Stop reason: HOSPADM

## 2017-05-23 RX ORDER — LIDOCAINE HYDROCHLORIDE 10 MG/ML
0.1 INJECTION, SOLUTION EPIDURAL; INFILTRATION; INTRACAUDAL; PERINEURAL AS NEEDED
Status: DISCONTINUED | OUTPATIENT
Start: 2017-05-23 | End: 2017-05-23 | Stop reason: HOSPADM

## 2017-05-23 RX ORDER — SODIUM CHLORIDE 9 MG/ML
500 INJECTION, SOLUTION INTRAVENOUS CONTINUOUS
Status: DISCONTINUED | OUTPATIENT
Start: 2017-05-23 | End: 2017-05-23

## 2017-05-23 RX ORDER — LIDOCAINE HYDROCHLORIDE 20 MG/ML
INJECTION, SOLUTION EPIDURAL; INFILTRATION; INTRACAUDAL; PERINEURAL AS NEEDED
Status: DISCONTINUED | OUTPATIENT
Start: 2017-05-23 | End: 2017-05-23 | Stop reason: HOSPADM

## 2017-05-23 RX ORDER — SUCCINYLCHOLINE CHLORIDE 20 MG/ML
INJECTION INTRAMUSCULAR; INTRAVENOUS AS NEEDED
Status: DISCONTINUED | OUTPATIENT
Start: 2017-05-23 | End: 2017-05-23 | Stop reason: HOSPADM

## 2017-05-23 RX ORDER — HYDROCODONE BITARTRATE AND ACETAMINOPHEN 5; 325 MG/1; MG/1
1 TABLET ORAL AS NEEDED
Status: DISCONTINUED | OUTPATIENT
Start: 2017-05-23 | End: 2017-05-23 | Stop reason: HOSPADM

## 2017-05-23 RX ORDER — FENTANYL CITRATE 50 UG/ML
25 INJECTION, SOLUTION INTRAMUSCULAR; INTRAVENOUS
Status: DISCONTINUED | OUTPATIENT
Start: 2017-05-23 | End: 2017-05-23 | Stop reason: HOSPADM

## 2017-05-23 RX ORDER — MIDAZOLAM HYDROCHLORIDE 1 MG/ML
0.5 INJECTION, SOLUTION INTRAMUSCULAR; INTRAVENOUS
Status: DISCONTINUED | OUTPATIENT
Start: 2017-05-23 | End: 2017-05-23 | Stop reason: HOSPADM

## 2017-05-23 RX ORDER — DIPHENHYDRAMINE HYDROCHLORIDE 50 MG/ML
12.5 INJECTION, SOLUTION INTRAMUSCULAR; INTRAVENOUS
Status: DISCONTINUED | OUTPATIENT
Start: 2017-05-23 | End: 2017-05-29 | Stop reason: HOSPADM

## 2017-05-23 RX ORDER — HYDROMORPHONE HYDROCHLORIDE 1 MG/ML
0.5 INJECTION, SOLUTION INTRAMUSCULAR; INTRAVENOUS; SUBCUTANEOUS
Status: DISCONTINUED | OUTPATIENT
Start: 2017-05-23 | End: 2017-05-23 | Stop reason: HOSPADM

## 2017-05-23 RX ORDER — HEPARIN SODIUM 5000 [USP'U]/ML
5000 INJECTION, SOLUTION INTRAVENOUS; SUBCUTANEOUS EVERY 8 HOURS
Status: DISCONTINUED | OUTPATIENT
Start: 2017-05-24 | End: 2017-05-23

## 2017-05-23 RX ORDER — HYDROMORPHONE HYDROCHLORIDE 1 MG/ML
INJECTION, SOLUTION INTRAMUSCULAR; INTRAVENOUS; SUBCUTANEOUS
Status: COMPLETED
Start: 2017-05-23 | End: 2017-05-23

## 2017-05-23 RX ORDER — FENTANYL CITRATE 50 UG/ML
INJECTION, SOLUTION INTRAMUSCULAR; INTRAVENOUS AS NEEDED
Status: DISCONTINUED | OUTPATIENT
Start: 2017-05-23 | End: 2017-05-23 | Stop reason: HOSPADM

## 2017-05-23 RX ORDER — NEOSTIGMINE METHYLSULFATE 1 MG/ML
INJECTION INTRAVENOUS AS NEEDED
Status: DISCONTINUED | OUTPATIENT
Start: 2017-05-23 | End: 2017-05-23 | Stop reason: HOSPADM

## 2017-05-23 RX ORDER — GUAIFENESIN 100 MG/5ML
81 LIQUID (ML) ORAL DAILY
Status: DISCONTINUED | OUTPATIENT
Start: 2017-05-24 | End: 2017-05-29 | Stop reason: HOSPADM

## 2017-05-23 RX ORDER — HYDROMORPHONE HYDROCHLORIDE 1 MG/ML
0.5 INJECTION, SOLUTION INTRAMUSCULAR; INTRAVENOUS; SUBCUTANEOUS
Status: DISCONTINUED | OUTPATIENT
Start: 2017-05-23 | End: 2017-05-29 | Stop reason: HOSPADM

## 2017-05-23 RX ORDER — SODIUM CHLORIDE 9 MG/ML
INJECTION, SOLUTION INTRAVENOUS
Status: DISCONTINUED | OUTPATIENT
Start: 2017-05-23 | End: 2017-05-23 | Stop reason: HOSPADM

## 2017-05-23 RX ORDER — DIPHENHYDRAMINE HYDROCHLORIDE 50 MG/ML
12.5 INJECTION, SOLUTION INTRAMUSCULAR; INTRAVENOUS AS NEEDED
Status: DISCONTINUED | OUTPATIENT
Start: 2017-05-23 | End: 2017-05-23 | Stop reason: HOSPADM

## 2017-05-23 RX ORDER — GLYCOPYRROLATE 0.2 MG/ML
INJECTION INTRAMUSCULAR; INTRAVENOUS AS NEEDED
Status: DISCONTINUED | OUTPATIENT
Start: 2017-05-23 | End: 2017-05-23 | Stop reason: HOSPADM

## 2017-05-23 RX ORDER — SODIUM CHLORIDE 0.9 % (FLUSH) 0.9 %
5-10 SYRINGE (ML) INJECTION AS NEEDED
Status: DISCONTINUED | OUTPATIENT
Start: 2017-05-23 | End: 2017-05-29 | Stop reason: HOSPADM

## 2017-05-23 RX ORDER — MIDAZOLAM HYDROCHLORIDE 1 MG/ML
1 INJECTION, SOLUTION INTRAMUSCULAR; INTRAVENOUS AS NEEDED
Status: DISCONTINUED | OUTPATIENT
Start: 2017-05-23 | End: 2017-05-23 | Stop reason: HOSPADM

## 2017-05-23 RX ADMIN — GLYCOPYRROLATE 0.4 MG: 0.2 INJECTION INTRAMUSCULAR; INTRAVENOUS at 17:36

## 2017-05-23 RX ADMIN — SODIUM CHLORIDE 50 ML/HR: 900 INJECTION, SOLUTION INTRAVENOUS at 18:25

## 2017-05-23 RX ADMIN — INSULIN LISPRO 3 UNITS: 100 INJECTION, SOLUTION INTRAVENOUS; SUBCUTANEOUS at 09:16

## 2017-05-23 RX ADMIN — SODIUM CHLORIDE: 9 INJECTION, SOLUTION INTRAVENOUS at 15:57

## 2017-05-23 RX ADMIN — HYDROMORPHONE HYDROCHLORIDE 0.5 MG: 1 INJECTION, SOLUTION INTRAMUSCULAR; INTRAVENOUS; SUBCUTANEOUS at 17:31

## 2017-05-23 RX ADMIN — INSULIN LISPRO 3 UNITS: 100 INJECTION, SOLUTION INTRAVENOUS; SUBCUTANEOUS at 21:48

## 2017-05-23 RX ADMIN — CARVEDILOL 12.5 MG: 12.5 TABLET, FILM COATED ORAL at 09:17

## 2017-05-23 RX ADMIN — FENTANYL CITRATE 50 MCG: 50 INJECTION, SOLUTION INTRAMUSCULAR; INTRAVENOUS at 16:15

## 2017-05-23 RX ADMIN — DIGOXIN 0.12 MG: 125 TABLET ORAL at 09:17

## 2017-05-23 RX ADMIN — CLONIDINE HYDROCHLORIDE 0.1 MG: 0.1 TABLET ORAL at 21:48

## 2017-05-23 RX ADMIN — PROPOFOL 200 MG: 10 INJECTION, EMULSION INTRAVENOUS at 16:15

## 2017-05-23 RX ADMIN — INSULIN LISPRO 3 UNITS: 100 INJECTION, SOLUTION INTRAVENOUS; SUBCUTANEOUS at 12:21

## 2017-05-23 RX ADMIN — FENTANYL CITRATE 50 MCG: 50 INJECTION, SOLUTION INTRAMUSCULAR; INTRAVENOUS at 16:08

## 2017-05-23 RX ADMIN — ROCURONIUM BROMIDE 5 MG: 10 INJECTION, SOLUTION INTRAVENOUS at 16:15

## 2017-05-23 RX ADMIN — SODIUM CHLORIDE 25 ML: 900 INJECTION, SOLUTION INTRAVENOUS at 15:25

## 2017-05-23 RX ADMIN — NEOSTIGMINE METHYLSULFATE 3 MG: 1 INJECTION INTRAVENOUS at 17:36

## 2017-05-23 RX ADMIN — HYDROMORPHONE HYDROCHLORIDE 0.5 MG: 1 INJECTION, SOLUTION INTRAMUSCULAR; INTRAVENOUS; SUBCUTANEOUS at 17:01

## 2017-05-23 RX ADMIN — ACETAMINOPHEN 1000 MG: 10 INJECTION, SOLUTION INTRAVENOUS at 16:59

## 2017-05-23 RX ADMIN — ONDANSETRON 4 MG: 2 INJECTION INTRAMUSCULAR; INTRAVENOUS at 16:42

## 2017-05-23 RX ADMIN — AMLODIPINE BESYLATE 10 MG: 5 TABLET ORAL at 09:17

## 2017-05-23 RX ADMIN — INSULIN HUMAN 5 UNITS: 100 INJECTION, SOLUTION PARENTERAL at 15:11

## 2017-05-23 RX ADMIN — Medication 10 ML: at 12:22

## 2017-05-23 RX ADMIN — PRAVASTATIN SODIUM 20 MG: 10 TABLET ORAL at 21:48

## 2017-05-23 RX ADMIN — MIDAZOLAM HYDROCHLORIDE 2 MG: 1 INJECTION, SOLUTION INTRAMUSCULAR; INTRAVENOUS at 16:00

## 2017-05-23 RX ADMIN — INSULIN HUMAN 4 UNITS: 100 INJECTION, SOLUTION PARENTERAL at 18:15

## 2017-05-23 RX ADMIN — LIDOCAINE HYDROCHLORIDE 100 MG: 20 INJECTION, SOLUTION EPIDURAL; INFILTRATION; INTRACAUDAL; PERINEURAL at 16:15

## 2017-05-23 RX ADMIN — Medication 10 ML: at 21:48

## 2017-05-23 RX ADMIN — ROCURONIUM BROMIDE 25 MG: 10 INJECTION, SOLUTION INTRAVENOUS at 16:34

## 2017-05-23 RX ADMIN — VANCOMYCIN HYDROCHLORIDE 1500 MG: 10 INJECTION, POWDER, LYOPHILIZED, FOR SOLUTION INTRAVENOUS at 22:28

## 2017-05-23 RX ADMIN — CLONIDINE HYDROCHLORIDE 0.1 MG: 0.1 TABLET ORAL at 09:17

## 2017-05-23 RX ADMIN — CEFTRIAXONE 1 G: 1 INJECTION, POWDER, FOR SOLUTION INTRAMUSCULAR; INTRAVENOUS at 21:48

## 2017-05-23 RX ADMIN — ACETAMINOPHEN 650 MG: 325 TABLET, FILM COATED ORAL at 01:58

## 2017-05-23 RX ADMIN — SUCCINYLCHOLINE CHLORIDE 160 MG: 20 INJECTION INTRAMUSCULAR; INTRAVENOUS at 16:16

## 2017-05-23 RX ADMIN — PRAVASTATIN SODIUM 20 MG: 10 TABLET ORAL at 00:06

## 2017-05-23 NOTE — ED NOTES
Consent form signed and placed on ED chart for abscess drainage and incision procedure explained by MD Matthew Gibson.

## 2017-05-23 NOTE — ANESTHESIA POSTPROCEDURE EVALUATION
Post-Anesthesia Evaluation and Assessment    Patient: Mono Rojas MRN: [id number]  SSN: [id number]    YOB: 1955  Age: 57 y.o. Sex: male       Cardiovascular Function/Vital Signs  Visit Vitals    /70    Pulse 81    Temp 37.3 °C (99.2 °F)    Resp 17    Ht 6' 5\" (1.956 m)    Wt 147.4 kg (325 lb)    SpO2 97%    BMI 38.54 kg/m2       Patient is status post general anesthesia for Procedure(s):  INCISION AND DRAINAGE TRUNK, BACK ABSCESS AND RIGHT THIGH. Nausea/Vomiting: None    Postoperative hydration reviewed and adequate. Pain:  Pain Scale 1: Numeric (0 - 10) (05/23/17 1800)  Pain Intensity 1: 0 (05/23/17 1800)   Managed    Neurological Status:   Neuro (WDL): Exceptions to WDL (05/23/17 1800)  Neuro  Neurologic State: Appropriate for age;Drowsy; Eyes open spontaneously; Eyes open to voice (05/23/17 1800)  Orientation Level: Oriented to person;Oriented to place;Oriented to situation (05/23/17 1800)  Cognition: Follows commands (05/23/17 1800)  LUE Motor Response: Purposeful (05/23/17 1800)  LLE Motor Response: Purposeful (05/23/17 1800)  RUE Motor Response: Purposeful (05/23/17 1800)  RLE Motor Response: Purposeful (05/23/17 1800)   At baseline    Mental Status and Level of Consciousness: Arousable    Pulmonary Status:   O2 Device: Nasal cannula (05/23/17 1915)   Adequate oxygenation and airway patent    Complications related to anesthesia: None    Post-anesthesia assessment completed.  No concerns    Signed By: Jennifer Kemp MD     May 23, 2017

## 2017-05-23 NOTE — PROGRESS NOTES
TRANSFER - OUT REPORT:    Verbal report given to Lester Torres (name) on Jalen Stable  being transferred to pre-op (unit) for ordered procedure       Report consisted of patients Situation, Background, Assessment and   Recommendations(SBAR). Information from the following report(s) SBAR, Kardex and MAR was reviewed with the receiving nurse. Lines:   Peripheral IV 05/23/17 Left Antecubital (Active)   Site Assessment Clean, dry, & intact 5/23/2017  3:25 PM   Phlebitis Assessment 0 5/23/2017  3:25 PM   Infiltration Assessment 0 5/23/2017  3:25 PM   Dressing Status Clean, dry, & intact 5/23/2017  3:25 PM   Dressing Type Tape;Transparent 5/23/2017  3:25 PM   Hub Color/Line Status Pink; Infusing 5/23/2017  3:25 PM        Opportunity for questions and clarification was provided.

## 2017-05-23 NOTE — DIABETES MG[facility]        Recommendations/ Comments: If appropriate, please consider adding a new A1C test to next lab draw to assess home management. Noted Humalog Mix 75/25 held this morning due to NPO status. Will continue to follow as needed. A1c:   Lab Results   Component Value Date/Time    Hemoglobin A1c 9.8 02/08/2017 04:13 AM    Hemoglobin A1c 10.3 05/10/2016 05:20 AM       Will continue to follow as needed. Thank you.     Leila Rojas, [address] [facility]  Office: [phone]

## 2017-05-23 NOTE — ED NOTES
Pharmacy called at this time to make aware insulin remains unavailable in pyxis and vancomycin remains unsent to ED at this time. Pharmacy states they will be tubing both medications shortly.

## 2017-05-23 NOTE — PERIOP NOTES
TRANSFER - OUT REPORT:    Verbal report given to Rosalino Mendoza RN (name) on Lorraine Styles  being transferred to 2133 (unit) for routine post - op       Report consisted of patients Situation, Background, Assessment and   Recommendations(SBAR). Information from the following report(s) SBAR, OR Summary, Intake/Output, MAR, Recent Results and Med Rec Status was reviewed with the receiving nurse. Opportunity for questions and clarification was provided.       Patient transported with:   O2 @ 4 liters  Registered Nurse

## 2017-05-23 NOTE — PERIOP NOTES
Pt placed on the hover pad with white sheet  Noted two areas of concern  to back that has wounds  - mid back has  Small place that is still  Open and has dressing to  Lower back area and place to right hip. Ulcerations.   Dr. Alena Srivastava aware

## 2017-05-23 NOTE — ED NOTES
Patient repositioned in ED bed at this time. Patient voided 300 mL in urinal at this time.  Call light within reach

## 2017-05-23 NOTE — PROGRESS NOTES
Spoke with Dr. Nnamdi Payton regarding NPO status and scheduled insulin.  Received order to hold humalog mix this AM.

## 2017-05-23 NOTE — ED NOTES
Bedside shift change report given to [address] (oncoming nurse) by Sherie Timmons RN (offgoing nurse). Report included the following information SBAR, Kardex, ED Summary and MAR.

## 2017-05-23 NOTE — PERIOP NOTES
Handoff Report from Operating Room to PACU    Report received from Victoria Connors RN and Wendy Godoy CRNA regarding [facility]      Surgeon(s):  Jill Grace MD  And Procedure(s) (LRB):  INCISION AND DRAINAGE TRUNK, BACK ABSCESS AND RIGHT THIGH (N/A)  confirmed   with allergies and dressings discussed. Anesthesia type, drugs, patient history, complications, estimated blood loss, vital signs, intake and output, and last pain medication, lines, reversal medications and temperature were reviewed.

## 2017-05-23 NOTE — CDMP QUERY
Dr. Alessandra Nobles,  This patient has been diagnosed with Sepsis. Please document in the progress notes the clinical indicators that support this diagnosis or state that the diagnosis has been ruled out. Current Documentation: H&P--Sepsis, wbc 11.3--11.1, neut 81, rr 20-43, lactic 2.1      Sepsis indicators include:   Documented Source of suspected or possible infection in addition to 2 of more of the following:   Temperature >38C or <36C    Heart Rate >90/min    Respiratory Rate >20/min or PaCO2 <32 mm Hg    WBC >12,000/mm3 or <4000/mm3 or >10% immature bands     Severe sepsis   Sepsis w/ evidence of end?organ damage   Lactic acid > 2.0    Septic shock   Sepsis refractory to fluid resuscitation (requiring pressors)   Lactic acid > 4.0    Please clarify and document your clinical opinion in the progress notes and discharge summary including the definitive and/or presumptive diagnosis, (suspected or probable), related to the above clinical findings. Please include clinical findings supporting your diagnosis.   Thanks,  Sabrina Daniel RN/[facility] [phone]

## 2017-05-23 NOTE — PROGRESS NOTES
Hospitalist Progress Note    NAME: Doug Lincoln   :  1955   MRN:  [id number]     Interim Hospital Summary: 57 y.o. male whom presented on 2017 with      Assessment / Plan:    Sepsis due to recurrent multiple skin abscesses with cellulitis POA  -patient with multiple lesions in his back, hip and sacral area but the main or largest one is in his mid back. -continue empiric abx  -for OR today with Dr Manjit Mckenzie  -IVFs    DM, insulin dependent   -continue 75/25 mix with SSI prn    HTN  PAF  -continue asa, digoxin, coreg, clonidine and pravachol    Severe obesity with Body mass index is 38.54 kg/(m^2). Code status: Full  Prophylaxis: Hep SQ  Recommended Disposition:  PT, OT, RN       Subjective:     Chief Complaint / Reason for Physician Visit  Follow up of multiple skin abscesses, DM, HTN  Chart reviewed in detail. Discussed with RN events overnight. For OR later today. Review of Systems:  Symptom Y/N Comments  Symptom Y/N Comments   Fever/Chills    Chest Pain     Poor Appetite    Edema     Cough    Abdominal Pain     Sputum    Joint Pain     SOB/NAVARRO    Pruritis/Rash     Nausea/vomit    Tolerating PT/OT     Diarrhea    Tolerating Diet     Constipation    Other       Could NOT obtain due to:      PO intake: No data found. Objective:     VITALS:   Last 24hrs VS reviewed since prior progress note.  Most recent are:  Patient Vitals for the past 24 hrs:   Temp Pulse Resp BP SpO2   17 0812 - 77 - 144/55 92 %   17 0645 - 77 29 146/62 97 %   17 0615 - 79 24 149/81 98 %   17 0600 - 80 (!) 34 149/73 97 %   17 0545 - 81 29 143/73 96 %   17 0530 - 78 29 131/57 96 %   17 0515 - 78 (!) 31 127/53 93 %   17 0500 - 80 (!) 33 137/57 92 %   17 0430 - 82 (!) 34 141/52 96 %   17 0415 99 °F (37.2 °C) 83 (!) 33 144/60 96 %   17 0400 - 83 (!) 37 133/62 92 %   17 0345 - 85 (!) 38 138/63 93 % 05/23/17 0330 - 85 (!) 35 145/58 95 %   05/23/17 0315 - 87 (!) 37 157/60 95 %   05/23/17 0300 - 89 (!) 38 146/65 95 %   05/23/17 0245 - 89 (!) 38 158/63 93 %   05/23/17 0230 - 89 (!) 40 154/59 95 %   05/23/17 0215 - 87 (!) 34 154/62 93 %   05/23/17 0200 - 88 (!) 31 161/61 93 %   05/23/17 0147 - 87 (!) 38 - 93 %   05/23/17 0140 99.6 °F (37.6 °C) - - - -   05/23/17 0100 - 86 - 141/72 91 %   05/23/17 0045 - 86 (!) 43 152/59 92 %   05/23/17 0015 - 86 (!) 36 147/67 93 %   05/22/17 2330 - 87 (!) 35 153/68 94 %   05/22/17 2300 - 85 (!) 36 157/65 95 %   05/22/17 2245 - 85 (!) 37 147/62 95 %   05/22/17 2230 - 83 (!) 38 164/64 96 %   05/22/17 2215 - 84 (!) 36 161/60 90 %   05/22/17 2200 - 85 30 161/66 93 %   05/22/17 2115 - 82 27 166/68 92 %   05/22/17 2100 - 82 27 164/69 95 %   05/22/17 2045 - 84 (!) 34 166/67 93 %   05/22/17 2030 - 86 27 167/72 97 %   05/22/17 1915 - 83 28 170/58 97 %   05/22/17 1855 99.4 °F (37.4 °C) 83 18 177/77 -   05/22/17 1411 99.2 °F (37.3 °C) 83 20 165/77 98 %       Intake/Output Summary (Last 24 hours) at 05/23/17 1355  Last data filed at 05/22/17 1722   Gross per 24 hour   Intake             1000 ml   Output                0 ml   Net             1000 ml        PHYSICAL EXAM:  General: WD, WN. Alert, cooperative, no acute distress    EENT:  EOMI. Anicteric sclerae. MMM  Resp:  CTA bilaterally, no wheezing or rales.   No accessory muscle use  CV:  Regular  rhythm,  No edema  GI:  Soft, Non distended, Non tender.  +Bowel sounds  Neurologic:  Alert and oriented X 3, normal speech,   Psych:   Good insight. Not anxious nor agitated  Skin:  (+) mid back wide indurated area with purulent drainage, (+) right hip wound and left upper buttock / sacral wound    Reviewed most current lab test results and cultures  YES  Reviewed most current radiology test results   YES  Review and summation of old records today    NO  Reviewed patient's current orders and MAR    YES  PMH/SH reviewed - no change compared to H&P  ________________________________________________________________________  Care Plan discussed with:    Comments   Patient x    Family      RN     Care Manager     Consultant                        Multidiciplinary team rounds were held today with , nursing, pharmacist and clinical coordinator. Patient's plan of care was discussed; medications were reviewed and discharge planning was addressed. ________________________________________________________________________  Total NON critical care TIME:  25   Minutes    Total CRITICAL CARE TIME Spent:   Minutes non procedure based      Comments   >50% of visit spent in counseling and coordination of care x     This includes time during multidisciplinary rounds if indicated above   ________________________________________________________________________  Mary Beth Dickey MD     Procedures: see electronic medical records for all procedures/Xrays and details which were not copied into this note but were reviewed prior to creation of Plan. LABS:  I reviewed today's most current labs and imaging studies.   Pertinent labs include:  Recent Labs      05/23/17 0417  05/22/17   1456   WBC  11.1  11.3*   HGB  8.7*  10.0*   HCT  26.5*  30.4*   PLT  154  161     Recent Labs      05/23/17 0417  05/22/17   1456   NA  141  137   K  4.1  4.5   CL  108  102   CO2  24  28   GLU  184*  338*   BUN  20  19   CREA  2.91*  3.03*   CA  8.6  9.4   MG  2.2  2.2   ALB   --   2.8*   TBILI   --   0.7   SGOT   --   12*   ALT   --   31

## 2017-05-23 NOTE — PERIOP NOTES
TRANSFER - IN REPORT:    Verbal report received from [facility] on Marty Morrow  being received from CD34 for ordered procedure      Report consisted of patients Situation, Background, Assessment and   Recommendations(SBAR). Information from the following report(s) SBAR, ED Summary, Intake/Output and MAR was reviewed with the receiving nurse. Opportunity for questions and clarification was provided. Assessment completed upon patients arrival to unit and care assumed.

## 2017-05-23 NOTE — ED NOTES
Patient resting in ED bed at this time. Call light within reach. Patient assisted with reposition at this time.

## 2017-05-23 NOTE — BRIEF OP NOTE
BRIEF OPERATIVE NOTE    Date of Procedure: 5/23/2017   Preoperative Diagnosis: back and right thigh abscess  Postoperative Diagnosis: back and right thigh abscess    Procedure(s):  INCISION AND DRAINAGE  AND EXCISIONAL DEBRIDEMENT OF  ABSCESSES OF BACK  AND RIGHT THIGH  Surgeon(s) and Role:     * Jaswinder Coronado MD - Primary         Assistant Staff:       Surgical Staff:  Circ-Relief: Carole Villagomez RN  Scrub Tech-1: Thompson Purple  Event Time In   Incision Start 1642   Incision Close      Anesthesia: General   Estimated Blood Loss: 150 ml  Specimens:   ID Type Source Tests Collected by Time Destination   1 : ABSCESS RIGHT LATERAL THIGH Abscess Thigh AEROBIC CULTURE + GRAM STAIN Jaswinder Coronado MD 5/23/2017 1643 Microbiology      Findings: Pus infiltrating into skin and subcutaneous tissue of back and lateral right thigh. Sites packed open.   Complications: none  Implants: * No implants in log *

## 2017-05-23 NOTE — WOUND CARE
Wound Care Consult: Chart reviewed and patient assessed for his mid back lesion that is draining. Dr. Nelida Pittman also saw this patient this morning and is planning an OR Incision and drainage for this lesion. Pt. Is diabetic and takes insulin 65 units twice per day and still has most of the daily morning glucose checks in the 280 - 300 range. 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 The lesions started \"popping up\" several months ago per the patient. Assessment: This mid-back lesion is deep and indurated with purulent malodorous drainage coming out of it (this lesion measures approx. 8 x 10 cm with indurated edges). There are also several other lesions that are draining, indurated and have malodorous pus draining out. These are on the right hip, right side of the sacrum and an open lesion on the left side that needed to be packed. Treatment: All lesions were cleansed with CHG soap and water and then dressed with dry dressings over the draining lesions because these are going to be worked on in the [address] [place] [address]. The left sacral wound was packed with iodoform packing. Plan: The left open wound needs to be packed with iodoform packing for at least the next few days as it heals. Dr. Nelida Pittman to write orders for the other wounds he will be incising and draining today.    Huma Hensley RN, BSN, [employer]

## 2017-05-23 NOTE — ED NOTES
Patient sleeping in hospital bed at this time. Call light within reach. Patient in no sign of distress.

## 2017-05-23 NOTE — PROGRESS NOTES
Surgery    I will plan for Incision and Drainage of abscess of back this afternoon in OR. I have made patient NPO, held ASA and heparin until tomorrow.     Hunter Hernandez MD

## 2017-05-24 LAB
ANION GAP BLD CALC-SCNC: 9 MMOL/L (ref 5–15)
BASOPHILS # BLD AUTO: 0 K/UL (ref 0–0.1)
BASOPHILS # BLD: 0 % (ref 0–1)
BUN SERPL-MCNC: 24 MG/DL (ref 6–20)
BUN/CREAT SERPL: 8 (ref 12–20)
CALCIUM SERPL-MCNC: 8.8 MG/DL (ref 8.5–10.1)
CHLORIDE SERPL-SCNC: 104 MMOL/L (ref 97–108)
CO2 SERPL-SCNC: 24 MMOL/L (ref 21–32)
CREAT SERPL-MCNC: 3.07 MG/DL (ref 0.7–1.3)
DATE LAST DOSE: ABNORMAL
EOSINOPHIL # BLD: 0.1 K/UL (ref 0–0.4)
EOSINOPHIL NFR BLD: 2 % (ref 0–7)
ERYTHROCYTE [DISTWIDTH] IN BLOOD BY AUTOMATED COUNT: 13.5 % (ref 11.5–14.5)
GLUCOSE BLD STRIP.AUTO-MCNC: 199 MG/DL (ref 65–100)
GLUCOSE BLD STRIP.AUTO-MCNC: 279 MG/DL (ref 65–100)
GLUCOSE BLD STRIP.AUTO-MCNC: 280 MG/DL (ref 65–100)
GLUCOSE BLD STRIP.AUTO-MCNC: 291 MG/DL (ref 65–100)
GLUCOSE BLD STRIP.AUTO-MCNC: 357 MG/DL (ref 65–100)
GLUCOSE SERPL-MCNC: 278 MG/DL (ref 65–100)
HCT VFR BLD AUTO: 25.7 % (ref 36.6–50.3)
HGB BLD-MCNC: 8.7 G/DL (ref 12.1–17)
LYMPHOCYTES # BLD AUTO: 15 % (ref 12–49)
LYMPHOCYTES # BLD: 1.3 K/UL (ref 0.8–3.5)
MCH RBC QN AUTO: 30 PG (ref 26–34)
MCHC RBC AUTO-ENTMCNC: 33.9 G/DL (ref 30–36.5)
MCV RBC AUTO: 88.6 FL (ref 80–99)
MONOCYTES # BLD: 0.7 K/UL (ref 0–1)
MONOCYTES NFR BLD AUTO: 8 % (ref 5–13)
NEUTS SEG # BLD: 6.6 K/UL (ref 1.8–8)
NEUTS SEG NFR BLD AUTO: 75 % (ref 32–75)
PLATELET # BLD AUTO: 150 K/UL (ref 150–400)
POTASSIUM SERPL-SCNC: 4.2 MMOL/L (ref 3.5–5.1)
RBC # BLD AUTO: 2.9 M/UL (ref 4.1–5.7)
REPORTED DOSE,DOSE: ABNORMAL UNITS
REPORTED DOSE/TIME,TMG: 2100
SERVICE CMNT-IMP: ABNORMAL
SODIUM SERPL-SCNC: 137 MMOL/L (ref 136–145)
VANCOMYCIN TROUGH SERPL-MCNC: 12.3 UG/ML (ref 5–10)
WBC # BLD AUTO: 8.7 K/UL (ref 4.1–11.1)

## 2017-05-24 PROCEDURE — 85025 COMPLETE CBC W/AUTO DIFF WBC: CPT | Performed by: SURGERY

## 2017-05-24 PROCEDURE — 80048 BASIC METABOLIC PNL TOTAL CA: CPT | Performed by: SURGERY

## 2017-05-24 PROCEDURE — 74011250637 HC RX REV CODE- 250/637: Performed by: SURGERY

## 2017-05-24 PROCEDURE — 74011250636 HC RX REV CODE- 250/636: Performed by: SURGERY

## 2017-05-24 PROCEDURE — 65270000029 HC RM PRIVATE

## 2017-05-24 PROCEDURE — 82962 GLUCOSE BLOOD TEST: CPT

## 2017-05-24 PROCEDURE — 74011636637 HC RX REV CODE- 636/637: Performed by: INTERNAL MEDICINE

## 2017-05-24 PROCEDURE — 77010033678 HC OXYGEN DAILY

## 2017-05-24 PROCEDURE — 80202 ASSAY OF VANCOMYCIN: CPT | Performed by: SURGERY

## 2017-05-24 PROCEDURE — 74011000258 HC RX REV CODE- 258: Performed by: INTERNAL MEDICINE

## 2017-05-24 PROCEDURE — 74011250636 HC RX REV CODE- 250/636: Performed by: INTERNAL MEDICINE

## 2017-05-24 PROCEDURE — 74011250637 HC RX REV CODE- 250/637: Performed by: INTERNAL MEDICINE

## 2017-05-24 PROCEDURE — 36415 COLL VENOUS BLD VENIPUNCTURE: CPT | Performed by: SURGERY

## 2017-05-24 RX ORDER — INSULIN LISPRO 100 [IU]/ML
10 INJECTION, SOLUTION INTRAVENOUS; SUBCUTANEOUS ONCE
Status: COMPLETED | OUTPATIENT
Start: 2017-05-24 | End: 2017-05-24

## 2017-05-24 RX ORDER — VANCOMYCIN/0.9 % SOD CHLORIDE 1.5G/250ML
1500 PLASTIC BAG, INJECTION (ML) INTRAVENOUS EVERY 24 HOURS
Status: DISCONTINUED | OUTPATIENT
Start: 2017-05-24 | End: 2017-05-25

## 2017-05-24 RX ADMIN — AMLODIPINE BESYLATE 10 MG: 5 TABLET ORAL at 09:22

## 2017-05-24 RX ADMIN — INSULIN LISPRO 7 UNITS: 100 INJECTION, SOLUTION INTRAVENOUS; SUBCUTANEOUS at 09:17

## 2017-05-24 RX ADMIN — DIGOXIN 0.12 MG: 125 TABLET ORAL at 09:22

## 2017-05-24 RX ADMIN — INSULIN LISPRO 10 UNITS: 100 INJECTION, SOLUTION INTRAVENOUS; SUBCUTANEOUS at 12:21

## 2017-05-24 RX ADMIN — CARVEDILOL 12.5 MG: 12.5 TABLET, FILM COATED ORAL at 09:15

## 2017-05-24 RX ADMIN — HEPARIN SODIUM 5000 UNITS: 5000 INJECTION, SOLUTION INTRAVENOUS; SUBCUTANEOUS at 16:48

## 2017-05-24 RX ADMIN — SODIUM CHLORIDE 75 ML/HR: 900 INJECTION, SOLUTION INTRAVENOUS at 16:46

## 2017-05-24 RX ADMIN — CARVEDILOL 12.5 MG: 12.5 TABLET, FILM COATED ORAL at 16:48

## 2017-05-24 RX ADMIN — PRAVASTATIN SODIUM 20 MG: 10 TABLET ORAL at 21:34

## 2017-05-24 RX ADMIN — INSULIN LISPRO 10 UNITS: 100 INJECTION, SOLUTION INTRAVENOUS; SUBCUTANEOUS at 12:20

## 2017-05-24 RX ADMIN — VANCOMYCIN HYDROCHLORIDE 1500 MG: 10 INJECTION, POWDER, LYOPHILIZED, FOR SOLUTION INTRAVENOUS at 21:27

## 2017-05-24 RX ADMIN — HEPARIN SODIUM 5000 UNITS: 5000 INJECTION, SOLUTION INTRAVENOUS; SUBCUTANEOUS at 09:17

## 2017-05-24 RX ADMIN — ASPIRIN 81 MG CHEWABLE TABLET 81 MG: 81 TABLET CHEWABLE at 09:22

## 2017-05-24 RX ADMIN — CLONIDINE HYDROCHLORIDE 0.1 MG: 0.1 TABLET ORAL at 09:22

## 2017-05-24 RX ADMIN — INSULIN LISPRO 65 UNITS: 100 INJECTION, SUSPENSION SUBCUTANEOUS at 16:47

## 2017-05-24 RX ADMIN — INSULIN LISPRO 65 UNITS: 100 INJECTION, SUSPENSION SUBCUTANEOUS at 09:16

## 2017-05-24 RX ADMIN — CLONIDINE HYDROCHLORIDE 0.1 MG: 0.1 TABLET ORAL at 17:45

## 2017-05-24 RX ADMIN — CEFTRIAXONE 1 G: 1 INJECTION, POWDER, FOR SOLUTION INTRAMUSCULAR; INTRAVENOUS at 21:34

## 2017-05-24 RX ADMIN — ACETAMINOPHEN 650 MG: 325 TABLET, FILM COATED ORAL at 23:20

## 2017-05-24 RX ADMIN — INSULIN LISPRO 5 UNITS: 100 INJECTION, SOLUTION INTRAVENOUS; SUBCUTANEOUS at 16:47

## 2017-05-24 RX ADMIN — Medication 10 ML: at 21:34

## 2017-05-24 RX ADMIN — HEPARIN SODIUM 5000 UNITS: 5000 INJECTION, SOLUTION INTRAVENOUS; SUBCUTANEOUS at 23:21

## 2017-05-24 NOTE — PROGRESS NOTES
End of Shift Nursing Note    Bedside shift change report given to [place] RN (oncoming nurse) by Mita Burroughs (offgoing nurse). Report included the following information SBAR, Kardex, Intake/Output and MAR. Zone Phone:   [phone]    Significant changes during shift:    none   Non-emergent issues for physician to address:   none     Number times ambulated in hallway past shift: 0      Number of times OOB to chair past shift: 0    POD #: 1     Vital Signs:    Temp: 98.7 °F (37.1 °C)     Pulse (Heart Rate): 78     BP: 145/78     Resp Rate: 16     O2 Sat (%): 97 %    Lines & Drains:     Urinary Catheter? No   Placement Date:    Medical Necessity:   Central Line? No   Placement Date:    Medical Necessity:   PICC Line? No   Placement Date:    Medical Necessity:     NG tube [] in [] removed [x] not applicable   Drains [] in [] removed [x] not applicable     Skin Integrity:      Wounds: yes   Dressings Present: yes    Wound Concerns: no      GI:    Current diet:  DIET REGULAR    Nausea: NO  Vomiting: NO  Bowel Sounds: YES  Flatus: YES  Last Bowel Movement: yesterday   Appearance:     Respiratory:  Supplemental O2: No      Device:    via  Liters/min     Incentive Spirometer: NO  Volume:   Coughing and Deep Breathing: YES  Oral Care: YES  Understanding (patient/family education): YES   Getting out of bed: YES  Head of bed elevation: YES    Patient Safety:    Falls Score: 2  Mobility Score: 2  Bed Alarm On? No  Sitter? No      Opportunity for questions and clarification was given to oncoming nurse. Patient bed is in low position, side rails are up x 2, door & observation blinds open as needed, call bell within reach and patient not in distress.     Preeti Rueda RN

## 2017-05-24 NOTE — PROGRESS NOTES
Pt was admitted for uncontrolled type 1 diabetes mellitus with other skin complications. Pt was alert and oriented, upon CM room assessment. Pt reported that he resides with his children, in a one story home. Pt reported that he is independent with ADLs, and drives. Pt reported that he is active with his PCP: 6mths ago, and he uses [facility]. Pt reported that he has DME at home: walker. Pt reported that he is currently receiving home health at home, for wound care. However, pt can not remember what agency. Pt has never been to SNF. Pt will have transportation home. CM will continue to follow up with pt and make referrals as deemed necessary. Care Management Interventions  PCP Verified by CM: Yes  Mode of Transport at Discharge: Other (see comment)  Transition of Care Consult (CM Consult): Discharge Planning  Discharge Durable Medical Equipment: No  Physical Therapy Consult: No  Occupational Therapy Consult: No  Speech Therapy Consult: No  Current Support Network:  Other, Own Home  Confirm Follow Up Transport: Family  Plan discussed with Pt/Family/Caregiver: Yes  Discharge Location  Discharge Placement: [facility] [address], MSW   [phone]

## 2017-05-24 NOTE — DIABETES MGMT
DTC Progress Note    Recommendations/ Comments: If appropriate, please consider:  1) adding A1c to next lab draw. 2) adding DM carb control restrictions to his diet order. 3) may benefit from adding basal insulin (Lantus) with hyperglycemia with infection/wound healing needs while lowering risk for hypoglycemia with sepsis and renal status. Chart reviewed on Sreekanth Jimenez.    Patient is a 57 y.o. male with known  Type 2 Diabetes on Humalog 75/25 65 units bid at home. A1c:   Lab Results   Component Value Date/Time    Hemoglobin A1c 9.8 02/08/2017 04:13 AM    Hemoglobin A1c 10.3 05/10/2016 05:20 AM       Recent Glucose Results: Lab Results   Component Value Date/Time     (H) 05/24/2017 03:20 AM    GLUCPOC 357 (H) 05/24/2017 11:21 AM    GLUCPOC 280 (H) 05/24/2017 07:22 AM    GLUCPOC 236 (H) 05/23/2017 08:19 PM        Lab Results   Component Value Date/Time    Creatinine 3.07 05/24/2017 03:20 AM     Estimated Creatinine Clearance: 38.6 mL/min (based on Cr of 3.07). Active Orders   Diet    DIET REGULAR        PO intake: Patient Vitals for the past 72 hrs:   % Diet Eaten   05/24/17 0905 100 %       Current hospital DM medication: Humalog 75/25 65 units bid ac, Lispro correctional insulin - high sensitivity. Will continue to follow as needed.     Thank you  Na Lane RD CDE

## 2017-05-24 NOTE — PROGRESS NOTES
Pressure Ulcer Prevention In basket Alert Received for Nicolás < 14 (moderate risk).      Suggested Care Plan/Interventions for Nursing  1. Complete Nicolás Pressure Ulcer Risk Scale and use sub scores to identify appropriate interventions. 2. Perform Assessment: skin, changes in LOC, visual cues for pain, monitor skin under medical devices  3. Respond to Reduced Sensory Perception: changes in LOC, check visual cues for pain, float heels, suspension boots, pressure redistribution bed/mattress/chair cushion, turning and reposition approximately every 2 hours (pillows & wedges), pad between skin to skin, turn & reposition  4. Manage Moisture: absorbent under pads, internal / external urinary device, internal /  external fecal device, minimize layers, contain wound drainage, access need for specialty bed, limit adult briefs, maintain skin hydration (lotion/cream), moisture barrier, offer toileting every hour  5. Promote Activity: increase time out of bed, chair cushion, PT/OT evaluation, trapeze to reposition, pressure redistribution bed/mattress/chair  6. Address Reduced Mobility: float heels / suspension boot, HOB 30 degrees or less, pressure redistribution bed/mattress/cushion, PT / OT evaluation, turn and reposition approximately every 2 hours (pillows & wedges)  7. Promote Nutrition: document food / fluid / supplement intake, encourage/assist with meals as needed  8. Reduce Friction and Shear: transferring/repositioning devices (lift/draw sheet), lift team/ patient mobility team, feet elevated on foot rest, minimize layers, foam dressing / transparent film / skin sealants, protective barrier creams and emollients, transfer aides (board, Zahira lift, ceiling lift, stand assist), HOB 30 degrees or less, trapeze to reposition.   Wound Care Team

## 2017-05-24 NOTE — PROGRESS NOTES
CM was informed that pt has home health provided by [facility] ([phone]). CM informed admissions coordinator that pt is currently in inpatient status here at [address], with no current d/c date. CM was informed that when pt d/c he can resume HHcare, and d/c paperwork can be faxed to ([phone]). [facility] will send a fax to [facility], informing them of resumption order for [facility] services.     CAT Angulo CM  [phone]

## 2017-05-24 NOTE — PROGRESS NOTES
Surgery    Some pain at surgical sites. T max 99, VSS. Dressinga in place right thigh and back. I will change packing tomorrow. Continue antibiotics.     Nevin Woods MD

## 2017-05-24 NOTE — PROGRESS NOTES
End of Shift Nursing Note    Bedside shift change report given to Roger Mayberry (oncoming nurse) by Alondra Clemons (offgoing nurse). Report included the following information SBAR, Kardex, Intake/Output, MAR and Recent Results. Zone Phone:   [phone]    Significant changes during shift:    none   Non-emergent issues for physician to address:   none     Number times ambulated in hallway past shift: 0      Number of times OOB to chair past shift: 3    POD #:      Vital Signs:    Temp: 99.1 °F (37.3 °C)     Pulse (Heart Rate): 78     BP: 160/77     Resp Rate: 16     O2 Sat (%): 93 %    Lines & Drains:     Urinary Catheter? no   Placement Date:    Medical Necessity:   Central Line? No   Placement Date:    Medical Necessity:   PICC Line? No   Placement Date:    Medical Necessity:     NG tube [] in [] removed [] not applicable   Drains [] in [] removed [] not applicable     Skin Integrity:      Wounds: no   Dressings Present: no    Wound Concerns: no      GI:    Current diet:  DIET REGULAR    Nausea: NO  Vomiting: NO  Bowel Sounds: YES  Flatus: YES  Last Bowel Movement: yesterday   Appearance:     Respiratory:  Supplemental O2: No      Device:    via  Liters/min     Incentive Spirometer: YES  Volume:   Coughing and Deep Breathing: YES  Oral Care: YES  Understanding (patient/family education): YES   Getting out of bed: YES  Head of bed elevation: YES    Patient Safety:    Falls Score: 1  Mobility Score: 1  Bed Alarm On? No  Sitter? No      Opportunity for questions and clarification was given to oncoming nurse. Patient bed is in locked,low position, side rails are up x 2, door & observation blinds open as needed, call bell within reach and patient not in distress.     Chey Israel RN

## 2017-05-24 NOTE — PROGRESS NOTES
Hospitalist Progress Note    NAME: Etta Dias   :  1955   MRN:  [id number]     Interim Hospital Summary: 57 y.o. male whom presented on 2017 with      Assessment / Plan:  Sepsis due to recurrent multiple skin abscesses with cellulitis POA  -patient with multiple lesions in his back, hip and sacral area but the main or largest one is in his mid back. -s/p I&D of back and R thigh wound  -continue empiric vancomycin and rocephi. De-escalate as appropriate  -BCx NTD, awaiting wound cx  -IVF tat 75cc/hr    DM, insulin dependent   -continue 75/25 mix with SSI prn    CKD stage 4  -cr stable  -monitor    HTN  PAF  -continue asa, digoxin, coreg, clonidine and pravachol    Severe obesity with Body mass index is 38.54 kg/(m^2). Code status: Full  Prophylaxis: Hep SQ  Recommended Disposition:  PT, OT, RN       Subjective:     Chief Complaint / Reason for Physician Visit  Follow up of multiple skin abscesses, DM, HTN  Chart reviewed in detail. Discussed with RN events overnight. No acute complaints. Able to ambulate on his own    Review of Systems:  Symptom Y/N Comments  Symptom Y/N Comments   Fever/Chills n   Chest Pain n    Poor Appetite    Edema     Cough    Abdominal Pain     Sputum    Joint Pain     SOB/NAVARRO    Pruritis/Rash     Nausea/vomit n   Tolerating PT/OT     Diarrhea    Tolerating Diet     Constipation    Other y Back and R thigh     Could NOT obtain due to:      PO intake:   Patient Vitals for the past 72 hrs:   % Diet Eaten   17 0905 100 %     Objective:     VITALS:   Last 24hrs VS reviewed since prior progress note.  Most recent are:  Patient Vitals for the past 24 hrs:   Temp Pulse Resp BP SpO2   17 0913 98.6 °F (37 °C) 88 16 163/62 97 %   17 0230 98.7 °F (37.1 °C) 78 16 145/78 -   17 97.9 °F (36.6 °C) 79 18 144/65 97 %   17 -  20 146/64 95 %   175 - 82 16 150/65 100 %   17 193 - 81 17 138/70 97 %   05/23/17 1915 - 81 21 154/61 99 %   05/23/17 1845 - 83 24 129/58 91 %   05/23/17 1830 - 83 19 139/63 94 %   05/23/17 1820 - 84 18 137/63 100 %   05/23/17 1815 - 85 16 150/87 100 %   05/23/17 1810 - 85 20 145/70 100 %   05/23/17 1805 - 85 28 147/59 96 %   05/23/17 1802 99.2 °F (37.3 °C) 86 23 138/61 97 %   05/23/17 1801 - 85 - - 95 %   05/23/17 1800 99.2 °F (37.3 °C) - - 138/61 -   05/23/17 1455 98.6 °F (37 °C) 81 18 173/72 92 %       Intake/Output Summary (Last 24 hours) at 05/24/17 0955  Last data filed at 05/24/17 0905   Gross per 24 hour   Intake           469.17 ml   Output              500 ml   Net           -30.83 ml        PHYSICAL EXAM:  General: WD, WN. Alert, cooperative, no acute distress    EENT:  EOMI. Anicteric sclerae. MMM  Resp:  CTA bilaterally, no wheezing or rales. No accessory muscle use  CV:  Regular  rhythm,  No edema  GI:  Soft, Non distended, Non tender.  +Bowel sounds  Neurologic:  Alert and oriented X 3, normal speech,   Psych:   Good insight. Not anxious nor agitated  Skin:  Large bandage covering back and R thigh wound. (+) right hip wound and left upper buttock / sacral wound    Reviewed most current lab test results and cultures  YES  Reviewed most current radiology test results   YES  Review and summation of old records today    NO  Reviewed patient's current orders and MAR    YES  PMH/SH reviewed - no change compared to H&P  ________________________________________________________________________  Care Plan discussed with:    Comments   Patient x    Family      RN x    Care Manager     Consultant                        Multidiciplinary team rounds were held today with , nursing, pharmacist and clinical coordinator. Patient's plan of care was discussed; medications were reviewed and discharge planning was addressed.      ________________________________________________________________________  Total NON critical care TIME:  40   Minutes    Total CRITICAL CARE TIME Spent:   Minutes non procedure based      Comments   >50% of visit spent in counseling and coordination of care x     This includes time during multidisciplinary rounds if indicated above   ________________________________________________________________________  Morena Bolden MD     Procedures: see electronic medical records for all procedures/Xrays and details which were not copied into this note but were reviewed prior to creation of Plan. LABS:  I reviewed today's most current labs and imaging studies.   Pertinent labs include:  Recent Labs      05/24/17 0320 05/23/17 0417 05/22/17   1456   WBC  8.7  11.1  11.3*   HGB  8.7*  8.7*  10.0*   HCT  25.7*  26.5*  30.4*   PLT  150  154  161     Recent Labs      05/24/17 0320 05/23/17 0417 05/22/17   1456   NA  137  141  137   K  4.2  4.1  4.5   CL  104  108  102   CO2  24  24  28   GLU  278*  184*  338*   BUN  24*  20  19   CREA  3.07*  2.91*  3.03*   CA  8.8  8.6  9.4   MG   --   2.2  2.2   ALB   --    --   2.8*   TBILI   --    --   0.7   SGOT   --    --   12*   ALT   --    --   31

## 2017-05-24 NOTE — OP NOTES
[address], [facility]   OP NOTE       Name:  Meron Corral   MR#:  [id number]   :  1955   Account #:  [de-identified]    Surgery Date:  2017   Date of Adm:  2017       PREOPERATIVE DIAGNOSIS: Abscesses of back and right lateral   thigh. POSTOPERATIVE DIAGNOSIS: Abscesses of back and right lateral   thigh. PROCEDURES PERFORMED:  Incision and drainage and excisional   debridement of abscesses (skin and subcutaneous tissues) back and right thigh. SURGEON: Ebony Kwong MD    ESTIMATED BLOOD LOSS: 150 mL. SPECIMENS REMOVED: Debrided skin and subcutaneous tissues,   which was not sent for pathology. DRAINS: None. ANESTHESIA:  General.      OPERATIVE SUMMARY: The patient was taken to the operating   room, placed on the operating room table initially in supine position. General endotracheal anesthesia was induced. The patient was then   turned to a left lateral decubitus position. Sites of abscess on the mid   upper back in the midline and lateral aspect right thigh were sterilely   prepared and draped. Attention was first turned to the thigh. The induration extended for   about 4 cm. There was one small opening with some purulent drainage   in the middle. A clamp was passed into this opening and pus was   obtained. An incision was made, exposing a cavity with necrotic walls   in the subcutaneous tissue. The purulence infiltrated into the tissues,   including infiltrating into both frankly necrotic tissue and with   pseudopods of pus extending into normal tissue. It required excisional   debridement, removing skin and subcutaneous tissues, to finally   achieve viable margins where the subcutaneous tissue was not   infiltrated with pus. This left an open wound approximately 3.5 cm   across and 2.5 cm deep in a circular shape. Hemostasis was obtained   partially by electrocautery.  One arterial bleeder was suture ligated with   3-0 chromic. The wound was then packed with 1-inch plain Nu Gauze. Culture had been taken of the pus at this site. Attention was then turned to the back. Here, there was one small   opening, which had some purulent drainage. Induration was about 7   cm around. A clamp was passed into this opening and pus was   obtained. Initially, a linear incision was made in the midline, about 2-  1/2 inches in length. This exposed a space containing pus and necrotic   subcutaneous tissue. There was also some necrosis of the dermis of   the skin. With sequential excisions, eventually a section of skin and   subcutaneous tissue was excised to get to normal tissue beyond the   necrosis of subcutaneous tissue and beyond the infiltration into the   subcutaneous tissues with pus. This was sharp excisional   debridement. This left a cavity approximately 7 cm in diameter and   about 3 cm deep. Several arterial bleeding points were controlled by   suture ligature of 3-0 chromic. Hemostasis was also obtained by   electrocautery. The wound was then packed open with 1-inch plain Nu   Gauze. A dry dressing was applied to both sites. The patient tolerated surgery and was taken into the recovery room   postoperatively.         MD JUAN Perera / [place]   D:  05/23/2017   17:48   T:  05/23/2017   21:08   Job #:  [id number]

## 2017-05-25 LAB
ANION GAP BLD CALC-SCNC: 10 MMOL/L (ref 5–15)
BACTERIA SPEC CULT: ABNORMAL
BUN SERPL-MCNC: 25 MG/DL (ref 6–20)
BUN/CREAT SERPL: 9 (ref 12–20)
CALCIUM SERPL-MCNC: 8.5 MG/DL (ref 8.5–10.1)
CHLORIDE SERPL-SCNC: 107 MMOL/L (ref 97–108)
CO2 SERPL-SCNC: 24 MMOL/L (ref 21–32)
CREAT SERPL-MCNC: 2.87 MG/DL (ref 0.7–1.3)
GLUCOSE BLD STRIP.AUTO-MCNC: 210 MG/DL (ref 65–100)
GLUCOSE BLD STRIP.AUTO-MCNC: 213 MG/DL (ref 65–100)
GLUCOSE BLD STRIP.AUTO-MCNC: 321 MG/DL (ref 65–100)
GLUCOSE BLD STRIP.AUTO-MCNC: 342 MG/DL (ref 65–100)
GLUCOSE BLD STRIP.AUTO-MCNC: 85 MG/DL (ref 65–100)
GLUCOSE BLD STRIP.AUTO-MCNC: 87 MG/DL (ref 65–100)
GLUCOSE SERPL-MCNC: 175 MG/DL (ref 65–100)
GRAM STN SPEC: ABNORMAL
GRAM STN SPEC: ABNORMAL
POTASSIUM SERPL-SCNC: 4 MMOL/L (ref 3.5–5.1)
SERVICE CMNT-IMP: ABNORMAL
SERVICE CMNT-IMP: NORMAL
SERVICE CMNT-IMP: NORMAL
SODIUM SERPL-SCNC: 141 MMOL/L (ref 136–145)

## 2017-05-25 PROCEDURE — 74011000258 HC RX REV CODE- 258: Performed by: INTERNAL MEDICINE

## 2017-05-25 PROCEDURE — 74011250637 HC RX REV CODE- 250/637: Performed by: INTERNAL MEDICINE

## 2017-05-25 PROCEDURE — 74011250637 HC RX REV CODE- 250/637: Performed by: SURGERY

## 2017-05-25 PROCEDURE — 74011250636 HC RX REV CODE- 250/636: Performed by: SURGERY

## 2017-05-25 PROCEDURE — 36415 COLL VENOUS BLD VENIPUNCTURE: CPT | Performed by: SURGERY

## 2017-05-25 PROCEDURE — 65270000029 HC RM PRIVATE

## 2017-05-25 PROCEDURE — 82962 GLUCOSE BLOOD TEST: CPT

## 2017-05-25 PROCEDURE — 74011250636 HC RX REV CODE- 250/636: Performed by: INTERNAL MEDICINE

## 2017-05-25 PROCEDURE — 80048 BASIC METABOLIC PNL TOTAL CA: CPT | Performed by: SURGERY

## 2017-05-25 PROCEDURE — 74011636637 HC RX REV CODE- 636/637: Performed by: INTERNAL MEDICINE

## 2017-05-25 PROCEDURE — 77030009526 HC GEL CARSYN MDII -A

## 2017-05-25 RX ADMIN — AMLODIPINE BESYLATE 10 MG: 5 TABLET ORAL at 09:11

## 2017-05-25 RX ADMIN — HYDROMORPHONE HYDROCHLORIDE 0.5 MG: 1 INJECTION, SOLUTION INTRAMUSCULAR; INTRAVENOUS; SUBCUTANEOUS at 23:12

## 2017-05-25 RX ADMIN — INSULIN LISPRO 7 UNITS: 100 INJECTION, SOLUTION INTRAVENOUS; SUBCUTANEOUS at 16:47

## 2017-05-25 RX ADMIN — CLONIDINE HYDROCHLORIDE 0.1 MG: 0.1 TABLET ORAL at 17:42

## 2017-05-25 RX ADMIN — Medication 10 ML: at 13:16

## 2017-05-25 RX ADMIN — INSULIN LISPRO 7 UNITS: 100 INJECTION, SOLUTION INTRAVENOUS; SUBCUTANEOUS at 13:15

## 2017-05-25 RX ADMIN — INSULIN LISPRO 70 UNITS: 100 INJECTION, SUSPENSION SUBCUTANEOUS at 16:47

## 2017-05-25 RX ADMIN — CLONIDINE HYDROCHLORIDE 0.1 MG: 0.1 TABLET ORAL at 09:12

## 2017-05-25 RX ADMIN — AMPICILLIN SODIUM AND SULBACTAM SODIUM 3 G: 2; 1 INJECTION, POWDER, FOR SOLUTION INTRAMUSCULAR; INTRAVENOUS at 15:54

## 2017-05-25 RX ADMIN — CARVEDILOL 12.5 MG: 12.5 TABLET, FILM COATED ORAL at 09:11

## 2017-05-25 RX ADMIN — HEPARIN SODIUM 5000 UNITS: 5000 INJECTION, SOLUTION INTRAVENOUS; SUBCUTANEOUS at 09:11

## 2017-05-25 RX ADMIN — INSULIN LISPRO 70 UNITS: 100 INJECTION, SUSPENSION SUBCUTANEOUS at 09:15

## 2017-05-25 RX ADMIN — ASPIRIN 81 MG CHEWABLE TABLET 81 MG: 81 TABLET CHEWABLE at 09:11

## 2017-05-25 RX ADMIN — CARVEDILOL 12.5 MG: 12.5 TABLET, FILM COATED ORAL at 17:42

## 2017-05-25 RX ADMIN — DIGOXIN 0.12 MG: 125 TABLET ORAL at 09:11

## 2017-05-25 RX ADMIN — HEPARIN SODIUM 5000 UNITS: 5000 INJECTION, SOLUTION INTRAVENOUS; SUBCUTANEOUS at 16:49

## 2017-05-25 RX ADMIN — OXYCODONE HYDROCHLORIDE AND ACETAMINOPHEN 2 TABLET: 5; 325 TABLET ORAL at 09:10

## 2017-05-25 RX ADMIN — INSULIN LISPRO 3 UNITS: 100 INJECTION, SOLUTION INTRAVENOUS; SUBCUTANEOUS at 09:09

## 2017-05-25 NOTE — PROGRESS NOTES
Hospitalist Progress Note    NAME: Etta Dias   :  1955   MRN:  [id number]     Interim Hospital Summary: 57 y.o. male whom presented on 2017 with      Assessment / Plan:  Addendum:  -BCx NTD, wound cx growing staph aureus sens to Unasyn, will de-escalate    Sepsis due to recurrent multiple skin abscesses with cellulitis POA  -patient with multiple lesions in his back, hip and sacral area but the main or largest one is in his mid back. -s/p I&D of back and R thigh wound  -continue empiric vancomycin and rocephi. De-escalate as appropriate  -BCx NTD, wound cx grew staph aureus, awaiting for sensitivity  -discontinue IVF, pt tolerating po intake  -dressing change per Dr Kaye Helton  -will need to resume [place] on discharge    DM, insulin dependent with hyperglycemia  -adjusting insulin 75/25 mix  -SSI prn    CKD stage 4  -cr stable  -monitor    HTN  PAF  -continue asa, digoxin, coreg, clonidine and pravachol    Severe obesity with Body mass index is 38.54 kg/(m^2). Code status: Full  Prophylaxis: Hep SQ  Recommended Disposition:  PT, OT, RN       Subjective:     Chief Complaint / Reason for Physician Visit  Follow up of multiple skin abscesses, DM, HTN  Chart reviewed in detail. Discussed with RN events overnight. Dressing changed by Dr Kaye Helton this AM.  Pt complains of pain, awaiting for pain meds. Review of Systems:  Symptom Y/N Comments  Symptom Y/N Comments   Fever/Chills n   Chest Pain n    Poor Appetite    Edema     Cough    Abdominal Pain     Sputum    Joint Pain     SOB/NAVARRO    Pruritis/Rash     Nausea/vomit n   Tolerating PT/OT     Diarrhea    Tolerating Diet     Constipation    Other y Back and R thigh     Could NOT obtain due to:      PO intake:   Patient Vitals for the past 72 hrs:   % Diet Eaten   17 0905 100 %     Objective:     VITALS:   Last 24hrs VS reviewed since prior progress note.  Most recent are:  Patient Vitals for the past 24 hrs:   Temp Pulse Resp BP SpO2   05/25/17 0354 98.8 °F (37.1 °C) 74 18 148/72 96 %   05/24/17 2301 98.3 °F (36.8 °C) 72 16 152/66 91 %   05/24/17 1950 97.8 °F (36.6 °C) 67 18 112/88 -   05/24/17 1655 99.1 °F (37.3 °C) 78 16 160/77 93 %   05/24/17 1217 98.4 °F (36.9 °C) 86 16 118/53 98 %   05/24/17 0913 98.6 °F (37 °C) 88 16 163/62 97 %       Intake/Output Summary (Last 24 hours) at 05/25/17 0840  Last data filed at 05/24/17 2301   Gross per 24 hour   Intake              240 ml   Output              895 ml   Net             -655 ml        PHYSICAL EXAM:  General: WD, WN. Alert, cooperative, no acute distress    EENT:  EOMI. Anicteric sclerae. MMM  Resp:  CTA bilaterally, no wheezing or rales. No accessory muscle use  CV:  Regular  rhythm,  No edema  GI:  Soft, Non distended, Non tender.  +Bowel sounds  Neurologic:  Alert and oriented X 3, normal speech,   Psych:   Good insight. Not anxious nor agitated  Skin:  Large bandage covering back and R thigh wound. (+) right hip wound and left upper buttock / sacral wound    Reviewed most current lab test results and cultures  YES  Reviewed most current radiology test results   YES  Review and summation of old records today    NO  Reviewed patient's current orders and MAR    YES  PMH/SH reviewed - no change compared to H&P  ________________________________________________________________________  Care Plan discussed with:    Comments   Patient x    Family      RN x    Care Manager     Consultant                        Multidiciplinary team rounds were held today with , nursing, pharmacist and clinical coordinator. Patient's plan of care was discussed; medications were reviewed and discharge planning was addressed.      ________________________________________________________________________  Total NON critical care TIME:  35   Minutes    Total CRITICAL CARE TIME Spent:   Minutes non procedure based      Comments   >50% of visit spent in counseling and coordination of care x     This includes time during multidisciplinary rounds if indicated above   ________________________________________________________________________  Pretty Spence MD     Procedures: see electronic medical records for all procedures/Xrays and details which were not copied into this note but were reviewed prior to creation of Plan. LABS:  I reviewed today's most current labs and imaging studies.   Pertinent labs include:  Recent Labs      05/24/17 0320 05/23/17 0417 05/22/17   1456   WBC  8.7  11.1  11.3*   HGB  8.7*  8.7*  10.0*   HCT  25.7*  26.5*  30.4*   PLT  150  154  161     Recent Labs      05/25/17 0328 05/24/17 0320 05/23/17 0417 05/22/17   1456   NA  141  137  141  137   K  4.0  4.2  4.1  4.5   CL  107  104  108  102   CO2  24  24  24  28   GLU  175*  278*  184*  338*   BUN  25*  24*  20  19   CREA  2.87*  3.07*  2.91*  3.03*   CA  8.5  8.8  8.6  9.4   MG   --    --   2.2  2.2   ALB   --    --    --   2.8*   TBILI   --    --    --   0.7   SGOT   --    --    --   12*   ALT   --    --    --   31

## 2017-05-25 NOTE — PROGRESS NOTES
End of Shift Nursing Note    Bedside shift change report given to [address] (oncoming nurse) by Grisel Fay (offgoing nurse). Report included the following information SBAR, Kardex, Intake/Output and MAR. Zone Phone:   [phone]    Significant changes during shift:    none   Non-emergent issues for physician to address:   none     Number times ambulated in hallway past shift: 0      Number of times OOB to chair past shift: 0    POD #: 2    Vital Signs:    Temp: 98.8 °F (37.1 °C)     Pulse (Heart Rate): 74     BP: 148/72     Resp Rate: 18     O2 Sat (%): 96 %    Lines & Drains:     Urinary Catheter? No   Placement Date:    Medical Necessity:   Central Line? No   Placement Date:    Medical Necessity:   PICC Line? No   Placement Date:    Medical Necessity:     NG tube [] in [] removed [x] not applicable   Drains [] in [] removed [x] not applicable     Skin Integrity:      Wounds: yes   Dressings Present: yes    Wound Concerns: no      GI:    Current diet:  DIET REGULAR    Nausea: NO  Vomiting: NO  Bowel Sounds: YES  Flatus: YES  Last Bowel Movement: yesterday   Appearance:     Respiratory:  Supplemental O2: No      Device:    via  Liters/min     Incentive Spirometer: NO  Volume:   Coughing and Deep Breathing: YES  Oral Care: YES  Understanding (patient/family education): YES   Getting out of bed: YES  Head of bed elevation: YES    Patient Safety:    Falls Score: 2  Mobility Score: 2  Bed Alarm On? No  Sitter? No      Opportunity for questions and clarification was given to oncoming nurse. Patient bed is in low position, side rails are up x 2, door & observation blinds open as needed, call bell within reach and patient not in distress.     Litzy Joseph RN

## 2017-05-25 NOTE — PROGRESS NOTES
Surgery    Patient with mild pain at I and D sites. Afebrile, VSS. Packing changed at I and D sites. Wounds fairly clean. Repacked with Carrasyn gel / plain Nu gauze, covered with gauze. BID packing changes ordered. Patient should continue IV antibiotics for a few more days before discharge with Home health and po antibiotics. Lack of control of diabetes at home is probably contributing to his recurrent subcutaneous abscesses.     Humberto Hoang MD

## 2017-05-25 NOTE — PROGRESS NOTES
Pharmacy Automatic Renal Dosing Protocol - Antimicrobials    Indication for Antimicrobials: Recurrent multiple skin abscesses with cellulitis, s/p I/D of abscess of of back & right thigh     Current Regimen of Each Antimicrobial (Start Day & Day of Therapy):  Vancomycin 1.5gm IV q24h ( Day #4)  Ceftriaxone 1 g IV every 24 hours ( Day #4)    Goal Vancomycin Level (if needed): 10-15  Level(s) Ordered (if needed):   Measured / Extrapolated Vancomycin Levels (if drawn): 12.2   / 11.7    Significant Cultures:    Blood: ng x3 days (pending)  : Wound: moderate probable Staph aureus (pending)    CAPD, Hemodialysis or Renal Replacement Therapy: None documented   Paralysis, amputations, malnutrition: None documented    Recent Labs      17   0328  17   0320  17   0417  17   1456   CREA  2.87*  3.07*  2.91*  3.03*   BUN  25*  24*  20  19   WBC   --   8.7  11.1  11.3*     Temp (24hrs), Av.5 °F (36.9 °C), Min:97.8 °F (36.6 °C), Max:99.1 °F (37.3 °C)    Creatinine Clearance (Creatinine Clearance (ml/min)): 31    Impression/Plan:   - Vancomycin Trough 11.7 is within the desired range (10-15) to treat cellulitis  - Continue Vancomycin 1.5gm IV q24h  - Continue daily BMP  - Continue Ceftriaxone 1gm IV q24h       Pharmacy will follow daily and adjust medications as appropriate for renal function and/or serum levels.     Thank you,  Tyson Sparrow, [facility]     Renal Dosing Tables on [employer]

## 2017-05-25 NOTE — PROGRESS NOTES
End of Shift Nursing Note    Bedside shift change report given to Nitish Almanza RN (oncoming nurse) by Chandrakant Galeas RN (offgoing nurse). Report included the following information SBAR, Kardex, Procedure Summary and Intake/Output. Zone Phone:       Significant changes during shift:    none   Non-emergent issues for physician to address:   none     Number times ambulated in hallway past shift: 0      Number of times OOB to chair past shift: 3    POD #:      Vital Signs:    Temp: 98.2 °F (36.8 °C)     Pulse (Heart Rate): 72     BP: 161/86     Resp Rate: 18     O2 Sat (%): 97 %    Lines & Drains:     Urinary Catheter? No   Placement Date:    Medical Necessity:   Central Line? No   Placement Date:    Medical Necessity:   PICC Line? No   Placement Date:    Medical Necessity:     NG tube [] in [] removed [x] not applicable   Drains [] in [] removed [x] not applicable     Skin Integrity:      Wounds: yes   Dressings Present: yes    Wound Concerns: no      GI:    Current diet:  DIET DIABETIC WITH OPTIONS Consistent Carb 2000kcal; Regular    Nausea: NO  Vomiting: NO  Bowel Sounds: YES  Flatus: YES  Last Bowel Movement: yesterday   Appearance:     Respiratory:  Supplemental O2: No      Device:    via  Liters/min     Incentive Spirometer: YES  Volume:   Coughing and Deep Breathing: YES  Oral Care: YES  Understanding (patient/family education): YES   Getting out of bed: YES  Head of bed elevation: YES    Patient Safety:    Falls Score: 2  Mobility Score: 2  Bed Alarm On? No  Sitter? No      Opportunity for questions and clarification was given to oncoming nurse. Patient bed is in low position, side rails are up x 2, door & observation blinds open as needed, call bell within reach and patient not in distress.     Iman Luu

## 2017-05-25 NOTE — PROGRESS NOTES
Interdisciplinary Rounds were completed on this patient. Rounds included nursing, clinical care leader, pharmacy, and case management. Patient was doing well without problems. Patient had the following concerns: pain after dressing change. Goals for the day will include: mobilize and continue to use incentive, pain control.

## 2017-05-26 LAB
ANION GAP BLD CALC-SCNC: 7 MMOL/L (ref 5–15)
BUN SERPL-MCNC: 24 MG/DL (ref 6–20)
BUN/CREAT SERPL: 10 (ref 12–20)
CALCIUM SERPL-MCNC: 8.4 MG/DL (ref 8.5–10.1)
CHLORIDE SERPL-SCNC: 108 MMOL/L (ref 97–108)
CO2 SERPL-SCNC: 25 MMOL/L (ref 21–32)
CREAT SERPL-MCNC: 2.5 MG/DL (ref 0.7–1.3)
GLUCOSE BLD STRIP.AUTO-MCNC: 181 MG/DL (ref 65–100)
GLUCOSE BLD STRIP.AUTO-MCNC: 255 MG/DL (ref 65–100)
GLUCOSE BLD STRIP.AUTO-MCNC: 290 MG/DL (ref 65–100)
GLUCOSE BLD STRIP.AUTO-MCNC: 312 MG/DL (ref 65–100)
GLUCOSE BLD STRIP.AUTO-MCNC: 329 MG/DL (ref 65–100)
GLUCOSE BLD STRIP.AUTO-MCNC: 334 MG/DL (ref 65–100)
GLUCOSE SERPL-MCNC: 181 MG/DL (ref 65–100)
POTASSIUM SERPL-SCNC: 4.2 MMOL/L (ref 3.5–5.1)
SERVICE CMNT-IMP: ABNORMAL
SODIUM SERPL-SCNC: 140 MMOL/L (ref 136–145)

## 2017-05-26 PROCEDURE — 74011636637 HC RX REV CODE- 636/637: Performed by: INTERNAL MEDICINE

## 2017-05-26 PROCEDURE — 74011250637 HC RX REV CODE- 250/637: Performed by: SURGERY

## 2017-05-26 PROCEDURE — 74011000258 HC RX REV CODE- 258: Performed by: INTERNAL MEDICINE

## 2017-05-26 PROCEDURE — 36415 COLL VENOUS BLD VENIPUNCTURE: CPT | Performed by: SURGERY

## 2017-05-26 PROCEDURE — 65270000029 HC RM PRIVATE

## 2017-05-26 PROCEDURE — 74011250636 HC RX REV CODE- 250/636: Performed by: SURGERY

## 2017-05-26 PROCEDURE — 80048 BASIC METABOLIC PNL TOTAL CA: CPT | Performed by: SURGERY

## 2017-05-26 PROCEDURE — 74011250637 HC RX REV CODE- 250/637: Performed by: INTERNAL MEDICINE

## 2017-05-26 PROCEDURE — 82962 GLUCOSE BLOOD TEST: CPT

## 2017-05-26 PROCEDURE — 74011250636 HC RX REV CODE- 250/636: Performed by: INTERNAL MEDICINE

## 2017-05-26 RX ADMIN — CARVEDILOL 12.5 MG: 12.5 TABLET, FILM COATED ORAL at 09:54

## 2017-05-26 RX ADMIN — INSULIN LISPRO 55 UNITS: 100 INJECTION, SUSPENSION SUBCUTANEOUS at 23:34

## 2017-05-26 RX ADMIN — PRAVASTATIN SODIUM 20 MG: 10 TABLET ORAL at 22:35

## 2017-05-26 RX ADMIN — HYDROMORPHONE HYDROCHLORIDE 0.5 MG: 1 INJECTION, SOLUTION INTRAMUSCULAR; INTRAVENOUS; SUBCUTANEOUS at 06:31

## 2017-05-26 RX ADMIN — AMPICILLIN SODIUM AND SULBACTAM SODIUM 3 G: 2; 1 INJECTION, POWDER, FOR SOLUTION INTRAMUSCULAR; INTRAVENOUS at 00:42

## 2017-05-26 RX ADMIN — Medication 10 ML: at 22:37

## 2017-05-26 RX ADMIN — AMLODIPINE BESYLATE 10 MG: 5 TABLET ORAL at 09:54

## 2017-05-26 RX ADMIN — CARVEDILOL 12.5 MG: 12.5 TABLET, FILM COATED ORAL at 17:54

## 2017-05-26 RX ADMIN — INSULIN LISPRO 4 UNITS: 100 INJECTION, SOLUTION INTRAVENOUS; SUBCUTANEOUS at 23:34

## 2017-05-26 RX ADMIN — Medication 10 ML: at 00:42

## 2017-05-26 RX ADMIN — PRAVASTATIN SODIUM 20 MG: 10 TABLET ORAL at 00:42

## 2017-05-26 RX ADMIN — AMPICILLIN SODIUM AND SULBACTAM SODIUM 3 G: 2; 1 INJECTION, POWDER, FOR SOLUTION INTRAMUSCULAR; INTRAVENOUS at 07:13

## 2017-05-26 RX ADMIN — HEPARIN SODIUM 5000 UNITS: 5000 INJECTION, SOLUTION INTRAVENOUS; SUBCUTANEOUS at 23:35

## 2017-05-26 RX ADMIN — HEPARIN SODIUM 5000 UNITS: 5000 INJECTION, SOLUTION INTRAVENOUS; SUBCUTANEOUS at 00:41

## 2017-05-26 RX ADMIN — HYDRALAZINE HYDROCHLORIDE 10 MG: 20 INJECTION INTRAMUSCULAR; INTRAVENOUS at 15:02

## 2017-05-26 RX ADMIN — ASPIRIN 81 MG CHEWABLE TABLET 81 MG: 81 TABLET CHEWABLE at 09:55

## 2017-05-26 RX ADMIN — CLONIDINE HYDROCHLORIDE 0.1 MG: 0.1 TABLET ORAL at 09:54

## 2017-05-26 RX ADMIN — INSULIN LISPRO 5 UNITS: 100 INJECTION, SOLUTION INTRAVENOUS; SUBCUTANEOUS at 11:36

## 2017-05-26 RX ADMIN — Medication 10 ML: at 05:57

## 2017-05-26 RX ADMIN — DIGOXIN 0.12 MG: 125 TABLET ORAL at 09:54

## 2017-05-26 RX ADMIN — HEPARIN SODIUM 5000 UNITS: 5000 INJECTION, SOLUTION INTRAVENOUS; SUBCUTANEOUS at 09:55

## 2017-05-26 RX ADMIN — INSULIN LISPRO 5 UNITS: 100 INJECTION, SOLUTION INTRAVENOUS; SUBCUTANEOUS at 17:55

## 2017-05-26 RX ADMIN — AMPICILLIN SODIUM AND SULBACTAM SODIUM 3 G: 2; 1 INJECTION, POWDER, FOR SOLUTION INTRAMUSCULAR; INTRAVENOUS at 14:18

## 2017-05-26 RX ADMIN — Medication 10 ML: at 14:18

## 2017-05-26 RX ADMIN — HEPARIN SODIUM 5000 UNITS: 5000 INJECTION, SOLUTION INTRAVENOUS; SUBCUTANEOUS at 17:54

## 2017-05-26 RX ADMIN — AMPICILLIN SODIUM AND SULBACTAM SODIUM 3 G: 2; 1 INJECTION, POWDER, FOR SOLUTION INTRAMUSCULAR; INTRAVENOUS at 21:00

## 2017-05-26 RX ADMIN — INSULIN LISPRO 2 UNITS: 100 INJECTION, SOLUTION INTRAVENOUS; SUBCUTANEOUS at 09:55

## 2017-05-26 RX ADMIN — HYDRALAZINE HYDROCHLORIDE 10 MG: 20 INJECTION INTRAMUSCULAR; INTRAVENOUS at 23:36

## 2017-05-26 RX ADMIN — CLONIDINE HYDROCHLORIDE 0.1 MG: 0.1 TABLET ORAL at 17:54

## 2017-05-26 RX ADMIN — HYDROMORPHONE HYDROCHLORIDE 0.5 MG: 1 INJECTION, SOLUTION INTRAMUSCULAR; INTRAVENOUS; SUBCUTANEOUS at 00:49

## 2017-05-26 RX ADMIN — INSULIN LISPRO 70 UNITS: 100 INJECTION, SUSPENSION SUBCUTANEOUS at 09:54

## 2017-05-26 NOTE — PROGRESS NOTES
Pharmacy Automatic Renal Dosing Protocol - Antimicrobials    Indication for Antimicrobials: Recurrent multiple skin abscesses with cellulitis, s/p I/D of abscess of of back & right thigh     Current Regimen of Each Antimicrobial (Start Day & Day of Therapy): Unasyn 3gm IV q8h; start ; Day #2    Previous Abx:  Ceftriaxone 1 g IV every 24 hours ( Day #4)  Vancomycin 1.5gm IV q24h ( Day #5)    Goal Vancomycin Level (if needed): 10-15  Level(s) Ordered (if needed):   Measured / Extrapolated Vancomycin Levels (if drawn): 12.2   / 11.7    Significant Cultures:    Blood: ng x3 days (pending)  : Wound: moderate Staph aureus  (FINAL)    CAPD, Hemodialysis or Renal Replacement Therapy: None documented   Paralysis, amputations, malnutrition: None documented    Recent Labs      17   0440  17   0328  17   0320   CREA  2.50*  2.87*  3.07*   BUN  24*  25*  24*   WBC   --    --   8.7     Temp (24hrs), Av.1 °F (36.7 °C), Min:97.9 °F (36.6 °C), Max:98.2 °F (36.8 °C)    Creatinine Clearance (Creatinine Clearance (ml/min)): 38    Impression/Plan:   - Staph aureus sensitive to Unasyn  - Vancomycin & Ceftriaxone changed to Unasyn 3gm IV q8h  - Change  BMP to every other day       Pharmacy will follow daily and adjust medications as appropriate for renal function and/or serum levels.     Thank you,  Dolores Petty [facility]     Renal Dosing Tables on Pharm[employer]

## 2017-05-26 NOTE — PROGRESS NOTES
Interdisciplinary Rounds were completed on this patient. Rounds included nursing, clinical care leader, pharmacy, and case management. Patient was doing well without problems. Patient had the following concerns: none. Goals for the day will include: mobilize and continue IV antibiotics, discharge in the next couple of days.

## 2017-05-26 NOTE — PROGRESS NOTES
*CM acknowledge consult*.  will send [facility] orders regarding pt's resumption.     CAT Angulo CM  [phone]

## 2017-05-26 NOTE — PROGRESS NOTES
Surgery    No complaint. Afebrile. Dressings in place. For BID dressing changes while in hospital.    Glucose 300's to 80 yesterday. I would favor keeping patient in hospital on IV antibiotics over weekend, then consider discharge on po antibiotics. Management of diabetes as outpatient will be the challenge.     Shoaib Fragoso MD

## 2017-05-26 NOTE — PROGRESS NOTES
Hospitalist Progress Note    NAME: Sreekanth Jimenez   :  1955   MRN:  [id number]     Interim Hospital Summary: 57 y.o. male whom presented on 2017 with      Assessment / Plan:  Sepsis due to recurrent multiple skin abscesses with cellulitis POA  -patient with multiple lesions in his back, hip and sacral area but the main or largest one is in his mid back. -s/p I&D of back and R thigh wound  -continue empiric vancomycin and rocephi. De-escalate as appropriate  -BCx NTD, wound cx grew staph aureus, sensitive to unasyn  -continue Unasyn for a few days, then likely discharge to home on PO abx  -dressing change per Dr Dominik Wong  -will need to resume [facility] on discharge    DM, insulin dependent with hyperglycemia  -hyperglycemia throughout the day, then hypoglycemia at night. Likely due to lingering effect of insulin from renal failure with no meals throughout the night  -will increase AM dose to 80units, and decrease PM dose to 55units. Adjust prn  -SSI prn    CKD stage 4  -cr stable  -monitor    HTN  PAF  -continue asa, digoxin, coreg, clonidine and pravachol    Severe obesity with Body mass index is 38.54 kg/(m^2). Code status: Full  Prophylaxis: Hep SQ  Recommended Disposition:  PT, OT, RN       Subjective:     Chief Complaint / Reason for Physician Visit  Follow up of multiple skin abscesses, DM, HTN  Chart reviewed in detail. Discussed with RN events overnight. No acute complaints. BG dropped to 80s overnight.     Review of Systems:  Symptom Y/N Comments  Symptom Y/N Comments   Fever/Chills n   Chest Pain n    Poor Appetite    Edema     Cough    Abdominal Pain     Sputum    Joint Pain     SOB/NAVARRO    Pruritis/Rash     Nausea/vomit n   Tolerating PT/OT     Diarrhea    Tolerating Diet     Constipation    Other n      Could NOT obtain due to:      PO intake:   Patient Vitals for the past 72 hrs:   % Diet Eaten   17 0909 100 %   17 0905 100 % Objective:     VITALS:   Last 24hrs VS reviewed since prior progress note. Most recent are:  Patient Vitals for the past 24 hrs:   Temp Pulse Resp BP SpO2   05/26/17 0300 97.9 °F (36.6 °C) 73 17 166/76 98 %   05/25/17 2100 98 °F (36.7 °C) 76 20 160/80 98 %   05/25/17 1559 98.2 °F (36.8 °C) 72 18 161/86 97 %   05/25/17 1028 98.2 °F (36.8 °C) 85 18 162/81 95 %   05/25/17 0912 98.1 °F (36.7 °C) 83 18 (!) 194/95 94 %       Intake/Output Summary (Last 24 hours) at 05/26/17 0908  Last data filed at 05/26/17 0500   Gross per 24 hour   Intake             1200 ml   Output              800 ml   Net              400 ml        PHYSICAL EXAM:  General: WD, WN. Alert, cooperative, no acute distress    EENT:  EOMI. Anicteric sclerae. MMM  Resp:  CTA bilaterally, no wheezing or rales. No accessory muscle use  CV:  Regular  rhythm,  No edema  GI:  Soft, Non distended, Non tender.  +Bowel sounds  Neurologic:  Alert and oriented X 3, normal speech,   Psych:   Good insight. Not anxious nor agitated  Skin:  Large bandage covering back and R thigh wound. (+) right hip wound and left upper buttock / sacral wound    Reviewed most current lab test results and cultures  YES  Reviewed most current radiology test results   YES  Review and summation of old records today    NO  Reviewed patient's current orders and MAR    YES  PMH/SH reviewed - no change compared to H&P  ________________________________________________________________________  Care Plan discussed with:    Comments   Patient x    Family      RN x    Care Manager     Consultant                        Multidiciplinary team rounds were held today with , nursing, pharmacist and clinical coordinator. Patient's plan of care was discussed; medications were reviewed and discharge planning was addressed.      ________________________________________________________________________  Total NON critical care TIME:  35   Minutes    Total CRITICAL CARE TIME Spent:   Minutes non procedure based      Comments   >50% of visit spent in counseling and coordination of care x     This includes time during multidisciplinary rounds if indicated above   ________________________________________________________________________  Ebony Alas MD     Procedures: see electronic medical records for all procedures/Xrays and details which were not copied into this note but were reviewed prior to creation of Plan. LABS:  I reviewed today's most current labs and imaging studies.   Pertinent labs include:  Recent Labs      05/24/17   0320   WBC  8.7   HGB  8.7*   HCT  25.7*   PLT  150     Recent Labs      05/26/17   0440  05/25/17   0328  05/24/17   0320   NA  140  141  137   K  4.2  4.0  4.2   CL  108  107  104   CO2  25  24  24   GLU  181*  175*  278*   BUN  24*  25*  24*   CREA  2.50*  2.87*  3.07*   CA  8.4*  8.5  8.8

## 2017-05-26 NOTE — ROUTINE PROCESS
End of Shift Nursing Note    Bedside shift change report given to raffaele michelle (oncoming nurse) by Chapis Parikh (offgoing nurse). Report included the following information SBAR. [facility] Phone:   [phone]    Significant changes during shift:    Blood pressure high    Non-emergent issues for physician to address:        Number times ambulated in hallway past shift: 0      Number of times OOB to chair past shift: 3    POD #:      Vital Signs:    Temp: 98.2 °F (36.8 °C)     Pulse (Heart Rate): 66     BP: 200/87     Resp Rate: 20     O2 Sat (%): 94 %    Lines & Drains:     Urinary Catheter? No   Placement Date:    Medical Necessity:   Central Line? No   Placement Date:    Medical Necessity:   PICC Line? No   Placement Date:    Medical Necessity:     NG tube [] in [] removed [] not applicable   Drains [] in [] removed [] not applicable     Skin Integrity:      Wounds: no   Dressings Present: no    Wound Concerns: no      GI:    Current diet:  DIET DIABETIC WITH OPTIONS Consistent Carb 2000kcal; Regular    Nausea: NO  Vomiting: NO  Bowel Sounds: NO  Flatus: NO  Last Bowel Movement:    Appearance:     Respiratory:  Supplemental O2: No      Device:    via  Liters/min     Incentive Spirometer: YES  Volume:   Coughing and Deep Breathing: YES  Oral Care: YES  Understanding (patient/family education): YES   Getting out of bed: YES  Head of bed elevation: YES    Patient Safety:    Falls Score: 1  Mobility Score: 1  Bed Alarm On? Yes  Sitter? No      Opportunity for questions and clarification was given to oncoming nurse. Patient bed is in lowest position, side rails are up x 2, door & observation blinds open as needed, call bell within reach and patient not in distress.     Esme Gaspar RN

## 2017-05-27 LAB
BACTERIA SPEC CULT: NORMAL
GLUCOSE BLD STRIP.AUTO-MCNC: 159 MG/DL (ref 65–100)
GLUCOSE BLD STRIP.AUTO-MCNC: 177 MG/DL (ref 65–100)
GLUCOSE BLD STRIP.AUTO-MCNC: 209 MG/DL (ref 65–100)
GLUCOSE BLD STRIP.AUTO-MCNC: 240 MG/DL (ref 65–100)
SERVICE CMNT-IMP: ABNORMAL
SERVICE CMNT-IMP: NORMAL

## 2017-05-27 PROCEDURE — 82962 GLUCOSE BLOOD TEST: CPT

## 2017-05-27 PROCEDURE — 74011636637 HC RX REV CODE- 636/637: Performed by: INTERNAL MEDICINE

## 2017-05-27 PROCEDURE — 74011250637 HC RX REV CODE- 250/637: Performed by: INTERNAL MEDICINE

## 2017-05-27 PROCEDURE — 74011000258 HC RX REV CODE- 258: Performed by: INTERNAL MEDICINE

## 2017-05-27 PROCEDURE — 74011250637 HC RX REV CODE- 250/637: Performed by: SURGERY

## 2017-05-27 PROCEDURE — 74011250636 HC RX REV CODE- 250/636: Performed by: INTERNAL MEDICINE

## 2017-05-27 PROCEDURE — 74011250636 HC RX REV CODE- 250/636: Performed by: SURGERY

## 2017-05-27 PROCEDURE — 65270000029 HC RM PRIVATE

## 2017-05-27 RX ORDER — INSULIN LISPRO 100 [IU]/ML
5 INJECTION, SOLUTION INTRAVENOUS; SUBCUTANEOUS
Status: DISCONTINUED | OUTPATIENT
Start: 2017-05-27 | End: 2017-05-29 | Stop reason: HOSPADM

## 2017-05-27 RX ADMIN — INSULIN LISPRO 2 UNITS: 100 INJECTION, SOLUTION INTRAVENOUS; SUBCUTANEOUS at 17:37

## 2017-05-27 RX ADMIN — Medication 10 ML: at 13:36

## 2017-05-27 RX ADMIN — INSULIN LISPRO 60 UNITS: 100 INJECTION, SUSPENSION SUBCUTANEOUS at 22:48

## 2017-05-27 RX ADMIN — AMPICILLIN SODIUM AND SULBACTAM SODIUM 3 G: 2; 1 INJECTION, POWDER, FOR SOLUTION INTRAMUSCULAR; INTRAVENOUS at 22:50

## 2017-05-27 RX ADMIN — INSULIN LISPRO 3 UNITS: 100 INJECTION, SOLUTION INTRAVENOUS; SUBCUTANEOUS at 10:38

## 2017-05-27 RX ADMIN — HEPARIN SODIUM 5000 UNITS: 5000 INJECTION, SOLUTION INTRAVENOUS; SUBCUTANEOUS at 17:18

## 2017-05-27 RX ADMIN — HYDROMORPHONE HYDROCHLORIDE 0.5 MG: 1 INJECTION, SOLUTION INTRAMUSCULAR; INTRAVENOUS; SUBCUTANEOUS at 03:35

## 2017-05-27 RX ADMIN — HEPARIN SODIUM 5000 UNITS: 5000 INJECTION, SOLUTION INTRAVENOUS; SUBCUTANEOUS at 10:40

## 2017-05-27 RX ADMIN — INSULIN LISPRO 3 UNITS: 100 INJECTION, SOLUTION INTRAVENOUS; SUBCUTANEOUS at 13:35

## 2017-05-27 RX ADMIN — CARVEDILOL 12.5 MG: 12.5 TABLET, FILM COATED ORAL at 17:18

## 2017-05-27 RX ADMIN — INSULIN LISPRO 5 UNITS: 100 INJECTION, SOLUTION INTRAVENOUS; SUBCUTANEOUS at 13:34

## 2017-05-27 RX ADMIN — PRAVASTATIN SODIUM 20 MG: 10 TABLET ORAL at 22:49

## 2017-05-27 RX ADMIN — HYDROMORPHONE HYDROCHLORIDE 0.5 MG: 1 INJECTION, SOLUTION INTRAMUSCULAR; INTRAVENOUS; SUBCUTANEOUS at 17:18

## 2017-05-27 RX ADMIN — Medication 10 ML: at 22:50

## 2017-05-27 RX ADMIN — HEPARIN SODIUM 5000 UNITS: 5000 INJECTION, SOLUTION INTRAVENOUS; SUBCUTANEOUS at 23:01

## 2017-05-27 RX ADMIN — AMPICILLIN SODIUM AND SULBACTAM SODIUM 3 G: 2; 1 INJECTION, POWDER, FOR SOLUTION INTRAMUSCULAR; INTRAVENOUS at 13:35

## 2017-05-27 RX ADMIN — ASPIRIN 81 MG CHEWABLE TABLET 81 MG: 81 TABLET CHEWABLE at 10:40

## 2017-05-27 RX ADMIN — CLONIDINE HYDROCHLORIDE 0.1 MG: 0.1 TABLET ORAL at 10:44

## 2017-05-27 RX ADMIN — DIGOXIN 0.12 MG: 125 TABLET ORAL at 10:44

## 2017-05-27 RX ADMIN — INSULIN LISPRO 5 UNITS: 100 INJECTION, SOLUTION INTRAVENOUS; SUBCUTANEOUS at 17:37

## 2017-05-27 RX ADMIN — CLONIDINE HYDROCHLORIDE 0.1 MG: 0.1 TABLET ORAL at 17:18

## 2017-05-27 RX ADMIN — INSULIN LISPRO 85 UNITS: 100 INJECTION, SUSPENSION SUBCUTANEOUS at 10:38

## 2017-05-27 RX ADMIN — CARVEDILOL 12.5 MG: 12.5 TABLET, FILM COATED ORAL at 10:44

## 2017-05-27 RX ADMIN — Medication 10 ML: at 05:44

## 2017-05-27 RX ADMIN — AMLODIPINE BESYLATE 10 MG: 5 TABLET ORAL at 10:44

## 2017-05-27 RX ADMIN — AMPICILLIN SODIUM AND SULBACTAM SODIUM 3 G: 2; 1 INJECTION, POWDER, FOR SOLUTION INTRAMUSCULAR; INTRAVENOUS at 05:44

## 2017-05-27 NOTE — PROGRESS NOTES
Hospitalist Progress Note    NAME: Nabil Peace   :  1955   MRN:  [id number]     Interim Hospital Summary: 57 y.o. male whom presented on 2017 with      Assessment / Plan:  Sepsis due to recurrent multiple skin abscesses with cellulitis POA  -patient with multiple lesions in his back, hip and sacral area but the main or largest one is in his mid back. -s/p I&D of back and R thigh wound  -continue empiric vancomycin and rocephi. De-escalate as appropriate  -BCx NTD, wound cx grew staph aureus, sensitive to unasyn  -continue Unasyn for a few days, then likely discharge to home on PO abx  -will need to resume HH on discharge    DM, insulin dependent with hyperglycemia  -BG ~ 300s overnight.   -lantus 85units AM,  55units PM, lispro 5 units TID premeals. Adjust prn  -SSI prn    CKD stage 4  -cr stable  -monitor    HTN  PAF  -continue asa, digoxin, coreg, clonidine and pravachol    Severe obesity with Body mass index is 38.54 kg/(m^2). Code status: Full  Prophylaxis: Hep SQ  Recommended Disposition: HH PT, OT, RN       Subjective:     Chief Complaint / Reason for Physician Visit  Follow up of multiple skin abscesses, DM, HTN  Chart reviewed in detail. Discussed with RN events overnight. No acute complaints. BG remains high overnight. Review of Systems:  Symptom Y/N Comments  Symptom Y/N Comments   Fever/Chills n   Chest Pain n    Poor Appetite n   Edema     Cough    Abdominal Pain n    Sputum    Joint Pain     SOB/NAVARRO    Pruritis/Rash     Nausea/vomit n   Tolerating PT/OT     Diarrhea    Tolerating Diet     Constipation    Other n      Could NOT obtain due to:      PO intake:   Patient Vitals for the past 72 hrs:   % Diet Eaten   17 0846 100 %   17 1757 100 %   17 1417 100 %   17 0958 100 %   17 0909 100 %     Objective:     VITALS:   Last 24hrs VS reviewed since prior progress note.  Most recent are:  Patient Vitals for the past 24 hrs:   Temp Pulse Resp BP SpO2   05/27/17 0840 98.4 °F (36.9 °C) 81 22 183/88 96 %   05/27/17 0500 97 °F (36.1 °C) 76 20 164/80 98 %   05/26/17 2303 98.8 °F (37.1 °C) 74 19 (!) 191/95 96 %   05/26/17 1626 - - - 198/90 -   05/26/17 1453 98.2 °F (36.8 °C) 66 20 200/87 94 %   05/26/17 1122 - - - (!) 160/94 -   05/26/17 1116 97.8 °F (36.6 °C) 77 20 (!) 184/94 96 %       Intake/Output Summary (Last 24 hours) at 05/27/17 1025  Last data filed at 05/27/17 0846   Gross per 24 hour   Intake             1995 ml   Output             3850 ml   Net            -1855 ml        PHYSICAL EXAM:  General: WD, WN. Alert, cooperative, no acute distress    EENT:  EOMI. Anicteric sclerae. MMM  Resp:  CTA bilaterally, no wheezing or rales. No accessory muscle use  CV:  Regular  rhythm,  No edema  GI:  Soft, Non distended, Non tender.  +Bowel sounds  Neurologic:  Alert and oriented X 3, normal speech,   Psych:   Good insight. Not anxious nor agitated  Skin:  Large bandage covering back and R thigh wound. (+) right hip wound and left upper buttock / sacral wound    Reviewed most current lab test results and cultures  YES  Reviewed most current radiology test results   YES  Review and summation of old records today    NO  Reviewed patient's current orders and MAR    YES  PMH/SH reviewed - no change compared to H&P  ________________________________________________________________________  Care Plan discussed with:    Comments   Patient x    Family      RN x    Care Manager     Consultant                        Multidiciplinary team rounds were held today with , nursing, pharmacist and clinical coordinator. Patient's plan of care was discussed; medications were reviewed and discharge planning was addressed.      ________________________________________________________________________  Total NON critical care TIME:  35   Minutes    Total CRITICAL CARE TIME Spent:   Minutes non procedure based      Comments >50% of visit spent in counseling and coordination of care x     This includes time during multidisciplinary rounds if indicated above   ________________________________________________________________________  Tabitha Rivera MD     Procedures: see electronic medical records for all procedures/Xrays and details which were not copied into this note but were reviewed prior to creation of Plan. LABS:  I reviewed today's most current labs and imaging studies. Pertinent labs include:  No results for input(s): WBC, HGB, HCT, PLT, HGBEXT, HCTEXT, PLTEXT, HGBEXT, HCTEXT, PLTEXT in the last 72 hours.   Recent Labs      05/26/17   0440  05/25/17   0328   NA  140  141   K  4.2  4.0   CL  108  107   CO2  25  24   GLU  181*  175*   BUN  24*  25*   CREA  2.50*  2.87*   CA  8.4*  8.5

## 2017-05-27 NOTE — PROGRESS NOTES
Surgery      No complaints    BID dressing changes    IV antibx through weekend    Cont present Rx. Rosey Sacks.  Mady Muniz MD, [facility]

## 2017-05-27 NOTE — PROGRESS NOTES
Bedside shift change report given to EMMY Herrera (oncoming nurse) by Mack Strickland RN (offgoing nurse). Report included the following information SBAR, Kardex, Intake/Output, MAR, Accordion and Recent Results. Patient Vitals for the past 12 hrs:   Temp Pulse Resp BP SpO2   05/27/17 1714 98.5 °F (36.9 °C) 74 14 177/83 99 %   05/27/17 1220 98.7 °F (37.1 °C) 77 20 174/88 99 %   05/27/17 1044 - 83 - (!) 195/92 -   05/27/17 0840 98.4 °F (36.9 °C) 81 22 183/88 96 %     An opportunity for questions and clarification was provided.

## 2017-05-28 LAB
ANION GAP BLD CALC-SCNC: 10 MMOL/L (ref 5–15)
BASOPHILS # BLD AUTO: 0 K/UL (ref 0–0.1)
BASOPHILS # BLD: 0 % (ref 0–1)
BUN SERPL-MCNC: 22 MG/DL (ref 6–20)
BUN/CREAT SERPL: 10 (ref 12–20)
CALCIUM SERPL-MCNC: 8.6 MG/DL (ref 8.5–10.1)
CHLORIDE SERPL-SCNC: 110 MMOL/L (ref 97–108)
CO2 SERPL-SCNC: 24 MMOL/L (ref 21–32)
CREAT SERPL-MCNC: 2.31 MG/DL (ref 0.7–1.3)
EOSINOPHIL # BLD: 0.2 K/UL (ref 0–0.4)
EOSINOPHIL NFR BLD: 2 % (ref 0–7)
ERYTHROCYTE [DISTWIDTH] IN BLOOD BY AUTOMATED COUNT: 13.6 % (ref 11.5–14.5)
GLUCOSE BLD STRIP.AUTO-MCNC: 131 MG/DL (ref 65–100)
GLUCOSE BLD STRIP.AUTO-MCNC: 164 MG/DL (ref 65–100)
GLUCOSE BLD STRIP.AUTO-MCNC: 197 MG/DL (ref 65–100)
GLUCOSE BLD STRIP.AUTO-MCNC: 198 MG/DL (ref 65–100)
GLUCOSE SERPL-MCNC: 111 MG/DL (ref 65–100)
HCT VFR BLD AUTO: 26.1 % (ref 36.6–50.3)
HGB BLD-MCNC: 8.6 G/DL (ref 12.1–17)
LYMPHOCYTES # BLD AUTO: 16 % (ref 12–49)
LYMPHOCYTES # BLD: 1.1 K/UL (ref 0.8–3.5)
MCH RBC QN AUTO: 28.8 PG (ref 26–34)
MCHC RBC AUTO-ENTMCNC: 33 G/DL (ref 30–36.5)
MCV RBC AUTO: 87.3 FL (ref 80–99)
MONOCYTES # BLD: 0.6 K/UL (ref 0–1)
MONOCYTES NFR BLD AUTO: 9 % (ref 5–13)
NEUTS SEG # BLD: 4.9 K/UL (ref 1.8–8)
NEUTS SEG NFR BLD AUTO: 73 % (ref 32–75)
PLATELET # BLD AUTO: 231 K/UL (ref 150–400)
POTASSIUM SERPL-SCNC: 4 MMOL/L (ref 3.5–5.1)
RBC # BLD AUTO: 2.99 M/UL (ref 4.1–5.7)
SERVICE CMNT-IMP: ABNORMAL
SODIUM SERPL-SCNC: 144 MMOL/L (ref 136–145)
WBC # BLD AUTO: 6.8 K/UL (ref 4.1–11.1)

## 2017-05-28 PROCEDURE — 82962 GLUCOSE BLOOD TEST: CPT

## 2017-05-28 PROCEDURE — 74011636637 HC RX REV CODE- 636/637: Performed by: INTERNAL MEDICINE

## 2017-05-28 PROCEDURE — 85025 COMPLETE CBC W/AUTO DIFF WBC: CPT | Performed by: INTERNAL MEDICINE

## 2017-05-28 PROCEDURE — 74011250636 HC RX REV CODE- 250/636: Performed by: INTERNAL MEDICINE

## 2017-05-28 PROCEDURE — 74011250637 HC RX REV CODE- 250/637: Performed by: INTERNAL MEDICINE

## 2017-05-28 PROCEDURE — 74011000258 HC RX REV CODE- 258: Performed by: INTERNAL MEDICINE

## 2017-05-28 PROCEDURE — 74011250636 HC RX REV CODE- 250/636: Performed by: SURGERY

## 2017-05-28 PROCEDURE — 80048 BASIC METABOLIC PNL TOTAL CA: CPT | Performed by: INTERNAL MEDICINE

## 2017-05-28 PROCEDURE — 36415 COLL VENOUS BLD VENIPUNCTURE: CPT | Performed by: INTERNAL MEDICINE

## 2017-05-28 PROCEDURE — 74011250637 HC RX REV CODE- 250/637: Performed by: SURGERY

## 2017-05-28 PROCEDURE — 65270000029 HC RM PRIVATE

## 2017-05-28 RX ORDER — OXYCODONE AND ACETAMINOPHEN 5; 325 MG/1; MG/1
1-2 TABLET ORAL
Qty: 15 TAB | Refills: 0 | Status: SHIPPED | OUTPATIENT
Start: 2017-05-28 | End: 2017-08-02

## 2017-05-28 RX ORDER — CLONIDINE HYDROCHLORIDE 0.2 MG/1
0.2 TABLET ORAL 2 TIMES DAILY
Qty: 30 TAB | Refills: 0 | Status: SHIPPED | OUTPATIENT
Start: 2017-05-28

## 2017-05-28 RX ORDER — CLONIDINE HYDROCHLORIDE 0.1 MG/1
0.2 TABLET ORAL 2 TIMES DAILY
Status: DISCONTINUED | OUTPATIENT
Start: 2017-05-28 | End: 2017-05-29 | Stop reason: HOSPADM

## 2017-05-28 RX ORDER — AMOXICILLIN AND CLAVULANATE POTASSIUM 875; 125 MG/1; MG/1
1 TABLET, FILM COATED ORAL EVERY 12 HOURS
Qty: 16 TAB | Refills: 0 | Status: SHIPPED | OUTPATIENT
Start: 2017-05-28 | End: 2017-06-05

## 2017-05-28 RX ADMIN — AMPICILLIN SODIUM AND SULBACTAM SODIUM 3 G: 2; 1 INJECTION, POWDER, FOR SOLUTION INTRAMUSCULAR; INTRAVENOUS at 04:40

## 2017-05-28 RX ADMIN — Medication 10 ML: at 04:40

## 2017-05-28 RX ADMIN — ASPIRIN 81 MG CHEWABLE TABLET 81 MG: 81 TABLET CHEWABLE at 08:58

## 2017-05-28 RX ADMIN — Medication 10 ML: at 13:15

## 2017-05-28 RX ADMIN — HYDRALAZINE HYDROCHLORIDE 10 MG: 20 INJECTION INTRAMUSCULAR; INTRAVENOUS at 21:37

## 2017-05-28 RX ADMIN — HYDRALAZINE HYDROCHLORIDE 10 MG: 20 INJECTION INTRAMUSCULAR; INTRAVENOUS at 02:34

## 2017-05-28 RX ADMIN — HEPARIN SODIUM 5000 UNITS: 5000 INJECTION, SOLUTION INTRAVENOUS; SUBCUTANEOUS at 08:59

## 2017-05-28 RX ADMIN — INSULIN LISPRO 60 UNITS: 100 INJECTION, SUSPENSION SUBCUTANEOUS at 22:14

## 2017-05-28 RX ADMIN — AMPICILLIN SODIUM AND SULBACTAM SODIUM 3 G: 2; 1 INJECTION, POWDER, FOR SOLUTION INTRAMUSCULAR; INTRAVENOUS at 21:29

## 2017-05-28 RX ADMIN — CLONIDINE HYDROCHLORIDE 0.2 MG: 0.1 TABLET ORAL at 17:19

## 2017-05-28 RX ADMIN — INSULIN LISPRO 2 UNITS: 100 INJECTION, SOLUTION INTRAVENOUS; SUBCUTANEOUS at 13:14

## 2017-05-28 RX ADMIN — INSULIN LISPRO 5 UNITS: 100 INJECTION, SOLUTION INTRAVENOUS; SUBCUTANEOUS at 13:14

## 2017-05-28 RX ADMIN — HYDROMORPHONE HYDROCHLORIDE 0.5 MG: 1 INJECTION, SOLUTION INTRAMUSCULAR; INTRAVENOUS; SUBCUTANEOUS at 04:27

## 2017-05-28 RX ADMIN — DIGOXIN 0.12 MG: 125 TABLET ORAL at 08:58

## 2017-05-28 RX ADMIN — INSULIN LISPRO 85 UNITS: 100 INJECTION, SUSPENSION SUBCUTANEOUS at 09:00

## 2017-05-28 RX ADMIN — INSULIN LISPRO 5 UNITS: 100 INJECTION, SOLUTION INTRAVENOUS; SUBCUTANEOUS at 17:18

## 2017-05-28 RX ADMIN — Medication 10 ML: at 21:30

## 2017-05-28 RX ADMIN — AMLODIPINE BESYLATE 10 MG: 5 TABLET ORAL at 08:58

## 2017-05-28 RX ADMIN — INSULIN LISPRO 2 UNITS: 100 INJECTION, SOLUTION INTRAVENOUS; SUBCUTANEOUS at 17:19

## 2017-05-28 RX ADMIN — AMPICILLIN SODIUM AND SULBACTAM SODIUM 3 G: 2; 1 INJECTION, POWDER, FOR SOLUTION INTRAMUSCULAR; INTRAVENOUS at 13:16

## 2017-05-28 RX ADMIN — HEPARIN SODIUM 5000 UNITS: 5000 INJECTION, SOLUTION INTRAVENOUS; SUBCUTANEOUS at 16:32

## 2017-05-28 RX ADMIN — CARVEDILOL 12.5 MG: 12.5 TABLET, FILM COATED ORAL at 16:42

## 2017-05-28 RX ADMIN — CARVEDILOL 12.5 MG: 12.5 TABLET, FILM COATED ORAL at 09:01

## 2017-05-28 RX ADMIN — INSULIN LISPRO 5 UNITS: 100 INJECTION, SOLUTION INTRAVENOUS; SUBCUTANEOUS at 09:00

## 2017-05-28 RX ADMIN — HYDROMORPHONE HYDROCHLORIDE 0.5 MG: 1 INJECTION, SOLUTION INTRAMUSCULAR; INTRAVENOUS; SUBCUTANEOUS at 16:32

## 2017-05-28 RX ADMIN — PRAVASTATIN SODIUM 20 MG: 10 TABLET ORAL at 21:29

## 2017-05-28 RX ADMIN — CLONIDINE HYDROCHLORIDE 0.1 MG: 0.1 TABLET ORAL at 08:58

## 2017-05-28 NOTE — PROGRESS NOTES
Surgery      Pt stable    Cont present RX    Dr Lupe Harrell will round for Dr Robin Dumont.  Delfino Singh MD, [facility]

## 2017-05-28 NOTE — PROGRESS NOTES
End of Shift Nursing Note    Bedside shift change report given to Huy Tejada RN (oncoming nurse) by Charlene Partida RN (offgoing nurse). Report included the following information SBAR, MAR and Recent Results. Significant changes during shift:    None   Non-emergent issues for physician to address:   None     Number times ambulated in hallway past shift: 0      Number of times OOB to chair past shift: 2    POD #: 5     Vital Signs:    Temp: 98.3 °F (36.8 °C)     Pulse (Heart Rate): 80     BP: 152/88     Resp Rate: 18     O2 Sat (%): 97 %    Lines & Drains:     Urinary Catheter? No  Central Line? No  PICC Line? No       NG tube [] in [] removed [x] not applicable   Drains [] in [] removed [x] not applicable     Skin Integrity:      Wounds: yes   Dressings Present: yes    Wound Concerns: no      GI:    Current diet:  DIET DIABETIC WITH OPTIONS Consistent Carb 2000kcal; Regular    Nausea: NO  Vomiting: NO  Bowel Sounds: YES  Flatus: YES  Last Bowel Movement: yesterday    Respiratory:  Supplemental O2: No          Incentive Spirometer: YES    Coughing and Deep Breathing: YES  Oral Care: YES  Understanding (patient/family education): YES   Getting out of bed: YES  Head of bed elevation: YES    Patient Safety:    Falls Score: 2  Mobility Score: 1  Bed Alarm On? No  Sitter? No      Opportunity for questions and clarification was given to oncoming nurse. Patient bed is in low position, side rails are up x 2, door & observation blinds open as needed, call bell within reach and patient not in distress.     Abhay Holman RN

## 2017-05-28 NOTE — PROGRESS NOTES
Mews 3. Will give bp meds that are due and recheck pressure. If BP is still high, will give prn hydralazine. Informed MD Enriquez of /97 and that BP will be reassessed after meds are given.

## 2017-05-28 NOTE — DISCHARGE SUMMARY
Discharge Summary  Name: German Grullon  [id number]  YOB: 1955 (Age: 57 y.o.)   Date of Admission: 5/22/2017  Date of Discharge: 5/29/2017  Attending Physician: Edyta Najera MD    Discharge Diagnosis:   T2DM, POA  Sepsis due to recurrent multiple skin abscesses with cellulitis POA  CKD stage 4  PAF  HTN  Obesity    Consultants: General Surgery    Brief Admission History/Reason for Admission Per Jeffrey Dubose MD:   Unknown Marilyn is a 57 y.o.  male who presents with above. Pt with h/o recurrent subcutaneous abscesses in setting of diabetes. He says that he has been doing well recently. No fever, cough or URI sx. His blood sugars have been elevated, but he felt that was because he wasn't watching them closely enough. Pt was noted today to have a large area of induration on his back so a caregiver sent him to the ER. Pt denies pain with this. He is not aware of any drainage. He denies SOB. He denies nausea or change in appetite. No stool changes. 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 He denies focal weakness.      We were asked to admit for work up and evaluation of the above problems. Brief Hospital Course by Main Problems:   Sepsis due to recurrent multiple skin abscesses with cellulitis POA  Patient with multiple lesions in his back, hip and sacral area but the main or largest one is in his mid back. He underwent I&D of back and R thigh wound. Was treated with empiric vancomycin and rocephin. BCx NTD, wound cx grew staph aureus sensitive to unasyn. Patient had remained HD stable. He will be discharge to home with HH/wound care and directions to complete abx course as prescribed. DM, insulin dependent with hyperglycemia  BG difficult to control inpt. He states his BG at home is stable and that his diet is different at home compared to here. Will resume his home meds.  Discussed home monitoring of BG with log and bring to PCP for further titration of insulin regimen.     CKD stage 4, stable    HTN/PAF  Continue asa, digoxin, coreg, clonidine and pravachol     Severe obesity with Body mass index is 38.54 kg/(m^2).     Discharge Exam:  Patient seen and examined by me on discharge day. Pertinent Findings:  Visit Vitals    /79    Pulse 73    Temp 98.2 °F (36.8 °C)    Resp 18    Ht 6' 5\" (1.956 m)    Wt 147.4 kg (325 lb)    SpO2 94%    BMI 38.54 kg/m2     Gen:    Not in distress  Chest: Clear lungs  CVS:   Regular rhythm. No edema  Abd:  Soft, not distended, not tender    Discharge/Recent Laboratory Results:  Recent Labs      05/28/17   0344   NA  144   K  4.0   CL  110*   CO2  24   BUN  22*   GLU  111*   CA  8.6     Recent Labs      05/28/17   0344   HGB  8.6*   HCT  26.1*   WBC  6.8   PLT  231       Discharge Medications:  Current Discharge Medication List      START taking these medications    Details   amoxicillin-clavulanate (AUGMENTIN) 875-125 mg per tablet Take 1 Tab by mouth every twelve (12) hours for 8 days. Qty: 16 Tab, Refills: 0         CONTINUE these medications which have CHANGED    Details   cloNIDine HCl (CATAPRES) 0.2 mg tablet Take 1 Tab by mouth two (2) times a day. Qty: 30 Tab, Refills: 0      oxyCODONE-acetaminophen (PERCOCET) 5-325 mg per tablet Take 1-2 Tabs by mouth every six (6) hours as needed. Max Daily Amount: 8 Tabs. Qty: 15 Tab, Refills: 0         CONTINUE these medications which have NOT CHANGED    Details   acetaminophen (TYLENOL) 325 mg tablet Take 2 Tabs by mouth every four (4) hours as needed for Pain or Fever. Qty: 40 Tab, Refills: 0      carvedilol (COREG) 12.5 mg tablet       aspirin delayed-release 81 mg tablet Take 1 Tab by mouth daily.   Qty: 30 Tab, Refills: 0      !! insulin lispro protamine/insulin lispro (HUMALOG MIX 75/25) 100 unit/mL (75-25) injection 65 units subcutaneous injection twice daily before meals (before breakfast and dinner)  Qty: 1 Vial, Refills: 2      !! insulin lispro protamine/insulin lispro (HUMALOG MIX 75/25) 100 unit/mL (75-25) injection 60 Units by SubCUTAneous route Before breakfast and dinner. Qty: 1 Vial, Refills: 0      amLODIPine (NORVASC) 10 mg tablet Take 1 Tab by mouth daily. Qty: 30 Tab, Refills: 1      alcohol swabs (ALCOHOL PADS) padm 1 Each by Apply Externally route two (2) times a day. Qty: 100 Each, Refills: 1      pravastatin (PRAVACHOL) 20 mg tablet Take 20 mg by mouth nightly. digoxin (LANOXIN) 0.125 mg tablet Take 0.125 mg by mouth daily. Indications: afib       !! - Potential duplicate medications found. Please discuss with provider.       STOP taking these medications       ondansetron (ZOFRAN ODT) 4 mg disintegrating tablet Comments:   Reason for Stopping:         Insulin Syringe-Needle U-100 (INSULIN SYRINGE) 1/2 mL 29 x 1/2\" syrg Comments:   Reason for Stopping:               DISPOSITION:    Home with Family:    Home with HH/PT/OT/RN: x   SNF/LTC:    DARIN:    OTHER:          Follow up with:   PCP : Shoshana Alfonso NP  Follow-up Information     Follow up With Details Comments [address]  On 5/29/2017 THIS  [address], IF YOU DONT [address] 24-48 HOURS, PLEASE CONTACT THEM DIRECTLY [phone]    Wilver Miller MD In 1 week  [address] [facility] [address]  [phone]            Diet: diabetic, low salt    Total time in minutes spent coordinating this discharge (includes going over instructions, follow-up, prescriptions, and preparing report for sign off to her PCP) :  35 minutes

## 2017-05-28 NOTE — PROGRESS NOTES
Hospitalist Progress Note    NAME: Bernarda Martel   :  1955   MRN:  [id number]     Interim Hospital Summary: 57 y.o. male whom presented on 2017 with      Assessment / Plan:  Sepsis due to recurrent multiple skin abscesses with cellulitis POA  -patient with multiple lesions in his back, hip and sacral area but the main or largest one is in his mid back. -s/p I&D of back and R thigh wound  -continue empiric vancomycin and rocephi. De-escalate as appropriate  -BCx NTD, wound cx grew staph aureus, sensitive to unasyn  -continue Unasyn for a few days, then likely discharge to home on PO abx  -will need to resume HH on discharge    DM, insulin dependent with hyperglycemia  -BG stable on current regimen  -lantus 85units AM,  55units PM, lispro 5 units TID premeals. Adjust prn  -SSI prn    CKD stage 4  -cr stable  -monitor    HTN/PAF  -hypertensive overnight and this AM.  ? Due to pain  -AM meds given, will incr clonidine to 0.2mg, continue amlodipine, , coreg, digoxin  -hydralazine prn      Severe obesity with Body mass index is 38.54 kg/(m^2). Code status: Full  Prophylaxis: Hep SQ  Recommended Disposition:  PT, OT, RN       Subjective:     Chief Complaint / Reason for Physician Visit  Follow up of multiple skin abscesses, DM, HTN  Chart reviewed in detail. Discussed with RN events overnight. No acute complaints. BG stable.     Review of Systems:  Symptom Y/N Comments  Symptom Y/N Comments   Fever/Chills n   Chest Pain n    Poor Appetite n   Edema     Cough    Abdominal Pain n    Sputum    Joint Pain     SOB/NAVARRO    Pruritis/Rash     Nausea/vomit n   Tolerating PT/OT     Diarrhea    Tolerating Diet     Constipation    Other n      Could NOT obtain due to:      PO intake:   Patient Vitals for the past 72 hrs:   % Diet Eaten   17 0846 100 %   17 1757 100 %   17 1417 100 %   17 0958 100 %     Objective:     VITALS:   Last 24hrs VS reviewed since prior progress note. Most recent are:  Patient Vitals for the past 24 hrs:   Temp Pulse Resp BP SpO2   05/28/17 0848 98.3 °F (36.8 °C) 75 20 (!) 202/97 94 %   05/28/17 0420 - 80 - 152/88 -   05/28/17 0225 98.3 °F (36.8 °C) 76 18 174/83 97 %   05/27/17 1714 98.5 °F (36.9 °C) 74 14 177/83 99 %   05/27/17 1220 98.7 °F (37.1 °C) 77 20 174/88 99 %   05/27/17 1044 - 83 - (!) 195/92 -       Intake/Output Summary (Last 24 hours) at 05/28/17 0909  Last data filed at 05/28/17 0847   Gross per 24 hour   Intake              680 ml   Output             3650 ml   Net            -2970 ml        PHYSICAL EXAM:  General: WD, WN. Alert, cooperative, no acute distress    EENT:  EOMI. Anicteric sclerae. MMM  Resp:  CTA bilaterally, no wheezing or rales. No accessory muscle use  CV:  Regular  rhythm,  No edema  GI:  Soft, Non distended, Non tender.  +Bowel sounds  Neurologic:  Alert and oriented X 3, normal speech,   Psych:   Good insight. Not anxious nor agitated  Skin:  Large bandage covering back and R thigh wound. (+) right hip wound and left upper buttock / sacral wound    Reviewed most current lab test results and cultures  YES  Reviewed most current radiology test results   YES  Review and summation of old records today    NO  Reviewed patient's current orders and MAR    YES  PMH/ reviewed - no change compared to H&P  ________________________________________________________________________  Care Plan discussed with:    Comments   Patient x    Family      RN x    Care Manager     Consultant                        Multidiciplinary team rounds were held today with , nursing, pharmacist and clinical coordinator. Patient's plan of care was discussed; medications were reviewed and discharge planning was addressed.      ________________________________________________________________________  Total NON critical care TIME:  35   Minutes    Total CRITICAL CARE TIME Spent:   Minutes non procedure based      Comments   >50% of visit spent in counseling and coordination of care x     This includes time during multidisciplinary rounds if indicated above   ________________________________________________________________________  Pretty Spence MD     Procedures: see electronic medical records for all procedures/Xrays and details which were not copied into this note but were reviewed prior to creation of Plan. LABS:  I reviewed today's most current labs and imaging studies.   Pertinent labs include:  Recent Labs      05/28/17   0344   WBC  6.8   HGB  8.6*   HCT  26.1*   PLT  231     Recent Labs      05/28/17 0344  05/26/17   0440   NA  144  140   K  4.0  4.2   CL  110*  108   CO2  24  25   GLU  111*  181*   BUN  22*  24*   CREA  2.31*  2.50*   CA  8.6  8.4*

## 2017-05-28 NOTE — PROGRESS NOTES
End of Shift Nursing Note    Bedside shift change report given to Leora (oncoming nurse) by Rigo Harrington (offgoing nurse). Report included the following information SBAR, Kardex, Procedure Summary, Intake/Output, MAR and Recent Results. Zone Phone:   none    Significant changes during shift:    none   Non-emergent issues for physician to address:   none     Number times ambulated in hallway past shift: 0; pt ambulates to and from bathroom      Number of times OOB to chair past shift: 0    POD #: 5     Vital Signs:    Temp: 98.8 °F (37.1 °C)     Pulse (Heart Rate): 72     BP: 143/65     Resp Rate: 20     O2 Sat (%): 96 %    Lines & Drains:     Urinary Catheter? No     Central Line? No     PICC Line? No     NG tube [] in [] removed [x] not applicable   Drains [] in [] removed [x] not applicable     Skin Integrity:      Wounds: yes   Dressings Present: yes    Wound Concerns: no      GI:    Current diet:  DIET DIABETIC WITH OPTIONS Consistent Carb 2000kcal; Regular    Nausea: NO  Vomiting: NO  Bowel Sounds: YES  Flatus: YES  Last Bowel Movement: today     Respiratory:  Supplemental O2: No          Incentive Spirometer:  YES        Volume: 2000  Coughing and Deep Breathing: YES  Oral Care: NO; pt given hygiene items but hadn't done it   Understanding (patient/family education): YES   Getting out of bed: YES  Head of bed elevation: YES    Patient Safety:    Falls Score: 1  Mobility Score: 0  Bed Alarm On? No  Sitter? No      Opportunity for questions and clarification was given to oncoming nurse. Patient bed is in low position, side rails are up x 2, door & observation blinds open as needed, call bell within reach and patient not in distress.     Neo Figueroa

## 2017-05-29 VITALS
OXYGEN SATURATION: 94 % | TEMPERATURE: 98.2 F | BODY MASS INDEX: 37.19 KG/M2 | HEART RATE: 73 BPM | RESPIRATION RATE: 18 BRPM | SYSTOLIC BLOOD PRESSURE: 165 MMHG | WEIGHT: 315 LBS | HEIGHT: 77 IN | DIASTOLIC BLOOD PRESSURE: 79 MMHG

## 2017-05-29 LAB
GLUCOSE BLD STRIP.AUTO-MCNC: 127 MG/DL (ref 65–100)
SERVICE CMNT-IMP: ABNORMAL

## 2017-05-29 PROCEDURE — 74011250637 HC RX REV CODE- 250/637: Performed by: INTERNAL MEDICINE

## 2017-05-29 PROCEDURE — 82962 GLUCOSE BLOOD TEST: CPT

## 2017-05-29 PROCEDURE — 74011250636 HC RX REV CODE- 250/636: Performed by: INTERNAL MEDICINE

## 2017-05-29 PROCEDURE — 74011636637 HC RX REV CODE- 636/637: Performed by: INTERNAL MEDICINE

## 2017-05-29 PROCEDURE — 74011000258 HC RX REV CODE- 258: Performed by: INTERNAL MEDICINE

## 2017-05-29 PROCEDURE — 74011250637 HC RX REV CODE- 250/637: Performed by: SURGERY

## 2017-05-29 PROCEDURE — 74011250636 HC RX REV CODE- 250/636: Performed by: SURGERY

## 2017-05-29 RX ADMIN — Medication 10 ML: at 05:07

## 2017-05-29 RX ADMIN — AMLODIPINE BESYLATE 10 MG: 5 TABLET ORAL at 08:01

## 2017-05-29 RX ADMIN — HYDRALAZINE HYDROCHLORIDE 10 MG: 20 INJECTION INTRAMUSCULAR; INTRAVENOUS at 05:16

## 2017-05-29 RX ADMIN — INSULIN LISPRO 85 UNITS: 100 INJECTION, SUSPENSION SUBCUTANEOUS at 07:57

## 2017-05-29 RX ADMIN — ASPIRIN 81 MG CHEWABLE TABLET 81 MG: 81 TABLET CHEWABLE at 08:01

## 2017-05-29 RX ADMIN — CARVEDILOL 12.5 MG: 12.5 TABLET, FILM COATED ORAL at 08:01

## 2017-05-29 RX ADMIN — HEPARIN SODIUM 5000 UNITS: 5000 INJECTION, SOLUTION INTRAVENOUS; SUBCUTANEOUS at 01:00

## 2017-05-29 RX ADMIN — DIGOXIN 0.12 MG: 125 TABLET ORAL at 08:01

## 2017-05-29 RX ADMIN — INSULIN LISPRO 5 UNITS: 100 INJECTION, SOLUTION INTRAVENOUS; SUBCUTANEOUS at 07:57

## 2017-05-29 RX ADMIN — HEPARIN SODIUM 5000 UNITS: 5000 INJECTION, SOLUTION INTRAVENOUS; SUBCUTANEOUS at 07:58

## 2017-05-29 RX ADMIN — CLONIDINE HYDROCHLORIDE 0.2 MG: 0.1 TABLET ORAL at 08:01

## 2017-05-29 RX ADMIN — AMPICILLIN SODIUM AND SULBACTAM SODIUM 3 G: 2; 1 INJECTION, POWDER, FOR SOLUTION INTRAMUSCULAR; INTRAVENOUS at 05:05

## 2017-05-29 NOTE — PROGRESS NOTES
Bedside and Verbal shift change report given to Jeremiah Jones (oncoming nurse) by Nhung Hurt RN (offgoing nurse). Report included the following information SBAR, Kardex, Intake/Output and MAR. Zone Phone for oncoming shift:       Shift Summary: Blood Pressure high gave PRN medications as ordered    LDAs               Peripheral IV Right Arm (Active)   Site Assessment Clean, dry, & intact 5/28/2017  7:52 PM   Phlebitis Assessment 0 5/28/2017  7:52 PM   Infiltration Assessment 0 5/28/2017  7:52 PM   Dressing Status Clean, dry, & intact 5/28/2017  7:52 PM   Dressing Type Transparent;Tape 5/28/2017  7:52 PM   Hub Color/Line Status Pink;Capped;Flushed 5/28/2017  7:52 PM   Alcohol Cap Used Yes 5/28/2017  7:52 PM                        Intake & Output   Date 05/28/17 0700 - 05/29/17 0659 05/29/17 0700 - 05/30/17 0659   Shift 6018-7232 0678-0921 24 Hour Total 0700-1859 2552-8865 24 Hour Total   I  N  T  A  K  E   P. O. 720  720         P. O. 720  720       I.V.  (mL/kg/hr) 100  (0.1)  100         Volume (dextrose 10 % infusion 125-250 mL) 0  0         Volume (ampicillin-sulbactam (UNASYN) 3 g in 0.9% sodium chloride (MBP/ADV) 100 mL) 100  100       Shift Total  (mL/kg) 820  (5.6)  820  (5.6)      O  U  T  P  U  T   Urine  (mL/kg/hr) 2150  (1.2)  2150         Urine Voided 2150  2150         Urine Occurrence(s) 1 x  1 x       Stool            Stool Occurrence(s) 1 x  1 x       Shift Total  (mL/kg) 2150  (14.6)  2150  (14.6)      NET -1330  -1330      Weight (kg) 147.4 147.4 147.4 147.4 147.4 147.4      Last Bowel Movement Last Bowel Movement Date: 05/28/17   Glucose Checks [] N/A  [x] AC/HS  [] Q6  Concerns:   Nutrition Active Orders   Diet    DIET DIABETIC WITH OPTIONS Consistent Carb 2000kcal; Regular       Consults []PT  []OT  []Speech  []Case Management   Cardiac Monitoring [x]N/A []Yes Expires:

## 2017-05-29 NOTE — ROUTINE PROCESS
I have reviewed discharge instructions with the patient. The patient verbalized understanding. IV removed. Scripts for percocet, clonidine and amoxicillin given. Taken to main entrance via wheelchair with tech.

## 2017-06-05 ENCOUNTER — OFFICE VISIT (OUTPATIENT)
Dept: SURGERY | Age: 62
End: 2017-06-05

## 2017-06-05 VITALS
WEIGHT: 313.2 LBS | BODY MASS INDEX: 36.98 KG/M2 | SYSTOLIC BLOOD PRESSURE: 184 MMHG | HEART RATE: 81 BPM | OXYGEN SATURATION: 96 % | HEIGHT: 77 IN | RESPIRATION RATE: 24 BRPM | DIASTOLIC BLOOD PRESSURE: 79 MMHG

## 2017-06-05 DIAGNOSIS — L02.212 BACK ABSCESS: Primary | ICD-10-CM

## 2017-06-05 DIAGNOSIS — L02.415 ABSCESS OF RIGHT THIGH: ICD-10-CM

## 2017-06-05 NOTE — PROGRESS NOTES
The patient is status post incision and drainage of abscess of back and right thigh. He reports no complaints. On examination the right thigh wound is clean and fairly shallow. Gel dressing applied there. The back wound is about 3 inches across and about 1 inch deep with granulation. Gel and gauze dressing was applied there with the use of gauze packing. The patient will continue with wound care at home and see me in follow up in 2 weeks. Final Diagnosis:  Status post I & D of abscesses.     [place]/mikey

## 2017-06-05 NOTE — MR AVS SNAPSHOT
Visit Information Date & Time Provider Department Dept. Phone Encounter #  
 6/5/2017 11:00 AM Ebony Kwong MD [facility] [address] [phone] [id number] Follow-up Instructions Return in about 2 weeks (around 6/19/2017). Follow-up and Disposition History Your Appointments 7/10/2017 10:40 AM  
ESTABLISHED PATIENT with Ebony Kwong MD  
[facility] [address] (Kristi June) Appt Note: 3 wk f.u.  
 [address], [address] [phone]  
[address] [facility] [address] [facility] [address] [phone] Upcoming Health Maintenance Date Due Hepatitis C Screening 1955 LIPID PANEL Q1 1955 FOOT EXAM Q1 4/8/1963 MICROALBUMIN Q1 4/8/1963 EYE EXAM RETINAL OR DILATED Q1 4/8/1963 DTaP/Tdap/Td series (1 - Tdap) 4/8/1974 FOBT Q 1 YEAR AGE 50-75 4/8/2003 ZOSTER VACCINE AGE 60> 4/8/2013 Pneumococcal 19-64 Highest Risk (2 of 3 - PCV13) 8/1/2013 INFLUENZA AGE 9 TO ADULT 8/1/2017 HEMOGLOBIN A1C Q6M 8/8/2017 Allergies as of 6/5/2017  Review Complete On: 6/5/2017 By: Reddy Roger No Known Allergies Current Immunizations  Reviewed on 8/23/2013 Name Date Pneumococcal Vaccine (Unspecified Type) 8/1/2012 Not reviewed this visit You Were Diagnosed With   
  
 Codes Comments Back abscess    -  Primary ICD-10-CM: B54.447 ICD-9-CM: 871. 2 Abscess of right thigh     ICD-10-CM: L02.415 ICD-9-CM: 652. 6 Vitals BP Pulse Resp Height(growth percentile) Weight(growth percentile) SpO2  
 184/79 (BP 1 Location: Right arm, BP Patient Position: Sitting) 81 24 6' 5\" (1.956 m) 313 lb 3.2 oz (142.1 kg) 96% BMI Smoking Status 37.14 kg/m2 Never Smoker BMI and BSA Data Body Mass Index Body Surface Area  
 37.14 kg/m 2 2.78 m 2 Preferred Pharmacy Pharmacy Name Phone [phone] [phone] Your Updated Medication List  
  
   
This list is accurate as of: 6/5/17 11:59 PM.  Always use your most recent med list.  
  
  
  
  
 acetaminophen 325 mg tablet Commonly known as:  TYLENOL Take 2 Tabs by mouth every four (4) hours as needed for Pain or Fever. alcohol swabs Padm Commonly known as:  ALCOHOL PADS  
1 Each by Apply Externally route two (2) times a day. amLODIPine 10 mg tablet Commonly known as:  Mosetta Hark Take 1 Tab by mouth daily. amoxicillin-clavulanate 875-125 mg per tablet Commonly known as:  AUGMENTIN Take 1 Tab by mouth every twelve (12) hours for 8 days. aspirin delayed-release 81 mg tablet Take 1 Tab by mouth daily. carvedilol 12.5 mg tablet Commonly known as:  COREG  
  
 cloNIDine HCl 0.2 mg tablet Commonly known as:  CATAPRES Take 1 Tab by mouth two (2) times a day. digoxin 0.125 mg tablet Commonly known as:  LANOXIN Take 0.125 mg by mouth daily. Indications: afib * insulin lispro protamine/insulin lispro 100 unit/mL (75-25) injection Commonly known as:  HUMALOG MIX 75/25  
60 Units by SubCUTAneous route Before breakfast and dinner. * insulin lispro protamine/insulin lispro 100 unit/mL (75-25) injection Commonly known as:  HUMALOG MIX 75/25  
65 units subcutaneous injection twice daily before meals (before breakfast and dinner)  
  
 oxyCODONE-acetaminophen 5-325 mg per tablet Commonly known as:  PERCOCET Take 1-2 Tabs by mouth every six (6) hours as needed. Max Daily Amount: 8 Tabs. pravastatin 20 mg tablet Commonly known as:  PRAVACHOL Take 20 mg by mouth nightly. * Notice: This list has 2 medication(s) that are the same as other medications prescribed for you. Read the directions carefully, and ask your doctor or other care provider to review them with you. Follow-up Instructions Return in about 2 weeks (around 6/19/2017). Introducing [facility] SERVICES! [place] [employer] introduces [employer] patient portal. Now you can access parts of your medical record, email your doctor's office, and request medication refills online. 1. In your internet browser, go to [email or web address] 2. Click on the First Time User? Click Here link in the Sign In box. You will see the New Member Sign Up page. 3. Enter your [employer] Access Code exactly as it appears below. You will not need to use this code after youve completed the sign-up process. If you do not sign up before the expiration date, you must request a new code. · [employer] Access Code: [email or web address] Expires: 9/16/2017  3:47 PM 
 
4. Enter the last four digits of your Social Security Number (xxxx) and Date of Birth (mm/dd/yyyy) as indicated and click Submit. You will be taken to the next sign-up page. 5. Create a [employer] ID. This will be your [employer] login ID and cannot be changed, so think of one that is secure and easy to remember. 6. Create a [employer] password. You can change your password at any time. 7. Enter your Password Reset Question and Answer. This can be used at a later time if you forget your password. 8. Enter your e-mail address. You will receive e-mail notification when new information is available in [address] 9. Click Sign Up. You can now view and download portions of your medical record. 10. Click the Download Summary menu link to download a portable copy of your medical information. If you have questions, please visit the Frequently Asked Questions section of the [employer] website. Remember, [employer] is NOT to be used for urgent needs. For medical emergencies, dial 911. Now available from your iPhone and Android! Please provide this summary of care documentation to your next provider. Your primary care clinician is listed as Eduardo Hudson. If you have any questions after today's visit, please call [phone].

## 2017-06-19 ENCOUNTER — OFFICE VISIT (OUTPATIENT)
Dept: SURGERY | Age: 62
End: 2017-06-19

## 2017-06-19 VITALS
BODY MASS INDEX: 37.19 KG/M2 | WEIGHT: 315 LBS | OXYGEN SATURATION: 100 % | SYSTOLIC BLOOD PRESSURE: 188 MMHG | HEART RATE: 69 BPM | HEIGHT: 77 IN | DIASTOLIC BLOOD PRESSURE: 78 MMHG

## 2017-06-19 DIAGNOSIS — L02.212 BACK ABSCESS: Primary | ICD-10-CM

## 2017-06-19 DIAGNOSIS — L02.415 ABSCESS OF RIGHT THIGH: ICD-10-CM

## 2017-06-19 NOTE — MR AVS SNAPSHOT
Visit Information Date & Time Provider Department Dept. Phone Encounter #  
 6/19/2017  8:20 AM Julieth Clement MD [facility] [address] [phone] [id number] Follow-up Instructions Return in about 3 weeks (around 7/10/2017). Follow-up and Disposition History Upcoming Health Maintenance Date Due Hepatitis C Screening 1955 LIPID PANEL Q1 1955 FOOT EXAM Q1 4/8/1963 MICROALBUMIN Q1 4/8/1963 EYE EXAM RETINAL OR DILATED Q1 4/8/1963 DTaP/Tdap/Td series (1 - Tdap) 4/8/1974 FOBT Q 1 YEAR AGE 50-75 4/8/2003 ZOSTER VACCINE AGE 60> 4/8/2013 Pneumococcal 19-64 Highest Risk (2 of 3 - PCV13) 8/1/2013 INFLUENZA AGE 9 TO ADULT 8/1/2017 HEMOGLOBIN A1C Q6M 8/8/2017 Allergies as of 6/19/2017  Review Complete On: 6/19/2017 By: Julieth Clement MD  
 No Known Allergies Current Immunizations  Reviewed on 8/23/2013 Name Date Pneumococcal Vaccine (Unspecified Type) 8/1/2012 Not reviewed this visit You Were Diagnosed With   
  
 Codes Comments Back abscess    -  Primary ICD-10-CM: P40.109 ICD-9-CM: 460. 2 Abscess of right thigh     ICD-10-CM: L02.415 ICD-9-CM: 180. 6 Vitals BP Pulse Height(growth percentile) Weight(growth percentile) SpO2 BMI  
 188/78 (BP 1 Location: Right arm, BP Patient Position: Sitting) 69 6' 5\" (1.956 m) 321 lb 6.4 oz (145.8 kg) 100% 38.11 kg/m2 Smoking Status Never Smoker BMI and BSA Data Body Mass Index Body Surface Area  
 38.11 kg/m 2 2.81 m 2 Preferred Pharmacy Pharmacy Name Phone [id number] [facility] [phone] Your Updated Medication List  
  
   
This list is accurate as of: 6/19/17  9:19 AM.  Always use your most recent med list.  
  
  
  
  
 acetaminophen 325 mg tablet Commonly known as:  TYLENOL  
 Take 2 Tabs by mouth every four (4) hours as needed for Pain or Fever. alcohol swabs Padm Commonly known as:  ALCOHOL PADS  
1 Each by Apply Externally route two (2) times a day. amLODIPine 10 mg tablet Commonly known as:  Fozia Dural Take 1 Tab by mouth daily. aspirin delayed-release 81 mg tablet Take 1 Tab by mouth daily. carvedilol 12.5 mg tablet Commonly known as:  COREG  
  
 cloNIDine HCl 0.2 mg tablet Commonly known as:  CATAPRES Take 1 Tab by mouth two (2) times a day. digoxin 0.125 mg tablet Commonly known as:  LANOXIN Take 0.125 mg by mouth daily. Indications: afib * insulin lispro protamine/insulin lispro 100 unit/mL (75-25) injection Commonly known as:  HUMALOG MIX 75/25  
60 Units by SubCUTAneous route Before breakfast and dinner. * insulin lispro protamine/insulin lispro 100 unit/mL (75-25) injection Commonly known as:  HUMALOG MIX 75/25  
65 units subcutaneous injection twice daily before meals (before breakfast and dinner)  
  
 oxyCODONE-acetaminophen 5-325 mg per tablet Commonly known as:  PERCOCET Take 1-2 Tabs by mouth every six (6) hours as needed. Max Daily Amount: 8 Tabs. pravastatin 20 mg tablet Commonly known as:  PRAVACHOL Take 20 mg by mouth nightly. * Notice: This list has 2 medication(s) that are the same as other medications prescribed for you. Read the directions carefully, and ask your doctor or other care provider to review them with you. Follow-up Instructions Return in about 3 weeks (around 7/10/2017). Introducing [facility] & HEALTH SERVICES! Frank Pulido introduces [employer] patient portal. Now you can access parts of your medical record, email your doctor's office, and request medication refills online. 1. In your internet browser, go to [email or web address] 2. Click on the First Time User? Click Here link in the Sign In box. You will see the New Member Sign Up page. 3. Enter your [employer] Access Code exactly as it appears below. You will not need to use this code after youve completed the sign-up process. If you do not sign up before the expiration date, you must request a new code. · [employer] Access Code: [email or web address] Expires: 9/16/2017  3:47 PM 
 
4. Enter the last four digits of your Social Security Number (xxxx) and Date of Birth (mm/dd/yyyy) as indicated and click Submit. You will be taken to the next sign-up page. 5. Create a [employer] ID. This will be your [employer] login ID and cannot be changed, so think of one that is secure and easy to remember. 6. Create a [employer] password. You can change your password at any time. 7. Enter your Password Reset Question and Answer. This can be used at a later time if you forget your password. 8. Enter your e-mail address. You will receive e-mail notification when new information is available in [address]. 9. Click Sign Up. You can now view and download portions of your medical record. 10. Click the Download Summary menu link to download a portable copy of your medical information. If you have questions, please visit the Frequently Asked Questions section of the [employer] website. Remember, [employer] is NOT to be used for urgent needs. For medical emergencies, dial 911. Now available from your iPhone and Android! Please provide this summary of care documentation to your next provider. Your primary care clinician is listed as Estephania Briones. If you have any questions after today's visit, please call [phone].

## 2017-06-19 NOTE — PROGRESS NOTES
The patient is status post incision and drainage of abscess of back and right thigh 5/23/2017. He reports no complaints.     On examination the right thigh wound is clean and fairly shallow. Size 2 cm wide by 1 cm deep. Gel dressing applied there. The back wound is about 2.5 inches across and about 1/2 inch deep with granulation.  Gel and gauze dressing was applied there with the use of gauze packing.     The patient will continue with wound care at home and see me in follow up in 3 weeks.     Final Diagnosis: Status post I & D of abscesses.

## 2017-07-12 ENCOUNTER — OFFICE VISIT (OUTPATIENT)
Dept: SURGERY | Age: 62
End: 2017-07-12

## 2017-07-12 VITALS
SYSTOLIC BLOOD PRESSURE: 190 MMHG | BODY MASS INDEX: 37.19 KG/M2 | DIASTOLIC BLOOD PRESSURE: 76 MMHG | HEART RATE: 69 BPM | OXYGEN SATURATION: 96 % | WEIGHT: 315 LBS | HEIGHT: 77 IN

## 2017-07-12 DIAGNOSIS — L02.415 ABSCESS OF RIGHT THIGH: ICD-10-CM

## 2017-07-12 DIAGNOSIS — L02.212 BACK ABSCESS: Primary | ICD-10-CM

## 2017-07-12 NOTE — MR AVS SNAPSHOT
Visit Information Date & Time Provider Department Dept. Phone Encounter #  
 7/12/2017 10:00 AM Leonie Marcelino MD [facility] [address] [phone] [id number] Upcoming Health Maintenance Date Due Hepatitis C Screening 1955 LIPID PANEL Q1 1955 FOOT EXAM Q1 4/8/1963 MICROALBUMIN Q1 4/8/1963 EYE EXAM RETINAL OR DILATED Q1 4/8/1963 DTaP/Tdap/Td series (1 - Tdap) 4/8/1974 FOBT Q 1 YEAR AGE 50-75 4/8/2003 ZOSTER VACCINE AGE 60> 4/8/2013 Pneumococcal 19-64 Highest Risk (2 of 3 - PCV13) 8/1/2013 HEMOGLOBIN A1C Q6M 8/8/2017 INFLUENZA AGE 9 TO ADULT 8/1/2017 Allergies as of 7/12/2017  Review Complete On: 7/12/2017 By: Hood Guerrero LPN No Known Allergies Current Immunizations  Reviewed on 8/23/2013 Name Date Pneumococcal Vaccine (Unspecified Type) 8/1/2012 Not reviewed this visit Vitals BP Pulse Height(growth percentile) Weight(growth percentile) SpO2 BMI  
 190/76 (BP 1 Location: Right arm, BP Patient Position: Sitting) 69 6' 5\" (1.956 m) 328 lb (148.8 kg) 96% 38.9 kg/m2 Smoking Status Never Smoker BMI and BSA Data Body Mass Index Body Surface Area 38.9 kg/m 2 2.84 m 2 Preferred Pharmacy Pharmacy Name Phone [id number] [phone] [facility] [phone] Your Updated Medication List  
  
   
This list is accurate as of: 7/12/17 10:46 AM.  Always use your most recent med list.  
  
  
  
  
 acetaminophen 325 mg tablet Commonly known as:  TYLENOL Take 2 Tabs by mouth every four (4) hours as needed for Pain or Fever. alcohol swabs Padm Commonly known as:  ALCOHOL PADS  
1 Each by Apply Externally route two (2) times a day. amLODIPine 10 mg tablet Commonly known as:  Jay Jay Railing Take 1 Tab by mouth daily. aspirin delayed-release 81 mg tablet Take 1 Tab by mouth daily. carvedilol 12.5 mg tablet Commonly known as:  COREG  
  
 cloNIDine HCl 0.2 mg tablet Commonly known as:  CATAPRES Take 1 Tab by mouth two (2) times a day. digoxin 0.125 mg tablet Commonly known as:  LANOXIN Take 0.125 mg by mouth daily. Indications: afib * insulin lispro protamine/insulin lispro 100 unit/mL (75-25) injection Commonly known as:  HUMALOG MIX 75/25  
60 Units by SubCUTAneous route Before breakfast and dinner. * insulin lispro protamine/insulin lispro 100 unit/mL (75-25) injection Commonly known as:  HUMALOG MIX 75/25  
65 units subcutaneous injection twice daily before meals (before breakfast and dinner)  
  
 oxyCODONE-acetaminophen 5-325 mg per tablet Commonly known as:  PERCOCET Take 1-2 Tabs by mouth every six (6) hours as needed. Max Daily Amount: 8 Tabs. pravastatin 20 mg tablet Commonly known as:  PRAVACHOL Take 20 mg by mouth nightly. * Notice: This list has 2 medication(s) that are the same as other medications prescribed for you. Read the directions carefully, and ask your doctor or other care provider to review them with you. Introducing [facility] & HEALTH SERVICES! Tiffany Yu introduces [employer] patient portal. Now you can access parts of your medical record, email your doctor's office, and request medication refills online. 1. In your internet browser, go to [email or web address] 2. Click on the First Time User? Click Here link in the Sign In box. You will see the New Member Sign Up page. 3. Enter your [employer] Access Code exactly as it appears below. You will not need to use this code after youve completed the sign-up process. If you do not sign up before the expiration date, you must request a new code. · [employer] Access Code: [email or web address] Expires: 9/16/2017  3:47 PM 
 
4.  Enter the last four digits of your Social Security Number (xxxx) and Date of Birth (mm/dd/yyyy) as indicated and click Submit. You will be taken to the next sign-up page. 5. Create a [employer] ID. This will be your [employer] login ID and cannot be changed, so think of one that is secure and easy to remember. 6. Create a [employer] password. You can change your password at any time. 7. Enter your Password Reset Question and Answer. This can be used at a later time if you forget your password. 8. Enter your e-mail address. You will receive e-mail notification when new information is available in [address] 9. Click Sign Up. You can now view and download portions of your medical record. 10. Click the Download Summary menu link to download a portable copy of your medical information. If you have questions, please visit the Frequently Asked Questions section of the [employer] website. Remember, [employer] is NOT to be used for urgent needs. For medical emergencies, dial 911. Now available from your iPhone and Android! Please provide this summary of care documentation to your next provider. Your primary care clinician is listed as Maxim London. If you have any questions after today's visit, please call [phone].

## 2017-07-12 NOTE — PROGRESS NOTES
The patient is status post debridement of abscesses of thigh and back. He has no complaints. Home health nurses are doing wound care. The patient reports his blood sugars now run between 200-250. This is actually improved compared to previous. On examination the right thigh site is down to a clean granulating site about 3 mm across without depth. I applied a dry Band-Aid there. The back wound which had been circular about 2-1/2 inches across is now about 4 cm long and 2 cm wide without significant depth. Gel dressing was applied there. The patient will continue with gel dressings on the back. He can use a dry Band-Aid daily on the right thigh site until it finishes healing. The patient will follow up in one month. Final Diagnosis:  Status post debridement of abscesses back and thigh.      MedDATA/sachio

## 2017-07-29 ENCOUNTER — HOSPITAL ENCOUNTER (INPATIENT)
Age: 62
LOS: 4 days | Discharge: HOME HEALTH | DRG: 065 | End: 2017-08-02
Attending: EMERGENCY MEDICINE | Admitting: HOSPITALIST
Payer: MEDICARE

## 2017-07-29 ENCOUNTER — APPOINTMENT (OUTPATIENT)
Dept: CT IMAGING | Age: 62
DRG: 065 | End: 2017-07-29
Attending: EMERGENCY MEDICINE
Payer: MEDICARE

## 2017-07-29 ENCOUNTER — APPOINTMENT (OUTPATIENT)
Dept: GENERAL RADIOLOGY | Age: 62
DRG: 065 | End: 2017-07-29
Attending: EMERGENCY MEDICINE
Payer: MEDICARE

## 2017-07-29 DIAGNOSIS — I48.0 PAROXYSMAL ATRIAL FIBRILLATION (HCC): ICD-10-CM

## 2017-07-29 DIAGNOSIS — Z79.4 TYPE 2 DIABETES MELLITUS WITH DIABETIC POLYNEUROPATHY, WITH LONG-TERM CURRENT USE OF INSULIN (HCC): ICD-10-CM

## 2017-07-29 DIAGNOSIS — I16.0 HYPERTENSIVE URGENCY: ICD-10-CM

## 2017-07-29 DIAGNOSIS — E11.42 TYPE 2 DIABETES MELLITUS WITH DIABETIC POLYNEUROPATHY, WITH LONG-TERM CURRENT USE OF INSULIN (HCC): ICD-10-CM

## 2017-07-29 DIAGNOSIS — R41.3 MEMORY LOSS DUE TO MEDICAL CONDITION: ICD-10-CM

## 2017-07-29 DIAGNOSIS — G45.9 TRANSIENT CEREBRAL ISCHEMIA, UNSPECIFIED TYPE: Primary | ICD-10-CM

## 2017-07-29 DIAGNOSIS — I63.312 CEREBROVASCULAR ACCIDENT (CVA) DUE TO THROMBOSIS OF LEFT MIDDLE CEREBRAL ARTERY (HCC): ICD-10-CM

## 2017-07-29 DIAGNOSIS — I10 ESSENTIAL HYPERTENSION: ICD-10-CM

## 2017-07-29 PROBLEM — I63.9 STROKE (HCC): Status: ACTIVE | Noted: 2017-07-29

## 2017-07-29 LAB
ALBUMIN SERPL BCP-MCNC: 3.2 G/DL (ref 3.5–5)
ALBUMIN/GLOB SERPL: 1 {RATIO} (ref 1.1–2.2)
ALP SERPL-CCNC: 186 U/L (ref 45–117)
ALT SERPL-CCNC: 30 U/L (ref 12–78)
AMPHET UR QL SCN: NEGATIVE
ANION GAP BLD CALC-SCNC: 6 MMOL/L (ref 5–15)
APPEARANCE UR: CLEAR
APTT PPP: 27.2 SEC (ref 22.1–32.5)
AST SERPL W P-5'-P-CCNC: 20 U/L (ref 15–37)
BACTERIA URNS QL MICRO: NEGATIVE /HPF
BARBITURATES UR QL SCN: NEGATIVE
BASOPHILS # BLD AUTO: 0 K/UL (ref 0–0.1)
BASOPHILS # BLD: 0 % (ref 0–1)
BENZODIAZ UR QL: NEGATIVE
BILIRUB SERPL-MCNC: 0.5 MG/DL (ref 0.2–1)
BILIRUB UR QL: NEGATIVE
BUN SERPL-MCNC: 30 MG/DL (ref 6–20)
BUN/CREAT SERPL: 10 (ref 12–20)
CALCIUM SERPL-MCNC: 8.8 MG/DL (ref 8.5–10.1)
CANNABINOIDS UR QL SCN: NEGATIVE
CHLORIDE SERPL-SCNC: 105 MMOL/L (ref 97–108)
CO2 SERPL-SCNC: 26 MMOL/L (ref 21–32)
COCAINE UR QL SCN: NEGATIVE
COLOR UR: ABNORMAL
CREAT SERPL-MCNC: 3.1 MG/DL (ref 0.7–1.3)
DIGOXIN SERPL-MCNC: 0.8 NG/ML (ref 0.9–2)
DRUG SCRN COMMENT,DRGCM: NORMAL
EOSINOPHIL # BLD: 0.1 K/UL (ref 0–0.4)
EOSINOPHIL NFR BLD: 2 % (ref 0–7)
EPITH CASTS URNS QL MICRO: ABNORMAL /LPF
ERYTHROCYTE [DISTWIDTH] IN BLOOD BY AUTOMATED COUNT: 13.8 % (ref 11.5–14.5)
GLOBULIN SER CALC-MCNC: 3.2 G/DL (ref 2–4)
GLUCOSE BLD STRIP.AUTO-MCNC: 176 MG/DL (ref 65–100)
GLUCOSE BLD STRIP.AUTO-MCNC: 241 MG/DL (ref 65–100)
GLUCOSE SERPL-MCNC: 249 MG/DL (ref 65–100)
GLUCOSE UR STRIP.AUTO-MCNC: 500 MG/DL
HCT VFR BLD AUTO: 35.1 % (ref 36.6–50.3)
HGB BLD-MCNC: 12.1 G/DL (ref 12.1–17)
HGB UR QL STRIP: ABNORMAL
INR PPP: 1 (ref 0.9–1.1)
KETONES UR QL STRIP.AUTO: NEGATIVE MG/DL
LEUKOCYTE ESTERASE UR QL STRIP.AUTO: NEGATIVE
LYMPHOCYTES # BLD AUTO: 30 % (ref 12–49)
LYMPHOCYTES # BLD: 1.7 K/UL (ref 0.8–3.5)
MCH RBC QN AUTO: 29.5 PG (ref 26–34)
MCHC RBC AUTO-ENTMCNC: 34.5 G/DL (ref 30–36.5)
MCV RBC AUTO: 85.6 FL (ref 80–99)
METHADONE UR QL: NEGATIVE
MONOCYTES # BLD: 0.2 K/UL (ref 0–1)
MONOCYTES NFR BLD AUTO: 3 % (ref 5–13)
MUCOUS THREADS URNS QL MICRO: ABNORMAL /LPF
NEUTS SEG # BLD: 3.8 K/UL (ref 1.8–8)
NEUTS SEG NFR BLD AUTO: 65 % (ref 32–75)
NITRITE UR QL STRIP.AUTO: NEGATIVE
OPIATES UR QL: NEGATIVE
PCP UR QL: NEGATIVE
PH UR STRIP: 6 [PH] (ref 5–8)
PLATELET # BLD AUTO: 150 K/UL (ref 150–400)
POTASSIUM SERPL-SCNC: 3.9 MMOL/L (ref 3.5–5.1)
PROT SERPL-MCNC: 6.4 G/DL (ref 6.4–8.2)
PROT UR STRIP-MCNC: >300 MG/DL
PROTHROMBIN TIME: 10.5 SEC (ref 9–11.1)
RBC # BLD AUTO: 4.1 M/UL (ref 4.1–5.7)
RBC #/AREA URNS HPF: ABNORMAL /HPF (ref 0–5)
SERVICE CMNT-IMP: ABNORMAL
SERVICE CMNT-IMP: ABNORMAL
SODIUM SERPL-SCNC: 137 MMOL/L (ref 136–145)
SP GR UR REFRACTOMETRY: 1.02 (ref 1–1.03)
THERAPEUTIC RANGE,PTTT: NORMAL SECS (ref 58–77)
UA: UC IF INDICATED,UAUC: ABNORMAL
UROBILINOGEN UR QL STRIP.AUTO: 1 EU/DL (ref 0.2–1)
WBC # BLD AUTO: 5.8 K/UL (ref 4.1–11.1)
WBC URNS QL MICRO: ABNORMAL /HPF (ref 0–4)

## 2017-07-29 PROCEDURE — 85610 PROTHROMBIN TIME: CPT

## 2017-07-29 PROCEDURE — 74011250637 HC RX REV CODE- 250/637: Performed by: HOSPITALIST

## 2017-07-29 PROCEDURE — 80053 COMPREHEN METABOLIC PANEL: CPT | Performed by: EMERGENCY MEDICINE

## 2017-07-29 PROCEDURE — 65660000000 HC RM CCU STEPDOWN

## 2017-07-29 PROCEDURE — 93005 ELECTROCARDIOGRAM TRACING: CPT

## 2017-07-29 PROCEDURE — 85610 PROTHROMBIN TIME: CPT | Performed by: EMERGENCY MEDICINE

## 2017-07-29 PROCEDURE — 36415 COLL VENOUS BLD VENIPUNCTURE: CPT | Performed by: EMERGENCY MEDICINE

## 2017-07-29 PROCEDURE — 85730 THROMBOPLASTIN TIME PARTIAL: CPT | Performed by: EMERGENCY MEDICINE

## 2017-07-29 PROCEDURE — 74011636637 HC RX REV CODE- 636/637: Performed by: HOSPITALIST

## 2017-07-29 PROCEDURE — 80162 ASSAY OF DIGOXIN TOTAL: CPT | Performed by: EMERGENCY MEDICINE

## 2017-07-29 PROCEDURE — 80307 DRUG TEST PRSMV CHEM ANLYZR: CPT | Performed by: EMERGENCY MEDICINE

## 2017-07-29 PROCEDURE — 70450 CT HEAD/BRAIN W/O DYE: CPT

## 2017-07-29 PROCEDURE — 81001 URINALYSIS AUTO W/SCOPE: CPT | Performed by: EMERGENCY MEDICINE

## 2017-07-29 PROCEDURE — 82962 GLUCOSE BLOOD TEST: CPT

## 2017-07-29 PROCEDURE — 71010 XR CHEST PORT: CPT

## 2017-07-29 PROCEDURE — 85025 COMPLETE CBC W/AUTO DIFF WBC: CPT | Performed by: EMERGENCY MEDICINE

## 2017-07-29 PROCEDURE — 99285 EMERGENCY DEPT VISIT HI MDM: CPT

## 2017-07-29 RX ORDER — HYDRALAZINE HYDROCHLORIDE 20 MG/ML
10 INJECTION INTRAMUSCULAR; INTRAVENOUS
Status: DISCONTINUED | OUTPATIENT
Start: 2017-07-29 | End: 2017-08-02 | Stop reason: HOSPADM

## 2017-07-29 RX ORDER — SODIUM CHLORIDE 0.9 % (FLUSH) 0.9 %
5-10 SYRINGE (ML) INJECTION AS NEEDED
Status: DISCONTINUED | OUTPATIENT
Start: 2017-07-29 | End: 2017-08-02 | Stop reason: HOSPADM

## 2017-07-29 RX ORDER — SODIUM CHLORIDE 0.9 % (FLUSH) 0.9 %
5-10 SYRINGE (ML) INJECTION EVERY 8 HOURS
Status: DISCONTINUED | OUTPATIENT
Start: 2017-07-29 | End: 2017-08-02 | Stop reason: HOSPADM

## 2017-07-29 RX ORDER — INSULIN LISPRO 100 [IU]/ML
INJECTION, SOLUTION INTRAVENOUS; SUBCUTANEOUS
Status: DISCONTINUED | OUTPATIENT
Start: 2017-07-30 | End: 2017-08-02 | Stop reason: HOSPADM

## 2017-07-29 RX ORDER — MAGNESIUM SULFATE 100 %
4 CRYSTALS MISCELLANEOUS AS NEEDED
Status: DISCONTINUED | OUTPATIENT
Start: 2017-07-29 | End: 2017-08-02 | Stop reason: HOSPADM

## 2017-07-29 RX ORDER — CLONIDINE HYDROCHLORIDE 0.1 MG/1
0.2 TABLET ORAL EVERY 12 HOURS
Status: DISCONTINUED | OUTPATIENT
Start: 2017-07-29 | End: 2017-08-02 | Stop reason: HOSPADM

## 2017-07-29 RX ORDER — DEXTROSE 50 % IN WATER (D50W) INTRAVENOUS SYRINGE
12.5-25 AS NEEDED
Status: DISCONTINUED | OUTPATIENT
Start: 2017-07-29 | End: 2017-08-02 | Stop reason: HOSPADM

## 2017-07-29 RX ORDER — CARVEDILOL 12.5 MG/1
12.5 TABLET ORAL 2 TIMES DAILY WITH MEALS
Status: DISCONTINUED | OUTPATIENT
Start: 2017-07-29 | End: 2017-08-02 | Stop reason: HOSPADM

## 2017-07-29 RX ADMIN — Medication 10 ML: at 22:00

## 2017-07-29 RX ADMIN — CLONIDINE HYDROCHLORIDE 0.2 MG: 0.1 TABLET ORAL at 22:49

## 2017-07-29 RX ADMIN — CARVEDILOL 12.5 MG: 12.5 TABLET, FILM COATED ORAL at 22:49

## 2017-07-29 RX ADMIN — INSULIN HUMAN 30 UNITS: 100 INJECTION, SUSPENSION SUBCUTANEOUS at 22:49

## 2017-07-29 NOTE — ED NOTES
Patient provided urinal at this time, patient voided at this time. 200 mL output, urine sample obtained and sent to lab.

## 2017-07-29 NOTE — PROGRESS NOTES
Spiritual Care Assessment/Progress Notes    Betty Reece [id number]  [id number]    1955  57 y.o.  male    Patient Telephone Number: [phone] (home)   Baptism Affiliation: No Faith   Language: English   Extended Emergency Contact Information  Primary Emergency Contact: [address] Phone: [phone]  Relation: Other Relative  Secondary Emergency Contact: Ritesh Hobbs  Home Phone: [phone]  Relation: Other Relative   Patient Active Problem List    Diagnosis Date Noted    Abscess 05/22/2017    Acute renal failure ([facility] [address] [facility] 12/20/2016    [facility] 05/10/2016     Class: Chronic    Vitamin D deficiency 05/10/2016     Class: Chronic    CKD (chronic kidney disease), stage IV ([facility] 05/10/2016     Class: Chronic    HTN (hypertension) 07/29/2013    DM (diabetes mellitus) ([facility] 07/26/2013    Hairy cell leukemia, in remission ([facility] 07/26/2013     Class: Present on Admission    Morbid obesity ([facility] 07/26/2013        Date: 7/29/2017       Level of Baptism/Spiritual Activity:  []         Involved in ale tradition/spiritual practice    []         Not involved in ale tradition/spiritual practice  []         Spiritually oriented    []         Claims no spiritual orientation    []         seeking spiritual identity  []         Feels alienated from Pentecostalism practice/tradition  []         Feels angry about Pentecostalism practice/tradition  []         Spirituality/Pentecostalism tradition   a resource for coping at this time.   [x]         Not able to assess due to medical condition    Services Provided Today:  []         crisis intervention    []         reading Scriptures  [x]         spiritual assessment    [x]         prayer  [x]         empathic listening/emotional support  []         rites and rituals (cite in comments)  []         life review     []         Pentecostalism support  []         theological development    []         advocacy  []         ethical dialog     [] blessing  []         bereavement support    []         support to family  []         anticipatory grief support   []         help with AMD  []         spiritual guidance    []         meditation      Spiritual Care Needs  []         Emotional Support  []         Spiritual/Mormon Care  []         Loss/Adjustment  []         Advocacy/Referral                /Ethics  []         No needs expressed at               this time  []         Other: (note in               comments)  [address]  []         Follow up visits with               pt/family  []         Provide materials  []         Schedule sacraments  []         Contact Community               Clergy  [x]         Follow up as needed  []         Other: (note in               comments)     Comments:   Responded to Code Stroke in ER. No family present. Patient was being cared for by clinical staff.  will be paged if needed.   Miki Mullins, MPS, [address] [facility], [address] [place] [address] [address]     Paging Service  [phone])

## 2017-07-29 NOTE — ED NOTES
Bedside and Verbal shift change report given to Herbster TORY EDWARDS RN (oncoming nurse) by EMMY Banks (offgoing nurse). Report included the following information SBAR, Kardex, ED Summary, MAR, Accordion, Recent Results and Cardiac Rhythm NSR.

## 2017-07-29 NOTE — IP AVS SNAPSHOT
[address] [facility] 
[phone] Patient: Concepcion Bernabe 
MRN: [id number] :1955 You are allergic to the following No active allergies Recent Documentation Height Weight BMI Smoking Status 1.956 m 147.1 kg 38.46 kg/m2 Never Smoker Emergency Contacts Name Discharge Info Relation Home Work Mobile Andrey Lilly  Other Relative [6] [phone] Ritesh Hobbs  Other Relative [6] [phone] Margareth Donaldson     [phone] About your hospitalization You were admitted on:  2017 You last received care in the:  [facility] 3 NEUROSCIENCE TELEMETRY You were discharged on:  2017 Unit phone number:  [phone] Why you were hospitalized Your primary diagnosis was:  Stroke (Hcc) Your diagnoses also included: Morbid Obesity (Hcc), Htn (Hypertension), A-Fib (Hcc) Providers Seen During Your Hospitalizations Provider Role Specialty Primary office phone Tiffanie Fong MD Attending Provider Emergency Medicine [phone] Celeste Black MD Attending Provider Internal Medicine [phone] Porfirio Lynn MD Attending Provider Internal Medicine [phone] Jose Parish MD Attending Provider Internal Medicine [phone] Your Primary Care Physician (PCP) Primary Care Physician Office Phone Office Fax [facility] [phone] Follow-up Information Follow up With Details Comments Contact Info Ileana Faith NP Schedule an appointment as soon as possible for a visit in 1 week Please call to schedule a hospital follow-up appointment for next week at  [address] [place] [address] [phone] Oxana Mcfarlane NP On 2017 9:30am  Neurology follow-up [address] [facility] 
[phone] Yanci 47, OT, Nursing, Social Work [facility] [phone] Your Appointments Thursday August 03, 2017  1:40 PM EDT ACUTE CARE with Babita Rueda MD  
[facility] Dr. Gong Dejahmarvabenjamin [facility] ([address]) [address] [place] [address], [address] [facility]  
[phone] Thursday August 31, 2017 10:00 AM EDT Follow Up with Td Garcias NP Neurology Clinic at [facility] [address] [facility] 200 [facility], 
[address] [place] [address], [address] [facility]  
[phone] Current Discharge Medication List  
  
START taking these medications Dose & Instructions Dispensing Information Comments Morning Noon Evening Bedtime  
 clopidogrel 75 mg Tab Commonly known as:  PLAVIX Your last dose was: Your next dose is:    
   
   
 Dose:  75 mg Take 1 Tab by mouth daily for 30 days. Quantity:  30 Tab Refills:  0  
     
   
   
   
  
 hydroCHLOROthiazide 12.5 mg capsule Commonly known as:  Ivelisse Alexis Your last dose was: Your next dose is:    
   
   
 Dose:  12.5 mg Take 1 Cap by mouth daily for 30 days. Quantity:  30 Cap Refills:  0 CONTINUE these medications which have CHANGED Dose & Instructions Dispensing Information Comments Morning Noon Evening Bedtime  
 carvedilol 12.5 mg tablet Commonly known as:  Dianalenore Bolden What changed:   
- how much to take 
- how to take this - when to take this Your last dose was: Your next dose is:    
   
   
 Dose:  12.5 mg Take 1 Tab by mouth two (2) times daily (with meals) for 30 days. Quantity:  60 Tab Refills:  0  
     
   
   
   
  
 insulin lispro protamine/insulin lispro 100 unit/mL (75-25) injection Commonly known as:  HUMALOG MIX 75/25 What changed:  Another medication with the same name was removed.  Continue taking this medication, and follow the directions you see here. Your last dose was: Your next dose is:    
   
   
 Dose:  60 Units 60 Units by SubCUTAneous route Before breakfast and dinner. Quantity:  1 Vial  
Refills:  0 CONTINUE these medications which have NOT CHANGED Dose & Instructions Dispensing Information Comments Morning Noon Evening Bedtime  
 alcohol swabs Padm Commonly known as:  ALCOHOL PADS Your last dose was: Your next dose is:    
   
   
 Dose:  1 Each  
1 Each by Apply Externally route two (2) times a day. Quantity:  100 Each Refills:  1  
     
   
   
   
  
 amLODIPine 10 mg tablet Commonly known as:  [place] Bars Your last dose was: Your next dose is:    
   
   
 Dose:  10 mg Take 1 Tab by mouth daily. Quantity:  30 Tab Refills:  1  
     
   
   
   
  
 cloNIDine HCl 0.2 mg tablet Commonly known as:  CATAPRES Your last dose was: Your next dose is:    
   
   
 Dose:  0.2 mg Take 1 Tab by mouth two (2) times a day. Quantity:  30 Tab Refills:  0  
     
   
   
   
  
 digoxin 0.125 mg tablet Commonly known as:  LANOXIN Your last dose was: Your next dose is:    
   
   
 Dose:  0.125 mg Take 0.125 mg by mouth daily. Indications: afib Refills:  0  
     
   
   
   
  
 pravastatin 20 mg tablet Commonly known as:  PRAVACHOL Your last dose was: Your next dose is:    
   
   
 Dose:  20 mg Take 20 mg by mouth nightly. Refills:  0 STOP taking these medications   
 oxyCODONE-acetaminophen 5-325 mg per tablet Commonly known as:  PERCOCET Where to Get Your Medications Information on where to get these meds will be given to you by the nurse or doctor. ! Ask your nurse or doctor about these medications  
  carvedilol 12.5 mg tablet  
 clopidogrel 75 mg Tab hydroCHLOROthiazide 12.5 mg capsule Discharge Instructions DISCHARGE DIAGNOSIS: 
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 
hypertension Hyperlipidemia Chronic kidney disease stage IV Hx of recurrent skin abscess Obesity Body mass index is 38.46 kg/(m^2). Hairy cell leukemia in remission followed by Dr. Edwin Hearn MEDICATIONS: 
· It is important that you take the medication exactly as they are prescribed. · Keep your medication in the bottles provided by the pharmacist and keep a list of the medication names, dosages, and times to be taken in your wallet. · Do not take other medications without consulting your doctor. Pain Management: per above medications What to do at [facility] Recommended diet:  Cardiac Diet Recommended activity: Activity as tolerated If you have questions regarding the hospital related prescriptions or hospital related issues please call [address] [place] [address] at . You can always direct your questions to your primary care doctor if you are unable to reach your hospital physician; your PCP works as an extension of your hospital doctor just like your hospital doctor is an extension of your PCP for your time at the hospital [facility], [facility]). If you experience any of the following symptoms then please call your primary care physician or return to the emergency room if you cannot get hold of your doctor: 
Fever, chills, nausea, vomiting, diarrhea, change in mentation, falling, bleeding, shortness of breath Stop smoking completely Do therapy as directed Discharge Orders None [facility] Announcement We are excited to announce that we are making your provider's discharge notes available to you in [facility].   You will see these notes when they are completed and signed by the physician that discharged you from your recent hospital stay. If you have any questions or concerns about any information you see in [facility], please call the Health Information Department where you were seen or reach out to your Primary Care Provider for more information about your plan of care. Introducing [facility]! Teresa Luis introduces [facility] patient portal. Now you can access parts of your medical record, email your doctor's office, and request medication refills online. 1. In your internet browser, go to [email or web address] 2. Click on the First Time User? Click Here link in the Sign In box. You will see the New Member Sign Up page. 3. Enter your [facility] Access Code exactly as it appears below. You will not need to use this code after youve completed the sign-up process. If you do not sign up before the expiration date, you must request a new code. · [facility] Access Code: [email or web address] Expires: 9/16/2017  3:47 PM 
 
4. Enter the last four digits of your Social Security Number (xxxx) and Date of Birth (mm/dd/yyyy) as indicated and click Submit. You will be taken to the next sign-up page. 5. Create a [facility] ID. This will be your [facility] login ID and cannot be changed, so think of one that is secure and easy to remember. 6. Create a [facility] password. You can change your password at any time. 7. Enter your Password Reset Question and Answer. This can be used at a later time if you forget your password. 8. Enter your e-mail address. You will receive e-mail notification when new information is available in [address] 9. Click Sign Up. You can now view and download portions of your medical record. 10. Click the Download Summary menu link to download a portable copy of your medical information.  
 
If you have questions, please visit the Frequently Asked Questions section of the [employer]. Remember, MyChart is NOT to be used for urgent needs. For medical emergencies, dial 911. Now available from your iPhone and Android! General Information Please provide this summary of care documentation to your next provider. Patient Signature:  ____________________________________________________________ Date:  ____________________________________________________________  
  
Joby Charlton Provider Signature:  ____________________________________________________________ Date:  ____________________________________________________________

## 2017-07-29 NOTE — ED NOTES
Patient arrives via EMS with complaint of right sided weakness and high blood sugar since yesterday around 3pm per patient. Patient stated that he has been feeling dizzy at onset of symptoms; patient stated his blood sugar was 420 and his right arm was weak and \"swinging. \" Per patient weakness worsened today and now has weakness to right leg. Per EMS blood sugar was 234. EKG was NSR. Patient denies chest pain and shortness of breath, N/V/D.

## 2017-07-29 NOTE — ED TRIAGE NOTES
Pre-alert Code S in field per EMS last known well was three hours ago, once patient arrived story changed as told by the patient

## 2017-07-30 ENCOUNTER — APPOINTMENT (OUTPATIENT)
Dept: MRI IMAGING | Age: 62
DRG: 065 | End: 2017-07-30
Attending: HOSPITALIST
Payer: MEDICARE

## 2017-07-30 LAB
ANION GAP BLD CALC-SCNC: 7 MMOL/L (ref 5–15)
ATRIAL RATE: 69 BPM
BUN SERPL-MCNC: 28 MG/DL (ref 6–20)
BUN/CREAT SERPL: 10 (ref 12–20)
CALCIUM SERPL-MCNC: 8.4 MG/DL (ref 8.5–10.1)
CALCULATED P AXIS, ECG09: 40 DEGREES
CALCULATED R AXIS, ECG10: -16 DEGREES
CALCULATED T AXIS, ECG11: 8 DEGREES
CHLORIDE SERPL-SCNC: 105 MMOL/L (ref 97–108)
CHOLEST SERPL-MCNC: 189 MG/DL
CO2 SERPL-SCNC: 25 MMOL/L (ref 21–32)
CREAT SERPL-MCNC: 2.76 MG/DL (ref 0.7–1.3)
DIAGNOSIS, 93000: NORMAL
EST. AVERAGE GLUCOSE BLD GHB EST-MCNC: 232 MG/DL
GLUCOSE BLD STRIP.AUTO-MCNC: 240 MG/DL (ref 65–100)
GLUCOSE BLD STRIP.AUTO-MCNC: 259 MG/DL (ref 65–100)
GLUCOSE BLD STRIP.AUTO-MCNC: 333 MG/DL (ref 65–100)
GLUCOSE BLD STRIP.AUTO-MCNC: 370 MG/DL (ref 65–100)
GLUCOSE SERPL-MCNC: 224 MG/DL (ref 65–100)
HBA1C MFR BLD: 9.7 % (ref 4.2–6.3)
HDLC SERPL-MCNC: 34 MG/DL
HDLC SERPL: 5.6 {RATIO} (ref 0–5)
LDLC SERPL CALC-MCNC: 103.6 MG/DL (ref 0–100)
LIPID PROFILE,FLP: ABNORMAL
P-R INTERVAL, ECG05: 194 MS
POTASSIUM SERPL-SCNC: 3.7 MMOL/L (ref 3.5–5.1)
Q-T INTERVAL, ECG07: 376 MS
QRS DURATION, ECG06: 102 MS
QTC CALCULATION (BEZET), ECG08: 402 MS
SERVICE CMNT-IMP: ABNORMAL
SODIUM SERPL-SCNC: 137 MMOL/L (ref 136–145)
TRIGL SERPL-MCNC: 257 MG/DL (ref ?–150)
TSH SERPL DL<=0.05 MIU/L-ACNC: 1.95 UIU/ML (ref 0.36–3.74)
VENTRICULAR RATE, ECG03: 69 BPM
VLDLC SERPL CALC-MCNC: 51.4 MG/DL

## 2017-07-30 PROCEDURE — 74011250637 HC RX REV CODE- 250/637: Performed by: INTERNAL MEDICINE

## 2017-07-30 PROCEDURE — 70544 MR ANGIOGRAPHY HEAD W/O DYE: CPT

## 2017-07-30 PROCEDURE — 65660000000 HC RM CCU STEPDOWN

## 2017-07-30 PROCEDURE — 83036 HEMOGLOBIN GLYCOSYLATED A1C: CPT | Performed by: HOSPITALIST

## 2017-07-30 PROCEDURE — 80061 LIPID PANEL: CPT | Performed by: HOSPITALIST

## 2017-07-30 PROCEDURE — 97116 GAIT TRAINING THERAPY: CPT

## 2017-07-30 PROCEDURE — 74011636637 HC RX REV CODE- 636/637: Performed by: HOSPITALIST

## 2017-07-30 PROCEDURE — G8978 MOBILITY CURRENT STATUS: HCPCS

## 2017-07-30 PROCEDURE — 84443 ASSAY THYROID STIM HORMONE: CPT | Performed by: HOSPITALIST

## 2017-07-30 PROCEDURE — 80048 BASIC METABOLIC PNL TOTAL CA: CPT | Performed by: HOSPITALIST

## 2017-07-30 PROCEDURE — 74011250636 HC RX REV CODE- 250/636: Performed by: HOSPITALIST

## 2017-07-30 PROCEDURE — 97162 PT EVAL MOD COMPLEX 30 MIN: CPT

## 2017-07-30 PROCEDURE — G8979 MOBILITY GOAL STATUS: HCPCS

## 2017-07-30 PROCEDURE — 82962 GLUCOSE BLOOD TEST: CPT

## 2017-07-30 PROCEDURE — 36415 COLL VENOUS BLD VENIPUNCTURE: CPT | Performed by: HOSPITALIST

## 2017-07-30 PROCEDURE — 74011250637 HC RX REV CODE- 250/637: Performed by: HOSPITALIST

## 2017-07-30 PROCEDURE — 70551 MRI BRAIN STEM W/O DYE: CPT

## 2017-07-30 PROCEDURE — 74011636637 HC RX REV CODE- 636/637: Performed by: INTERNAL MEDICINE

## 2017-07-30 RX ORDER — DIGOXIN 125 MCG
0.12 TABLET ORAL DAILY
Status: DISCONTINUED | OUTPATIENT
Start: 2017-07-30 | End: 2017-08-02 | Stop reason: HOSPADM

## 2017-07-30 RX ORDER — ACETAMINOPHEN 325 MG/1
650 TABLET ORAL
Status: DISCONTINUED | OUTPATIENT
Start: 2017-07-30 | End: 2017-08-02 | Stop reason: HOSPADM

## 2017-07-30 RX ORDER — SODIUM CHLORIDE 0.9 % (FLUSH) 0.9 %
5-10 SYRINGE (ML) INJECTION AS NEEDED
Status: DISCONTINUED | OUTPATIENT
Start: 2017-07-30 | End: 2017-08-02 | Stop reason: HOSPADM

## 2017-07-30 RX ORDER — SODIUM CHLORIDE 0.9 % (FLUSH) 0.9 %
5-10 SYRINGE (ML) INJECTION EVERY 8 HOURS
Status: DISCONTINUED | OUTPATIENT
Start: 2017-07-30 | End: 2017-08-02 | Stop reason: HOSPADM

## 2017-07-30 RX ORDER — ASPIRIN 325 MG
325 TABLET ORAL DAILY
Status: DISCONTINUED | OUTPATIENT
Start: 2017-07-30 | End: 2017-07-30

## 2017-07-30 RX ORDER — ONDANSETRON 2 MG/ML
4 INJECTION INTRAMUSCULAR; INTRAVENOUS
Status: DISCONTINUED | OUTPATIENT
Start: 2017-07-30 | End: 2017-08-02 | Stop reason: HOSPADM

## 2017-07-30 RX ORDER — ACETAMINOPHEN 650 MG/1
650 SUPPOSITORY RECTAL
Status: DISCONTINUED | OUTPATIENT
Start: 2017-07-30 | End: 2017-08-02 | Stop reason: HOSPADM

## 2017-07-30 RX ORDER — OXYCODONE AND ACETAMINOPHEN 5; 325 MG/1; MG/1
1-2 TABLET ORAL
Status: DISCONTINUED | OUTPATIENT
Start: 2017-07-30 | End: 2017-08-02 | Stop reason: HOSPADM

## 2017-07-30 RX ORDER — CLOPIDOGREL BISULFATE 75 MG/1
75 TABLET ORAL DAILY
Status: DISCONTINUED | OUTPATIENT
Start: 2017-07-31 | End: 2017-08-02 | Stop reason: HOSPADM

## 2017-07-30 RX ORDER — ENOXAPARIN SODIUM 100 MG/ML
40 INJECTION SUBCUTANEOUS DAILY
Status: DISCONTINUED | OUTPATIENT
Start: 2017-07-30 | End: 2017-08-02 | Stop reason: HOSPADM

## 2017-07-30 RX ORDER — AMLODIPINE BESYLATE 5 MG/1
10 TABLET ORAL DAILY
Status: DISCONTINUED | OUTPATIENT
Start: 2017-07-30 | End: 2017-08-02 | Stop reason: HOSPADM

## 2017-07-30 RX ORDER — PRAVASTATIN SODIUM 10 MG/1
20 TABLET ORAL
Status: DISCONTINUED | OUTPATIENT
Start: 2017-07-30 | End: 2017-08-02 | Stop reason: HOSPADM

## 2017-07-30 RX ADMIN — CLONIDINE HYDROCHLORIDE 0.2 MG: 0.1 TABLET ORAL at 21:40

## 2017-07-30 RX ADMIN — INSULIN LISPRO 65 UNITS: 100 INJECTION, SUSPENSION SUBCUTANEOUS at 08:53

## 2017-07-30 RX ADMIN — Medication 10 ML: at 21:40

## 2017-07-30 RX ADMIN — ENOXAPARIN SODIUM 40 MG: 40 INJECTION SUBCUTANEOUS at 08:53

## 2017-07-30 RX ADMIN — Medication 10 ML: at 05:20

## 2017-07-30 RX ADMIN — INSULIN LISPRO 7 UNITS: 100 INJECTION, SOLUTION INTRAVENOUS; SUBCUTANEOUS at 12:19

## 2017-07-30 RX ADMIN — PRAVASTATIN SODIUM 20 MG: 10 TABLET ORAL at 21:40

## 2017-07-30 RX ADMIN — DIGOXIN 0.12 MG: 125 TABLET ORAL at 08:53

## 2017-07-30 RX ADMIN — Medication 10 ML: at 12:22

## 2017-07-30 RX ADMIN — CARVEDILOL 12.5 MG: 12.5 TABLET, FILM COATED ORAL at 16:34

## 2017-07-30 RX ADMIN — PRAVASTATIN SODIUM 20 MG: 10 TABLET ORAL at 00:24

## 2017-07-30 RX ADMIN — INSULIN LISPRO 3 UNITS: 100 INJECTION, SOLUTION INTRAVENOUS; SUBCUTANEOUS at 21:40

## 2017-07-30 RX ADMIN — INSULIN LISPRO 3 UNITS: 100 INJECTION, SOLUTION INTRAVENOUS; SUBCUTANEOUS at 08:52

## 2017-07-30 RX ADMIN — HYDRALAZINE HYDROCHLORIDE 10 MG: 20 INJECTION INTRAMUSCULAR; INTRAVENOUS at 20:30

## 2017-07-30 RX ADMIN — CLONIDINE HYDROCHLORIDE 0.2 MG: 0.1 TABLET ORAL at 08:53

## 2017-07-30 RX ADMIN — AMLODIPINE BESYLATE 10 MG: 5 TABLET ORAL at 16:34

## 2017-07-30 RX ADMIN — INSULIN LISPRO 70 UNITS: 100 INJECTION, SUSPENSION SUBCUTANEOUS at 16:34

## 2017-07-30 RX ADMIN — INSULIN LISPRO 10 UNITS: 100 INJECTION, SOLUTION INTRAVENOUS; SUBCUTANEOUS at 16:35

## 2017-07-30 RX ADMIN — CARVEDILOL 12.5 MG: 12.5 TABLET, FILM COATED ORAL at 08:53

## 2017-07-30 RX ADMIN — ASPIRIN 325 MG ORAL TABLET 325 MG: 325 PILL ORAL at 08:53

## 2017-07-30 NOTE — ED NOTES
Pt given meal tray as well as insulin and bp medications. Pt reports right sided weakness has begun to improve.

## 2017-07-30 NOTE — PROGRESS NOTES
Bedside shift change report given to 11 Lauren Rishi (oncoming nurse) by Edmar Nunn (offgoing nurse). Report included the following information SBAR, Kardex, Intake/Output, MAR, Accordion and Recent Results.

## 2017-07-30 NOTE — ED NOTES
TRANSFER - OUT REPORT:    Verbal report given to Jeri Morales RN(name) on Moustapha Agrawal  being transferred to room 3120 NSTU(unit) for routine progression of care       Report consisted of patients Situation, Background, Assessment and   Recommendations(SBAR). Information from the following report(s) SBAR, Kardex, ED Summary, [facility] and Recent Results was reviewed with the receiving nurse. Lines:   Peripheral IV 07/29/17 Right Antecubital (Active)   Site Assessment Clean, dry, & intact 7/29/2017  4:12 PM   Phlebitis Assessment 0 7/29/2017  4:12 PM   Infiltration Assessment 0 7/29/2017  4:12 PM   Dressing Status Clean, dry, & intact 7/29/2017  4:12 PM   Dressing Type Transparent 7/29/2017  4:12 PM   Hub Color/Line Status Flushed 7/29/2017  4:12 PM   Action Taken Blood drawn 7/29/2017  4:12 PM        Opportunity for questions and clarification was provided.       Patient transported with:   Monitor

## 2017-07-30 NOTE — PROGRESS NOTES
Problem: Mobility Impaired (Adult and Pediatric)  Goal: *Acute Goals and Plan of Care (Insert Text)  Physical Therapy Goals  Initiated 7/30/2017  1. Patient will move from supine to sit and sit to supine , scoot up and down and roll side to side in bed with independence within 7 day(s). 2. Patient will transfer from bed to chair and chair to bed with independence using the least restrictive device within 7 day(s). 3. Patient will perform sit to stand with independence within 7 day(s). 4. Patient will ambulate with independence for 150 feet with the least restrictive device within 7 day(s). 5. Patient will ascend/descend 5 stairs with one sided handrail(s) with modified independence within 7 day(s). 6. Patient will improve Palafox Balance score by 7 points within 7 days. PHYSICAL THERAPY EVALUATION- NEURO POPULATION     Patient: Ramiro Luu (61 y.o. male)  Date: 7/30/2017  Primary Diagnosis: Stroke [facility])        Precautions:          ASSESSMENT :  Based on the objective data described below, the patient presents with decreased R strength, decreased functional mobility, impaired balance, and unsteady gait following admission for stroke. PTA patient with complicated medical history and recent hospitalizations. Patient is independent with all aspects of functional mobility and ADLs. He drives but does not work. Currently, patient received resting in bed. Patient required CGA for bed mobility. Patient required CGA for sit <> stand. Patient ambulated 50 feet with min A without AD. Patient with unsteady gait with poor foot clearance and occasional RLE buckling, requiring min A to correct. Noted increased buckling with fatigue. Patient scored 25/56 on Palafox balance test, indicating moderate fall risk. Patient reports some numbness/odd sensation in R forearm. Patient with mild coordination deficits in RUE although functional. Patient left sitting in the chair at the conclusion of PT evaluation.  Patient will benefit from IP rehab at discharge. Patient will benefit from skilled intervention to address the above impairments. Patients rehabilitation potential is considered to be Good  Factors which may influence rehabilitation potential include:   [ ]           None noted  [ ]           Mental ability/status  [X]           Medical condition  [ ]           Home/family situation and support systems  [ ]           Safety awareness  [ ]           Pain tolerance/management  [ ]           Other:        PLAN :  Recommendations and Planned Interventions:  [X]             Bed Mobility Training             [X]      Neuromuscular Re-Education  [X]             Transfer Training                   [ ]      Orthotic/Prosthetic Training  [X]             Gait Training                         [ ]      Modalities  [X]             Therapeutic Exercises           [ ]      Edema Management/Control  [X]             Therapeutic Activities            [X]      Patient and Family Training/Education  [ ]             Other (comment):  Frequency/Duration: Patient will be followed by physical therapy 5 times a week to address goals. Discharge Recommendations: Inpatient Rehab  Further Equipment Recommendations for Discharge: TBD       SUBJECTIVE:   Patient stated I feel like this is better than yesterday.       OBJECTIVE DATA SUMMARY:   HISTORY:    Past Medical History:   Diagnosis Date    A-fib [facility]) 2009     Resolved.    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[address]      Cancer ([address]       splenic lymphoma    CKD stage 4 due to type 2 diabetes mellitus ([address]      Diabetes ([address]      Hypertension      Neuropathy ([address]      Other ill-defined conditions       spinal meningitis    Other ill-defined conditions       broken blood vessel to nose    Stroke ([address] 2007     per patient    Vitamin D deficiency       Past Surgical History:   Procedure Laterality Date    HX OTHER SURGICAL   02/06/2017 Excisional debridement of abscess right thigh & right lower back     HX OTHER SURGICAL   05/23/2017     I&D & excisional debridement (skin & subcutaneous tissue) back & right thigh     Prior Level of Function/Home Situation: patient with complicated medical history and recent hospitalizations. Patient is independent with all aspects of functional mobility and ADLs. He drives but does not work. Personal factors and/or comorbidities impacting plan of care: frequent hospitalizations, CVA     Home Situation  Home Environment: Private residence  # Steps to Enter: 5  Rails to Enter: Yes  Hand Rails : Bilateral  One/Two Story Residence: One story  Living Alone: No  Support Systems: Child(richmond), Family member(s)  Patient Expects to be Discharged to[de-identified] Unknown  Current DME Used/Available at Home: Shower chair, Walker, rolling  Tub or Shower Type: Tub/Shower combination     EXAMINATION/PRESENTATION/DECISION MAKING:   Critical Behavior:  Neurologic State: Alert  Orientation Level: Oriented X4  Cognition: Appropriate decision making, Follows commands     Hearing: Auditory  Auditory Impairment: None  Skin:  intact  Edema: none  Range Of Motion:  AROM: Within functional limits           PROM: Within functional limits           Strength:    Strength: Generally decreased, functional (on RLE)                    Tone & Sensation:   Tone: Normal              Sensation: Intact               Coordination:  Coordination: Generally decreased, functional (on R UE)  Vision:      Functional Mobility:  Bed Mobility:  Rolling: Contact guard assistance  Supine to Sit: Contact guard assistance     Scooting: Contact guard assistance  Transfers:  Sit to Stand: Contact guard assistance  Stand to Sit: Contact guard assistance        Bed to Chair: Minimum assistance              Balance:   Sitting: Intact; Without support  Standing: Impaired; Without support  Standing - Static: Constant support; Fair  Standing - Dynamic : Fair  Ambulation/Gait Training:  Distance (ft): 50 Feet (ft)  Assistive Device: Gait belt  Ambulation - Level of Assistance: Minimal assistance     Gait Description (WDL): Exceptions to WDL  Gait Abnormalities: Decreased step clearance;Trunk sway increased (occasional R knee buckling)        Base of Support: Widened  Stance: Right decreased  Speed/Sanjana: Pace decreased (<100 feet/min)  Step Length: Right shortened;Left shortened  Swing Pattern: Right asymmetrical                            Functional Measure  Palafox Balance Test:      Sitting to Standing: 3  Standing Unsupported: 3  Sitting with Back Unsupported: 4  Standing to Sittin  Transfers: 2  Standing Unsupported with Eyes Closed: 3  Standing Unsupported with Feet Together: 0  Reach Forward with Outstretched Arm: 3   Object: 0  Turn to Look Over Shoulders: 3  Turn 360 Degrees: 2  Alternate Foot on Step/Stool: 0  Standing Unsupported One Foot in Front: 0  Stand on One Le  Total: 25             56=Maximum possible score;   0-20=High fall risk  21-40=Moderate fall risk   41-56=Low fall risk      Palafox Balance Test and G-code impairment scale:  Percentage of Impairment CH     0%    CI     1-19% CJ     20-39% CK     40-59% CL     60-79% CM     80-99% CN      100%   Palafox   Score 0-56 56 45-55 34-44 23-33 12-22 1-11 0         G codes: In compliance with CMSs Claims Based Outcome Reporting, the following G-code set was chosen for this patient based on their primary functional limitation being treated: The outcome measure chosen to determine the severity of the functional limitation was the Márquez with a score of 25/56 which was correlated with the impairment scale.       · Mobility - Walking and Moving Around:               - CURRENT STATUS:    CK - 40%-59% impaired, limited or restricted               - GOAL STATUS:           CJ - 20%-39% impaired, limited or restricted               - D/C STATUS:                       ---------------To be determined---------------       Physical Therapy Evaluation Charge Determination   History Examination Presentation Decision-Making   MEDIUM  Complexity : 1-2 comorbidities / personal factors will impact the outcome/ POC  MEDIUM Complexity : 3 Standardized tests and measures addressing body structure, function, activity limitation and / or participation in recreation  MEDIUM Complexity : Evolving with changing characteristics  Other outcome measures Palafox  MEDIUM      Based on the above components, the patient evaluation is determined to be of the following complexity level: MEDIUM     Therapeutic Exercises:      Pain:  Pain Scale 1: Numeric (0 - 10)  Pain Intensity 1: 0              Activity Tolerance:   Fair, VSS  Please refer to the flowsheet for vital signs taken during this treatment. After treatment:   [X]     Patient left in no apparent distress sitting up in chair  [ ]     Patient left in no apparent distress in bed  [X]     Call bell left within reach  [X]     Nursing notified  [ ]     Caregiver present  [ ]     Bed alarm activated      COMMUNICATION/EDUCATION:   The patients plan of care was discussed with: Registered Nurse and . Patient was educated regarding His deficit(s) of R sided weakness as this relates to His diagnosis of CVA workup. He demonstrated good understanding as evidenced by verbal feedback. Patient and/or family was verbally educated on the BE FAST acronym for signs/symptoms of CVA and TIA. BE FAST was written on patient's communication board  for visual education and reinforcement. All questions answered with patient indicating good understanding.      [X]  Fall prevention education was provided and the patient/caregiver indicated understanding. [X]  Patient/family have participated as able in goal setting and plan of care. [X]  Patient/family agree to work toward stated goals and plan of care.   [ ]  Patient understands intent and goals of therapy, but is neutral about his/her participation. [ ]  Patient is unable to participate in goal setting and plan of care.      Thank you for this referral.  Zakia Betancourt, PT, DPT   Time Calculation: 23 mins

## 2017-07-30 NOTE — PROGRESS NOTES
Hospitalist Progress Note    NAME: Ramiro Douglas   :  1955   MRN:  [id number]   LOS:   1      Assessment / Plan:  Multiple acute infarctions in left periventricular white matter and left basal ganglia  Right sided weakness  -scattered infarcts suggesting embolic etiology  -does have history of afib not on anticoagulation, patient isn't sure why he is not on [facility] but says he had nose bleeds in the past  -await neurology evaluation  -consider switch to atorvastatin, LDL is 100  -hba1c 9.7% not well controlled, will increase insulin to 70 units BID  -f/u echo  -will order carotid doppler  -ST/OT/PT    Paroxysmal afib  -digoxin, coreg  -will need anticoagulation given stroke    DM2 uncontrolled  -increasing NPH    HTN  HLD  -will restart some home meds, goal BP normotensive  -clonidine, amlodipine, coreg  -statin    CKD4    Hx of recurrent skin abscess  -managed by Dr. Kenneth Alba, no signs of systemic infection    Obesity Body mass index is 38.46 kg/(m^2). Hairy cell leukemia in remission followed by Dr. Monica Mckeon        Code status: Full  Prophylaxis: Lovenox  Recommended Disposition: Home w/Family     Subjective:     Chief Complaint: right weakness  Weakness improved    Review of Systems:  Symptom Y/N Comments  Symptom Y/N Comments   Fever/Chills    Chest Pain     Poor Appetite    Edema     Cough    Abdominal Pain     Sputum    Joint Pain     SOB/NAVARRO    Pruritis/Rash     Nausea/vomit    Tolerating PT/OT     Diarrhea    Tolerating Diet     Constipation    Other       Could NOT obtain due to:      Objective:     VITALS:   Last 24hrs VS reviewed since prior progress note.  Most recent are:  Patient Vitals for the past 24 hrs:   Temp Pulse Resp BP SpO2   17 1213 98 °F (36.7 °C) 64 16 172/82 100 %   17 0750 98.3 °F (36.8 °C) 65 16 161/71 99 %   17 0447 98.1 °F (36.7 °C) 70 18 (!) 157/97 98 %   17 2359 97.3 °F (36.3 °C) 77 20 159/68 98 %   17 2323 - 76 20 - 98 %   17 2314 - 65 17 - 98 %   07/29/17 2245 - 64 21 168/90 95 %   07/29/17 2138 - 64 16 - 95 %   07/29/17 2115 - 65 17 (!) 178/97 94 %   07/29/17 1945 - 70 22 (!) 140/103 99 %   07/29/17 1930 - 71 13 170/90 99 %   07/29/17 1915 - 71 13 168/87 98 %   07/29/17 1900 - 70 13 (!) 189/105 98 %   07/29/17 1845 - 67 16 171/90 97 %   07/29/17 1830 - 71 20 186/88 96 %   07/29/17 1815 - 70 18 167/72 94 %   07/29/17 1800 - 69 14 165/86 100 %   07/29/17 1745 - 68 18 166/88 95 %   07/29/17 1730 - 71 15 162/81 96 %   07/29/17 1715 - 69 16 177/84 95 %   07/29/17 1700 - 69 20 174/84 94 %   07/29/17 1645 - 69 14 169/90 95 %   07/29/17 1630 - 71 17 170/84 96 %   07/29/17 1603 98.1 °F (36.7 °C) 73 21 (!) 169/100 97 %   07/29/17 1600 - 87 25 (!) 169/100 -       Intake/Output Summary (Last 24 hours) at 07/30/17 1329  Last data filed at 07/30/17 1021   Gross per 24 hour   Intake                0 ml   Output              250 ml   Net             -250 ml        PHYSICAL EXAM:  General: Alert, cooperative, no acute distress    EENT:  EOMI. Anicteric sclerae. Mucous membranes moist  Resp:  CTA bilaterally, no wheezing or rales. No accessory muscle use  CV:  Regular rhythm, no edema  GI:  Soft, non distended, non tender. +Bowel sounds  Neurologic:  Alert and oriented X 3, normal speech  Psych:   Poor insight, not anxious nor agitated  Skin:  No rashes, no jaundice  ________________________________________________________________________  Care Plan discussed with:    Comments   Patient     Family      RN     Care Manager     Consultant                        Multidiciplinary team rounds were held today with , nursing, pharmacist and clinical coordinator. Patient's plan of care was discussed; medications were reviewed and discharge planning was addressed.      ________________________________________________________________________  Total NON critical care TIME:  30   Minutes  ________________________________________________________________________  Jeremiah Persaud Angel Hendricks MD     Procedures: see electronic medical records for all procedures/Xrays and details which were not copied into this note but were reviewed prior to creation of Plan. LABS:  I reviewed today's most current labs and imaging studies.   Pertinent labs include:  Recent Labs      07/29/17   1607   WBC  5.8   HGB  12.1   HCT  35.1*   PLT  150     Recent Labs      07/30/17   0453  07/29/17   1607   NA  137  137   K  3.7  3.9   CL  105  105   CO2  25  26   GLU  224*  249*   BUN  28*  30*   CREA  2.76*  3.10*   CA  8.4*  8.8   ALB   --   3.2*   TBILI   --   0.5   SGOT   --   20   ALT   --   30   INR   --   1.0       Signed: Mitul Hinson MD

## 2017-07-30 NOTE — PROGRESS NOTES
..BSV Stroke Education   and Stroke Education provided to patient and the following topics were discussed    1. Patients personal risk factors for stroke are hypertension and diabetes mellitus    2. Warning signs of Stroke:        * Sudden numbness or weakness of the face, arm or leg, especially on one side of          The body            * Sudden confusion, trouble speaking or understanding        * Sudden trouble seeing in one or both eyes        * Sudden trouble walking, dizziness, loss of balance or coordination        * Sudden severe headache with no known cause      3. Importance of activation Emergency Medical Services ( 9-1-1 ) immediately if                       experience any warning signs of stroke. 4. Be sure and schedule a follow-up appointment with your primary care doctor or any                  specialists as instructed. 5. You must take medicine every day to treat your risk factors for stroke. Be sure to take your medicines exactly as your doctor tells you: no more, no less. Know what your medicines are for , what they do. Anti-thrombotics /anticoagulants can help prevent strokes. You are taking the following medicine(s)  Asprin     6. Smoking and second-hand smoke greatly increase your risk of stroke, cardiovascular disease and death. Smoking history ended year 18's.

## 2017-07-30 NOTE — H&P
Hospitalist Admission Note    NAME: Maxx Fung   :  1955   MRN:  [id number]     Date/Time:  2017 10:18 PM    Patient PCP: Nathaly Dutta NP  ________________________________________________________________________    My assessment of this patient's clinical condition and my plan of care is as follows. Assessment / Plan:  Clinically acute stroke   With R sided weakness  Head CT: negative. Risk factors: HTN / Pafib/ DM/ hyperlipidemia   Admit to telemetry to monitor for Afib. Not TPA candidate - out of window. Neurology In house consult. Continue home ASA but will increase dose. Risk factor evaluation with ECHO, lipids pabel, A1C, TSH, MRI/MRA. Monitor stroke vitals with glucose, sats, RR, HR, temp. Since clinically with acute stroke - will allow permissive hypertension  PT/OT/Speech. Passed bedside swallowing study, OK to start diet. IDDM type II with hyperglycemia  Dose of NPH now  Continue home regiment, adjust as needed  C/w SS     HTN/ PAfib   BP high side, HR controlled, in NSR   Since clinically with acute stroke - will allow permissive hypertension  Holding Norvasc  Will continue coreg, clonidine to avoid rebound effect and to control HR   Cont digoxin for HR control   asa, digoxin,  and pravachol  Not on chronic anticoagulation. Cont ASA     H/o recurrent multiple skin abscesses   + wound on the back, no signs of infection  Managed by Dr Sander Landin  Not currently on AB     CKD stage IV  Cr at baseline, monitor closely. Avoid nephrotoxic drugs, adjust all meds to GFR. Severe obesity with Body mass index is 38.54 kg/(m^2).   Hairy cell leukemia on remission, seen Dr Usman Burciaga in the past   Hyperlipidemia, cont statin           Code Status: Full code   Surrogate Decision Maker: children and mother     DVT Prophylaxis: Lovenox   GI Prophylaxis: not indicated    Baseline: lives with family; ambulating with walker         Subjective:   CHIEF COMPLAINT: R sided weakness HISTORY OF PRESENT ILLNESS:     Natan Padilla is a 57 y.o.  male who presents with above complaint. Chief complaint:R sided weakness   Severity: moderate     Onset: yesterday 1600   Duration: continuous  Course: worsening since this am    Exacerbated By: none   Alleviated By: none   Associated factors: Positive - off balance, he was dragging his R leg yesterday. Negative - speech and visual abnormalities/HA/dizzness/CP/dyspnea    Pt endorses history of CVA 10 years ago with spinal meningitis. No previous h/o TIA. On daily ASA at home. Pertinent ER work up: Head CT negative, ECG with NSR     Treated in ED with: none     Vs:98.1 °F (36.7 °C) - 73 - 169/100 - 21 - 97% RA      We were asked to admit for work up and evaluation of the above problems. Past Medical History:   Diagnosis Date    [facility]-fib [facility]) 2009    Resolved. 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[address]     Cancer ([address]     splenic lymphoma    CKD stage 4 due to type 2 diabetes mellitus ([address]     Diabetes ([address]     Hypertension     Neuropathy ([address]     Other ill-defined conditions     spinal meningitis    Other ill-defined conditions     broken blood vessel to nose    Stroke ([address] 2007    per patient    Vitamin D deficiency         Past Surgical History:   Procedure Laterality Date    HX OTHER SURGICAL  02/06/2017    Excisional debridement of abscess right thigh & right lower back     HX OTHER SURGICAL  05/23/2017    I&D & excisional debridement (skin & subcutaneous tissue) back & right thigh       Social History   Substance Use Topics    Smoking status: Never Smoker    Smokeless tobacco: Never Used    Alcohol use No        Family History   Problem Relation Age of Onset    Diabetes Mother     Hypertension Father     Hypertension Sister      No Known Allergies     Prior to Admission medications    Medication Sig Start Date End Date Taking?  Authorizing Provider   cloNIDine HCl (CATAPRES) 0.2 mg tablet Take 1 Tab by mouth two (2) times a day. 5/28/17  Yes Meagan Aparicio MD   carvedilol (COREG) 12.5 mg tablet  4/7/17  Yes Historical Provider   insulin lispro protamine/insulin lispro (HUMALOG MIX 75/25) 100 unit/mL (75-25) injection 65 units subcutaneous injection twice daily before meals (before breakfast and dinner) 2/10/17  Yes Daylin Clemons MD   insulin lispro protamine/insulin lispro (HUMALOG MIX 75/25) 100 unit/mL (75-25) injection 60 Units by SubCUTAneous route Before breakfast and dinner. 12/23/16  Yes Austyn Price NP   amLODIPine (NORVASC) 10 mg tablet Take 1 Tab by mouth daily. 10/3/15  Yes Adolfo Subramanian MD   pravastatin (PRAVACHOL) 20 mg tablet Take 20 mg by mouth nightly. Yes Magaly Lyons MD   digoxin (LANOXIN) 0.125 mg tablet Take 0.125 mg by mouth daily. Indications: afib   Yes Historical Provider   oxyCODONE-acetaminophen (PERCOCET) 5-325 mg per tablet Take 1-2 Tabs by mouth every six (6) hours as needed. Max Daily Amount: 8 Tabs. 5/28/17   Meagan Aparicio MD   alcohol swabs (ALCOHOL PADS) padm 1 Each by Apply Externally route two (2) times a day. 10/3/15   Adolfo Subramanian MD       REVIEW OF SYSTEMS:     I am not able to complete the review of systems because:    The patient is intubated and sedated    The patient has altered mental status due to his acute medical problems    The patient has baseline aphasia from prior stroke(s)    The patient has baseline dementia and is not reliable historian    The patient is in acute medical distress and unable to provide information           Total of 12 systems reviewed as follows:       POSITIVE= underlined text  Negative = text not underlined  General:  fever, chills, sweats, generalized weakness, weight loss/gain,      loss of appetite   Eyes:    blurred vision, eye pain, loss of vision, double vision  ENT:    rhinorrhea, pharyngitis   Respiratory:   cough, sputum production, SOB, NAVARRO, wheezing, pleuritic pain   Cardiology:   chest pain, palpitations, orthopnea, PND, edema, syncope   Gastrointestinal:  abdominal pain , N/V, diarrhea, dysphagia, constipation, bleeding   Genitourinary:  frequency, urgency, dysuria, hematuria, incontinence   Muskuloskeletal :  arthralgia, myalgia, back pain  Hematology:  easy bruising, nose or gum bleeding, lymphadenopathy   Dermatological: rash, ulceration, pruritis, color change / jaundice, back wound   Endocrine:   hot flashes or polydipsia   Neurological:  headache, dizziness, confusion, focal weakness, paresthesia,     Speech difficulties, memory loss, gait difficulty  Psychological: Feelings of anxiety, depression, agitation    Objective:   VITALS:    Visit Vitals    BP (!) 178/97    Pulse 64    Temp 98.1 °F (36.7 °C)    Resp 16    Ht 6' 5\" (1.956 m)    Wt 147.1 kg (324 lb 4.8 oz)    SpO2 95%    BMI 38.46 kg/m2       PHYSICAL EXAM:    General:    Alert, cooperative, no distress, appears stated age. HEENT: Atraumatic, anicteric sclerae, pink conjunctivae     No oral ulcers, mucosa moist, throat clear, dentition fair  Neck:  Supple, symmetrical,  thyroid: non tender  Lungs:   Clear to auscultation bilaterally. No Wheezing or Rhonchi. No rales. Chest wall:  No tenderness  No Accessory muscle use. Heart:   Regular  rhythm,  No  murmur   No edema  Abdomen:   Soft, non-tender. Not distended. Bowel sounds normal  Extremities: No cyanosis. No clubbing,      Skin turgor normal, Capillary refill normal, Radial dial pulse 2+  Skin:     Not pale. Not Jaundiced  No rashes                           + wound in the middle of the back, no signs of infection   Psych:  Good insight. Not depressed. Not anxious or agitated. Neurologic: EOMs intact. No facial asymmetry. No aphasia or slurred speech. R sided weakness 4/5, Sensation grossly intact.  Alert and oriented X 4.     _______________________________________________________________________  Care Plan discussed with:    Comments   Patient y    Family      RN y    Care Manager                    Consultant:  benjamin ED provider    _______________________________________________________________________  Expected  Disposition:   Home with Family y   HH/PT/OT/RN    SNF/LTC    DARIN    ________________________________________________________________________  TOTAL TIME:  72 Minutes    Critical Care Provided     Minutes non procedure based      Comments    y Reviewed previous records    y Discussion with patient and/or family and questions answered       ________________________________________________________________________  Signed: Derotha Epley, MD    Procedures: see electronic medical records for all procedures/Xrays and details which were not copied into this note but were reviewed prior to creation of Plan. LAB DATA REVIEWED:    Recent Results (from the past 24 hour(s))   GLUCOSE, POC    Collection Time: 07/29/17  3:54 PM   Result Value Ref Range    Glucose (POC) 241 (H) 65 - 100 mg/dL    Performed by Nathaly Hayes    CBC WITH AUTOMATED DIFF    Collection Time: 07/29/17  4:07 PM   Result Value Ref Range    WBC 5.8 4.1 - 11.1 K/uL    RBC 4.10 4. 10 - 5.70 M/uL    HGB 12.1 12.1 - 17.0 g/dL    HCT 35.1 (L) 36.6 - 50.3 %    MCV 85.6 80.0 - 99.0 FL    MCH 29.5 26.0 - 34.0 PG    MCHC 34.5 30.0 - 36.5 g/dL    RDW 13.8 11.5 - 14.5 %    PLATELET 719 151 - 356 K/uL    NEUTROPHILS 65 32 - 75 %    LYMPHOCYTES 30 12 - 49 %    MONOCYTES 3 (L) 5 - 13 %    EOSINOPHILS 2 0 - 7 %    BASOPHILS 0 0 - 1 %    ABS. NEUTROPHILS 3.8 1.8 - 8.0 K/UL    ABS. LYMPHOCYTES 1.7 0.8 - 3.5 K/UL    ABS. MONOCYTES 0.2 0.0 - 1.0 K/UL    ABS. EOSINOPHILS 0.1 0.0 - 0.4 K/UL    ABS.  BASOPHILS 0.0 0.0 - 0.1 K/UL   METABOLIC PANEL, COMPREHENSIVE    Collection Time: 07/29/17  4:07 PM   Result Value Ref Range    Sodium 137 136 - 145 mmol/L    Potassium 3.9 3.5 - 5.1 mmol/L    Chloride 105 97 - 108 mmol/L    CO2 26 21 - 32 mmol/L    Anion gap 6 5 - 15 mmol/L Glucose 249 (H) 65 - 100 mg/dL    BUN 30 (H) 6 - 20 MG/DL    Creatinine 3.10 (H) 0.70 - 1.30 MG/DL    BUN/Creatinine ratio 10 (L) 12 - 20      GFR est AA 25 (L) >60 ml/min/1.73m2    GFR est non-AA 20 (L) >60 ml/min/1.73m2    Calcium 8.8 8.5 - 10.1 MG/DL    Bilirubin, total 0.5 0.2 - 1.0 MG/DL    ALT (SGPT) 30 12 - 78 U/L    AST (SGOT) 20 15 - 37 U/L    Alk.  phosphatase 186 (H) 45 - 117 U/L    Protein, total 6.4 6.4 - 8.2 g/dL    Albumin 3.2 (L) 3.5 - 5.0 g/dL    Globulin 3.2 2.0 - 4.0 g/dL    A-G Ratio 1.0 (L) 1.1 - 2.2     PROTHROMBIN TIME + INR    Collection Time: 07/29/17  4:07 PM   Result Value Ref Range    INR 1.0 0.9 - 1.1      Prothrombin time 10.5 9.0 - 11.1 sec   PTT    Collection Time: 07/29/17  4:07 PM   Result Value Ref Range    aPTT 27.2 22.1 - 32.5 sec    aPTT, therapeutic range     58.0 - 77.0 SECS   EKG, 12 LEAD, INITIAL    Collection Time: 07/29/17  4:25 PM   Result Value Ref Range    Ventricular Rate 69 BPM    Atrial Rate 69 BPM    P-R Interval 194 ms    QRS Duration 102 ms    Q-T Interval 376 ms    QTC Calculation (Bezet) 402 ms    Calculated P Axis 40 degrees    Calculated R Axis -16 degrees    Calculated T Axis 8 degrees    Diagnosis       Normal sinus rhythm  Normal ECG  When compared with ECG of 22-MAY-2017 14:46,  No significant change was found     URINALYSIS W/ REFLEX CULTURE    Collection Time: 07/29/17  5:29 PM   Result Value Ref Range    Color YELLOW/STRAW      Appearance CLEAR CLEAR      Specific gravity 1.020 1.003 - 1.030      pH (UA) 6.0 5.0 - 8.0      Protein >300 (A) NEG mg/dL    Glucose 500 (A) NEG mg/dL    Ketone NEGATIVE  NEG mg/dL    Bilirubin NEGATIVE  NEG      Blood MODERATE (A) NEG      Urobilinogen 1.0 0.2 - 1.0 EU/dL    Nitrites NEGATIVE  NEG      Leukocyte Esterase NEGATIVE  NEG      WBC 0-4 0 - 4 /hpf    RBC 0-5 0 - 5 /hpf    Epithelial cells FEW FEW /lpf    Bacteria NEGATIVE  NEG /hpf    UA:UC IF INDICATED CULTURE NOT INDICATED BY UA RESULT CNI      Mucus 1+ (A) NEG /lpf   DRUG SCREEN, URINE    Collection Time: 07/29/17  5:29 PM   Result Value Ref Range    AMPHETAMINES NEGATIVE  NEG      BARBITURATES NEGATIVE  NEG      BENZODIAZEPINE NEGATIVE  NEG      COCAINE NEGATIVE  NEG      METHADONE NEGATIVE  NEG      OPIATES NEGATIVE  NEG      PCP(PHENCYCLIDINE) NEGATIVE  NEG      THC (TH-CANNABINOL) NEGATIVE  NEG      Drug screen comment (NOTE)    DIGOXIN    Collection Time: 07/29/17  5:29 PM   Result Value Ref Range    DIGOXIN 0.8 (L) 0.90 - 2.00 NG/ML

## 2017-07-30 NOTE — CONSULTS
IP CONSULT TO NEUROLOGY  Consult performed by: Beth Enamorado  Consult ordered by: Aishwarya Atwood CONSULT    NAME Nany Calderon AGE 57 y.o. MRN [id number]  1955     REQUESTING PHYSICIAN: Abram Alatorre MD      CHIEF COMPLAINT:  stroke     This is a 57 y.o. right handed male with a medical history of diabetes and hypertension admitted after sudden onset right sided weakness and gait disturbance. Symptoms did not progress and are not associated with confusion or sensory loss. Symptoms have only resolved partially. Arrived outside [facility] window. Onset 4pm the day prior to admission. ASSESSMENT AND PLAN     1. Stroke  · Goal systolic blood pressure 145 or below  · Imaging: MRI, carotid dopplers, transthoracic echocardiogram  · Labs:B12 and folate, HGBA1C, homocysteine, TSH and lipid profile   · Consults: speech therapy, physical therapy and occupational therapy. · Start:75mg Plavix daily   · Educated on stroke risk factors, treatment and prevention    MRI  shows multiple stroke left basal ganglia  B12 pending   MRA Carotids  No flow limiting stenosis  TSH pending   Echo pending HGBA1C  9.7    Homocysteine pending Lipids        2. Uncontrolled diabetes II  Continue insulin     3. Hypertension  Continue antihypertensives. No need to hold    4. Hyperlipidemia  Continue pravachol increase dose    ALLERGIES:  Review of patient's allergies indicates no known allergies.      Current Facility-Administered Medications   Medication Dose Route Frequency    digoxin (LANOXIN) tablet 0.125 mg  0.125 mg Oral DAILY    oxyCODONE-acetaminophen (PERCOCET) 5-325 mg per tablet 1-2 Tab  1-2 Tab Oral Q6H PRN    pravastatin (PRAVACHOL) tablet 20 mg  20 mg Oral QHS    sodium chloride (NS) flush 5-10 mL  5-10 mL IntraVENous Q8H    sodium chloride (NS) flush 5-10 mL  5-10 mL IntraVENous PRN    acetaminophen (TYLENOL) tablet 650 mg  650 mg Oral Q4H PRN    Or    acetaminophen (TYLENOL) solution 650 mg  650 mg Per NG tube Q4H PRN    Or    acetaminophen (TYLENOL) suppository 650 mg  650 mg Rectal Q4H PRN    aspirin (ASPIRIN) tablet 325 mg  325 mg Oral DAILY    ondansetron (ZOFRAN) injection 4 mg  4 mg IntraVENous Q6H PRN    enoxaparin (LOVENOX) injection 40 mg  40 mg SubCUTAneous DAILY    insulin lispro protamine/insulin lispro (HUMALOG MIX 75/25) injection 70 Units  70 Units SubCUTAneous ACB&D    amLODIPine (NORVASC) tablet 10 mg  10 mg Oral DAILY    sodium chloride (NS) flush 5-10 mL  5-10 mL IntraVENous Q8H    sodium chloride (NS) flush 5-10 mL  5-10 mL IntraVENous PRN    insulin lispro (HUMALOG) injection   SubCUTAneous AC&HS    glucose chewable tablet 16 g  4 Tab Oral PRN    dextrose (D50W) injection syrg 12.5-25 g  12.5-25 g IntraVENous PRN    glucagon (GLUCAGEN) injection 1 mg  1 mg IntraMUSCular PRN    hydrALAZINE (APRESOLINE) 20 mg/mL injection 10 mg  10 mg IntraVENous Q6H PRN    carvedilol (COREG) tablet 12.5 mg  12.5 mg Oral BID WITH MEALS    cloNIDine HCl (CATAPRES) tablet 0.2 mg  0.2 mg Oral Q12H       Past Medical History:   Diagnosis Date    [facility]-fib [facility]) 2009    Resolved. 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[address]     Cancer ([address]     splenic lymphoma    CKD stage 4 due to type 2 diabetes mellitus ([address]     Diabetes ([address]     Hypertension     Neuropathy ([address]     Other ill-defined conditions     spinal meningitis    Other ill-defined conditions     broken blood vessel to nose    Stroke ([address] 2007    per patient    Vitamin D deficiency        Social History   Substance Use Topics    Smoking status: Never Smoker    Smokeless tobacco: Never Used    Alcohol use No       Family History   Problem Relation Age of Onset    Diabetes Mother     Hypertension Father     Hypertension Sister      Review of Systems   Constitutional: Negative for chills and fever. HENT: Negative for ear pain.     Eyes: Negative for pain and discharge. Respiratory: Negative for cough and hemoptysis. Cardiovascular: Negative for chest pain and claudication. Gastrointestinal: Negative for constipation and diarrhea. Genitourinary: Negative for flank pain and hematuria. Musculoskeletal: Negative for back pain and myalgias. Skin: Negative for itching and rash. Neurological: Positive for sensory change and focal weakness. Negative for headaches. Endo/Heme/Allergies: Negative for environmental allergies. Does not bruise/bleed easily. Psychiatric/Behavioral: Negative for depression and hallucinations. Visit Vitals    /82    Pulse 64    Temp 98 °F (36.7 °C)    Resp 16    Ht 6' 5\" (1.956 m)    Wt 324 lb 4.8 oz (147.1 kg)    SpO2 100%    BMI 38.46 kg/m2      General: Well developed, well nourished. Head: Normocephalic, atraumatic, anicteric sclera   Neck Normal ROM, No thyromegally   Lungs:  Clear to auscultation bilaterally, No wheezes or rubs   Cardiac: Regular rate and rhythm with no murmurs. Abd: Bowel sounds were audible. No tenderness on palpation   Ext: No pedal edema   Skin: No overt signs of rash or insect bites     NeurologicExam:  Mental Status: Alert and oriented to person place and time   Speech: Fluent no aphasia or dysarthria. Cranial Nerves:  II - XII Inact   Motor:  Full and symmetric strength of upper and lower proximal and distal muscles. Normal bulk and tone. Reflexes:   +1/4 over the proximal tendons of the upper and lower extremities. +0/4 over distal tendons. .   Sensory:   Symmetric distal reduction in sensory perception affecting all modalities. Gait:  Limps to the right. Tremor:   No tremor noted. Cerebellar:  dysmetric on the right    Neurovascular: No carotid bruits. No JVD       REVIEWED IMAGING:    MRI :    Results from Hospital Encounter encounter on 07/29/17   MRA BRAIN WO CONT   CLINICAL HISTORY: Right-sided weakness    INDICATION: R sided weakness.     COMPARISON:  CT 7/29/2017    TECHNIQUE: MR examination of the brain includes axial and sagittal T1, axial T2,  axial FLAIR, axial gradient echo, axial DWI, coronal T2. Coronal T2    Contrast: None   Next, 3-D time-of-flight MRA of the brain was performed. Multiplanar  reconstructions were obtained. FINDINGS:   There are small scattered infarctions (3) in the periventricular white matter on  the left and in the left basal ganglia posterior limb internal capsule as well. Largest infarct lies in the periventricular white matter posteriorly on the left  than the left frontal lobe measures 12 x 7 mm in size Sulcal and ventricular  prominence. Confluent periventricular scattered foci of increased T2 signal  intensity in the corona radiata and centrum semiovale. There is no Chiari or syrinx. The pituitary and infundibulum are grossly  unremarkable. There is no skull base mass. Cerebellopontine angles are grossly  unremarkable. The major intracranial vascular flow-voids are unremarkable. The  cavernous sinuses are symmetric. Optic chiasm and infundibulum grossly  unremarkable. Orbits are grossly symmetric. Dural venous sinuses are grossly  patent. The brain architecture is normal. There is no evidence of midline shift  or mass-effect. There are no extra-axial fluid collections. The A2 segments are within normal limits. There are A1 segments bilaterally. M1  segments are symmetric. M2 segments demonstrate symmetric arborization. . P1  segments are patent. . The internal carotid, anterior cerebral, and middle  cerebral arteries are patent. There is no flow-limiting intracranial stenosis. There is no aneurysm. There are no sizable posterior communicating arteries. IMPRESSION:   Small scattered acute infarctions in the periventricular white matter on the  left and in the left basal ganglia. There is no associated hemorrhage or mass  effect.     Severe chronic microvascular ischemic change and moderate to severe cerebral  atrophy for patient age. No aneurysm, dissection or evidence of hemodynamically significant stenosis. The findings were called to Douglas Zamora the patient's RN on 7/30/2017 at 10:25  AM by Dr. Mary Skinner [id number]                    CT:    Results from Hospital Encounter encounter on 07/29/17   CT CODE NEURO HEAD WO CONTRAST  EXAM:  CT CODE NEURO HEAD WO CONTRAST    INDICATION:   code s    COMPARISON: CT 5/9/2016. CONTRAST:  None. TECHNIQUE: Unenhanced CT of the head was performed using 5 mm images. Brain and  bone windows were generated. CT dose reduction was achieved through use of a  standardized protocol tailored for this examination and automatic exposure  control for dose modulation. FINDINGS:  The ventricles and sulci are normal in size, shape and configuration and  midline. There is mild periventricular and subcortical white matter disease. There is no intracranial hemorrhage, extra-axial collection, mass, mass effect  or midline shift. The basilar cisterns are open. No acute infarct is  identified. The bone windows demonstrate no abnormalities. The visualized  portions of the paranasal sinuses and mastoid air cells are clear. IMPRESSION: No acute intracranial abnormality.             REVIEWED LABS:  Lab Results   Component Value Date/Time    WBC 5.8 07/29/2017 04:07 PM    HCT 35.1 07/29/2017 04:07 PM    HGB 12.1 07/29/2017 04:07 PM    PLATELET 935 81/13/8416 04:07 PM     Lab Results   Component Value Date/Time    Sodium 137 07/30/2017 04:53 AM    Potassium 3.7 07/30/2017 04:53 AM    Chloride 105 07/30/2017 04:53 AM    CO2 25 07/30/2017 04:53 AM    Glucose 224 07/30/2017 04:53 AM    BUN 28 07/30/2017 04:53 AM    Creatinine 2.76 07/30/2017 04:53 AM    Calcium 8.4 07/30/2017 04:53 AM       Lab Results   Component Value Date/Time    LDL, calculated 103.6 07/30/2017 04:53 AM     Lab Results   Component Value Date/Time    Hemoglobin A1c 9.7 07/30/2017 04:53 AM

## 2017-07-31 LAB
FOLATE SERPL-MCNC: 9.2 NG/ML (ref 5–21)
GLUCOSE BLD STRIP.AUTO-MCNC: 100 MG/DL (ref 65–100)
GLUCOSE BLD STRIP.AUTO-MCNC: 199 MG/DL (ref 65–100)
GLUCOSE BLD STRIP.AUTO-MCNC: 232 MG/DL (ref 65–100)
GLUCOSE BLD STRIP.AUTO-MCNC: 319 MG/DL (ref 65–100)
HCYS SERPL-SCNC: 16.3 UMOL/L (ref 3.7–13.9)
INR BLD: 1 (ref 0.9–1.2)
SERVICE CMNT-IMP: ABNORMAL
SERVICE CMNT-IMP: NORMAL
VIT B12 SERPL-MCNC: 373 PG/ML (ref 211–911)

## 2017-07-31 PROCEDURE — 74011250637 HC RX REV CODE- 250/637: Performed by: HOSPITALIST

## 2017-07-31 PROCEDURE — 74011250637 HC RX REV CODE- 250/637: Performed by: INTERNAL MEDICINE

## 2017-07-31 PROCEDURE — 97535 SELF CARE MNGMENT TRAINING: CPT | Performed by: OCCUPATIONAL THERAPIST

## 2017-07-31 PROCEDURE — 82607 VITAMIN B-12: CPT | Performed by: PSYCHIATRY & NEUROLOGY

## 2017-07-31 PROCEDURE — G8987 SELF CARE CURRENT STATUS: HCPCS | Performed by: OCCUPATIONAL THERAPIST

## 2017-07-31 PROCEDURE — 92523 SPEECH SOUND LANG COMPREHEN: CPT

## 2017-07-31 PROCEDURE — 74011636637 HC RX REV CODE- 636/637: Performed by: INTERNAL MEDICINE

## 2017-07-31 PROCEDURE — 77030011256 HC DRSG MEPILEX <16IN NO BORD MOLN -A

## 2017-07-31 PROCEDURE — 74011250636 HC RX REV CODE- 250/636: Performed by: HOSPITALIST

## 2017-07-31 PROCEDURE — 97165 OT EVAL LOW COMPLEX 30 MIN: CPT | Performed by: OCCUPATIONAL THERAPIST

## 2017-07-31 PROCEDURE — 65660000000 HC RM CCU STEPDOWN

## 2017-07-31 PROCEDURE — 74011250637 HC RX REV CODE- 250/637: Performed by: PSYCHIATRY & NEUROLOGY

## 2017-07-31 PROCEDURE — 82962 GLUCOSE BLOOD TEST: CPT

## 2017-07-31 PROCEDURE — 74011636637 HC RX REV CODE- 636/637: Performed by: HOSPITALIST

## 2017-07-31 PROCEDURE — 36415 COLL VENOUS BLD VENIPUNCTURE: CPT | Performed by: PSYCHIATRY & NEUROLOGY

## 2017-07-31 PROCEDURE — G8988 SELF CARE GOAL STATUS: HCPCS | Performed by: OCCUPATIONAL THERAPIST

## 2017-07-31 PROCEDURE — 97116 GAIT TRAINING THERAPY: CPT

## 2017-07-31 PROCEDURE — 83090 ASSAY OF HOMOCYSTEINE: CPT | Performed by: PSYCHIATRY & NEUROLOGY

## 2017-07-31 RX ORDER — HYDROCHLOROTHIAZIDE 12.5 MG/1
12.5 CAPSULE ORAL DAILY
Status: DISCONTINUED | OUTPATIENT
Start: 2017-07-31 | End: 2017-08-02 | Stop reason: HOSPADM

## 2017-07-31 RX ADMIN — AMLODIPINE BESYLATE 10 MG: 5 TABLET ORAL at 09:18

## 2017-07-31 RX ADMIN — CLONIDINE HYDROCHLORIDE 0.2 MG: 0.1 TABLET ORAL at 21:42

## 2017-07-31 RX ADMIN — INSULIN LISPRO 70 UNITS: 100 INJECTION, SUSPENSION SUBCUTANEOUS at 09:18

## 2017-07-31 RX ADMIN — PRAVASTATIN SODIUM 20 MG: 10 TABLET ORAL at 21:42

## 2017-07-31 RX ADMIN — CARVEDILOL 12.5 MG: 12.5 TABLET, FILM COATED ORAL at 17:20

## 2017-07-31 RX ADMIN — CARVEDILOL 12.5 MG: 12.5 TABLET, FILM COATED ORAL at 09:18

## 2017-07-31 RX ADMIN — HYDROCHLOROTHIAZIDE 12.5 MG: 12.5 CAPSULE ORAL at 13:54

## 2017-07-31 RX ADMIN — Medication 10 ML: at 06:52

## 2017-07-31 RX ADMIN — ENOXAPARIN SODIUM 40 MG: 40 INJECTION SUBCUTANEOUS at 09:18

## 2017-07-31 RX ADMIN — INSULIN LISPRO 4 UNITS: 100 INJECTION, SOLUTION INTRAVENOUS; SUBCUTANEOUS at 21:42

## 2017-07-31 RX ADMIN — Medication 10 ML: at 13:54

## 2017-07-31 RX ADMIN — DIGOXIN 0.12 MG: 125 TABLET ORAL at 09:18

## 2017-07-31 RX ADMIN — CLONIDINE HYDROCHLORIDE 0.2 MG: 0.1 TABLET ORAL at 09:18

## 2017-07-31 RX ADMIN — INSULIN LISPRO 2 UNITS: 100 INJECTION, SOLUTION INTRAVENOUS; SUBCUTANEOUS at 02:00

## 2017-07-31 RX ADMIN — CLOPIDOGREL BISULFATE 75 MG: 75 TABLET ORAL at 09:18

## 2017-07-31 RX ADMIN — INSULIN LISPRO 70 UNITS: 100 INJECTION, SUSPENSION SUBCUTANEOUS at 17:19

## 2017-07-31 RX ADMIN — INSULIN LISPRO 3 UNITS: 100 INJECTION, SOLUTION INTRAVENOUS; SUBCUTANEOUS at 17:19

## 2017-07-31 NOTE — CARDIO/PULMONARY
Cardiopulmonary Rehab Nursing Entry:    Chart reviewed secondary to in-basket referral. Pt admitted with acute CVA, DM, HTN, PAF, CKD, hyperlipidemia. No documentation of CHF or new diagnosis of. CHF teaching deferred.

## 2017-07-31 NOTE — PROGRESS NOTES
Chief Complaint: right weakness  Weakness and sensory loss improving. His host (also the mother of his younger children) is concerned about progressive memory loss and cognitive difficulties that have been ongoing for some years. She would like his to eventually go to a nursing home as she is having difficulty keeping him on his diet and keeping up with his physician appointments. I advised her to discuss this with his children first. I told her memory evaluation in a hospital setting does not accurately lead to a diagnosis. Assesment and Plan    1. Stroke  · Goal systolic blood pressure 007 or below  · Continue plavix  · Cleared by pt  · Follow up with neuro in 2 weeks     MRI  shows multiple stroke left basal ganglia  B12 pending   MRA Carotids  No flow limiting stenosis  TSH pending   Echo EF 55% HGBA1C  9.7    Homocysteine pending Lipids         2. Uncontrolled diabetes II  Continue insulin      3. Hypertension  Continue antihypertensives. No need to hold     4. Hyperlipidemia  Continue pravachol      5. Memory loss  To be assessed out patient. 6. afib  On calcium channel blocker      Allergies  Review of patient's allergies indicates no known allergies.      Medications  Current Facility-Administered Medications   Medication Dose Route Frequency    hydroCHLOROthiazide (MICROZIDE) capsule 12.5 mg  12.5 mg Oral DAILY    digoxin (LANOXIN) tablet 0.125 mg  0.125 mg Oral DAILY    oxyCODONE-acetaminophen (PERCOCET) 5-325 mg per tablet 1-2 Tab  1-2 Tab Oral Q6H PRN    pravastatin (PRAVACHOL) tablet 20 mg  20 mg Oral QHS    sodium chloride (NS) flush 5-10 mL  5-10 mL IntraVENous Q8H    sodium chloride (NS) flush 5-10 mL  5-10 mL IntraVENous PRN    acetaminophen (TYLENOL) tablet 650 mg  650 mg Oral Q4H PRN    Or    acetaminophen (TYLENOL) solution 650 mg  650 mg Per NG tube Q4H PRN    Or    acetaminophen (TYLENOL) suppository 650 mg  650 mg Rectal Q4H PRN    ondansetron (ZOFRAN) injection 4 mg  4 mg IntraVENous Q6H PRN    enoxaparin (LOVENOX) injection 40 mg  40 mg SubCUTAneous DAILY    insulin lispro protamine/insulin lispro (HUMALOG MIX 75/25) injection 70 Units  70 Units SubCUTAneous ACB&D    amLODIPine (NORVASC) tablet 10 mg  10 mg Oral DAILY    clopidogrel (PLAVIX) tablet 75 mg  75 mg Oral DAILY    sodium chloride (NS) flush 5-10 mL  5-10 mL IntraVENous Q8H    sodium chloride (NS) flush 5-10 mL  5-10 mL IntraVENous PRN    insulin lispro (HUMALOG) injection   SubCUTAneous AC&HS    glucose chewable tablet 16 g  4 Tab Oral PRN    dextrose (D50W) injection syrg 12.5-25 g  12.5-25 g IntraVENous PRN    glucagon (GLUCAGEN) injection 1 mg  1 mg IntraMUSCular PRN    hydrALAZINE (APRESOLINE) 20 mg/mL injection 10 mg  10 mg IntraVENous Q6H PRN    carvedilol (COREG) tablet 12.5 mg  12.5 mg Oral BID WITH MEALS    cloNIDine HCl (CATAPRES) tablet 0.2 mg  0.2 mg Oral Q12H        Medical History  Past Medical History:   Diagnosis Date    A-fib [facility]) 2009    Resolved. 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     Cancer ([facility]     splenic lymphoma    CKD stage 4 due to type 2 diabetes mellitus ([address]     Diabetes ([address]     Hypertension     Neuropathy ([address]     Other ill-defined conditions     spinal meningitis    Other ill-defined conditions     broken blood vessel to nose    Stroke ([facility] 2007    per patient    Vitamin D deficiency      Review of Systems   Constitutional: Negative for chills and fever. HENT: Negative for ear pain. Eyes: Negative for pain and discharge. Respiratory: Negative for cough and hemoptysis. Cardiovascular: Negative for chest pain and claudication. Gastrointestinal: Negative for constipation and diarrhea. Genitourinary: Negative for flank pain and hematuria. Musculoskeletal: Negative for back pain and myalgias. Skin: Negative for itching and rash.    Neurological: Positive for sensory change and focal weakness. Negative for headaches. Endo/Heme/Allergies: Negative for environmental allergies. Does not bruise/bleed easily. Psychiatric/Behavioral: Negative for depression and hallucinations. Exam:    Visit Vitals    BP (!) 167/92  Comment: nurse notified    Pulse 74    Temp 98.3 °F (36.8 °C)    Resp 18    Ht 6' 5\" (1.956 m)    Wt 324 lb 4.8 oz (147.1 kg)    SpO2 99%    BMI 38.46 kg/m2      General: Well developed, well nourished. Head: Normocephalic, atraumatic, anicteric sclera   Neck Normal ROM, No thyromegally   Lungs:  Clear to auscultation bilaterally, No wheezes or rubs   Cardiac: Regular rate and rhythm with no murmurs. Abd: Bowel sounds were audible. No tenderness on palpation   Ext: No pedal edema   Skin: No overt signs of rash or insect bites      NeurologicExam:  Mental Status: Alert and oriented to person place and time   Speech: Fluent no aphasia or dysarthria. Cranial Nerves:  II - XII Inact   Motor:  Full and symmetric strength of upper and lower proximal and distal muscles. Normal bulk and tone. Reflexes:   +1/4 over the proximal tendons of the upper and lower extremities. +0/4 over distal tendons. .   Sensory:   Symmetric distal reduction in sensory perception affecting all modalities. Gait:  Limps to the right. Tremor:   No tremor noted. Cerebellar:  dysmetric on the right    Neurovascular: No carotid bruits. No JVD         Imaging    CT Results (most recent):    Results from [place] encounter on 07/29/17   CT CODE NEURO HEAD WO CONTRAST   Narrative EXAM:  CT CODE NEURO HEAD WO CONTRAST    INDICATION:   code s    COMPARISON: CT 5/9/2016. CONTRAST:  None. TECHNIQUE: Unenhanced CT of the head was performed using 5 mm images. Brain and  bone windows were generated. CT dose reduction was achieved through use of a  standardized protocol tailored for this examination and automatic exposure  control for dose modulation.       FINDINGS:  The ventricles and sulci are normal in size, shape and configuration and  midline. There is mild periventricular and subcortical white matter disease. There is no intracranial hemorrhage, extra-axial collection, mass, mass effect  or midline shift. The basilar cisterns are open. No acute infarct is  identified. The bone windows demonstrate no abnormalities. The visualized  portions of the paranasal sinuses and mastoid air cells are clear. IMPRESSION: No acute intracranial abnormality. MRI Results (most recent):    Results from [place] encounter on 07/29/17   MRA BRAIN WO CONT   Narrative CLINICAL HISTORY: Right-sided weakness    INDICATION: R sided weakness. COMPARISON:  CT 7/29/2017    TECHNIQUE: MR examination of the brain includes axial and sagittal T1, axial T2,  axial FLAIR, axial gradient echo, axial DWI, coronal T2. Coronal T2    Contrast: None   Next, 3-D time-of-flight MRA of the brain was performed. Multiplanar  reconstructions were obtained. FINDINGS:   There are small scattered infarctions (3) in the periventricular white matter on  the left and in the left basal ganglia posterior limb internal capsule as well. Largest infarct lies in the periventricular white matter posteriorly on the left  than the left frontal lobe measures 12 x 7 mm in size Sulcal and ventricular  prominence. Confluent periventricular scattered foci of increased T2 signal  intensity in the corona radiata and centrum semiovale. There is no Chiari or syrinx. The pituitary and infundibulum are grossly  unremarkable. There is no skull base mass. Cerebellopontine angles are grossly  unremarkable. The major intracranial vascular flow-voids are unremarkable. The  cavernous sinuses are symmetric. Optic chiasm and infundibulum grossly  unremarkable. Orbits are grossly symmetric. Dural venous sinuses are grossly  patent. The brain architecture is normal. There is no evidence of midline shift  or mass-effect.  There are no extra-axial fluid collections. The A2 segments are within normal limits. There are A1 segments bilaterally. M1  segments are symmetric. M2 segments demonstrate symmetric arborization. . P1  segments are patent. . The internal carotid, anterior cerebral, and middle  cerebral arteries are patent. There is no flow-limiting intracranial stenosis. There is no aneurysm. There are no sizable posterior communicating arteries. IMPRESSION:   Small scattered acute infarctions in the periventricular white matter on the  left and in the left basal ganglia. There is no associated hemorrhage or mass  effect. Severe chronic microvascular ischemic change and moderate to severe cerebral  atrophy for patient age. No aneurysm, dissection or evidence of hemodynamically significant stenosis. The findings were called to Peña Winston the patient's RN on 7/30/2017 at 10:25  AM by Dr. Mari Toledo                    .   Lab Review    Lab Results   Component Value Date/Time    WBC 5.8 07/29/2017 04:07 PM    HCT 35.1 07/29/2017 04:07 PM    HGB 12.1 07/29/2017 04:07 PM    PLATELET 766 52/53/7865 04:07 PM       Lab Results   Component Value Date/Time    Sodium 137 07/30/2017 04:53 AM    Potassium 3.7 07/30/2017 04:53 AM    Chloride 105 07/30/2017 04:53 AM    CO2 25 07/30/2017 04:53 AM    Glucose 224 07/30/2017 04:53 AM    BUN 28 07/30/2017 04:53 AM    Creatinine 2.76 07/30/2017 04:53 AM    Calcium 8.4 07/30/2017 04:53 AM       Lab Results   Component Value Date/Time    Hemoglobin A1c 9.7 07/30/2017 04:53 AM        Lab Results   Component Value Date/Time    Vitamin B12 373 07/31/2017 03:49 AM    Folate 9.2 07/31/2017 03:49 AM       Lab Results   Component Value Date/Time    Cholesterol, total 189 07/30/2017 04:53 AM    HDL Cholesterol 34 07/30/2017 04:53 AM    LDL, calculated 103.6 07/30/2017 04:53 AM    VLDL, calculated 51.4 07/30/2017 04:53 AM    Triglyceride 257 07/30/2017 04:53 AM    CHOL/HDL Ratio 5.6 07/30/2017 04:53 AM

## 2017-07-31 NOTE — PROGRESS NOTES
Speech LAnguage Pathology evaluation/discharge  Patient: Nany Calderon (44 y.o. male)  Date: 7/31/2017  Primary Diagnosis: Stroke [facility])        Precautions:        ASSESSMENT :  Based on the objective data described below, the patient presents with functional basic and integrated language skills. His speech is fully intelligible. .  Skilled therapy provided by a speech-language pathologist is not indicated at this time. PLAN :  Recommendations:  No speech needs. Discharge Recommendations: None     SUBJECTIVE:   Patient stated his 12 yo dtr helped the home health nurse with his wound care. OBJECTIVE:     Past Medical History:   Diagnosis Date    A-fib [facility]) 2009    Resolved.    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[address]     Cancer ([address]     splenic lymphoma    CKD stage 4 due to type 2 diabetes mellitus ([address]     Diabetes ([address]     Hypertension     Neuropathy ([address]     Other ill-defined conditions     spinal meningitis    Other ill-defined conditions     broken blood vessel to nose    [address] [address] 2007    per patient    Vitamin D deficiency      Past Surgical History:   Procedure Laterality Date    HX OTHER SURGICAL  02/06/2017    Excisional debridement of abscess right thigh & right lower back     HX OTHER SURGICAL  05/23/2017    I&D & excisional debridement (skin & subcutaneous tissue) back & right thigh     Prior Level of Function/Home Situation:   Home Situation  Home Environment: Private residence  # Steps to Enter: 5  Rails to Enter: Yes  Hand Rails : Bilateral  One/Two Story Residence: One story  Living Alone: No  Support Systems: Child(richmond), Family member(s)  Patient Expects to be Discharged to[de-identified] Unknown  Current DME Used/Available at Home: Shower chair, Walker, rolling  Tub or Shower Type: Tub/Shower combination  Mental Status:  Neurologic State: Alert  Orientation Level: Disoriented to time, Oriented X4  Cognition: Appropriate decision making, Appropriate for age attention/concentration, Appropriate safety awareness  Perception: Appears intact  Perseveration: No perseveration noted     Motor Speech:  Oral-Motor Structure/Motor Speech  Face: No impairment  Labial: No impairment  Lingual: No impairment  Velum: No impairment  Mandible: No impairment  Apraxic Characteristics: None  Dysarthric Characteristics: None  Intelligibility: No impairment  Overall Impairment Severity: None  Language Comprehension and Expression:  Auditory Comprehension  Auditory Impairment: No   Verbal Expression  Primary Mode of Expression: Verbal  Initiation: No impairment  Naming: No impairment (listed 14 animals in 1 minute; wnl)  Conversation: Fluent; No impairment  Overall Impairment: None        Neuro-Linguistics:      verbal problem solving, verbal reasoning, divergent naming wnl per NCSE                                            Pragmatics:   wnl               G Codes: In compliance with CMSs Claims Based Outcome Reporting, the following G-code set was chosen for this patient based the use of the NOMS functional outcome to quantify this patient's level of  impairment. Using the NOMS, the patient was determined to be at level 7 for verbal expressoin function which correlates with the CH= 0% level of severity. Based on the objective assessment provided within this note, the current, goal, and discharge g-codes are as follows:    Expression   Current  CH= 0%   Goals  CH= 0%   D/C  CH= 0%                   After treatment:   [x]   Patient left in no apparent distress sitting up in chair  []   Patient left in no apparent distress in bed  [x]   Call bell left within reach  [x]   Nursing notified  []   Caregiver present  []   Bed alarm activated    COMMUNICATION/EDUCATION:   The patients plan of care including findings and recommendations was discussed with: Registered Nurse.   Educated on BEFAST and pt's risk factors of DM, high cholesterol, HTN, and being AA   . [x]   Patient/family have participated as able and agree with findings and recommendations. []   Patient is unable to participate in plan of care at this time.     Thank you for this referral.  Rafael Bradshaw, SLP  Time Calculation: 14 mins

## 2017-07-31 NOTE — PROGRESS NOTES
Notified Dr. Selena Narvaez about patient's fluctuating blood pressure episode with PT earlier today

## 2017-07-31 NOTE — PROGRESS NOTES
* No surgery found *  Bedside shift change report given to Shruti Dutton (oncoming nurse) by Lorne Spann  (offgoing nurse). Report included the following information SBAR, Kardex, Intake/Output, MAR and Recent Results. Zone Phone:   [phone]      Significant changes during shift:  BP fluctuating with PT/OT today         Patient Information    Israel Jaquez  57 y.o.  7/29/2017  3:39 PM by Ivone Lazo MD. Israel Jaquez was admitted from Home    Problem List    Patient Active Problem List    Diagnosis Date Noted    Stroke [facility]) 07/29/2017    Abscess 05/22/2017    Acute renal failure ([address] 12/20/2016    A-fib [address] [address] 05/10/2016     Class: Chronic    Vitamin D deficiency 05/10/2016     Class: Chronic    CKD (chronic kidney disease), stage IV ([address] 05/10/2016     Class: Chronic    HTN (hypertension) 07/29/2013    DM (diabetes mellitus) ([address] 07/26/2013    Hairy cell leukemia, in remission ([address] 07/26/2013     Class: Present on Admission    Morbid obesity ([address] 07/26/2013     Past Medical History:   Diagnosis Date    A-fib [facility]) 2009    Resolved. 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[address]     Cancer ([address]     splenic lymphoma    CKD stage 4 due to type 2 diabetes mellitus ([address]     Diabetes ([address]     Hypertension     Neuropathy ([address]     Other ill-defined conditions     spinal meningitis    Other ill-defined conditions     broken blood vessel to nose    Stroke ([address] 2007    per patient    Vitamin D deficiency          Core Measures:    CVA: Yes No  CHF:No No  PNA:No No      Activity Status:    OOB to Chair Yes  Ambulated this shift Yes   Bed Rest No    Supplemental O2: (If Applicable)    NC No  NRB No  Venti-mask No    LINES AND DRAINS:    Central Line? No     PICC LINE? No     Urinary Catheter?  No     DVT prophylaxis:    DVT prophylaxis Med- Yes  DVT prophylaxis SCD or PASHA- No     Wounds: (If Applicable)    Wounds- Yes    Location- lower back Patient Safety:    Falls Score Total Score: 2  Safety Level_______  Bed Alarm On? Yes  Sitter?  No    Plan for upcoming shift: -continue to monitor         Discharge Plan: In progress    Active Consults:  IP CONSULT TO NEUROLOGY

## 2017-07-31 NOTE — PROGRESS NOTES
Problem: Mobility Impaired (Adult and Pediatric)  Goal: *Acute Goals and Plan of Care (Insert Text)  Physical Therapy Goals  Initiated 7/30/2017  1. Patient will move from supine to sit and sit to supine , scoot up and down and roll side to side in bed with independence within 7 day(s). 2. Patient will transfer from bed to chair and chair to bed with independence using the least restrictive device within 7 day(s). 3. Patient will perform sit to stand with independence within 7 day(s). 4. Patient will ambulate with independence for 150 feet with the least restrictive device within 7 day(s). 5. Patient will ascend/descend 5 stairs with one sided handrail(s) with modified independence within 7 day(s). 6. Patient will improve Palafox Balance score by 7 points within 7 days. PHYSICAL THERAPY TREATMENT  Patient: Nasima Andrews (1 y.o. male)  Date: 7/31/2017  Diagnosis: Stroke [facility]) Stroke [facility])       Precautions: Fall      ASSESSMENT:  Pt was received sitting up in the chair and cleared by nursing to mobilize. VSS in sitting. Performed sit<>stand with CGA x 2 to come to full stand. In standing BP increased to 160/100. Limited due to HTN. Ambulated into the bathroom with with the room for 30ft without AD, noted unsteady gait with increased trunk sway, widened KATEY, and slight knee buckling of the RLE during stance phase. LOB while turning and which he needed min A to recover from. He was returned to sitting EOB, /92 and nursing made aware. He was able to perform sit>supine and scoot up in bed with SBA. Continue to recommend IP rehab at discharge. Refer to OT note for ore details, but pt has baseline short term memory loss for the past 10 years per pt report.       Progression toward goals:  [X]    Improving appropriately and progressing toward goals  [ ]    Improving slowly and progressing toward goals  [ ]    Not making progress toward goals and plan of care will be adjusted       PLAN:  Patient continues to benefit from skilled intervention to address the above impairments. Continue treatment per established plan of care. Discharge Recommendations:  Inpatient Rehab  Further Equipment Recommendations for Discharge:  TBD       SUBJECTIVE:   Patient stated I have had a rough life.       OBJECTIVE DATA SUMMARY:   Critical Behavior:  Neurologic State: Alert  Orientation Level: Disoriented to time, Oriented X4  Cognition: Appropriate decision making, Appropriate for age attention/concentration, Appropriate safety awareness     Functional Mobility Training:  Bed Mobility:        Sit to Supine: Stand-by asssistance  Scooting: Stand-by asssistance        Transfers:  Sit to Stand: Contact guard assistance  Stand to Sit: Contact guard assistance                             Balance:  Sitting: Intact; Without support  Standing: Impaired  Standing - Static: Fair  Standing - Dynamic : Fair  Ambulation/Gait Training:  Distance (ft): 30 Feet (ft) (limited by HTN)  Assistive Device: Gait belt  Ambulation - Level of Assistance: Contact guard assistance;Assist x2        Gait Abnormalities: Decreased step clearance;Trunk sway increased (slight increased knee flexion in stance phase)        Base of Support: Widened  Stance: Right decreased  Speed/Sanjana: Pace decreased (<100 feet/min)  Step Length: Left shortened;Right shortened  Swing Pattern: Right asymmetrical                          Neuro Re-Education:  Gait training, balance training     Pain:  Pain Scale 1: Numeric (0 - 10)  Pain Intensity 1: 0              Activity Tolerance:   HTN  Please refer to the flowsheet for vital signs taken during this treatment.   After treatment:   [ ]    Patient left in no apparent distress sitting up in chair  [X]    Patient left in no apparent distress in bed  [X]    Call bell left within reach  [X]    Nursing notified  [ ]    Caregiver present  [ ]    Bed alarm activated      COMMUNICATION/COLLABORATION:   The patients plan of care was discussed with: Occupational Therapist and Registered Nurse     Aj Severe, PT, DPT   Time Calculation: 36 mins

## 2017-07-31 NOTE — PROGRESS NOTES
Hospitalist Progress Note    NAME: Chivo Elder   :  1955   MRN:  [id number]   LOS:   2      Assessment / Plan:  Multiple acute infarctions in left periventricular white matter and left basal ganglia  Right sided weakness  -scattered infarctions only on one side, less likely cardioembolic discussed with Dr. Levar Vizcaino  -continue aspirin and plavix  -f/u echo and carotid doppler  -ST/OT/PT    Paroxysmal afib  -digoxin, coreg  -per patient had never been on anticoagulation    DM2 uncontrolled  -increasing NPH    HTN  HLD  -clonidine, amlodipine, coreg, added HCTZ  -statin    CKD4    Hx of recurrent skin abscess  -managed by Dr. Patel Delacruz, no signs of systemic infection    Obesity Body mass index is 38.46 kg/(m^2). Hairy cell leukemia in remission followed by Dr. Gilmore Or        Code status: Full  Prophylaxis: Lovenox  Recommended Disposition: Home w/Family     Subjective:     Chief Complaint: right weakness  Feeling well, no isssues, weakness improved    Review of Systems:  Symptom Y/N Comments  Symptom Y/N Comments   Fever/Chills    Chest Pain     Poor Appetite    Edema     Cough    Abdominal Pain     Sputum    Joint Pain     SOB/NAVARRO    Pruritis/Rash     Nausea/vomit    Tolerating PT/OT     Diarrhea    Tolerating Diet     Constipation    Other       Could NOT obtain due to:      Objective:     VITALS:   Last 24hrs VS reviewed since prior progress note.  Most recent are:  Patient Vitals for the past 24 hrs:   Temp Pulse Resp BP SpO2   17 1109 98.3 °F (36.8 °C) 74 18 (!) 167/92 99 %   17 0727 98 °F (36.7 °C) 67 18 171/90 99 %   17 0321 98.1 °F (36.7 °C) 64 19 153/88 -   17 0042 98.6 °F (37 °C) 79 18 (!) 192/97 97 %   17 2140 - 66 - (!) 200/91 -   17 1958 97.8 °F (36.6 °C) 67 18 188/85 97 %   17 1550 98.1 °F (36.7 °C) 64 16 149/64 98 %       Intake/Output Summary (Last 24 hours) at 17 1230  Last data filed at 17 0659   Gross per 24 hour   Intake                0 ml   Output             1400 ml   Net            -1400 ml        PHYSICAL EXAM:  General: Alert, cooperative, no acute distress    EENT:  EOMI. Anicteric sclerae. Mucous membranes moist  Resp:  CTA bilaterally, no wheezing or rales. No accessory muscle use  CV:  Regular rhythm, no edema  GI:  Soft, non distended, non tender. +Bowel sounds  Neurologic:  Alert and oriented X 3, normal speech  Psych:   Poor insight, not anxious nor agitated  Skin:  No rashes, no jaundice  ________________________________________________________________________  Care Plan discussed with:    Comments   Patient x    Family      RN     Care Manager     Consultant  x Neurology                     Multidiciplinary team rounds were held today with , nursing, pharmacist and clinical coordinator. Patient's plan of care was discussed; medications were reviewed and discharge planning was addressed. ________________________________________________________________________  Total NON critical care TIME:  20   Minutes  ________________________________________________________________________  Orlin Rush MD     Procedures: see electronic medical records for all procedures/Xrays and details which were not copied into this note but were reviewed prior to creation of Plan. LABS:  I reviewed today's most current labs and imaging studies.   Pertinent labs include:  Recent Labs      07/29/17   1607   WBC  5.8   HGB  12.1   HCT  35.1*   PLT  150     Recent Labs      07/30/17   0453  07/29/17   1607   NA  137  137   K  3.7  3.9   CL  105  105   CO2  25  26   GLU  224*  249*   BUN  28*  30*   CREA  2.76*  3.10*   CA  8.4*  8.8   ALB   --   3.2*   TBILI   --   0.5   SGOT   --   20   ALT   --   30   INR   --   1.0       Signed: Orlin Rush MD

## 2017-07-31 NOTE — ROUTINE PROCESS
Bedside and Verbal shift change report given to Humza Nathan (oncoming nurse) by Samuel Kramer (offgoing nurse). Report included the following information SBAR, Kardex, Procedure Summary, Intake/Output, MAR and Recent Results.

## 2017-07-31 NOTE — PROGRESS NOTES
Problem: Self Care Deficits Care Plan (Adult)  Goal: *Acute Goals and Plan of Care (Insert Text)  Occupational Therapy Goals  Initiated 7/31/2017   1. Patient will perform grooming in standing with supervision/set-up within 7 day(s). 2. Patient will perform bathing with supervision/set-up within 7 day(s). 3. Patient will perform upper body dressing and lower body dressing with supervision/set-up within 7 day(s). 4. Patient will perform toilet transfers with supervision/set-up within 7 day(s). 5. Patient will perform all aspects of toileting with independence within 7 day(s). 6. Patient will participate in upper extremity therapeutic exercise/activities with supervision/set-up for 10 minutes within 7 day(s). 7. Patient will improve their Fugl Ventura score by 5 points within 7 days. OCCUPATIONAL THERAPY EVALUATION  Patient: Yahir Paulson (5 y.o. male)  Date: 7/31/2017  Primary Diagnosis: Stroke [facility])        Precautions:   Fall      ASSESSMENT :  Based on the objective data described below, the patient presents with decreased balance, generalized weakness, no focal weakness RUE but presents with impaired coordination and dexterity, impaired memory (baseline per medical record), and decreased insight into deficits, impairing independence and safety during adls and functional mobility. Pt is functioning at supervision to moderate assistance levels for self care and is 1 to 2 person assistance for functional mobility. Pt would benefit from rehab at discharge. .     Patient will benefit from skilled intervention to address the above impairments.   Patients rehabilitation potential is considered to be Good  Factors which may influence rehabilitation potential include:   [ ]                None noted  [x ]                Mental ability/status-baseline memory  [ ]                Medical condition  [x ]                Home/family situation and support systems  [ ]                Safety awareness  [ ] Pain tolerance/management  [ ]                Other:        PLAN :  Recommendations and Planned Interventions:  [ x]                  Self Care Training                  [x ]           Therapeutic Activities  [x ]                  Functional Mobility Training    [x ]           Cognitive Retraining  [x ]                  Therapeutic Exercises           [x ]           Endurance Activities  [x ]                  Balance Training                   [x ]           Neuromuscular Re-Education  [ ]                  Visual/Perceptual Training     [x ]      Home Safety Training  [x ]                  Patient Education                 [ x]           Family Training/Education  [ ]                  Other (comment):     Frequency/Duration: Patient will be followed by occupational therapy 5 times a week to address goals. Discharge Recommendations: Rehab  Further Equipment Recommendations for Discharge: tbd       SUBJECTIVE:   Patient reported that his 11year old daughter would be home with him during the day and she is able to assist him. OBJECTIVE DATA SUMMARY:   HISTORY:   Past Medical History:   Diagnosis Date    A-fib [facility]) 2009     Resolved.    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[address]      Cancer ([address]       splenic lymphoma    CKD stage 4 due to type 2 diabetes mellitus ([address]      Diabetes ([address]      Hypertension      Neuropathy ([address]      Other ill-defined conditions       spinal meningitis    Other ill-defined conditions       broken blood vessel to nose    Stroke ([address] 2007     per patient    Vitamin D deficiency       Past Surgical History:   Procedure Laterality Date    HX OTHER SURGICAL   02/06/2017     Excisional debridement of abscess right thigh & right lower back     HX OTHER SURGICAL   05/23/2017     I&D & excisional debridement (skin & subcutaneous tissue) back & right thigh        Prior Level of Function/Home Situation: Pt reports independence in adls. His 11year old daughter is home with him during the day and reports that she can assist him. Pt reports memory issues for approx. 10 years since he had meningitis. Expanded or extensive additional review of patient history:      Home Situation  Home Environment: Private residence  # Steps to Enter: 5  Rails to Enter: Yes  Hand Rails : Bilateral  One/Two Story Residence: One story  Living Alone: No  Support Systems: Child(richmond), Family member(s)  Patient Expects to be Discharged to[de-identified] Unknown  Current DME Used/Available at Home: Deonna An, rolling, Shower chair  Tub or Shower Type: Tub/Shower combination  [ x]  Right hand dominant   [ ]  Left hand dominant     EXAMINATION OF PERFORMANCE DEFICITS:  Cognitive/Behavioral Status:  Neurologic State: Alert  Orientation Level: Oriented to person;Oriented to place;Oriented to situation  Cognition: Follows commands;Memory loss  Perception: Appears intact  Perseveration: No perseveration noted (very talkative )        Skin: generally intact, but has history of skin lesions and bandage noted on mid thoracic spine     Edema: none observed. Pt is morbidly obese     Hearing: Auditory  Auditory Impairment: None     Vision/Perceptual:    Tracking: Able to track stimulus in all quadrants w/o difficulty                                 Range of Motion:  BUEs:  Within functional limits  Decreased (mildly) opposition and may be related to impaired coordination                             Strength:  Generally decreased/functional.  No focal weakness BUEs. Coordination:     Fine Motor Skills-Upper: Left Intact; Right Impaired (decreased coordination/dexterity)    Gross Motor Skills-Upper: Left Intact; Right Intact     Tone & Sensation: Tone is generally normal  Sensation is impaired. Feels some numbness                             Balance:  Sitting: Intact; Without support  Standing: Impaired  Standing - Static: Fair  Standing - Dynamic : Fair Functional Mobility and Transfers for ADLs:  Bed Mobility:  Rolling:  (not assessed pt seated in chair upon arrival, returned to bed at his request at end of tx session)  Sit to Supine: Supervision;Stand-by asssistance  Scooting: Supervision;Stand-by asssistance     Transfers:  Sit to Stand:  (X2 for safety) CGA  Functional Transfers  Toilet Transfer : Contact guard assistance;Assist x2     ADL Assessment:  Feeding: Independent     Oral Facial Hygiene/Grooming: Supervision;Stand-by assistance (seated)     Bathing: Minimum assistance     Upper Body Dressing: Minimum assistance     Lower Body Dressing: Moderate assistance     Toileting: Minimum assistance                 ADL Intervention and task modifications:   Pt performed bed mobility, ambulation to the bathroom for toilet transfer with CGA X2 for safety/fall prevention due to balance issues and possible knee buckling. Pt performed standing grooming at the sink, with verbal cues for thoroughness, finding supplies-soap, and assisting with towels. Pt has difficulty reaching towards his feet in sitting. Pt was able to immediately recall 3 items (red, dog, spoon) and after 5 minutes, pt reported that he could not repeat the 3 nouns. Pt did not seem to try to recall.  (history of impaired memory)  Pt was then provided cues-color, animal, utensil--he was then able to recall. Pt attempted to explain his memory issues and seemed to be saying that he can remember past events, but has poor short term memory. Therapeutic Exercise:  Encouraged pt to use his RUE when safe to do so during adls. Do not use in extreme temperatures, with sharp nor heavy objects.         Functional Measure:   Fugl-Ventura Assessment of Motor Recovery after Stroke:       Reflex Activity  Flexors/Biceps/Fingers: Can be elicited  Extensors/Triceps: Can be elicited  Reflex Subtotal: 4     Volitional Movement Within Synergies  Shoulder Retraction: Full  Shoulder Elevation: Full  Shoulder Abduction (90 degrees): Full  Shoulder External Rotation: Full  Elbow Flexion: Full  Forearm Supination: Partial  Shoulder Adduction/Internal Rotation: Partial  Elbow Extension: Full  Forearm Pronation: Full  Subtotal: 16     Volitional Movement Mixing Synergies  Hand to Lumbar Spine: Partial  Shoulder Flexion (0-90 degrees): Full  Pronation-Supination: Partial  Subtotal: 4     Volitional Movement With Little or No Synergy  Shoulder Abduction (0-90 degrees): Full  Shoulder Flexion ( degrees): Partial  Pronation/Supination: Partial  Subtotal : 4     Normal Reflex Activity  Biceps, Triceps, Finger Flexors: Full  Subtotal : 2     Upper Extremity Total   Upper Extremity Total: 30     Wrist  Stability at 15 Degree Dorsiflexion: Full  Repeated Dorsiflexion/ Volar Flexion: Partial  Stability at 15 Degree Dorsiflexion: Full  Repeated Dorsiflexion/ Volar Flexion: Partial  Circumduction: Partial  Wrist Total: 7     Hand  Mass Flexion: Full  Mass Extension: Full  Grasp A: Full  Grasp B: Full  Grasp C: Full  Grasp D: Full  Grasp E: Full  Hand Total: 14     Coordination/Speed  Tremor: None  Dysmetria: Slight  Time: 2-5s  Coordination/Speed Total : 4     Total A-D  Total A-D (Motor Function): 55/66      Percentage of impairment CH  0% CI  1-19% CJ  20-39% CK  40-59% CL  60-79% CM  80-99% CN  100%   Fugl-Ventura score: 0-66 66 53-65 39-52 26-38 13-25 1-12    0      This is a reliable/valid measure of arm function after a neurological event. It has established value to characterize functional status and for measuring spontaneous and therapy-induced recovery; tests proximal and distal motor functions. Fugl-Ventura Assessment  UE scores recorded between five and 30 days post neurologic event can be used to predict UE recovery at six months post neurologic event.   Severe = 0-21 points   Moderately Severe = 22-33 points   Moderate = 34-47 points   Mild = 48-66 points  Chanelle Boles GLORIA, JESSICA Kenney, EPI Gar, & PAO Hopson (1992). Measurement of motor recovery after stroke: Outcome assessment and sample size requirements. Stroke, 23, pp. 5384-2426.   ------------------------------------------------------------------------------------------------------------------------------------------------------------------  MCID:  Stroke:   Yassine Strickland et al, 2001; n = 171; mean age 79 (5) years; assessed within 16 (12) days of stroke, Acute Stroke)  FMA Motor Scores from Admission to Discharge   · 10 point increase in FMA Upper Extremity = 1.5 change in discharge FIM  · 10 point increase in FMA Lower Extremity = 1.9 change in discharge FIM  MDC:   Stroke:   Yuly Oviedo al, 2008, n = 14, mean age = 59.9 (14.6) years, assessed on average 14 (6.5) months post stroke, Chronic Stroke)   · FMA = 5.2 points for the Upper Extremity portion of the assessment   Barthel Index:      Bathin  Bladder: 5  Bowels: 10  Groomin  Dressin  Feeding: 10  Mobility: 0  Stairs: 0  Toilet Use: 5  Transfer (Bed to Chair and Back): 5  Total: 40         Barthel and G-code impairment scale:  Percentage of impairment CH  0% CI  1-19% CJ  20-39% CK  40-59% CL  60-79% CM  80-99% CN  100%   Barthel Score 0-100 100 99-80 79-60 59-40 20-39 1-19    0   Barthel Score 0-20 20 17-19 13-16 9-12 5-8 1-4 0      The Barthel ADL Index: Guidelines  1. The index should be used as a record of what a patient does, not as a record of what a patient could do. 2. The main aim is to establish degree of independence from any help, physical or verbal, however minor and for whatever reason. 3. The need for supervision renders the patient not independent. 4. A patient's performance should be established using the best available evidence. Asking the patient, friends/relatives and nurses are the usual sources, but direct observation and common sense are also important. However direct testing is not needed.   5. Usually the patient's performance over the preceding 24-48 hours is important, but occasionally longer periods will be relevant. 6. Middle categories imply that the patient supplies over 50 per cent of the effort. 7. Use of aids to be independent is allowed. Johanna Bangura., Barthel, D.W. ([id number]). Functional evaluation: the Barthel Index. [address] [facility] (14)2. Marcin Haddad jimy TJ Levi, Dimas Mario., Lita Obrien, Nafisa, [address] [place] [address] (1999). Measuring the change indisability after inpatient rehabilitation; comparison of the responsiveness of the Barthel Index and Functional Archer Measure. Journal of Neurology, Neurosurgery, and Psychiatry, 66(4), 459-812. Josué Duke, N.J.A, JENNY Roger, & Ame Alfaro M.A. (2004.) Assessment of post-stroke quality of life in cost-effectiveness studies: The usefulness of the Barthel Index and the EuroQoL-5D. Quality of Life Research, 13, 437-15      G codes: In compliance with CMSs Claims Based Outcome Reporting, the following G-code set was chosen for this patient based on their primary functional limitation being treated: The outcome measure chosen to determine the severity of the functional limitation was the BArthel Index with a score of 40/100 which was correlated with the impairment scale. ? Self Care:     - CURRENT STATUS: CK - 40%-59% impaired, limited or restricted    - GOAL STATUS: CJ - 20%-39% impaired, limited or restricted    - D/C STATUS:  ---------------To be determined---------------       Occupational Therapy Evaluation Charge Determination   History Examination Decision-Making   LOW Complexity : Brief history review  MEDIUM Complexity : 3-5 performance deficits relating to physical, cognitive , or psychosocial skils that result in activity limitations and / or participation restrictions MEDIUM Complexity : Patient may present with comorbidities that affect occupational performnce.  Miniml to moderate modification of tasks or assistance (eg, physical or verbal ) with assesment(s) is necessary to enable patient to complete evaluation       Based on the above components, the patient evaluation is determined to be of the following complexity level: LOW      Pain:  Pain Scale 1: Numeric (0 - 10)  Pain Intensity 1: 0              Activity Tolerance:   BP fluctuated and was on the high side taken via dynamap. Nursing informed-see chart  Please refer to the flowsheet for vital signs taken during this treatment. After treatment:   [ ]  Patient left in no apparent distress sitting up in chair  [ x]  Patient left in no apparent distress in bed  [ x]  Call bell left within reach  [x ]  Nursing notified  [ ]  Caregiver present  [ x]  Bed alarm activated      COMMUNICATION/EDUCATION:   The patients plan of care was discussed with: Physical Therapist and Registered Nurse. Patient was educated regarding His deficit(s) of impaired coordination and dexterity. [ ]      Home safety education was provided and the patient/caregiver indicated understanding.  x ]      Patient/family have participated as able and agree with findings and recommendations. [ ]      Patient is unable to participate in plan of care at this time. This patients plan of care is appropriate for delegation to [facility].      Thank you for this referral.  Veronica Ware OTR/L  Time Calculation: 36 mins

## 2017-08-01 LAB
ANION GAP BLD CALC-SCNC: 7 MMOL/L (ref 5–15)
BUN SERPL-MCNC: 26 MG/DL (ref 6–20)
BUN/CREAT SERPL: 10 (ref 12–20)
CALCIUM SERPL-MCNC: 8.6 MG/DL (ref 8.5–10.1)
CHLORIDE SERPL-SCNC: 104 MMOL/L (ref 97–108)
CO2 SERPL-SCNC: 26 MMOL/L (ref 21–32)
CREAT SERPL-MCNC: 2.62 MG/DL (ref 0.7–1.3)
GLUCOSE BLD STRIP.AUTO-MCNC: 183 MG/DL (ref 65–100)
GLUCOSE BLD STRIP.AUTO-MCNC: 228 MG/DL (ref 65–100)
GLUCOSE BLD STRIP.AUTO-MCNC: 246 MG/DL (ref 65–100)
GLUCOSE BLD STRIP.AUTO-MCNC: 285 MG/DL (ref 65–100)
GLUCOSE SERPL-MCNC: 168 MG/DL (ref 65–100)
POTASSIUM SERPL-SCNC: 3.6 MMOL/L (ref 3.5–5.1)
SERVICE CMNT-IMP: ABNORMAL
SODIUM SERPL-SCNC: 137 MMOL/L (ref 136–145)

## 2017-08-01 PROCEDURE — 82962 GLUCOSE BLOOD TEST: CPT

## 2017-08-01 PROCEDURE — 74011250637 HC RX REV CODE- 250/637: Performed by: HOSPITALIST

## 2017-08-01 PROCEDURE — 74011636637 HC RX REV CODE- 636/637: Performed by: INTERNAL MEDICINE

## 2017-08-01 PROCEDURE — 97530 THERAPEUTIC ACTIVITIES: CPT | Performed by: OCCUPATIONAL THERAPIST

## 2017-08-01 PROCEDURE — 97116 GAIT TRAINING THERAPY: CPT

## 2017-08-01 PROCEDURE — 74011250636 HC RX REV CODE- 250/636: Performed by: HOSPITALIST

## 2017-08-01 PROCEDURE — 74011250637 HC RX REV CODE- 250/637: Performed by: INTERNAL MEDICINE

## 2017-08-01 PROCEDURE — 77030011256 HC DRSG MEPILEX <16IN NO BORD MOLN -A

## 2017-08-01 PROCEDURE — 77030011255 HC DSG AQUACEL AG BMS -A

## 2017-08-01 PROCEDURE — 74011636637 HC RX REV CODE- 636/637: Performed by: HOSPITALIST

## 2017-08-01 PROCEDURE — 65660000000 HC RM CCU STEPDOWN

## 2017-08-01 PROCEDURE — 74011250637 HC RX REV CODE- 250/637: Performed by: PSYCHIATRY & NEUROLOGY

## 2017-08-01 PROCEDURE — 93306 TTE W/DOPPLER COMPLETE: CPT

## 2017-08-01 PROCEDURE — 80048 BASIC METABOLIC PNL TOTAL CA: CPT | Performed by: INTERNAL MEDICINE

## 2017-08-01 PROCEDURE — 36415 COLL VENOUS BLD VENIPUNCTURE: CPT | Performed by: INTERNAL MEDICINE

## 2017-08-01 PROCEDURE — 77030009526 HC GEL CARSYN MDII -A

## 2017-08-01 RX ADMIN — INSULIN LISPRO 3 UNITS: 100 INJECTION, SOLUTION INTRAVENOUS; SUBCUTANEOUS at 21:31

## 2017-08-01 RX ADMIN — CLOPIDOGREL BISULFATE 75 MG: 75 TABLET ORAL at 08:59

## 2017-08-01 RX ADMIN — CARVEDILOL 12.5 MG: 12.5 TABLET, FILM COATED ORAL at 18:08

## 2017-08-01 RX ADMIN — INSULIN LISPRO 70 UNITS: 100 INJECTION, SUSPENSION SUBCUTANEOUS at 18:07

## 2017-08-01 RX ADMIN — INSULIN LISPRO 3 UNITS: 100 INJECTION, SOLUTION INTRAVENOUS; SUBCUTANEOUS at 18:08

## 2017-08-01 RX ADMIN — CLONIDINE HYDROCHLORIDE 0.2 MG: 0.1 TABLET ORAL at 21:32

## 2017-08-01 RX ADMIN — PRAVASTATIN SODIUM 20 MG: 10 TABLET ORAL at 21:32

## 2017-08-01 RX ADMIN — CARVEDILOL 12.5 MG: 12.5 TABLET, FILM COATED ORAL at 08:59

## 2017-08-01 RX ADMIN — AMLODIPINE BESYLATE 10 MG: 5 TABLET ORAL at 08:59

## 2017-08-01 RX ADMIN — Medication 10 ML: at 03:36

## 2017-08-01 RX ADMIN — INSULIN LISPRO 3 UNITS: 100 INJECTION, SOLUTION INTRAVENOUS; SUBCUTANEOUS at 12:01

## 2017-08-01 RX ADMIN — Medication 10 ML: at 21:32

## 2017-08-01 RX ADMIN — Medication 10 ML: at 14:09

## 2017-08-01 RX ADMIN — CLONIDINE HYDROCHLORIDE 0.2 MG: 0.1 TABLET ORAL at 08:59

## 2017-08-01 RX ADMIN — INSULIN LISPRO 2 UNITS: 100 INJECTION, SOLUTION INTRAVENOUS; SUBCUTANEOUS at 08:59

## 2017-08-01 RX ADMIN — INSULIN LISPRO 70 UNITS: 100 INJECTION, SUSPENSION SUBCUTANEOUS at 09:00

## 2017-08-01 RX ADMIN — HYDROCHLOROTHIAZIDE 12.5 MG: 12.5 CAPSULE ORAL at 08:59

## 2017-08-01 RX ADMIN — DIGOXIN 0.12 MG: 125 TABLET ORAL at 08:59

## 2017-08-01 RX ADMIN — ENOXAPARIN SODIUM 40 MG: 40 INJECTION SUBCUTANEOUS at 09:00

## 2017-08-01 NOTE — PROGRESS NOTES
Hospitalist Progress Note    NAME: Moustapha Agrawal   :  1955   MRN:  [id number]   LOS:   3      Assessment / Plan:  Multiple acute infarctions in left periventricular white matter and left basal ganglia  Right sided weakness  -scattered infarctions only on one side, less likely cardioembolic discussed with Dr. Belinda Cueto and therefore to continue on plavix only  -TTE: 50%, no wall motion abnormality. -ST/OT/PT - all recommend IP but patient refuses 0- therefore HH tomorrow?  -huge risk factors for recurrence of cva - will dc on plavix, statin and bp control. Needs aggressive OP management. Paroxysmal afib  -digoxin, coreg continue  -per patient had never been on anticoagulation    DM2 uncontrolled  -increasing NPH with some success but suspect that our diet is controlling just as much - he notes \"I never eat like this\"  -no change to current NPH - a1c was 9.7    HTN  HLD  -clonidine, amlodipine, coreg, added HCTZ  -statin at dc mandatory    CKD4    Hx of recurrent skin abscess  -managed by Dr. Vidhya Miller, no signs of systemic infection    Obesity Body mass index is 38.46 kg/(m^2). Hairy cell leukemia in remission followed by Dr. Hope Corral        Code status: Full  Prophylaxis: Lovenox  Recommended Disposition: Home w/Family     Subjective:     Chief Complaint: \"i feel great today\" no further diffiuclty walking - did well with PT. right weakness persists. Though. Feeling well    Review of Systems:  Symptom Y/N Comments  Symptom Y/N Comments   Fever/Chills n   Chest Pain n    Poor Appetite n   Edema n    Cough    Abdominal Pain     Sputum    Joint Pain     SOB/NAVARRO n   Pruritis/Rash     Nausea/vomit n   Tolerating PT/OT     Diarrhea n   Tolerating Diet y    Constipation    Other       Could NOT obtain due to:      Objective:     VITALS:   Last 24hrs VS reviewed since prior progress note.  Most recent are:  Patient Vitals for the past 24 hrs:   Temp Pulse Resp BP SpO2   17 0906 - - - 152/78 -   17 5898 98.2 °F (36.8 °C) 64 18 (!) 159/99 96 %   08/01/17 0326 - - - 143/85 -   08/01/17 0300 98.9 °F (37.2 °C) 66 18 185/87 96 %   07/31/17 2301 97.9 °F (36.6 °C) 65 18 158/82 96 %   07/31/17 2143 - 65 - (!) 157/97 -   07/31/17 1950 98.1 °F (36.7 °C) 63 18 183/84 94 %   07/31/17 1539 - - - 130/80 -   07/31/17 1520 98 °F (36.7 °C) 64 18 160/87 98 %   07/31/17 1502 - - - 135/72 -   07/31/17 1412 - 69 - (!) 194/92 100 %   07/31/17 1408 - - - (!) 160/100 -   07/31/17 1358 - 70 - 144/81 99 %   07/31/17 1109 98.3 °F (36.8 °C) 74 18 (!) 167/92 99 %       Intake/Output Summary (Last 24 hours) at 08/01/17 0928  Last data filed at 08/01/17 0754   Gross per 24 hour   Intake                0 ml   Output             1700 ml   Net            -1700 ml        PHYSICAL EXAM:  General: Alert, cooperative sitting up in bed, interactive, no acute distress    EENT:  EOMI. Anicteric sclerae. Mucous membranes moist  Resp:  CTA bilaterally, no wheezing or rales. No accessory muscle use  CV:  Regular rhythm, no edema  GI:  Soft, non distended, non tender. +Bowel sounds  Neurologic:  Alert and oriented X 3, normal speech  Psych:   Fair insight, not anxious nor agitated  Skin:  No rashes, no jaundice  ________________________________________________________________________  Care Plan discussed with:    Comments   Patient x Discussed with aptient in room. Discussed overall poc with him. Discussed IP rehab suggested and he refused - home only. We will continue discussion tomorrow. 21   Family   none   RN     Care Manager     Consultant  x Neurology 5                     Multidiciplinary team rounds were held today with , nursing, pharmacist and clinical coordinator. Patient's plan of care was discussed; medications were reviewed and discharge planning was addressed.      ________________________________________________________________________  Total NON critical care TIME:  30 Minutes  ________________________________________________________________________  Shan Ching MD     Procedures: see electronic medical records for all procedures/Xrays and details which were not copied into this note but were reviewed prior to creation of Plan. LABS:  I reviewed today's most current labs and imaging studies. Pertinent labs include:  Recent Labs      07/29/17   1607   WBC  5.8   HGB  12.1   HCT  35.1*   PLT  150     Recent Labs      08/01/17   0328  07/30/17   0453  07/29/17   1607  07/29/17   1557   NA  137  137  137   --    K  3.6  3.7  3.9   --    CL  104  105  105   --    CO2  26  25  26   --    GLU  168*  224*  249*   --    BUN  26*  28*  30*   --    CREA  2.62*  2.76*  3.10*   --    CA  8.6  8.4*  8.8   --    ALB   --    --   3.2*   --    TBILI   --    --   0.5   --    SGOT   --    --   20   --    ALT   --    --   30   --    INR   --    --   1.0  1.0       Signed:  Shan Ching MD

## 2017-08-01 NOTE — PROGRESS NOTES
Pt is a 57 y.o  [place] male admitted with a Stroke. Pt was alert, oriented and in no distress. Demographic information verified and all is correct. Pt lives with his girlfriend and 3 children (ages 11, 13 & 25) in a 1 story home with a few steps to the entrance. Prior to admission, pt was [facility]. Pt was independent with his ADL's and IADL's. Pt was driving prior to admission. Preferred pharmacy is [facility]. Pt has a walker, shower chair at home. Pt has been to [facility] in the past. CM discussed discharge plan of inpt rehab and pt verbalized he would prefer to go home since he has to help with his 3 children and his girlfriend works during the day. Pt would prefer to go home with home health. CM will continue to follow pt for discharge planning needs. Care Management Interventions  PCP Verified by CM: Yes Delvis Medley NP  saw her last mth)  Mode of Transport at Discharge: Other (see comment) (family or friends can transport by car)  Transition of Care Consult (CM Consult): Discharge Planning  Discharge Durable Medical Equipment: No (walker)  Physical Therapy Consult: Yes  Occupational Therapy Consult: Yes  Speech Therapy Consult: Yes  Current Support Network:  Other, Own Home (lives with his girlfriend and 3 children)  Confirm Follow Up Transport: Friends  Discharge Location  Discharge Placement: [address] [employer] [address] [place]

## 2017-08-01 NOTE — PROGRESS NOTES
Checked on pt 2nd time today. CM currently in talking with pt. Will continue to follow. Thank you.   Ainsley Mitchell, PT, DPT

## 2017-08-01 NOTE — WOUND CARE
Wound Care consult: Chart reviewed and patient assessed for his mid back wound that was present on admission. Pt. Has been admitted for \"a mini stroke\". He stated that he wants to go home and he may be discharged tomorrow. The wound has been monitored by Dr. Linda Richmond for several months and used to be much deeper. A nurse comes by his home several times per week to dress the wound for him. Up until a few days ago the wound was packable with some depth. Now it only needs a wound bed dressing. Assessment of the mid back wound: The wound bed is pink and the edges are rolled. No s/sx of infection. WOUND POA CONDITIONS    Wound Back Mid (Active)   DRESSING STATUS Removed 8/1/2017  4:52 PM   DRESSING TYPE Foam 8/1/2017  4:52 PM   Non-Pressure Injury Full thickness (subcut/muscle) 8/1/2017  4:52 PM   Wound Length (cm) 3.8 cm 8/1/2017  4:52 PM   Wound Width (cm) 1.2 cm 8/1/2017  4:52 PM   Wound Depth (cm) 0.3 8/1/2017  4:52 PM   Wound Surface area (cm^3) 1.37 cm^2 8/1/2017  4:52 PM   Condition of [facility] 8/1/2017  4:52 PM   Condition of Edges Rolled/curled 8/1/2017  4:52 PM   Epithelialization (%) 20 8/1/2017  4:52 PM   Tissue Type Pink 8/1/2017  4:52 PM   Tissue Type Percent Pink 100 8/1/2017  4:52 PM   Drainage Amount  Scant 8/1/2017  4:52 PM   Drainage Color Serosanguinous 8/1/2017  4:52 PM   Wound Odor None 8/1/2017  4:52 PM   Periwound Skin Condition Intact 8/1/2017  4:52 PM   Cleansing and Cleansing Agents  Chlorhexidine gluconate 4%;Dermal wound cleanser 8/1/2017  4:52 PM   Dressing Type Applied Silver products; Wound gel 8/1/2017  4:52 PM   Procedure Tolerated Well 8/1/2017  4:52 PM   Number of days:2       Treatment today: The entire back was cleansed with a warm CHG wipe. The wound was cleansed with Caraklenz spray and wiped with gauze. Dressed with Carrasyn wound gel and a Aquacel Ag dressing and covered with a large foam dressing and then secured with the medipore tape. Orders written for the same. Joanna Montenegro RN, BSN, [employer]

## 2017-08-01 NOTE — PROGRESS NOTES
Problem: Mobility Impaired (Adult and Pediatric)  Goal: *Acute Goals and Plan of Care (Insert Text)  Physical Therapy Goals  Initiated 7/30/2017  1. Patient will move from supine to sit and sit to supine , scoot up and down and roll side to side in bed with independence within 7 day(s). 2. Patient will transfer from bed to chair and chair to bed with independence using the least restrictive device within 7 day(s). 3. Patient will perform sit to stand with independence within 7 day(s). 4. Patient will ambulate with independence for 150 feet with the least restrictive device within 7 day(s). 5. Patient will ascend/descend 5 stairs with one sided handrail(s) with modified independence within 7 day(s). 6. Patient will improve Palafox Balance score by 7 points within 7 days. PHYSICAL THERAPY TREATMENT  Patient: Luke Ronquillo (51 y.o. male)  Date: 8/1/2017  Diagnosis: Stroke [facility]) Stroke [facility])       Precautions: Fall      ASSESSMENT:  Pt was received in supine and cleared by nursing to mobilize. BP still HTN. He now refuses to go to rehab and wants to go home. He performed bed mobility with supervision, cued not to use the bed rail but he used it anyway. Sit<>stand was performed with CGA to come to full stand with RW. When attempting to educate on gait training and safety with RW, especially with scenarios similar to home set up he was very argumentative about the way he did things. Extremely poor safety awareness. Pt reports he had a visit from his 11year old last night and he is concerned about being away from his children . He does not want the mother's brother to be around the children. Strongly recommending IP rehab at discharge. He is refusing to go anywhere but home. At the least he needs HHPT/OT and would strongly recommend social work go out to the house to assess complication family situation.    Progression toward goals:  [X]    Improving appropriately and progressing toward goals  [ ]    Improving slowly and progressing toward goals  [ ]    Not making progress toward goals and plan of care will be adjusted       PLAN:  Patient continues to benefit from skilled intervention to address the above impairments. Continue treatment per established plan of care. Discharge Recommendations:  Home Health, pt refuses IP  Further Equipment Recommendations for Discharge:  Has RW, needs bariatric walker but refuses (too wide)       SUBJECTIVE:   Patient stated you don't understand I can't afford to be away from my kids.       OBJECTIVE DATA SUMMARY:   Critical Behavior:  Neurologic State: Alert, Eyes open spontaneously  Orientation Level: Oriented X4  Cognition: Follows commands     Functional Mobility Training:  Bed Mobility:     Supine to Sit: Supervision (used bed rails)     Scooting: Supervision        Transfers:  Sit to Stand: Supervision (RW for support in standing)                                Balance:  Standing: Impaired  Standing - Static: Constant support;Good  Standing - Dynamic : Fair (resistant to safety educ. using RW in tight space sideways)  Ambulation/Gait Training:  Distance (ft): 100 Feet (ft)  Assistive Device: Gait belt;Walker, rolling  Ambulation - Level of Assistance: Contact guard assistance        Gait Abnormalities: Decreased step clearance;Trunk sway increased        Base of Support: Widened  Stance: Right decreased  Speed/Sanjana: Pace decreased (<100 feet/min)  Step Length: Left shortened;Right shortened  Swing Pattern: Right asymmetrical                    Activity Tolerance:   Fair, argumentative   Please refer to the flowsheet for vital signs taken during this treatment.   After treatment:   [X]    Patient left in no apparent distress sitting up in chair  [ ]    Patient left in no apparent distress in bed  [X]    Call bell left within reach  [X]    Nursing notified  [ ]    Caregiver present  [ ]    Bed alarm activated      COMMUNICATION/COLLABORATION:   The patients plan of care was discussed with: Occupational Therapist and Registered Nurse     Ara Tineo, PT, DPT   Time Calculation: 37 mins

## 2017-08-01 NOTE — PROGRESS NOTES
Bedside shift change report given to [address] [place] [address]) by [facility] nurse). Report included the following information SBAR, Kardex, Intake/Output, MAR and Recent Results.      Zone Phone:   [phone]          Significant changes during shift:  patient eager to go home tomorrow, BP high with ambulation today, wound care consult today completed and echo completed today               Patient Information      Marilin Lozano  57 y.o.  7/29/2017  3:39 PM by Eva Duran MD. Shivam Robertson admitted from Home      Problem List                Patient Active Problem List      Diagnosis Date Noted    Stroke [facility]) 07/29/2017    Abscess 05/22/2017    Acute renal failure ([address] 12/20/2016    A-fib ([address] 05/10/2016         Class: Chronic    Vitamin D deficiency 05/10/2016         Class: Chronic    CKD (chronic kidney disease), stage IV ([address] 05/10/2016         Class: Chronic    HTN (hypertension) 07/29/2013    DM (diabetes mellitus) ([address] 07/26/2013    Hairy cell leukemia, in remission ([address] 07/26/2013         Class: Present on Admission    Morbid obesity ([address] 07/26/2013               Past Medical History:   Diagnosis Date    A-fib [facility]) 2009      Resolved.    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[address]       Cancer ([address]         splenic lymphoma    CKD stage 4 due to type 2 diabetes mellitus ([address]       Diabetes ([address]       Hypertension       Neuropathy ([address]       Other ill-defined conditions         spinal meningitis    Other ill-defined conditions         broken blood vessel to nose    Stroke [facility]) 2007      per patient    Vitamin D deficiency                  Core Measures:      CVA: Yes No  CHF:No No  PNA:No No          Activity Status:      OOB to Chair Yes  Ambulated this shift Yes   Bed Rest No      Supplemental X4: (SQ Applicable)      NC No  NRB No  Venti-mask No      LINES AND DRAINS:      [address] LINE? No       Urinary Catheter? No       DVT prophylaxis:      DVT prophylaxis Med- Yes  DVT prophylaxis SCD or PASHA- No       Wounds: (If Applicable)      Wounds- Yes      Location- mid lower back       Patient Safety:      Falls Score Total Score: 2  Safety Level_______  Bed Alarm On? Yes  Sitter?  No      Plan for upcoming shift: -continue neurochecks               Discharge Plan: In progress      Active Consults:  IP CONSULT TO NEUROLOGY

## 2017-08-01 NOTE — ROUTINE PROCESS
Bedside shift change report given to Keyonna Calvo (oncoming nurse) by Dana Patterson (offgoing nurse). Report included the following information SBAR, Kardex, Intake/Output, MAR and Recent Results.     Zone Phone:   [phone]        Significant changes during shift:  None           Patient Information     Sharmaine Nye  57 y.o.  7/29/2017  3:39 PM by Collin Bridges MD. Sharmaine Nye was admitted from Home     Problem List           Patient Active Problem List     Diagnosis Date Noted    Stroke [facility]) 07/29/2017    Abscess 05/22/2017    Acute renal failure ([address] 12/20/2016    A-fib ([facility] 05/10/2016       Class: Chronic    Vitamin D deficiency 05/10/2016       Class: Chronic    CKD (chronic kidney disease), stage IV ([address] 05/10/2016       Class: Chronic    HTN (hypertension) 07/29/2013    DM (diabetes mellitus) ([address] 07/26/2013    Hairy cell leukemia, in remission ([address] 07/26/2013       Class: Present on Admission    Morbid obesity ([address] 07/26/2013           Past Medical History:   Diagnosis Date    A-fib [facility]) 2009     Resolved.  Abscess of buttock 12/2016     wound culture grew out MSSA    Blastic NK-cell lymphoma ([address]      Cancer ([address]       splenic lymphoma    CKD stage 4 due to type 2 diabetes mellitus ([address]      Diabetes ([address]      Hypertension      Neuropathy ([address]      Other ill-defined conditions       spinal meningitis    Other ill-defined conditions       broken blood vessel to nose    Stroke ([address] 2007     per patient    Vitamin D deficiency              Core Measures:     CVA: Yes No  CHF:No No  PNA:No No        Activity Status:     OOB to Chair Yes  Ambulated this shift Yes   Bed Rest No     Supplemental O2: (If Applicable)     NC No  NRB No  Venti-mask No     LINES AND DRAINS:     Central Line? No      PICC LINE? No      Urinary Catheter?  No      DVT prophylaxis:     DVT prophylaxis Med- Yes  DVT prophylaxis SCD or PASHA- No      Wounds: (If Applicable)     Wounds- Yes     Location- mid lower back      Patient Safety:     Falls Score Total Score: 2  Safety Level_______  Bed Alarm On? Yes  Sitter? No     Plan for upcoming shift: -discharge planning?  ECHO           Discharge Plan: In progress     Active Consults:  IP CONSULT TO NEUROLOGY

## 2017-08-01 NOTE — PROGRESS NOTES
Attempted to see pt for PT tx. About to be taken off the floor. Will follow up later as able. Thank you.   Cherry Hooks, PT, DPT

## 2017-08-02 VITALS
WEIGHT: 315 LBS | BODY MASS INDEX: 37.19 KG/M2 | HEART RATE: 71 BPM | OXYGEN SATURATION: 97 % | DIASTOLIC BLOOD PRESSURE: 87 MMHG | HEIGHT: 77 IN | RESPIRATION RATE: 18 BRPM | SYSTOLIC BLOOD PRESSURE: 161 MMHG | TEMPERATURE: 98 F

## 2017-08-02 LAB
ANION GAP BLD CALC-SCNC: 7 MMOL/L (ref 5–15)
BASOPHILS # BLD AUTO: 0 K/UL (ref 0–0.1)
BASOPHILS # BLD: 0 % (ref 0–1)
BUN SERPL-MCNC: 26 MG/DL (ref 6–20)
BUN/CREAT SERPL: 10 (ref 12–20)
CALCIUM SERPL-MCNC: 8.4 MG/DL (ref 8.5–10.1)
CHLORIDE SERPL-SCNC: 104 MMOL/L (ref 97–108)
CO2 SERPL-SCNC: 25 MMOL/L (ref 21–32)
CREAT SERPL-MCNC: 2.72 MG/DL (ref 0.7–1.3)
EOSINOPHIL # BLD: 0.1 K/UL (ref 0–0.4)
EOSINOPHIL NFR BLD: 2 % (ref 0–7)
ERYTHROCYTE [DISTWIDTH] IN BLOOD BY AUTOMATED COUNT: 14 % (ref 11.5–14.5)
GLUCOSE BLD STRIP.AUTO-MCNC: 180 MG/DL (ref 65–100)
GLUCOSE BLD STRIP.AUTO-MCNC: 247 MG/DL (ref 65–100)
GLUCOSE BLD STRIP.AUTO-MCNC: 249 MG/DL (ref 65–100)
GLUCOSE SERPL-MCNC: 166 MG/DL (ref 65–100)
HCT VFR BLD AUTO: 32 % (ref 36.6–50.3)
HGB BLD-MCNC: 11.1 G/DL (ref 12.1–17)
LYMPHOCYTES # BLD AUTO: 28 % (ref 12–49)
LYMPHOCYTES # BLD: 1.4 K/UL (ref 0.8–3.5)
MAGNESIUM SERPL-MCNC: 2.3 MG/DL (ref 1.6–2.4)
MCH RBC QN AUTO: 29.8 PG (ref 26–34)
MCHC RBC AUTO-ENTMCNC: 34.7 G/DL (ref 30–36.5)
MCV RBC AUTO: 85.8 FL (ref 80–99)
MONOCYTES # BLD: 0.2 K/UL (ref 0–1)
MONOCYTES NFR BLD AUTO: 4 % (ref 5–13)
NEUTS SEG # BLD: 3.3 K/UL (ref 1.8–8)
NEUTS SEG NFR BLD AUTO: 66 % (ref 32–75)
PHOSPHATE SERPL-MCNC: 4.1 MG/DL (ref 2.6–4.7)
PLATELET # BLD AUTO: 133 K/UL (ref 150–400)
POTASSIUM SERPL-SCNC: 3.6 MMOL/L (ref 3.5–5.1)
RBC # BLD AUTO: 3.73 M/UL (ref 4.1–5.7)
SERVICE CMNT-IMP: ABNORMAL
SODIUM SERPL-SCNC: 136 MMOL/L (ref 136–145)
WBC # BLD AUTO: 5.1 K/UL (ref 4.1–11.1)

## 2017-08-02 PROCEDURE — 80048 BASIC METABOLIC PNL TOTAL CA: CPT | Performed by: INTERNAL MEDICINE

## 2017-08-02 PROCEDURE — 85025 COMPLETE CBC W/AUTO DIFF WBC: CPT | Performed by: INTERNAL MEDICINE

## 2017-08-02 PROCEDURE — 74011250637 HC RX REV CODE- 250/637: Performed by: HOSPITALIST

## 2017-08-02 PROCEDURE — 74011250637 HC RX REV CODE- 250/637: Performed by: INTERNAL MEDICINE

## 2017-08-02 PROCEDURE — 36415 COLL VENOUS BLD VENIPUNCTURE: CPT | Performed by: INTERNAL MEDICINE

## 2017-08-02 PROCEDURE — 84100 ASSAY OF PHOSPHORUS: CPT | Performed by: INTERNAL MEDICINE

## 2017-08-02 PROCEDURE — 74011250637 HC RX REV CODE- 250/637: Performed by: PSYCHIATRY & NEUROLOGY

## 2017-08-02 PROCEDURE — 74011636637 HC RX REV CODE- 636/637: Performed by: INTERNAL MEDICINE

## 2017-08-02 PROCEDURE — 83735 ASSAY OF MAGNESIUM: CPT | Performed by: INTERNAL MEDICINE

## 2017-08-02 PROCEDURE — 77030011256 HC DRSG MEPILEX <16IN NO BORD MOLN -A

## 2017-08-02 PROCEDURE — 82962 GLUCOSE BLOOD TEST: CPT

## 2017-08-02 PROCEDURE — 74011636637 HC RX REV CODE- 636/637: Performed by: HOSPITALIST

## 2017-08-02 PROCEDURE — 74011250636 HC RX REV CODE- 250/636: Performed by: HOSPITALIST

## 2017-08-02 RX ORDER — CLOPIDOGREL BISULFATE 75 MG/1
75 TABLET ORAL DAILY
Qty: 30 TAB | Refills: 0 | Status: SHIPPED | OUTPATIENT
Start: 2017-08-02 | End: 2017-09-01

## 2017-08-02 RX ORDER — CARVEDILOL 12.5 MG/1
12.5 TABLET ORAL 2 TIMES DAILY WITH MEALS
Qty: 60 TAB | Refills: 0 | Status: SHIPPED | OUTPATIENT
Start: 2017-08-02 | End: 2017-09-01

## 2017-08-02 RX ORDER — HYDROCHLOROTHIAZIDE 12.5 MG/1
12.5 CAPSULE ORAL DAILY
Qty: 30 CAP | Refills: 0 | Status: SHIPPED | OUTPATIENT
Start: 2017-08-02 | End: 2017-09-01

## 2017-08-02 RX ADMIN — CLONIDINE HYDROCHLORIDE 0.2 MG: 0.1 TABLET ORAL at 08:33

## 2017-08-02 RX ADMIN — ENOXAPARIN SODIUM 40 MG: 40 INJECTION SUBCUTANEOUS at 08:34

## 2017-08-02 RX ADMIN — INSULIN LISPRO 3 UNITS: 100 INJECTION, SOLUTION INTRAVENOUS; SUBCUTANEOUS at 17:33

## 2017-08-02 RX ADMIN — DIGOXIN 0.12 MG: 125 TABLET ORAL at 08:34

## 2017-08-02 RX ADMIN — INSULIN LISPRO 70 UNITS: 100 INJECTION, SUSPENSION SUBCUTANEOUS at 17:33

## 2017-08-02 RX ADMIN — INSULIN LISPRO 3 UNITS: 100 INJECTION, SOLUTION INTRAVENOUS; SUBCUTANEOUS at 12:18

## 2017-08-02 RX ADMIN — CARVEDILOL 12.5 MG: 12.5 TABLET, FILM COATED ORAL at 17:33

## 2017-08-02 RX ADMIN — INSULIN LISPRO 70 UNITS: 100 INJECTION, SUSPENSION SUBCUTANEOUS at 08:34

## 2017-08-02 RX ADMIN — Medication 10 ML: at 13:54

## 2017-08-02 RX ADMIN — INSULIN LISPRO 2 UNITS: 100 INJECTION, SOLUTION INTRAVENOUS; SUBCUTANEOUS at 08:34

## 2017-08-02 RX ADMIN — AMLODIPINE BESYLATE 10 MG: 5 TABLET ORAL at 08:33

## 2017-08-02 RX ADMIN — HYDROCHLOROTHIAZIDE 12.5 MG: 12.5 CAPSULE ORAL at 08:33

## 2017-08-02 RX ADMIN — CLOPIDOGREL BISULFATE 75 MG: 75 TABLET ORAL at 08:34

## 2017-08-02 RX ADMIN — Medication 10 ML: at 06:00

## 2017-08-02 RX ADMIN — CARVEDILOL 12.5 MG: 12.5 TABLET, FILM COATED ORAL at 08:34

## 2017-08-02 NOTE — DISCHARGE SUMMARY
Hospitalist Discharge Summary    NAME: Concepcion Bernabe   :  1955   MRN:  [id number]     DISCHARGE DIAGNOSIS:  Multiple acute infarctions in left periventricular white matter and left basal ganglia causing Right sided weakness  Paroxysmal atrial fibrillation  DM2 uncontrolled  hypertension  Hyperlipidemia  Chronic kidney disease stage IV  Hx of recurrent skin abscess  Obesity Body mass index is 38.46 kg/(m^2). Hairy cell leukemia in remission followed by Dr. Marilia Parks: Neurology    Follow Up: Follow-up Information     Follow up With Details Comments 5859 Naat Enriquez NP Schedule an appointment as soon as possible for a visit in 1 week Call Sooner, As needed, If symptoms worsen [address]  [phone]      Moraima West MD Schedule an appointment as soon as possible for a visit in 1 month Call Sooner, As needed, If symptoms worsen [address]  [address]  [facility]  [phone]            Procedures: see electronic medical records for all procedures/Xrays and details which were not copied into this note but were reviewed prior to creation of Plan. Please follow-up tests/labs that are still pendin. None     DISCHARGE SUMMARY/HOSPITAL COURSE: for full details see H&P, daily progress notes, labs, consult notes. Briefly As Per HPI:   Frida Lopez is a 57 y.o.  male who presents with above complaint.      Chief complaint:R sided weakness   Severity: moderate     Onset: yesterday 1600   Duration: continuous  Course: worsening since this am    Exacerbated By: none   Alleviated By: none   Associated factors: Positive - off balance, he was dragging his R leg yesterday. Negative - speech and visual abnormalities/HA/dizzness/CP/dyspnea     Pt endorses history of CVA 10 years ago with spinal meningitis. No previous h/o TIA.  On daily ASA at home.       Pertinent ER work up: Head CT negative, ECG with NSR      Treated in ED with: none      Vs:98.1 °F (36.7 °C) - 73 - 169/100 - 21 - 97% RA       We were asked to admit for work up and evaluation of the above problems.        The patient's hospital course was complicated by:  Multiple acute infarctions in left periventricular white matter and left basal ganglia causing Right sided weakness  Paroxysmal atrial fibrillation  DM2 uncontrolled  hypertension  Hyperlipidemia  Chronic kidney disease stage IV  Hx of recurrent skin abscess  Obesity Body mass index is 38.46 kg/(m^2). Hairy cell leukemia in remission followed by Dr. Sonal Alba    Patient with complex inpatient course. Please see all notes/labs/vitals/testing/procedures for details, briefly the patient was admitted following presentation to ED with right sided weakness. Found to have left basal gangliar stroke causing the right side weakness. oscarn has done well - was offered IP Rehab but patient has refused. Is for home with hh/pt/ot/rn/social service. Medications adjusted. Safe for dc. Informed to stop smoking, take medicine, undergo therapy and follow up with neurology - leonardo aware and is to try.  _______________________________________________________________________   Patient seen and examined by me on day of discharge. Pertinent findings are:  Gen: wd bm in nad  HEENT: nc at  Chest: cta b  Cv: no r/g  Abd: s/nd/nt +bs  Neuro: cn 2-12 gi    See Discharge Instructions for further details. _______________________________________________________________________  DISPOSITION:    Home with Family: x   Home with HH/PT/OT/RN: x   SNF/LTC:    DARIN:    OTHER:    ________________________________________________________________________  Medications Reviewed:  Current Discharge Medication List      START taking these medications    Details   hydroCHLOROthiazide (MICROZIDE) 12.5 mg capsule Take 1 Cap by mouth daily for 30 days. Qty: 30 Cap, Refills: 0      clopidogrel (PLAVIX) 75 mg tab Take 1 Tab by mouth daily for 30 days.   Qty: 30 Tab, Refills: 0         CONTINUE these medications which have CHANGED    Details   carvedilol (COREG) 12.5 mg tablet Take 1 Tab by mouth two (2) times daily (with meals) for 30 days. Qty: 60 Tab, Refills: 0         CONTINUE these medications which have NOT CHANGED    Details   cloNIDine HCl (CATAPRES) 0.2 mg tablet Take 1 Tab by mouth two (2) times a day. Qty: 30 Tab, Refills: 0      insulin lispro protamine/insulin lispro (HUMALOG MIX 75/25) 100 unit/mL (75-25) injection 60 Units by SubCUTAneous route Before breakfast and dinner. Qty: 1 Vial, Refills: 0      amLODIPine (NORVASC) 10 mg tablet Take 1 Tab by mouth daily. Qty: 30 Tab, Refills: 1      alcohol swabs (ALCOHOL PADS) padm 1 Each by Apply Externally route two (2) times a day. Qty: 100 Each, Refills: 1      pravastatin (PRAVACHOL) 20 mg tablet Take 20 mg by mouth nightly. digoxin (LANOXIN) 0.125 mg tablet Take 0.125 mg by mouth daily. Indications: afib         STOP taking these medications       oxyCODONE-acetaminophen (PERCOCET) 5-325 mg per tablet Comments:   Reason for Stopping:             PMH/SH reviewed - no change compared to H&P  ________________________________________________________________________  Total time spent in discharge (min): 35   ________________________________________________________________________  Risk of deterioration:    Low    Moderate x   High    ________________________________________________________________________  Care Plan discussed with:    Comments   Patient x    Family      RN x    Care Manager x                   Consultant:  x neurology   ________________________________________________________________________  CDMP Checked:   Yes y     Signed: Aspen Hutton MD    This note will not be viewable in [address]

## 2017-08-02 NOTE — DIABETES MGMT
DTC Progress Note    Recommendations/ Comments: If appropriate, please consider increasing Humalog Mix 75/25 - 75 units two times daily to aid in glucose control. Chart reviewed on Sharmaine Puls for hyperglycemia. Patient is a 57 y.o. male with known history of Type 2 Diabetes on Humalog Mix 75/25 - 65 units before breakfast and dinner at home. A1c:   Lab Results   Component Value Date/Time    Hemoglobin A1c 9.7 07/30/2017 04:53 AM    Hemoglobin A1c 9.8 02/08/2017 04:13 AM       Recent Glucose Results: Lab Results   Component Value Date/Time     (H) 08/02/2017 03:23 AM    GLUCPOC 249 (H) 08/02/2017 11:17 AM    GLUCPOC 180 (H) 08/02/2017 06:15 AM    GLUCPOC 285 (H) 08/01/2017 09:16 PM        Lab Results   Component Value Date/Time    Creatinine 2.72 08/02/2017 03:23 AM     Estimated Creatinine Clearance: 43.6 mL/min (based on Cr of 2.72). Active Orders   Diet    DIET DIABETIC WITH OPTIONS Consistent Carb 1800kcal; Regular        PO intake: Patient Vitals for the past 72 hrs:   % Diet Eaten   08/01/17 1926 100 %       Current hospital DM medication:   -Humalog normal sensitivity correction  -Humalog Mix 75/25 - 70 units two times daily before meals     Will continue to follow as needed.     Thank you    Emmy Castaneda, [facility]  Office: [phone]

## 2017-08-02 NOTE — DISCHARGE INSTRUCTIONS
DISCHARGE DIAGNOSIS:  Multiple acute infarctions in left periventricular white matter and left basal ganglia causing Right sided weakness  Paroxysmal atrial fibrillation  DM2 uncontrolled  hypertension  Hyperlipidemia  Chronic kidney disease stage IV  Hx of recurrent skin abscess  Obesity Body mass index is 38.46 kg/(m^2). Hairy cell leukemia in remission followed by Dr. Sabrina Solorzano:  · It is important that you take the medication exactly as they are prescribed. · Keep your medication in the bottles provided by the pharmacist and keep a list of the medication names, dosages, and times to be taken in your wallet. · Do not take other medications without consulting your doctor. Pain Management: per above medications    What to do at [address]:  Cardiac Diet    Recommended activity: Activity as tolerated    If you have questions regarding the hospital related prescriptions or hospital related issues please call [address] [place] [address] at . You can always direct your questions to your primary care doctor if you are unable to reach your hospital physician; your PCP works as an extension of your hospital doctor just like your hospital doctor is an extension of your PCP for your time at the hospital [facility], [facility]).     If you experience any of the following symptoms then please call your primary care physician or return to the emergency room if you cannot get hold of your doctor:  Fever, chills, nausea, vomiting, diarrhea, change in mentation, falling, bleeding, shortness of breath    Stop smoking completely    Do therapy as directed

## 2017-08-02 NOTE — PROGRESS NOTES
I have reviewed discharge instructions with the patient. The patient verbalized understanding. AVS, quick disclosure, mychart, prescriptions, medication information and appointment information completed/given. IV and monitor removed.     Patient discharged per nursing assistant

## 2017-08-02 NOTE — PROGRESS NOTES
A Neuro.  F/u has been scheduled with Harris Hays NP for Aug. 31 at 9:30am   PCP's office was closed, so I was unable to make the f/u

## 2017-08-02 NOTE — PROGRESS NOTES
[facility] office accepted pt for home health. He was open to them prior to admission and they would just need resumption of care orders at discharge. CM sent more pt updates to [employer] via [employer].      Kaykay Lee, [address]

## 2017-08-02 NOTE — PROGRESS NOTES
Problem: Self Care Deficits Care Plan (Adult)  Goal: *Acute Goals and Plan of Care (Insert Text)  Occupational Therapy Goals  Initiated 7/31/2017   1. Patient will perform grooming in standing with supervision/set-up within 7 day(s). 2. Patient will perform bathing with supervision/set-up within 7 day(s). 3. Patient will perform upper body dressing and lower body dressing with supervision/set-up within 7 day(s). 4. Patient will perform toilet transfers with supervision/set-up within 7 day(s). 5. Patient will perform all aspects of toileting with independence within 7 day(s). 6. Patient will participate in upper extremity therapeutic exercise/activities with supervision/set-up for 10 minutes within 7 day(s). 7. Patient will improve their Fugl Ventura score by 5 points within 7 days. OCCUPATIONAL THERAPY TREATMENT  Patient: Ashly Gregory (63 y.o. male)  Date: 8/1/2017  Diagnosis: Stroke [facility]) Stroke [facility])       Precautions: Fall      ASSESSMENT:  Pt is progressing in mobility and coordination of RUE, he continues to demonstrate decreased safety awareness. BP was elevated per nursing's manual reading during activity this date. Pt reports that he wants to discharge home and [facility]. He reports many family issues that he feels that he needs to be at home for. He will need 24 hour assistance at home. Progression toward goals:  [ ]       Improving appropriately and progressing toward goals  [X]       Improving slowly and progressing toward goals  [ ]       Not making progress toward goals and plan of care will be adjusted       PLAN:  Patient continues to benefit from skilled intervention to address the above impairments. Continue treatment per established plan of care.   Discharge Recommendations:  Home Health with 24 hour assistance which is pt's choice  Further Equipment Recommendations for Discharge:  none       SUBJECTIVE:   Patient reports that he needs to be home with his family OBJECTIVE DATA SUMMARY:   Cognitive/Behavioral Status:   alert, oriented   memory decreased-able to verbalize events of past                 Functional Mobility and Transfers for ADLs:  Bed Mobility:  Supine to Sit: Supervision (used bed rails)  Scooting: Supervision     Transfers:  Sit to Stand: Supervision (RW for support in standing)  Functional Transfers  Bathroom Mobility:  (pt declined)     Balance:  Standing: Impaired  Standing - Static: Constant support;Good  Standing - Dynamic : Fair (resistant to safety educ. using RW in tight space sideways)     ADL Intervention:      Pt performed functional mobility using the RW. He was quite resistant to therapist's education re: using RW safely while negotiating small/tight/narrow space. He refused to use the RW in side stepping fashion and needed increased assistance for managing the smaller space due to his poor safety awareness. Pt declined toilet transfers/toileting this date. He is very concerned about his children at home without his support while he is hospitalized. At this time declines rehab admission, even short term intensive. Brief seated activity involving rapid alternating patterns of BUEs - pt demonstrates increased coordination, but continues to demonstrate decreased coordination/speed of movement of dominant UE. Neuro Re-Education:   verbal and physical cues        Therapeutic Exercises:   Encouraged sitting up OOB and calling nursing for assistance and continuing to use RUE for adls/IADLs when safe  Pain:  Pain Scale 1: Numeric (0 - 10)  Pain Intensity 1: 0              Activity Tolerance:   BP increased during activity-nursing took manually in standing  Please refer to the flowsheet for vital signs taken during this treatment.   After treatment:   [x ] Patient left in no apparent distress sitting up in chair  [ ] Patient left in no apparent distress in bed  [x ] Call bell left within reach  [x ] Nursing notified  [ ] Caregiver present  [ ] Bed alarm activated      COMMUNICATION/COLLABORATION:   The patients plan of care was discussed with: Physical Therapist, Registered Nurse and      Damon Kevin OTR/L  Time Calculation: 37 mins

## 2017-08-02 NOTE — PROGRESS NOTES
Bedside and Verbal shift change report given to General Nieto (oncoming nurse) by Nannette Vázquez (offgoing nurse). Report included the following information SBAR, Kardex, MAR and Recent Results.

## 2017-08-02 NOTE — PROGRESS NOTES
Home Health Care Discharge Planning: [facility]  Face to Face Encounter      NAME: Destiney Schumacher   :  1955   MRN:  [id number]     Primary Diagnosis: stroke    Date of Face to Face:  2017 3:59 PM                                  Face to Face Encounter findings are related to primary reason for home care:   YES    1. I certify that the patient needs intermittent skilled nursing care, physical therapy and/or speech therapy. I will not be following this patient in the Community and Dr. Vanessa Sheldon, NP will be responsible for signing the [address] of Care. 2. Initial Orders for Care: Skilled Nursing, Physical Therapy, Occupational Therapy and Medical       3. I certify that this patient is homebound because of illness or injury, need the aid of supportive devices such as crutches, canes, wheelchairs, and walkers; the use of special transportation; or the assistance of another person in order to leave their place of residence. There exists a normal inability to leave home and leaving home requires a considerable and taxing effort. 4. I certify that this patient is under my care and that I had a Face-to-Face Encounter that meets the physician Face-to-Face Encounter requirements. Document the physical findings from the Face-to-Face Encounter that support the need for skilled services:  Has wound that requires skilled nursing assessment and treatment , Has new diagnosis that requires skilled nursing teaching and intervention  and Needs skilled safety assessment and interventions     Glory Rueda MD  Discharging Physician  Office: [phone]  Fax:   [phone]

## 2017-08-02 NOTE — PROGRESS NOTES
Attempted to see pt for PT tx. [facility] representative currently in to talk with pt. Will continue to follow. Thank you.   So Perez, PT, DPT

## 2017-08-03 ENCOUNTER — OFFICE VISIT (OUTPATIENT)
Dept: SURGERY | Age: 62
End: 2017-08-03

## 2017-08-03 ENCOUNTER — TELEPHONE (OUTPATIENT)
Dept: SURGERY | Age: 62
End: 2017-08-03

## 2017-08-03 VITALS
HEIGHT: 77 IN | OXYGEN SATURATION: 99 % | SYSTOLIC BLOOD PRESSURE: 190 MMHG | BODY MASS INDEX: 37.19 KG/M2 | DIASTOLIC BLOOD PRESSURE: 77 MMHG | WEIGHT: 315 LBS | HEART RATE: 81 BPM

## 2017-08-03 DIAGNOSIS — L02.212 BACK ABSCESS: Primary | ICD-10-CM

## 2017-08-03 NOTE — MR AVS SNAPSHOT
Visit Information Date & Time Provider Department Dept. Phone Encounter #  
 8/3/2017  1:40 PM Naida Fam MD [facility] [address] [phone] [id number] Follow-up Instructions Return in about 3 weeks (around 8/24/2017). Follow-up and Disposition History Your Appointments 8/31/2017 10:00 AM  
Follow Up with Jocelyn Godoy NP [facility] [address] [facility]) Appt Note: hosp f/u  
 [address] [facility], 
[address] [place] [address], [address] [address]  
[address] [facility], [address] [place] [address], [address] [address] Upcoming Health Maintenance Date Due Hepatitis C Screening 1955 FOOT EXAM Q1 4/8/1963 MICROALBUMIN Q1 4/8/1963 EYE EXAM RETINAL OR DILATED Q1 4/8/1963 DTaP/Tdap/Td series (1 - Tdap) 4/8/1974 FOBT Q 1 YEAR AGE 50-75 4/8/2003 ZOSTER VACCINE AGE 60> 2/8/2013 Pneumococcal 19-64 Highest Risk (2 of 3 - PCV13) 8/1/2013 INFLUENZA AGE 9 TO ADULT 8/1/2017 HEMOGLOBIN A1C Q6M 1/30/2018 LIPID PANEL Q1 7/30/2018 Allergies as of 8/3/2017  Review Complete On: 8/3/2017 By: Naida Fam MD  
 No Known Allergies Current Immunizations  Reviewed on 8/23/2013 Name Date ZZZ-RETIRED (DO NOT USE) Pneumococcal Vaccine (Unspecified Type) 8/1/2012 Not reviewed this visit You Were Diagnosed With   
  
 Codes Comments Back abscess    -  Primary ICD-10-CM: N57.280 ICD-9-CM: 736. 2 Vitals BP Pulse Height(growth percentile) Weight(growth percentile) SpO2 BMI  
 190/77 (BP 1 Location: Left arm, BP Patient Position: Sitting) 81 6' 5\" (1.956 m) 322 lb (146.1 kg) 99% 38.18 kg/m2 Smoking Status Never Smoker BMI and BSA Data Body Mass Index Body Surface Area  
 38.18 kg/m 2 2.82 m 2 Preferred Pharmacy Pharmacy Name Phone [facility]/PHARMACY #[id number]- [phone] [facility] [phone] Your Updated Medication List  
  
   
This list is accurate as of: 8/3/17  3:09 PM.  Always use your most recent med list.  
  
  
  
  
 alcohol swabs Padm Commonly known as:  ALCOHOL PADS  
1 Each by Apply Externally route two (2) times a day. amLODIPine 10 mg tablet Commonly known as:  Eulalio Rings Take 1 Tab by mouth daily. carvedilol 12.5 mg tablet Commonly known as:  Carrolyn Peabody Take 1 Tab by mouth two (2) times daily (with meals) for 30 days. cloNIDine HCl 0.2 mg tablet Commonly known as:  CATAPRES Take 1 Tab by mouth two (2) times a day. clopidogrel 75 mg Tab Commonly known as:  PLAVIX Take 1 Tab by mouth daily for 30 days. digoxin 0.125 mg tablet Commonly known as:  LANOXIN Take 0.125 mg by mouth daily. Indications: afib  
  
 hydroCHLOROthiazide 12.5 mg capsule Commonly known as:  Grey Dyers Take 1 Cap by mouth daily for 30 days. insulin lispro protamine/insulin lispro 100 unit/mL (75-25) injection Commonly known as:  HUMALOG MIX 75/25  
60 Units by SubCUTAneous route Before breakfast and dinner. pravastatin 20 mg tablet Commonly known as:  PRAVACHOL Take 20 mg by mouth nightly. Follow-up Instructions Return in about 3 weeks (around 8/24/2017). [facility] [place] & HEALTH SERVICES! [facility] introduces [employer] patient portal. Now you can access parts of your medical record, email your doctor's office, and request medication refills online. 1. In your internet browser, go to [email or web address] 2. Click on the First Time User? Click Here link in the Sign In box. You will see the New Member Sign Up page. 3. Enter your [employer] Access Code exactly as it appears below. You will not need to use this code after youve completed the sign-up process.  If you do not sign up before the expiration date, you must request a new code. · [employer] Access Code: [email or web address] Expires: 9/16/2017  3:47 PM 
 
4. Enter the last four digits of your Social Security Number (xxxx) and Date of Birth (mm/dd/yyyy) as indicated and click Submit. You will be taken to the next sign-up page. 5. Create a [employer] ID. This will be your [employer] login ID and cannot be changed, so think of one that is secure and easy to remember. 6. Create a [employer] password. You can change your password at any time. 7. Enter your Password Reset Question and Answer. This can be used at a later time if you forget your password. 8. Enter your e-mail address. You will receive e-mail notification when new information is available in [address] 9. Click Sign Up. You can now view and download portions of your medical record. 10. Click the Download Summary menu link to download a portable copy of your medical information. If you have questions, please visit the Frequently Asked Questions section of the [employer] website. Remember, [employer] is NOT to be used for urgent needs. For medical emergencies, dial 911. Now available from your iPhone and Android! Please provide this summary of care documentation to your next provider. Your primary care clinician is listed as Nasrin Sanchez. If you have any questions after today's visit, please call [phone].

## 2017-08-03 NOTE — PROGRESS NOTES
The patient is status post I & D and debridement of back abscess. He was in the hospital recently with a stroke. The patient reports blood sugars around 230. On examination the site where he had the abscess has clean granulation with no depth. There is some exuberant growth of granulation at its lower end. The site is now about 1 cm wide and about 3-1/2 cm long. I treated the exuberant granulation with silver nitrate. Dry dressing applied. The patient will continue dressing changes at home and follow up in three weeks. Final Diagnosis:  Abscess of back.     [place]/mikey

## 2017-08-09 ENCOUNTER — PATIENT OUTREACH (OUTPATIENT)
Dept: CARDIOLOGY CLINIC | Age: 62
End: 2017-08-09

## 2017-08-09 NOTE — PROGRESS NOTES
This note will not be viewable in [address] Called pt to follow up on hospital visit. Pt verified  and address. Pt states that [facility] has been out to see him already - PT and nurse. Pt also reports that [facility] nurse has been working with him regularly. Pt reports that he has a follow up with PCP on 17. Confirmed pt's other appts and he has followed up with Dr. Bethany Davis. Pt states that he has all of his medications and we performed a medication reconciliation. Pt reports that blood glucose is around 200 and states that blood glucose \"runs high. \"  Discussed with pt that high blood glucose over time can contribute to health issues and suggested to pt that he discuss DM management with PCP and perhaps see an endocrinologist.  Pt states understanding. We also discussed need for cardiac diet to work on lipid management and that pt might also see a cardiologist to help with health management. Pt states appreciation for call and states that I may continue to call.

## 2017-08-11 NOTE — ED NOTES
Consulted with Dr. Allyssa Medina regarding concerns about patient's memory loss.  It was agreed that it is appropriate to order a neuropsychological evaluation.    Discharge instructions reviewed with pt and given by Dr. Juana Vivas. IV disconnected with catheter intact. Pt taken off of monitor. No other complaints voiced at this time.

## 2017-08-24 ENCOUNTER — PATIENT OUTREACH (OUTPATIENT)
Dept: CARDIOLOGY CLINIC | Age: 62
End: 2017-08-24

## 2017-09-13 ENCOUNTER — OFFICE VISIT (OUTPATIENT)
Dept: SURGERY | Age: 62
End: 2017-09-13

## 2017-09-13 VITALS
BODY MASS INDEX: 37.19 KG/M2 | HEIGHT: 77 IN | OXYGEN SATURATION: 98 % | SYSTOLIC BLOOD PRESSURE: 184 MMHG | HEART RATE: 74 BPM | DIASTOLIC BLOOD PRESSURE: 93 MMHG | WEIGHT: 315 LBS

## 2017-09-13 DIAGNOSIS — L02.31 ABSCESS OF RIGHT BUTTOCK: Primary | ICD-10-CM

## 2017-09-13 NOTE — PROGRESS NOTES
The patient reports that he recently was hospitalized with a stroke but is recovering. He reports that the site where he had the abscess on his back is fully healed. The patient reports that he recently went to his primary physician because of an abscess on the right buttock. Incision and drainage was performed. The patient reports that he was prescribed oral antibiotics but has not yet picked up the prescription. He reports that his blood sugar is better controlled than it had been but still runs in the 200's. On examination the midline back site where he had the abscess is completely healed. On the right buttock about two inches from the midline, there is a wound with a central opening which is about 2-3 cm. There is yellow necrotic skin over the remaining part of the wound. I recommended a debridement of the wound. Risks versus benefits were reviewed with the patient. The patient understood and wished to proceed. The site was marked and standard time out was performed. The site was sterilely prepared and draped. Using a #15 blade scalpel excisional debridement was carried out sharply of necrotic skin and subcutaneous tissue. This was a conservative debridement. Some small bits of necrotic tissue remained. There was no overt deep tracking and no purulence. There were some areas of scattered granulation in the wound. Hemostasis was obtained and the wound was covered by gauze dressing. I urged the patient to  the prescription for antibiotics and start as instructed. I urged the patient to maintain tight control of blood sugar with adherence to diabetic diet and to medication regimen. The patient is to change dressing at least once per day. It is okay to shower or take a bath. The patient will see me again in the office in one week. Final Diagnosis:  Debridement of skin and subcutaneous tissue at abscess right buttock.     MedDATA/sachio

## 2017-09-13 NOTE — PROGRESS NOTES
[facility]  OFFICE PROCEDURE PROGRESS NOTE        Chart reviewed for the following:   I, Jack Higuera LPN, have reviewed the History, Physical and updated the Allergic reactions for [address] performed immediately prior to start of procedure:   Nevin Somers LPN, have performed the following reviews on Myrnamychal Sextonf prior to the start of the procedure:            * Patient was identified by name and date of birth   * Agreement on procedure being performed was verified  * Risks and Benefits explained to the patient  * Procedure site verified and marked as necessary  * Patient was positioned for comfort  * Consent was signed and verified     Time: 12:20      Date of procedure: 9/13/2017    Procedure performed by:  David Le MD    Provider assisted by: Jack Higuera LPN    Patient assisted by: self    How tolerated by patient: tolerated the procedure well with no complications    Post Procedural Pain Scale: 0 - No Hurt    Comments: none

## 2017-11-15 ENCOUNTER — OFFICE VISIT (OUTPATIENT)
Dept: SURGERY | Age: 62
End: 2017-11-15

## 2017-11-15 VITALS
TEMPERATURE: 97.8 F | HEART RATE: 78 BPM | BODY MASS INDEX: 37.19 KG/M2 | SYSTOLIC BLOOD PRESSURE: 170 MMHG | OXYGEN SATURATION: 98 % | DIASTOLIC BLOOD PRESSURE: 90 MMHG | HEIGHT: 77 IN | WEIGHT: 315 LBS

## 2017-11-15 DIAGNOSIS — L02.212 ABSCESS OF BACK: Primary | ICD-10-CM

## 2017-11-15 NOTE — PROGRESS NOTES
The patient reports that he had an abscess on his back which was drained by his primary care physician. He reports the area still has some discomfort and still has some drainage. The patient has a previous history of incision and drainage procedures for multiple abscesses in skin and subcutaneous tissue. On examination in the right mid back, there is an area about 1 cm across which has some necrotic visible fat and there is an underlying cavity. Compression of the area does not yield any pus. I recommended debridement of the necrotic tissue. Risks versus benefits were reviewed with the patient and the patient agreed. Sharp excisional debridement was carried out of necrotic subcutaneous tissue at the prior abscess site right mid back. There was a small amount of necrotic tissue. The walls of the cavity appeared fairly clean otherwise. After the debridement, the cavity was about 1 cm wide x 1-1/2 cm deep. The cavity was then packed open with 1/4-inch Iodoform packing and then covered with dry gauze. The patient will change the dry gauze dressing daily and see me again in the office in four days. I urged the patient to adhere tightly to a diabetic diet as well as medications as he has had problems with blood sugar control previously. Final Diagnosis:  Necrotic tissue at right back abscess site.     MedDATA/gwo     cc: Inessa Fonseca MD        [address] [place] [address]

## 2017-11-15 NOTE — MR AVS SNAPSHOT
Visit Information Date & Time Provider Department Dept. Phone Encounter #  
 11/15/2017 11:20 AM Kristen Tejeda MD [facility] [address] [phone] [id number] Upcoming Health Maintenance Date Due Hepatitis C Screening 1955 FOOT EXAM Q1 4/8/1963 MICROALBUMIN Q1 4/8/1963 EYE EXAM RETINAL OR DILATED Q1 4/8/1963 DTaP/Tdap/Td series (1 - Tdap) 4/8/1974 FOBT Q 1 YEAR AGE 50-75 4/8/2003 ZOSTER VACCINE AGE 60> 2/8/2013 Pneumococcal 19-64 Highest Risk (2 of 3 - PCV13) 8/1/2013 Influenza Age 5 to Adult 8/1/2017 HEMOGLOBIN A1C Q6M 1/30/2018 LIPID PANEL Q1 7/30/2018 Allergies as of 11/15/2017  Review Complete On: 9/13/2017 By: Liza Baker No Known Allergies Current Immunizations  Reviewed on 8/23/2013 Name Date ZZZ-RETIRED (DO NOT USE) Pneumococcal Vaccine (Unspecified Type) 8/1/2012 Not reviewed this visit Vitals BP Pulse Temp Height(growth percentile) Weight(growth percentile) SpO2  
 170/90 (BP 1 Location: Right arm, BP Patient Position: Sitting) 78 97.8 °F (36.6 °C) 6' 5\" (1.956 m) 317 lb (143.8 kg) 98% BMI Smoking Status 37.59 kg/m2 Never Smoker Vitals History BMI and BSA Data Body Mass Index Body Surface Area  
 37.59 kg/m 2 2.8 m 2 Preferred Pharmacy Pharmacy Name Phone [facility]/PHARMACY #[id number] [facility] [phone] Your Updated Medication List  
  
   
This list is accurate as of: 11/15/17 12:14 PM.  Always use your most recent med list.  
  
  
  
  
 alcohol swabs Padm Commonly known as:  ALCOHOL PADS  
1 Each by Apply Externally route two (2) times a day. amLODIPine 10 mg tablet Commonly known as:  Elspeth Bakes Take 1 Tab by mouth daily. cloNIDine HCl 0.2 mg tablet Commonly known as:  CATAPRES Take 1 Tab by mouth two (2) times a day. digoxin 0.125 mg tablet Commonly known as:  LANOXIN Take 0.125 mg by mouth daily. Indications: afib  
  
 insulin lispro protamine/insulin lispro 100 unit/mL (75-25) injection Commonly known as:  HUMALOG MIX 75/25  
60 Units by SubCUTAneous route Before breakfast and dinner. pravastatin 20 mg tablet Commonly known as:  PRAVACHOL Take 20 mg by mouth nightly. Introducing [facility] & HEALTH SERVICES! Stephanie Diaz introduces [employer] patient portal. Now you can access parts of your medical record, email your doctor's office, and request medication refills online. 1. In your internet browser, go to [email or web address] Me 2. Click on the First Time User? Click Here link in the Sign In box. You will see the New Member Sign Up page. 3. Enter your [employer] Access Code exactly as it appears below. You will not need to use this code after youve completed the sign-up process. If you do not sign up before the expiration date, you must request a new code. · [employer] Access Code: [email or web address] Expires: 1/3/2018  4:02 PM 
 
4. Enter the last four digits of your Social Security Number (xxxx) and Date of Birth (mm/dd/yyyy) as indicated and click Submit. You will be taken to the next sign-up page. 5. Create a [employer] ID. This will be your [employer] login ID and cannot be changed, so think of one that is secure and easy to remember. 6. Create a [employer] password. You can change your password at any time. 7. Enter your Password Reset Question and Answer. This can be used at a later time if you forget your password. 8. Enter your e-mail address. You will receive e-mail notification when new information is available in [address] 9. Click Sign Up. You can now view and download portions of your medical record. 10. Click the Download Summary menu link to download a portable copy of your medical information.  
 
If you have questions, please visit the Frequently Asked Questions section of the [employer]. Remember, [email or web address] is NOT to be used for urgent needs. For medical emergencies, dial 911. Now available from your iPhone and Android! Please provide this summary of care documentation to your next provider. If you have any questions after today's visit, please call [phone].

## 2017-11-20 ENCOUNTER — OFFICE VISIT (OUTPATIENT)
Dept: SURGERY | Age: 62
End: 2017-11-20

## 2017-11-20 VITALS
OXYGEN SATURATION: 99 % | DIASTOLIC BLOOD PRESSURE: 97 MMHG | HEART RATE: 91 BPM | SYSTOLIC BLOOD PRESSURE: 168 MMHG | RESPIRATION RATE: 24 BRPM | HEIGHT: 77 IN | BODY MASS INDEX: 37.19 KG/M2 | WEIGHT: 315 LBS

## 2017-11-20 DIAGNOSIS — L02.212 ABSCESS OF BACK: Primary | ICD-10-CM

## 2017-11-20 NOTE — PROGRESS NOTES
The patient is status post debridement of skin and subcutaneous tissue at abscess site on the lower back right side. He reports home health nurses are changing packing. He reports blood sugars are running in the 300 range today. On examination after removal of the packing, cavity remains about 1 cm wide and 2 cm deep with scattered fibrinous debris. There is no pus. Cavity was repacked with 1/4-inch Iodoform. Dry dressing applied. The patient will continue with packing changes by the visiting nurses. He will see me again in the office in two weeks. The patient reports he is to be checking his blood sugar twice per day. I have urged him to write down the results and bring these results in when he sees his physician to provide feedback regarding his diabetes management. Without good control of diabetes, healing is difficult and recurrence of abscess is likely. Final Diagnosis:  Abscess site of back, status post debridement, diabetes out of control.     MedDATA/gwo

## 2017-11-20 NOTE — MR AVS SNAPSHOT
Visit Information Date & Time Provider Department Dept. Phone Encounter #  
 11/20/2017  1:20 PM Adry Mcmahon MD [facility] [address] [phone] [id number] Follow-up Instructions Return in about 2 weeks (around 12/4/2017). Follow-up and Disposition History Your Appointments 12/7/2017  2:40 PM  
ESTABLISHED PATIENT with Adry Mcmahon MD  
[facility] [address] ([facility]) Appt Note: abscess on back [address], [address] [address]  
[address], [address], [address] [facility] [address] [address] Upcoming Health Maintenance Date Due Hepatitis C Screening 1955 FOOT EXAM Q1 4/8/1963 MICROALBUMIN Q1 4/8/1963 EYE EXAM RETINAL OR DILATED Q1 4/8/1963 DTaP/Tdap/Td series (1 - Tdap) 4/8/1974 FOBT Q 1 YEAR AGE 50-75 4/8/2003 ZOSTER VACCINE AGE 60> 2/8/2013 Pneumococcal 19-64 Highest Risk (2 of 3 - PCV13) 8/1/2013 Influenza Age 5 to Adult 8/1/2017 HEMOGLOBIN A1C Q6M 1/30/2018 LIPID PANEL Q1 7/30/2018 Allergies as of 11/20/2017  Review Complete On: 11/20/2017 By: Adry Mcmahon MD  
 No Known Allergies Current Immunizations  Reviewed on 8/23/2013 Name Date ZZZ-RETIRED (DO NOT USE) Pneumococcal Vaccine (Unspecified Type) 8/1/2012 Not reviewed this visit You Were Diagnosed With   
  
 Codes Comments Abscess of back    -  Primary ICD-10-CM: L02.212 ICD-9-CM: 261. 2 Vitals BP Pulse Resp Height(growth percentile) Weight(growth percentile) SpO2  
 (!) 168/97 (BP 1 Location: Right arm, BP Patient Position: Sitting) 91 24 6' 5\" (1.956 m) 317 lb 12.8 oz (144.2 kg) 99% BMI Smoking Status 37.69 kg/m2 Never Smoker BMI and BSA Data Body Mass Index Body Surface Area  
 37.69 kg/m 2 2.8 m 2 Preferred Pharmacy Pharmacy Name Phone [facility]/PHARMACY #[phone] [facility] [phone] Your Updated Medication List  
  
   
This list is accurate as of: 11/20/17  3:04 PM.  Always use your most recent med list.  
  
  
  
  
 alcohol swabs Padm Commonly known as:  ALCOHOL PADS  
1 Each by Apply Externally route two (2) times a day. amLODIPine 10 mg tablet Commonly known as:  Lennis Eagles Take 1 Tab by mouth daily. cloNIDine HCl 0.2 mg tablet Commonly known as:  CATAPRES Take 1 Tab by mouth two (2) times a day. digoxin 0.125 mg tablet Commonly known as:  LANOXIN Take 0.125 mg by mouth daily. Indications: afib  
  
 insulin lispro protamine/insulin lispro 100 unit/mL (75-25) injection Commonly known as:  HUMALOG MIX 75/25  
60 Units by SubCUTAneous route Before breakfast and dinner. pravastatin 20 mg tablet Commonly known as:  PRAVACHOL Take 20 mg by mouth nightly. Follow-up Instructions Return in about 2 weeks (around 12/4/2017). Introducing [facility] & HEALTH SERVICES! [facility] introduces [employer] patient portal. Now you can access parts of your medical record, email your doctor's office, and request medication refills online. 1. In your internet browser, go to [email or web address] 2. Click on the First Time User? Click Here link in the Sign In box. You will see the New Member Sign Up page. 3. Enter your [employer] Access Code exactly as it appears below. You will not need to use this code after youve completed the sign-up process. If you do not sign up before the expiration date, you must request a new code. · [employer] Access Code: [email or web address] Expires: 1/3/2018  4:02 PM 
 
4. Enter the last four digits of your Social Security Number (xxxx) and Date of Birth (mm/dd/yyyy) as indicated and click Submit. You will be taken to the next sign-up page. 5. Create a [employer] ID. This will be your [employer] login ID and cannot be changed, so think of one that is secure and easy to remember. 6. Create a [employer] password. You can change your password at any time. 7. Enter your Password Reset Question and Answer. This can be used at a later time if you forget your password. 8. Enter your e-mail address. You will receive e-mail notification when new information is available in [address] 9. Click Sign Up. You can now view and download portions of your medical record. 10. Click the Download Summary menu link to download a portable copy of your medical information. If you have questions, please visit the Frequently Asked Questions section of the [employer] website. Remember, [employer] is NOT to be used for urgent needs. For medical emergencies, dial 911. Now available from your iPhone and Android! Please provide this summary of care documentation to your next provider. If you have any questions after today's visit, please call [phone].

## 2017-12-07 ENCOUNTER — OFFICE VISIT (OUTPATIENT)
Dept: SURGERY | Age: 62
End: 2017-12-07

## 2017-12-07 VITALS
SYSTOLIC BLOOD PRESSURE: 189 MMHG | OXYGEN SATURATION: 97 % | HEART RATE: 77 BPM | WEIGHT: 315 LBS | TEMPERATURE: 97.8 F | DIASTOLIC BLOOD PRESSURE: 76 MMHG | HEIGHT: 77 IN | BODY MASS INDEX: 37.19 KG/M2

## 2017-12-07 DIAGNOSIS — L02.212 ABSCESS OF BACK: ICD-10-CM

## 2017-12-07 DIAGNOSIS — R22.2 MASS OF SKIN OF BACK: Primary | ICD-10-CM

## 2017-12-07 NOTE — PROGRESS NOTES
The patient reports he has an area which had been tender on the right presacral area although the last few days, tenderness has disappeared. There has been no drainage. He continues to have home health for packing changes at the site of abscess debridement in the mid lower back. On the right presacral region just superior to the buttock fold, there is an area of firmness about 1-1/2 cm across without fluctuance and without erythema or tenderness. There is no drainage. In the mid lower back, there is an opening in the skin about 1 cm across. I removed the packing from the site and the cavity remains about 2 cm deep without purulence. That was repacked with 1/4-inch Iodoform. Dressing applied. At this point, I would not push for surgical intervention for the area of induration given the clinical findings today. I do think, however, it needs to be followed up. I have asked the patient to see me in the office again in 2 weeks or to see me sooner if there should be increasing pain, swelling, or drainage. Once again, I strongly urged the patient to comply both with diet and medication to try to get blood sugars under control. He reports his blood sugars frequently run in the 300 range. Final Diagnosis:  Nodule right presacral area, history of abscess in lower back.      MedDATA/gwo

## 2017-12-07 NOTE — MR AVS SNAPSHOT
Visit Information Date & Time Provider Department Dept. Phone Encounter #  
 12/7/2017  2:40 PM Silvia Jacinto MD [facility] [address] [phone] [id number] Your Appointments 12/7/2017  2:40 PM  
ESTABLISHED PATIENT with Silvia Jacinto MD  
[facility] [address] ([address]) Appt Note: abscess on back [address], [address] [phone]  
[address] [facility], [address] [place] [address] [address] [phone] Upcoming Health Maintenance Date Due Hepatitis C Screening 1955 FOOT EXAM Q1 4/8/1963 MICROALBUMIN Q1 4/8/1963 EYE EXAM RETINAL OR DILATED Q1 4/8/1963 DTaP/Tdap/Td series (1 - Tdap) 4/8/1974 FOBT Q 1 YEAR AGE 50-75 4/8/2003 ZOSTER VACCINE AGE 60> 2/8/2013 Pneumococcal 19-64 Highest Risk (2 of 3 - PCV13) 8/1/2013 Influenza Age 5 to Adult 8/1/2017 HEMOGLOBIN A1C Q6M 1/30/2018 LIPID PANEL Q1 7/30/2018 Allergies as of 12/7/2017  Review Complete On: 12/7/2017 By: Silvia Jacinto MD  
 No Known Allergies Current Immunizations  Reviewed on 8/23/2013 Name Date ZZZ-RETIRED (DO NOT USE) Pneumococcal Vaccine (Unspecified Type) 8/1/2012 Not reviewed this visit Vitals BP Pulse Temp Height(growth percentile) Weight(growth percentile) SpO2  
 189/76 (BP 1 Location: Right arm, BP Patient Position: Sitting) 77 97.8 °F (36.6 °C) 6' 5\" (1.956 m) 324 lb (147 kg) 97% BMI Smoking Status 38.42 kg/m2 Never Smoker BMI and BSA Data Body Mass Index Body Surface Area  
 38.42 kg/m 2 2.83 m 2 Preferred Pharmacy Pharmacy Name Phone [phone] [phone] Your Updated Medication List  
  
   
This list is accurate as of: 12/7/17  2:13 PM.  Always use your most recent med list.  
  
  
 alcohol swabs Padm Commonly known as:  ALCOHOL PADS  
1 Each by Apply Externally route two (2) times a day. amLODIPine 10 mg tablet Commonly known as:  Elvia Colon Take 1 Tab by mouth daily. cloNIDine HCl 0.2 mg tablet Commonly known as:  CATAPRES Take 1 Tab by mouth two (2) times a day. digoxin 0.125 mg tablet Commonly known as:  LANOXIN Take 0.125 mg by mouth daily. Indications: afib  
  
 insulin lispro protamine/insulin lispro 100 unit/mL (75-25) injection Commonly known as:  HUMALOG MIX 75/25  
60 Units by SubCUTAneous route Before breakfast and dinner. pravastatin 20 mg tablet Commonly known as:  PRAVACHOL Take 20 mg by mouth nightly. Introducing [facility]! Mariano Soria introduces [employer] patient portal. Now you can access parts of your medical record, email your doctor's office, and request medication refills online. 1. In your internet browser, go to [email or web address] 2. Click on the First Time User? Click Here link in the Sign In box. You will see the New Member Sign Up page. 3. Enter your [employer] Access Code exactly as it appears below. You will not need to use this code after youve completed the sign-up process. If you do not sign up before the expiration date, you must request a new code. · [employer] Access Code: [email or web address] Expires: 1/3/2018  4:02 PM 
 
4. Enter the last four digits of your Social Security Number (xxxx) and Date of Birth (mm/dd/yyyy) as indicated and click Submit. You will be taken to the next sign-up page. 5. Create a [employer] ID. This will be your [employer] login ID and cannot be changed, so think of one that is secure and easy to remember. 6. Create a [employer] password. You can change your password at any time. 7. Enter your Password Reset Question and Answer. This can be used at a later time if you forget your password. 8. Enter your e-mail address. You will receive e-mail notification when new information is available in [address] 9. Click Sign Up. You can now view and download portions of your medical record. 10. Click the Download Summary menu link to download a portable copy of your medical information. If you have questions, please visit the Frequently Asked Questions section of the [employer] website. Remember, [employer] is NOT to be used for urgent needs. For medical emergencies, dial 911. Now available from your iPhone and Android! Please provide this summary of care documentation to your next provider. If you have any questions after today's visit, please call [phone].

## 2018-01-11 ENCOUNTER — OFFICE VISIT (OUTPATIENT)
Dept: SURGERY | Age: 63
End: 2018-01-11

## 2018-01-11 VITALS
RESPIRATION RATE: 18 BRPM | OXYGEN SATURATION: 97 % | TEMPERATURE: 98.6 F | SYSTOLIC BLOOD PRESSURE: 168 MMHG | HEIGHT: 77 IN | HEART RATE: 78 BPM | BODY MASS INDEX: 37.19 KG/M2 | WEIGHT: 315 LBS | DIASTOLIC BLOOD PRESSURE: 87 MMHG

## 2018-01-11 DIAGNOSIS — L02.212 ABSCESS OF BACK: Primary | ICD-10-CM

## 2018-01-11 PROBLEM — E11.21 TYPE 2 DIABETES MELLITUS WITH NEPHROPATHY (HCC): Status: ACTIVE | Noted: 2018-01-11

## 2018-01-11 NOTE — PROGRESS NOTES
Chief Complaint   Patient presents with    Follow-up     f/u cyst      1. Have you been to the ER, urgent care clinic since your last visit? Hospitalized since your last visit? No     2. Have you seen or consulted any other health care providers outside of the [address] [place] [address] since your last visit? Include any pap smears or colon screening.   No     Visit Vitals    /87 (BP 1 Location: Right arm, BP Patient Position: Sitting)    Pulse 78    Temp 98.6 °F (37 °C) (Oral)    Resp 18    Ht 6' 5\" (1.956 m)    Wt 318 lb 3.2 oz (144.3 kg)    SpO2 97%    BMI 37.73 kg/m2

## 2018-01-11 NOTE — MR AVS SNAPSHOT
Visit Information Date & Time Provider Department Dept. Phone Encounter #  
 1/11/2018  1:00 PM Lazarus Coffee, MD [facility]  [address] [phone] [id number] Upcoming Health Maintenance Date Due Hepatitis C Screening 1955 FOOT EXAM Q1 4/8/1963 MICROALBUMIN Q1 4/8/1963 EYE EXAM RETINAL OR DILATED Q1 4/8/1963 DTaP/Tdap/Td series (1 - Tdap) 4/8/1974 FOBT Q 1 YEAR AGE 50-75 4/8/2003 ZOSTER VACCINE AGE 60> 2/8/2013 Pneumococcal 19-64 Highest Risk (2 of 3 - PCV13) 8/1/2013 Influenza Age 5 to Adult 8/1/2017 HEMOGLOBIN A1C Q6M 1/30/2018 LIPID PANEL Q1 7/30/2018 Allergies as of 1/11/2018  Review Complete On: 1/11/2018 By: Windy Romo RN No Known Allergies Current Immunizations  Reviewed on 8/23/2013 Name Date ZZZ-RETIRED (DO NOT USE) Pneumococcal Vaccine (Unspecified Type) 8/1/2012 Not reviewed this visit You Were Diagnosed With   
  
 Codes Comments Abscess of back    -  Primary ICD-10-CM: L02.212 ICD-9-CM: 670. 2 Vitals BP Pulse Temp Resp Height(growth percentile) Weight(growth percentile) 168/87 (BP 1 Location: Right arm, BP Patient Position: Sitting) 78 98.6 °F (37 °C) (Oral) 18 6' 5\" (1.956 m) 318 lb 3.2 oz (144.3 kg) SpO2 BMI Smoking Status 97% 37.73 kg/m2 Never Smoker Vitals History BMI and BSA Data Body Mass Index Body Surface Area  
 37.73 kg/m 2 2.8 m 2 Preferred Pharmacy Pharmacy Name Phone [phone] [phone] Your Updated Medication List  
  
   
This list is accurate as of: 1/11/18  3:40 PM.  Always use your most recent med list.  
  
  
  
  
 alcohol swabs Padm Commonly known as:  ALCOHOL PADS  
1 Each by Apply Externally route two (2) times a day. amLODIPine 10 mg tablet Commonly known as:  Amanda Cabello Take 1 Tab by mouth daily. cloNIDine HCl 0.2 mg tablet Commonly known as:  CATAPRES Take 1 Tab by mouth two (2) times a day. digoxin 0.125 mg tablet Commonly known as:  LANOXIN Take 0.125 mg by mouth daily. Indications: afib  
  
 insulin lispro protamine/insulin lispro 100 unit/mL (75-25) injection Commonly known as:  HUMALOG MIX 75/25  
60 Units by SubCUTAneous route Before breakfast and dinner. pravastatin 20 mg tablet Commonly known as:  PRAVACHOL Take 20 mg by mouth nightly. Introducing [facility]! Dejahela Deejay introduces [employer] patient portal. Now you can access parts of your medical record, email your doctor's office, and request medication refills online. 1. In your internet browser, go to [email or web address] 2. Click on the First Time User? Click Here link in the Sign In box. You will see the New Member Sign Up page. 3. Enter your [employer] Access Code exactly as it appears below. You will not need to use this code after youve completed the sign-up process. If you do not sign up before the expiration date, you must request a new code. · [employer] Access Code: [email or web address] Expires: 4/11/2018  1:38 PM 
 
4. Enter the last four digits of your Social Security Number (xxxx) and Date of Birth (mm/dd/yyyy) as indicated and click Submit. You will be taken to the next sign-up page. 5. Create a [employer] ID. This will be your [employer] login ID and cannot be changed, so think of one that is secure and easy to remember. 6. Create a [employer] password. You can change your password at any time. 7. Enter your Password Reset Question and Answer. This can be used at a later time if you forget your password. 8. Enter your e-mail address. You will receive e-mail notification when new information is available in [address] 9. Click Sign Up. You can now view and download portions of your medical record.  
10. Click the Download Summary menu link to download a portable copy of your medical information. If you have questions, please visit the Frequently Asked Questions section of the [employer] website. Remember, [employer] is NOT to be used for urgent needs. For medical emergencies, dial 911. Now available from your iPhone and Android! Please provide this summary of care documentation to your next provider. Your primary care clinician is listed as Yousif Lopez. If you have any questions after today's visit, please call [phone].

## 2018-01-11 NOTE — PERIOP NOTES
[facility]  Preoperative Instructions        Surgery Date 1/12/18          Time of Arrival 0545    1. On the day of your surgery, please report to the Surgical Services Registration Desk and sign in at your designated time. The Surgery Center is located to the right of the Emergency Room. 2. You must have someone with you to drive you home. You should not drive a car for 24 hours following surgery. Please make arrangements for a friend or family member to stay with you for the first 24 hours after your surgery. 3. Do not have anything to eat or drink (including water, gum, mints, coffee, juice) after midnight 1/11/18?? Javy Ivgadiel ? This may not apply to medications prescribed by your physician. ?(Please note below the special instructions with medications to take the morning of your procedure.)    4. We recommend you do not drink any alcoholic beverages for 24 hours before and after your surgery. 5. Contact your surgeons office for instructions on the following medications: non-steroidal anti-inflammatory drugs (i.e. Advil, Aleve), vitamins, and supplements. (Some surgeons will want you to stop these medications prior to surgery and others may allow you to take them)  **If you are currently taking Plavix, Coumadin, Aspirin and/or other blood-thinning agents, contact your surgeon for instructions. ** Your surgeon will partner with the physician prescribing these medications to determine if it is safe to stop or if you need to continue taking. Please do not stop taking these medications without instructions from your surgeon    6. Wear comfortable clothes. Wear glasses instead of contacts. Do not bring any money or jewelry. Please bring picture ID, insurance card, and any prearranged co-payment or hospital payment. Do not wear make-up, particularly mascara the morning of your surgery. Do not wear nail polish, particularly if you are having foot /hand surgery.   Wear your hair loose or down, no ponytails, buns, uri pins or clips. All body piercings must be removed. Please shower with antibacterial soap for three consecutive days before and on the morning of surgery, but do not apply any lotions, powders or deodorants after the shower on the day of surgery. Please use a fresh towels after each shower. Please sleep in clean clothes and change bed linens the night before surgery. Please do not shave for 48 hours prior to surgery. Shaving of the face is acceptable. 7. You should understand that if you do not follow these instructions your surgery may be cancelled. If your physical condition changes (I.e. fever, cold or flu) please contact your surgeon as soon as possible. 8. It is important that you be on time. If a situation occurs where you may be late, please call [phone] (OR Holding Area). 9. If you have any questions and or problems, please call [phone] (Pre-admission Testing). 10. Your surgery time may be subject to change. You will receive a phone call the evening prior if your time changes. 11.  If having outpatient surgery, you must have someone to drive you here, stay with you during the duration of your stay, and to drive you home at time of discharge. 12.   In an effort to improve the efficiency, privacy, and safety for all of our Pre-op patients visitors are not allowed in the Holding area. Once you arrive and are registered your family/visitors will be asked to remain in the waiting room. The Pre-op staff will get you from the Surgical Waiting Area and will explain to you and your family/visitors that the Pre-op phase is beginning. The staff will answer any questions and provide instructions for tracking of the patient, by use of the existing tracking number and color-coded status board in the waiting room.   At this time the staff will also ask for your designated spokesperson information in the event that the physician or staff need to provide an update or obtain any pertinent information. The designated spokesperson will be notified if the physician needs to speak to family during the pre-operative phase. If at any time your family/visitors has questions or concerns they may approach the volunteer desk in the waiting area for assistance. Special Instructions:none    MEDICATIONS TO TAKE THE MORNING OF SURGERY WITH A SIP OF WATER:norvasc,catapres,digoxin      I understand a pre-operative phone call will be made to verify my surgery time. In the event that I am not available, I give permission for a message to be left on my answering service and/or with another person?   [phone]         ___________________      __________   _________    (Signature of Patient)             (Witness)                (Date and Time)

## 2018-01-11 NOTE — PROGRESS NOTES
The patient reports he has some drainage from his mid lower back. He reports that his blood sugars have been elevated at times. He says he is compliant with diabetic diet and medications. On examination there is drainage from the mid lower back site with induration, total diameter about an inch and-a-half. The skin opening is about 3 mm across. This abscess cavity, I think, will require operative debridement. We will set this up for the hospital OR tomorrow. I reviewed with the patient risks vs benefits of surgical debridement of abscess cavity lower back under general anesthesia. The patient understands and wishes to proceed. Final Diagnosis:  Draining abscess site lower back.     MedDATA/gwo

## 2018-01-12 ENCOUNTER — TELEPHONE (OUTPATIENT)
Dept: SURGERY | Age: 63
End: 2018-01-12

## 2018-01-12 ENCOUNTER — ANESTHESIA (OUTPATIENT)
Dept: SURGERY | Age: 63
End: 2018-01-12
Payer: MEDICARE

## 2018-01-12 ENCOUNTER — HOSPITAL ENCOUNTER (OUTPATIENT)
Age: 63
Setting detail: OUTPATIENT SURGERY
Discharge: HOME | End: 2018-01-12
Attending: SURGERY | Admitting: SURGERY
Payer: MEDICARE

## 2018-01-12 ENCOUNTER — ANESTHESIA EVENT (OUTPATIENT)
Dept: SURGERY | Age: 63
End: 2018-01-12
Payer: MEDICARE

## 2018-01-12 VITALS
RESPIRATION RATE: 16 BRPM | TEMPERATURE: 98.5 F | HEART RATE: 69 BPM | HEIGHT: 77 IN | WEIGHT: 315 LBS | DIASTOLIC BLOOD PRESSURE: 79 MMHG | OXYGEN SATURATION: 100 % | SYSTOLIC BLOOD PRESSURE: 178 MMHG | BODY MASS INDEX: 37.19 KG/M2

## 2018-01-12 DIAGNOSIS — L02.91 ABSCESS: Primary | ICD-10-CM

## 2018-01-12 LAB
ANION GAP BLD CALC-SCNC: 19 MMOL/L (ref 5–15)
BUN BLD-MCNC: 36 MG/DL (ref 9–20)
CA-I BLD-MCNC: 1.26 MMOL/L (ref 1.12–1.32)
CHLORIDE BLD-SCNC: 102 MMOL/L (ref 98–107)
CO2 BLD-SCNC: 21 MMOL/L (ref 21–32)
CREAT BLD-MCNC: 3.6 MG/DL (ref 0.6–1.3)
GLUCOSE BLD STRIP.AUTO-MCNC: 347 MG/DL (ref 65–100)
GLUCOSE BLD-MCNC: 378 MG/DL (ref 65–100)
HCT VFR BLD CALC: 31 % (ref 36.6–50.3)
HGB BLD-MCNC: 10.5 GM/DL (ref 12.1–17)
POTASSIUM BLD-SCNC: 4.8 MMOL/L (ref 3.5–5.1)
SERVICE CMNT-IMP: ABNORMAL
SERVICE CMNT-IMP: ABNORMAL
SODIUM BLD-SCNC: 136 MMOL/L (ref 136–145)

## 2018-01-12 PROCEDURE — 77030026438 HC STYL ET INTUB CARD -A: Performed by: NURSE ANESTHETIST, CERTIFIED REGISTERED

## 2018-01-12 PROCEDURE — 77030019908 HC STETH ESOPH SIMS -A: Performed by: NURSE ANESTHETIST, CERTIFIED REGISTERED

## 2018-01-12 PROCEDURE — 76060000032 HC ANESTHESIA 0.5 TO 1 HR: Performed by: SURGERY

## 2018-01-12 PROCEDURE — 76010000138 HC OR TIME 0.5 TO 1 HR: Performed by: SURGERY

## 2018-01-12 PROCEDURE — 76210000006 HC OR PH I REC 0.5 TO 1 HR: Performed by: SURGERY

## 2018-01-12 PROCEDURE — 74011000250 HC RX REV CODE- 250: Performed by: SURGERY

## 2018-01-12 PROCEDURE — 77030008771 HC TU NG SALEM SUMP -A: Performed by: NURSE ANESTHETIST, CERTIFIED REGISTERED

## 2018-01-12 PROCEDURE — 87147 CULTURE TYPE IMMUNOLOGIC: CPT | Performed by: SURGERY

## 2018-01-12 PROCEDURE — 87075 CULTR BACTERIA EXCEPT BLOOD: CPT | Performed by: SURGERY

## 2018-01-12 PROCEDURE — 80047 BASIC METABLC PNL IONIZED CA: CPT

## 2018-01-12 PROCEDURE — 77030013079 HC BLNKT BAIR HGGR 3M -A: Performed by: NURSE ANESTHETIST, CERTIFIED REGISTERED

## 2018-01-12 PROCEDURE — 77030032435

## 2018-01-12 PROCEDURE — 74011250636 HC RX REV CODE- 250/636

## 2018-01-12 PROCEDURE — 77030032491 HC SLV COMPR SCD KNE XL COVD -B: Performed by: SURGERY

## 2018-01-12 PROCEDURE — 77030018390 HC SPNG HEMSTAT2 J&J -B: Performed by: SURGERY

## 2018-01-12 PROCEDURE — 77030008684 HC TU ET CUF COVD -B: Performed by: NURSE ANESTHETIST, CERTIFIED REGISTERED

## 2018-01-12 PROCEDURE — 74011000272 HC RX REV CODE- 272: Performed by: SURGERY

## 2018-01-12 PROCEDURE — 77030021678 HC GLIDESCP STAT DISP VERT -B: Performed by: NURSE ANESTHETIST, CERTIFIED REGISTERED

## 2018-01-12 PROCEDURE — 87077 CULTURE AEROBIC IDENTIFY: CPT | Performed by: SURGERY

## 2018-01-12 PROCEDURE — 74011250637 HC RX REV CODE- 250/637: Performed by: SURGERY

## 2018-01-12 PROCEDURE — 87186 SC STD MICRODIL/AGAR DIL: CPT | Performed by: SURGERY

## 2018-01-12 PROCEDURE — 77030011640 HC PAD GRND REM COVD -A: Performed by: SURGERY

## 2018-01-12 PROCEDURE — 74011250636 HC RX REV CODE- 250/636: Performed by: ANESTHESIOLOGY

## 2018-01-12 PROCEDURE — 82962 GLUCOSE BLOOD TEST: CPT

## 2018-01-12 PROCEDURE — 74011000250 HC RX REV CODE- 250

## 2018-01-12 PROCEDURE — 76210000021 HC REC RM PH II 0.5 TO 1 HR: Performed by: SURGERY

## 2018-01-12 PROCEDURE — 87205 SMEAR GRAM STAIN: CPT | Performed by: SURGERY

## 2018-01-12 PROCEDURE — 74011250636 HC RX REV CODE- 250/636: Performed by: SURGERY

## 2018-01-12 PROCEDURE — 77030018836 HC SOL IRR NACL ICUM -A: Performed by: SURGERY

## 2018-01-12 PROCEDURE — 74011636637 HC RX REV CODE- 636/637: Performed by: ANESTHESIOLOGY

## 2018-01-12 PROCEDURE — 77030020782 HC GWN BAIR PAWS FLX 3M -B

## 2018-01-12 RX ORDER — CEFAZOLIN SODIUM 1 G/3ML
3 INJECTION, POWDER, FOR SOLUTION INTRAMUSCULAR; INTRAVENOUS ONCE
Status: DISCONTINUED | OUTPATIENT
Start: 2018-01-12 | End: 2018-01-12

## 2018-01-12 RX ORDER — VANCOMYCIN/0.9 % SOD CHLORIDE 1.5G/250ML
1500 PLASTIC BAG, INJECTION (ML) INTRAVENOUS
Status: COMPLETED | OUTPATIENT
Start: 2018-01-12 | End: 2018-01-12

## 2018-01-12 RX ORDER — OXYCODONE AND ACETAMINOPHEN 5; 325 MG/1; MG/1
1-2 TABLET ORAL
Qty: 30 TAB | Refills: 0 | Status: ON HOLD | OUTPATIENT
Start: 2018-01-12 | End: 2018-01-31

## 2018-01-12 RX ORDER — SODIUM CHLORIDE, SODIUM LACTATE, POTASSIUM CHLORIDE, CALCIUM CHLORIDE 600; 310; 30; 20 MG/100ML; MG/100ML; MG/100ML; MG/100ML
25 INJECTION, SOLUTION INTRAVENOUS CONTINUOUS
Status: DISCONTINUED | OUTPATIENT
Start: 2018-01-12 | End: 2018-01-12 | Stop reason: HOSPADM

## 2018-01-12 RX ORDER — LIDOCAINE HYDROCHLORIDE 10 MG/ML
0.1 INJECTION, SOLUTION EPIDURAL; INFILTRATION; INTRACAUDAL; PERINEURAL AS NEEDED
Status: DISCONTINUED | OUTPATIENT
Start: 2018-01-12 | End: 2018-01-12 | Stop reason: HOSPADM

## 2018-01-12 RX ORDER — SODIUM CHLORIDE 9 MG/ML
25 INJECTION, SOLUTION INTRAVENOUS CONTINUOUS
Status: DISCONTINUED | OUTPATIENT
Start: 2018-01-12 | End: 2018-01-12 | Stop reason: HOSPADM

## 2018-01-12 RX ORDER — SODIUM CHLORIDE 0.9 % (FLUSH) 0.9 %
5-10 SYRINGE (ML) INJECTION EVERY 8 HOURS
Status: DISCONTINUED | OUTPATIENT
Start: 2018-01-12 | End: 2018-01-12 | Stop reason: HOSPADM

## 2018-01-12 RX ORDER — SODIUM CHLORIDE 0.9 % (FLUSH) 0.9 %
5-10 SYRINGE (ML) INJECTION AS NEEDED
Status: DISCONTINUED | OUTPATIENT
Start: 2018-01-12 | End: 2018-01-12 | Stop reason: HOSPADM

## 2018-01-12 RX ORDER — LIDOCAINE HYDROCHLORIDE 20 MG/ML
INJECTION, SOLUTION EPIDURAL; INFILTRATION; INTRACAUDAL; PERINEURAL AS NEEDED
Status: DISCONTINUED | OUTPATIENT
Start: 2018-01-12 | End: 2018-01-12 | Stop reason: HOSPADM

## 2018-01-12 RX ORDER — ONDANSETRON 2 MG/ML
INJECTION INTRAMUSCULAR; INTRAVENOUS AS NEEDED
Status: DISCONTINUED | OUTPATIENT
Start: 2018-01-12 | End: 2018-01-12 | Stop reason: HOSPADM

## 2018-01-12 RX ORDER — METRONIDAZOLE 500 MG/100ML
500 INJECTION, SOLUTION INTRAVENOUS ONCE
Status: COMPLETED | OUTPATIENT
Start: 2018-01-12 | End: 2018-01-12

## 2018-01-12 RX ORDER — HYDROMORPHONE HYDROCHLORIDE 1 MG/ML
0.2 INJECTION, SOLUTION INTRAMUSCULAR; INTRAVENOUS; SUBCUTANEOUS
Status: DISCONTINUED | OUTPATIENT
Start: 2018-01-12 | End: 2018-01-12 | Stop reason: HOSPADM

## 2018-01-12 RX ORDER — ROCURONIUM BROMIDE 10 MG/ML
INJECTION, SOLUTION INTRAVENOUS AS NEEDED
Status: DISCONTINUED | OUTPATIENT
Start: 2018-01-12 | End: 2018-01-12 | Stop reason: HOSPADM

## 2018-01-12 RX ORDER — SUCCINYLCHOLINE CHLORIDE 20 MG/ML
INJECTION INTRAMUSCULAR; INTRAVENOUS AS NEEDED
Status: DISCONTINUED | OUTPATIENT
Start: 2018-01-12 | End: 2018-01-12 | Stop reason: HOSPADM

## 2018-01-12 RX ORDER — FENTANYL CITRATE 50 UG/ML
INJECTION, SOLUTION INTRAMUSCULAR; INTRAVENOUS AS NEEDED
Status: DISCONTINUED | OUTPATIENT
Start: 2018-01-12 | End: 2018-01-12 | Stop reason: HOSPADM

## 2018-01-12 RX ORDER — DIPHENHYDRAMINE HYDROCHLORIDE 50 MG/ML
12.5 INJECTION, SOLUTION INTRAMUSCULAR; INTRAVENOUS AS NEEDED
Status: DISCONTINUED | OUTPATIENT
Start: 2018-01-12 | End: 2018-01-12 | Stop reason: HOSPADM

## 2018-01-12 RX ORDER — PROPOFOL 10 MG/ML
INJECTION, EMULSION INTRAVENOUS AS NEEDED
Status: DISCONTINUED | OUTPATIENT
Start: 2018-01-12 | End: 2018-01-12 | Stop reason: HOSPADM

## 2018-01-12 RX ORDER — FENTANYL CITRATE 50 UG/ML
25 INJECTION, SOLUTION INTRAMUSCULAR; INTRAVENOUS
Status: DISCONTINUED | OUTPATIENT
Start: 2018-01-12 | End: 2018-01-12 | Stop reason: HOSPADM

## 2018-01-12 RX ORDER — OXYCODONE AND ACETAMINOPHEN 5; 325 MG/1; MG/1
1 TABLET ORAL ONCE
Status: COMPLETED | OUTPATIENT
Start: 2018-01-12 | End: 2018-01-12

## 2018-01-12 RX ORDER — MIDAZOLAM HYDROCHLORIDE 1 MG/ML
INJECTION, SOLUTION INTRAMUSCULAR; INTRAVENOUS AS NEEDED
Status: DISCONTINUED | OUTPATIENT
Start: 2018-01-12 | End: 2018-01-12 | Stop reason: HOSPADM

## 2018-01-12 RX ADMIN — INSULIN HUMAN 10 UNITS: 100 INJECTION, SOLUTION PARENTERAL at 07:27

## 2018-01-12 RX ADMIN — MIDAZOLAM HYDROCHLORIDE 2 MG: 1 INJECTION, SOLUTION INTRAMUSCULAR; INTRAVENOUS at 07:27

## 2018-01-12 RX ADMIN — LIDOCAINE HYDROCHLORIDE 100 MG: 20 INJECTION, SOLUTION EPIDURAL; INFILTRATION; INTRACAUDAL; PERINEURAL at 07:38

## 2018-01-12 RX ADMIN — SUCCINYLCHOLINE CHLORIDE 160 MG: 20 INJECTION INTRAMUSCULAR; INTRAVENOUS at 07:38

## 2018-01-12 RX ADMIN — PROPOFOL 250 MG: 10 INJECTION, EMULSION INTRAVENOUS at 07:38

## 2018-01-12 RX ADMIN — FENTANYL CITRATE 100 MCG: 50 INJECTION, SOLUTION INTRAMUSCULAR; INTRAVENOUS at 07:38

## 2018-01-12 RX ADMIN — VANCOMYCIN HYDROCHLORIDE 1500 MG: 10 INJECTION, POWDER, LYOPHILIZED, FOR SOLUTION INTRAVENOUS at 07:18

## 2018-01-12 RX ADMIN — SODIUM CHLORIDE 25 ML/HR: 900 INJECTION, SOLUTION INTRAVENOUS at 07:08

## 2018-01-12 RX ADMIN — ONDANSETRON 4 MG: 2 INJECTION INTRAMUSCULAR; INTRAVENOUS at 08:18

## 2018-01-12 RX ADMIN — METRONIDAZOLE 500 MG: 500 INJECTION, SOLUTION INTRAVENOUS at 07:55

## 2018-01-12 RX ADMIN — ROCURONIUM BROMIDE 10 MG: 10 INJECTION, SOLUTION INTRAVENOUS at 07:38

## 2018-01-12 RX ADMIN — OXYCODONE HYDROCHLORIDE AND ACETAMINOPHEN 1 TABLET: 5; 325 TABLET ORAL at 09:27

## 2018-01-12 NOTE — H&P
Surgery History and Physical    Subjective:      Milly Mcarthur is a 57 y.o. male with diabetes who presents with draining site mid lower back. He has had multiples cutaneous abscesses in the past.  His diabetes has in  The past been poorly controlled. He denies anginal chest pain or dyspnea. Past Medical History:   Diagnosis Date    A-fib [facility]) 2009    Resolved. 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     Cancer ([facility]     splenic lymphoma    CKD stage 4 due to type 2 diabetes mellitus ([address]     Diabetes ([address]     Hypertension     Ill-defined condition     diabetic neuropathy james feet    Neuropathy     Other ill-defined conditions(799.89)     spinal meningitis    Other ill-defined conditions(799.89)     broken blood vessel to nose    Stroke ([facility] 2007, 2017    per patient    Vitamin D deficiency      Past Surgical History:   Procedure Laterality Date    HX OTHER SURGICAL  02/06/2017    Excisional debridement of abscess right thigh & right lower back     HX OTHER SURGICAL  05/23/2017    I&D & excisional debridement (skin & subcutaneous tissue) back & right thigh      Family History   Problem Relation Age of Onset    Diabetes Mother     Hypertension Father     Hypertension Sister      Social History   Substance Use Topics    Smoking status: Never Smoker    Smokeless tobacco: Never Used    Alcohol use No      Prior to Admission medications    Medication Sig Start Date End Date Taking? Authorizing Provider   cloNIDine HCl (CATAPRES) 0.2 mg tablet Take 1 Tab by mouth two (2) times a day. 5/28/17  Yes Marilee Dixon MD   insulin lispro protamine/insulin lispro (HUMALOG MIX 75/25) 100 unit/mL (75-25) injection 60 Units by SubCUTAneous route Before breakfast and dinner. 12/23/16  Yes Austyn Price NP   amLODIPine (NORVASC) 10 mg tablet Take 1 Tab by mouth daily.  10/3/15  Yes Riay Banuelos MD   alcohol swabs (ALCOHOL PADS) padm 1 Each by Apply Externally route two (2) times a day. 10/3/15  Yes Dank Haq MD   pravastatin (PRAVACHOL) 20 mg tablet Take 20 mg by mouth nightly. Yes Magaly Lyons MD   digoxin (LANOXIN) 0.125 mg tablet Take 0.125 mg by mouth daily. Indications: afib   Yes Historical Provider      No Known Allergies    Review of Systems:  Pertinent items are noted in the History of Present Illness. Objective:      Patient Vitals for the past 8 hrs:   BP Temp Pulse Resp SpO2 Height Weight   18 0657 (!) 165/91 98.9 °F (37.2 °C) 78 14 97 % - -   18 8656 - - - - - 6' 5\" (1.956 m) 143.7 kg (316 lb 12.8 oz)       Temp (24hrs), Av.9 °F (37.2 °C), Min:98.9 °F (37.2 °C), Max:98.9 °F (37.2 °C)      Physical Exam:  WD obese man,  NAD  HEAD/NECK: No jaundice, mass or thyromegaly. LUNGS: Clear bilaterally without wheeze, rhonchi or rales. Carollynn Amarillo HEART: Regular without murmur, gallop or rub. BACK: Mid lower back 5 mm opening with purulent drainage; Induration diameter 3.5 cm. Abdomen:  Soft, non tender, no mass or hernia noted. NEURO: Patient is alert and oriented x 3 and moves all extremities equally. Facial movement is symmetrical. Speech was normal.   EXT: Without edema. Assessment:     Abscess of lower back    Plan:     Debridement of abscess cavity of back.     Signed By: Christ Bravo MD     2018

## 2018-01-12 NOTE — DISCHARGE INSTRUCTIONS
Discharge Instructions after Incision and Drainage of Abscess        Change dry dressing at abscess site daily. Leave Surgicel packing in place. Take pain medicine as prescribed as needed. Do not drive while taking narcotic pain medication. It is OK to shower. Do not share soap, towel, wash cloth, etc with other family members while you have an open wound. See Dr. Paolo Siddiqui in the office this Monday morning  - call [phone]      If you have any problems, call Dr. Paolo Siddiqui  [phone] or [phone] (after hours). QUENTIN Siddiqui MD  DISCHARGE SUMMARY from Nurse    PATIENT INSTRUCTIONS:    After general anesthesia or intravenous sedation, for 24 hours or while taking prescription Narcotics:  · Limit your activities  · Do not drive and operate hazardous machinery  · Do not make important personal or business decisions  · Do  not drink alcoholic beverages  · If you have not urinated within 8 hours after discharge, please contact your surgeon on call. Report the following to your surgeon:  · Excessive pain, swelling, redness or odor of or around the surgical area  · Temperature over 100.5  · Nausea and vomiting lasting longer than 4 hours or if unable to take medications  · Any signs of decreased circulation or nerve impairment to extremity: change in color, persistent  numbness, tingling, coldness or increase pain  · Any questions    What to do at Home:  A common side effect of anesthesia following surgery is nausea and/or vomiting. In order to decrease symptoms, it is wise to avoid foods that are high in fat, greasy foods, milk products, and spicy foods for the first 24 hours. Acceptable foods for the first 24 hours following surgery include but are not limited to:     soup   broth    toast    crackers    applesauce    bananas    mashed potatoes,   soft or scrambled eggs   oatmeal    jello    It is important to eat when taking your pain medication. This will help to prevent nausea.  If possible, please try to time your meals with your medications. It is very important to stay hydrated following surgery. Sip fluids frequently while awake. Avoid acidic drinks such as citrus juices and soda for 24 hours. Carbonated beverages may cause bloating and gas. Acceptable fluids include:    - water (flavor packets may add variety)  - coffee or tea (in moderation)  - Gatorade  - Eliot-aid  - apple juice  - cranberry juice    You are encouraged to cough and deep breathe every hour when awake. This will help to prevent respiratory complications following anesthesia. You may want to hug a pillow when coughing and sneezing to add additional support to the surgical area and to decrease discomfort if you had abdominal or chest surgery. If you are discharged home with support stockings, you may remove them after 24 hours. Support stockings are used to help prevent blood clots in the legs following surgery. Please take time to review all of your Home Care Instructions and Medication Information sheets provided in your discharge packet. If you have any questions, please contact your surgeons office. Thank you. Narcotic-Analgesic/Acetaminophen (Percocet, Norco, Lorcet HD, Lortab 10/325) - (By mouth)   Why this medicine is used:                                                                 1 tab given @ 09:30 am  Relieves pain. Contact a nurse or doctor right away if you have:  · Extreme weakness, shallow breathing, slow heartbeat  · Severe confusion, lightheadedness, dizziness, fainting  · Yellow skin or eyes, dark urine or pale stools  · Severe constipation, severe stomach pain, nausea, vomiting, loss of appetite  · Sweating or cold, clammy skin     Common side effects:  · Mild constipation, nausea, vomiting  · Sleepiness, tiredness  · Itching, rash  © 2017 [employer] Information is for End User's use only and may not be sold, redistributed or otherwise used for commercial purposes. Please carry medication information at all times in case of emergency situations. These are general instructions for a healthy lifestyle:    No smoking/ No tobacco products/ Avoid exposure to second hand smoke  Surgeon General's Warning:  Quitting smoking now greatly reduces serious risk to your health. Obesity, smoking, and sedentary lifestyle greatly increases your risk for illness    A healthy diet, regular physical exercise & weight monitoring are important for maintaining a healthy lifestyle    You may be retaining fluid if you have a history of heart failure or if you experience any of the following symptoms:  Weight gain of 3 pounds or more overnight or 5 pounds in a week, increased swelling in our hands or feet or shortness of breath while lying flat in bed. Please call your doctor as soon as you notice any of these symptoms; do not wait until your next office visit. Recognize signs and symptoms of STROKE:    F-face looks uneven    A-arms unable to move or move unevenly    S-speech slurred or non-existent    T-time-call 911 as soon as signs and symptoms begin-DO NOT go       Back to bed or wait to see if you get better-TIME IS BRAIN. Warning Signs of HEART ATTACK     Call 911 if you have these symptoms:   Chest discomfort. Most heart attacks involve discomfort in the center of the chest that lasts more than a few minutes, or that goes away and comes back. It can feel like uncomfortable pressure, squeezing, fullness, or pain.  Discomfort in other areas of the upper body. Symptoms can include pain or discomfort in one or both arms, the back, neck, jaw, or stomach.  Shortness of breath with or without chest discomfort.  Other signs may include breaking out in a cold sweat, nausea, or lightheadedness. Don't wait more than five minutes to call 911 - MINUTES MATTER! Fast action can save your life. Calling 911 is almost always the fastest way to get lifesaving treatment.  Emergency Medical Services staff can begin treatment when they arrive -- up to an hour sooner than if someone gets to the hospital by car. The discharge information has been reviewed with the patient and caregiver. The patient and caregiver verbalized understanding. Discharge medications reviewed with the patient and caregiver and appropriate educational materials and side effects teaching were provided.   ___________________________________________________________________________________________________________________________________

## 2018-01-12 NOTE — PERIOP NOTES
Patient: Ahmet Moseley MRN: [id number]  SSN: [id number]   YOB: 1955  Age: 57 y.o. Sex: male     Patient is status post Procedure(s):  DEBRIDEMENT ABSCESS CAVITY LOWER BACK. Surgeon(s) and Role:     * Shannan Terrazas MD - Primary                      Peripheral IV 01/12/18 Left Antecubital (Active)   Site Assessment Clean, dry, & intact 1/12/2018  7:07 AM   Phlebitis Assessment 0 1/12/2018  7:07 AM   Infiltration Assessment 0 1/12/2018  7:07 AM   Dressing Status Clean, dry, & intact 1/12/2018  7:07 AM   Dressing Type Tape;Transparent 1/12/2018  7:07 AM   Hub Color/Line Status Green; Infusing;Patent 1/12/2018  7:07 AM   Action Taken Blood drawn 1/12/2018  7:07 AM   Alcohol Cap Used Yes 1/12/2018  7:07 AM                           Dressing/Packing:  Wound Back Lower-DRESSING TYPE: Packing;4 x 4 (01/12/18 0815)  Splint/Cast:  ]

## 2018-01-12 NOTE — PERIOP NOTES
Consulted Dr. Mendy Rivera r/t FSBS = [phone]. He does not want to treat further at this time. States that patient can start home insulin regimen when discharge to home. Dr. Shayy Miller states that this patient typically maintains blood sugars in the 300s.

## 2018-01-12 NOTE — PERIOP NOTES
Handoff Report from Operating Room to PACU    Report received from Chrissy Brumfield RN and Etta Parker CRNA regarding Bienvenido Vickers.      Surgeon(s):  Cade Ambriz MD  And Procedure(s) (LRB):  [address] [place] [address] (N/A)  confirmed   with allergies, dressings and wound packing discussed. Anesthesia type, drugs, patient history, complications, estimated blood loss, vital signs, intake and output, and last pain medication, lines and temperature were reviewed.

## 2018-01-12 NOTE — ANESTHESIA POSTPROCEDURE EVALUATION
Post-Anesthesia Evaluation and Assessment    Patient: Isabela Garnett MRN: [id number]  SSN: [id number]    YOB: 1955  Age: 57 y.o. Sex: male       Cardiovascular Function/Vital Signs  Visit Vitals    /77    Pulse 74    Temp (P) 37 °C (98.6 °F)    Resp 14    Ht 6' 5\" (1.956 m)    Wt 143.7 kg (316 lb 12.8 oz)    SpO2 97%    BMI 37.57 kg/m2       Patient is status post general anesthesia for Procedure(s):  DEBRIDEMENT ABSCESS CAVITY LOWER BACK. Nausea/Vomiting: None    Postoperative hydration reviewed and adequate. Pain:  Pain Scale 1: (P) Numeric (0 - 10) (01/12/18 0915)  Pain Intensity 1: (P) 3 (01/12/18 0915)   Managed    Neurological Status:   Neuro (WDL): Exceptions to WDL (01/12/18 0840)  Neuro  Neurologic State: Drowsy; Eyes open to voice (01/12/18 0840)  Orientation Level: Oriented to person;Oriented to place;Oriented to situation (01/12/18 0840)  Cognition: Follows commands (01/12/18 0840)  Speech: Delayed responses; Appropriate for age (01/12/18 0840)  LUE Motor Response: Purposeful (01/12/18 0840)  LLE Motor Response: Purposeful (01/12/18 0840)  RUE Motor Response: Purposeful (01/12/18 0840)  RLE Motor Response: Purposeful (01/12/18 0840)   At baseline    Mental Status and Level of Consciousness: Arousable    Pulmonary Status:   O2 Device: (P) Room air (01/12/18 0915)   Adequate oxygenation and airway patent    Complications related to anesthesia: None    Post-anesthesia assessment completed.  No concerns    Signed By: Humberto Pollack MD     January 12, 2018

## 2018-01-12 NOTE — PROGRESS NOTES
Surgery    Abscess site had a chronic appearance. He had excision of involved tissue. I do not think that antibiotics at home would add any benefit. Unfortunately the patient appears to have chronically uncontrolled diabetes. Prior attempts at intervention have not been successful.     Silas Costa MD

## 2018-01-12 NOTE — BRIEF OP NOTE
BRIEF OPERATIVE NOTE    Date of Procedure: 1/12/2018   Preoperative Diagnosis: Abscess Lower Back  Postoperative Diagnosis: Abscess Lower Back    Procedure(s):  DEBRIDEMENT ABSCESS CAVITY LOWER BACK (skin and subcutaneous tissue)  Surgeon(s) and Role:     * Matilde Tirado MD - Primary         Assistant Staff:       Surgical Staff:  Circ-1: Anneliese Dunn  Scrub Tech-1: Yvrose Novoa  Scrub RN-1: [facility] Staff: Henry Langley  Event Time In   Incision Start 0238   Incision Close 5556     Anesthesia: General   Estimated Blood Loss: 50 ml  Specimens:   ID Type Source Tests Collected by Time Destination   1 : Back Abscess Wound Back ANAEROBIC/AEROBIC/GRAM STAIN Matilde Tirado MD 1/12/2018 0800 Microbiology      Findings: Abscess with a small amount of necrotic subcutaneous tissue. Packed open with Surgicel.   Complications: none  Implants: * No implants in log *

## 2018-01-12 NOTE — ANESTHESIA PREPROCEDURE EVALUATION
Anesthetic History   No history of anesthetic complications            Review of Systems / Medical History  Patient summary reviewed, nursing notes reviewed and pertinent labs reviewed    Pulmonary  Within defined limits                 Neuro/Psych       CVA  TIA    Comments: H/O Meningitis  Diabetic neuropathy Cardiovascular    Hypertension        Dysrhythmias : atrial fibrillation      Exercise tolerance: <4 METS: Uses walker  Comments: TTE (8/1/17):  EF=50-55%   GI/Hepatic/Renal         Renal disease: CRI      Comments: CRI, Stage IV Endo/Other    Diabetes: poorly controlled, type 2, using insulin    Morbid obesity and arthritis    Comments: H/O Blastic NK-cell lymphoma/splenic lymphoma Other Findings   Comments: Abscess of lower back  Vitamin D Deficiency         Physical Exam    Airway  Mallampati: III  TM Distance: > 6 cm  Neck ROM: normal range of motion   Mouth opening: Normal     Cardiovascular  Regular rate and rhythm,  S1 and S2 normal,  no murmur, click, rub, or gallop             Dental    Dentition: Poor dentition  Comments: Multiple missing, none loose.    Pulmonary  Breath sounds clear to auscultation               Abdominal  GI exam deferred       Other Findings            Anesthetic Plan    ASA: 3  Anesthesia type: general          Induction: Intravenous  Anesthetic plan and risks discussed with: Patient

## 2018-01-12 NOTE — IP AVS SNAPSHOT
[address] [facility] 
[phone] Patient: Luan Castaneda 
MRN: [id number] :1955 About your hospitalization You were admitted on:  2018 You last received care in the:  [facility] PACU You were discharged on:  2018 Why you were hospitalized Your primary diagnosis was:  Not on File Follow-up Information Follow up With Details Comments Contact Info Chente Kenney NP   [address] [phone] Discharge Orders None A check chula indicates which time of day the medication should be taken. My Medications START taking these medications Instructions Each Dose to Equal  
 Morning Noon Evening Bedtime  
 oxyCODONE-acetaminophen 5-325 mg per tablet Commonly known as:  PERCOCET Your last dose was: Your next dose is: Take 1-2 Tabs by mouth every four (4) hours as needed for Pain. Max Daily Amount: 12 Tabs. 1-2 Tab CONTINUE taking these medications Instructions Each Dose to Equal  
 Morning Noon Evening Bedtime  
 alcohol swabs Padm Commonly known as:  ALCOHOL PADS Your last dose was: Your next dose is:    
   
   
 1 Each by Apply Externally route two (2) times a day. 1 Each  
    
   
   
   
  
 amLODIPine 10 mg tablet Commonly known as:  Saintclair Rickers Your last dose was: Your next dose is: Take 1 Tab by mouth daily. 10 mg  
    
   
   
   
  
 cloNIDine HCl 0.2 mg tablet Commonly known as:  CATAPRES Your last dose was: Your next dose is: Take 1 Tab by mouth two (2) times a day. 0.2 mg  
    
   
   
   
  
 digoxin 0.125 mg tablet Commonly known as:  LANOXIN Your last dose was: Your next dose is: Take 0.125 mg by mouth daily.  Indications: afib  
 0.125 mg  
    
   
 insulin lispro protamine/insulin lispro 100 unit/mL (75-25) injection Commonly known as:  HUMALOG MIX 75/25 Your last dose was: Your next dose is:    
   
   
 60 Units by SubCUTAneous route Before breakfast and dinner. 60 Units  
    
   
   
   
  
 pravastatin 20 mg tablet Commonly known as:  PRAVACHOL Your last dose was: Your next dose is: Take 20 mg by mouth nightly. 20 mg Where to Get Your Medications Information on where to get these meds will be given to you by the nurse or doctor. ! Ask your nurse or doctor about these medications  
  oxyCODONE-acetaminophen 5-325 mg per tablet Discharge Instructions Discharge Instructions after Incision and Drainage of Abscess Change dry dressing at abscess site daily. Leave Surgicel packing in place. Take pain medicine as prescribed as needed. Do not drive while taking narcotic pain medication. It is OK to shower. Do not share soap, towel, wash cloth, etc with other family members while you have an open wound. See Dr. Diane Ang in the office this Monday morning  - call [phone] If you have any problems, call Dr. Diane Ang  [phone] or [phone] (after hours). QUENTIN Ang MD 
DISCHARGE SUMMARY from Nurse PATIENT INSTRUCTIONS: 
 
After general anesthesia or intravenous sedation, for 24 hours or while taking prescription Narcotics: · Limit your activities · Do not drive and operate hazardous machinery · Do not make important personal or business decisions · Do  not drink alcoholic beverages · If you have not urinated within 8 hours after discharge, please contact your surgeon on call. Report the following to your surgeon: 
· Excessive pain, swelling, redness or odor of or around the surgical area · Temperature over 100.5 · Nausea and vomiting lasting longer than 4 hours or if unable to take medications · Any signs of decreased circulation or nerve impairment to extremity: change in color, persistent  numbness, tingling, coldness or increase pain · Any questions What to do at Home: A common side effect of anesthesia following surgery is nausea and/or vomiting. In order to decrease symptoms, it is wise to avoid foods that are high in fat, greasy foods, milk products, and spicy foods for the first 24 hours. Acceptable foods for the first 24 hours following surgery include but are not limited to: 
 
? soup 
? broth 
?  toast  
? crackers ? applesauce 
? bananas  
? mashed potatoes, 
? soft or scrambled eggs 
? oatmeal 
?  jello It is important to eat when taking your pain medication. This will help to prevent nausea. If possible, please try to time your meals with your medications. It is very important to stay hydrated following surgery. Sip fluids frequently while awake. Avoid acidic drinks such as citrus juices and soda for 24 hours. Carbonated beverages may cause bloating and gas. Acceptable fluids include: 
 
? water (flavor packets may add variety) ? coffee or tea (in moderation) ? Gatorade ? Evelio Canes ? apple juice 
? cranberry juice You are encouraged to cough and deep breathe every hour when awake. This will help to prevent respiratory complications following anesthesia. You may want to hug a pillow when coughing and sneezing to add additional support to the surgical area and to decrease discomfort if you had abdominal or chest surgery. If you are discharged home with support stockings, you may remove them after 24 hours. Support stockings are used to help prevent blood clots in the legs following surgery. Please take time to review all of your Home Care Instructions and Medication Information sheets provided in your discharge packet. If you have any questions, please contact your surgeons office. Thank you.  
 Narcotic-Analgesic/Acetaminophen (Percocet, Norco, Lorcet HD, Lortab 10/325) - (By mouth) Why this medicine is used:  
Relieves pain. Contact a nurse or doctor right away if you have: 
· Extreme weakness, shallow breathing, slow heartbeat · Severe confusion, lightheadedness, dizziness, fainting · Yellow skin or eyes, dark urine or pale stools · Severe constipation, severe stomach pain, nausea, vomiting, loss of appetite · Sweating or cold, clammy skin Common side effects: · Mild constipation, nausea, vomiting · Sleepiness, tiredness · Itching, rash © 2017 [facility] Information is for End User's use only and may not be sold, redistributed or otherwise used for commercial purposes. Please carry medication information at all times in case of emergency situations. These are general instructions for a healthy lifestyle: No smoking/ No tobacco products/ Avoid exposure to second hand smoke Surgeon General's Warning:  Quitting smoking now greatly reduces serious risk to your health. Obesity, smoking, and sedentary lifestyle greatly increases your risk for illness A healthy diet, regular physical exercise & weight monitoring are important for maintaining a healthy lifestyle You may be retaining fluid if you have a history of heart failure or if you experience any of the following symptoms:  Weight gain of 3 pounds or more overnight or 5 pounds in a week, increased swelling in our hands or feet or shortness of breath while lying flat in bed. Please call your doctor as soon as you notice any of these symptoms; do not wait until your next office visit. Recognize signs and symptoms of STROKE: 
 
F-face looks uneven A-arms unable to move or move unevenly S-speech slurred or non-existent T-time-call 911 as soon as signs and symptoms begin-DO NOT go Back to bed or wait to see if you get better-TIME IS BRAIN. Warning Signs of HEART ATTACK Call 911 if you have these symptoms: ? Chest discomfort. Most heart attacks involve discomfort in the center of the chest that lasts more than a few minutes, or that goes away and comes back. It can feel like uncomfortable pressure, squeezing, fullness, or pain. ? Discomfort in other areas of the upper body. Symptoms can include pain or discomfort in one or both arms, the back, neck, jaw, or stomach. ? Shortness of breath with or without chest discomfort. ? Other signs may include breaking out in a cold sweat, nausea, or lightheadedness. Don't wait more than five minutes to call [address]! Fast action can save your life. Calling 911 is almost always the fastest way to get lifesaving treatment. Emergency Medical Services staff can begin treatment when they arrive  up to an hour sooner than if someone gets to the hospital by car. The discharge information has been reviewed with the {PATIENT PARENT GUARDIAN:47632}. The {PATIENT PARENT GUARDIAN:71716} verbalized understanding. Discharge medications reviewed with the {Dishcarge meds reviewed EQY} and appropriate educational materials and side effects teaching were provided. ___________________________________________________________________________________________________________________________________ Introducing [facility] & [facility]! Cedric Wood introduces [employer] patient portal. Now you can access parts of your medical record, email your doctor's office, and request medication refills online. 1. In your internet browser, go to [email or web address] 2. Click on the First Time User? Click Here link in the Sign In box. You will see the New Member Sign Up page. 3. Enter your [employer] Access Code exactly as it appears below. You will not need to use this code after youve completed the sign-up process. If you do not sign up before the expiration date, you must request a new code. · [employer] Access Code: [email or web address] Expires: 4/11/2018  1:38 PM 
 
4. Enter the last four digits of your Social Security Number (xxxx) and Date of Birth (mm/dd/yyyy) as indicated and click Submit. You will be taken to the next sign-up page. 5. Create a [employer] ID. This will be your Flayr login ID and cannot be changed, so think of one that is secure and easy to remember. 6. Create a Flayr password. You can change your password at any time. 7. Enter your Password Reset Question and Answer. This can be used at a later time if you forget your password. 8. Enter your e-mail address. You will receive e-mail notification when new information is available in [address] 9. Click Sign Up. You can now view and download portions of your medical record. 10. Click the Download Summary menu link to download a portable copy of your medical information. If you have questions, please visit the Frequently Asked Questions section of the Fl[employer] website. Remember, [employer] is NOT to be used for urgent needs. For medical emergencies, dial 911. Now available from your iPhone and Android! Unresulted Labs-Please follow up with your PCP about these lab tests Order Current Status CULTURE, ANAEROBIC In process CULTURE, WOUND W GRAM STAIN In process Providers Seen During Your Hospitalization Provider Specialty Primary office phone Shannan Terrazas MD General Surgery [phone] Your Primary Care Physician (PCP) Primary Care Physician Office Phone Office Fax Julia Sawant [phone] You are allergic to the following No active allergies Recent Documentation Height Weight BMI Smoking Status 1.956 m 143.7 kg 37.57 kg/m2 Never Smoker Emergency Contacts Name Discharge Info Relation Home Work Mobile Hershall [place]  Other Relative [6] [phone] FabyRitesh  Other Relative [6] [phone] Patient Belongings The following personal items are in your possession at time of discharge: 
  Dental Appliances: None  Visual Aid: Glasses   Hearing Aids/Status: Does not own  Home Medications: None   Jewelry: None  Clothing: Other (comment) (street clothes and shoes)    Other Valuables: None  Personal Items Sent to Safe: declined Please provide this summary of care documentation to your next provider. Signatures-by signing, you are acknowledging that this After Visit Summary has been reviewed with you and you have received a copy. Patient Signature:  ____________________________________________________________ Date:  ____________________________________________________________  
  
[place] Must Provider Signature:  ____________________________________________________________ Date:  ____________________________________________________________

## 2018-01-12 NOTE — OP NOTES
[facility]  ACUTE CARE OP NOTE    Leonides Ferguson  MR#: [id number]  : 1955  ACCOUNT #: [de-identified]   DATE OF SERVICE: 2018    PREOPERATIVE DIAGNOSIS:  Draining abscess mid lower back. POSTOPERATIVE DIAGNOSIS:  Draining abscess mid lower back. ANESTHESIA:  General.    SURGEON:  Calin Bonilla MD    PROCEDURE:  Debridement of abscess of back (skin and subcutaneous tissue). COMPLICATIONS:  None. ESTIMATED BLOOD LOSS:  50 mL    OPERATIVE SUMMARY:  The patient was taken to the operating room and placed on the operating table in supine position. General endotracheal anesthesia was induced. The patient was then turned to a right lateral decubitus position. Beanbag was utilized to help with positioning. The patient's lower back was sterilely prepared and draped. There was an opening about 5 mm in diameter draining purulent material from the mid lower back with surrounding induration for about 3.5 cm. The cavity was probed with a clamp and seemed to extend about 2 cm in all directions. Culture was taken of the pus. A #10 blade scalpel was then utilized to excise the skin overlying this cavity. This also allowed for excision of necrotic subcutaneous tissue. Once all nonviable tissue had been removed from the walls of the subcutaneous cavity, then pressure was maintained for a few minutes and then hemostasis was obtained by electrocautery. The inner walls of the cavity were then inspected and there was no further necrotic material.  Once there was good hemostasis, then the cavity was packed open with Surgicel 2 sheets. Total cavity size was about 4 cm across and about a 1.5 cm deep. Dry dressing was applied over the Surgicel. The patient tolerated the surgery and was taken to recovery room in stable condition. SPECIMEN:  Debrided skin and subcutaneous tissue, not sent for pathology. DRAINS:  None.       MD JUAN Gregg /   D: 01/12/2018 08:35     T: 01/12/2018 09:19  JOB #: [id number]  CC: Jose  NP

## 2018-01-12 NOTE — PERIOP NOTES
Patient meets discharge criteria. However, Vancomycin continues to infuse. Holding in PACU until Phase 2 is available.

## 2018-01-14 LAB
BACTERIA SPEC CULT: ABNORMAL
BACTERIA SPEC CULT: ABNORMAL
GRAM STN SPEC: ABNORMAL
SERVICE CMNT-IMP: ABNORMAL
SERVICE CMNT-IMP: ABNORMAL

## 2018-01-15 ENCOUNTER — OFFICE VISIT (OUTPATIENT)
Dept: SURGERY | Age: 63
End: 2018-01-15

## 2018-01-15 ENCOUNTER — TELEPHONE (OUTPATIENT)
Dept: SURGERY | Age: 63
End: 2018-01-15

## 2018-01-15 VITALS
RESPIRATION RATE: 18 BRPM | OXYGEN SATURATION: 99 % | HEART RATE: 67 BPM | DIASTOLIC BLOOD PRESSURE: 94 MMHG | TEMPERATURE: 97.6 F | SYSTOLIC BLOOD PRESSURE: 163 MMHG | BODY MASS INDEX: 37.19 KG/M2 | WEIGHT: 315 LBS | HEIGHT: 77 IN

## 2018-01-15 DIAGNOSIS — L02.212 ABSCESS OF BACK: Primary | ICD-10-CM

## 2018-01-15 RX ORDER — BLOOD-GLUCOSE CONTROL, NORMAL
EACH MISCELLANEOUS
COMMUNITY
Start: 2017-11-21

## 2018-01-15 RX ORDER — SYRINGE AND NEEDLE,INSULIN,1ML 31 GX5/16"
SYRINGE, EMPTY DISPOSABLE MISCELLANEOUS
COMMUNITY
Start: 2017-11-21

## 2018-01-15 RX ORDER — BLOOD GLUCOSE CONTROL HIGH,LOW
EACH MISCELLANEOUS
COMMUNITY
Start: 2017-11-21

## 2018-01-15 RX ORDER — DOXYCYCLINE HYCLATE 100 MG
TABLET ORAL
Status: ON HOLD | COMMUNITY
Start: 2017-10-30 | End: 2018-01-31

## 2018-01-15 RX ORDER — DOXYCYCLINE 100 MG/1
100 TABLET ORAL 2 TIMES DAILY
Qty: 20 TAB | Refills: 0 | Status: SHIPPED | OUTPATIENT
Start: 2018-01-15 | End: 2018-01-25

## 2018-01-15 RX ORDER — BLOOD SUGAR DIAGNOSTIC
STRIP MISCELLANEOUS
COMMUNITY
Start: 2017-11-21 | End: 2018-03-29

## 2018-01-15 RX ORDER — CARVEDILOL 12.5 MG/1
12.5 TABLET ORAL 2 TIMES DAILY
COMMUNITY
Start: 2017-10-31 | End: 2018-02-06

## 2018-01-15 RX ORDER — CLOPIDOGREL BISULFATE 75 MG/1
75 TABLET ORAL DAILY
COMMUNITY
Start: 2017-12-08

## 2018-01-15 RX ORDER — HYDROCHLOROTHIAZIDE 12.5 MG/1
12.5 CAPSULE ORAL DAILY
Status: ON HOLD | COMMUNITY
Start: 2017-12-08 | End: 2018-02-01

## 2018-01-15 RX ORDER — HUMAN INSULIN 100 [IU]/ML
65 INJECTION, SUSPENSION SUBCUTANEOUS
COMMUNITY
Start: 2018-01-05

## 2018-01-15 NOTE — TELEPHONE ENCOUNTER
Mr Dan Enoc seen in office today. S/P debridement of abscess lower back on Friday, 1/12/18. Dr Meliza Reed requests daily dressing change using Carrasyn gel & cover with dry gauze. Pt has follow up appt on 1/22/18.

## 2018-01-15 NOTE — PROGRESS NOTES
The patient is status post debridement of abscess site on the mid lower back. The culture has returned showing MRSA. He is not presently on antibiotics. On examination after removal of the Surgicel packing, the wound is relatively clean. Compressing surrounding tissue does not yield any purulence. Gel and gauze packing was applied and covered by dry gauze. We will arrange for home health for daily gel and gauze packing change. I sent by e-mail a prescription to the patient's [facility] pharmacy for Doxycycline 100 mg po bid for 10 days. I discussed with the patient the importance of starting antibiotics as I think this will be helpful in this situation. Unfortunately, the patient tends to have very high blood sugars and has not been successful at complying with diet and medication. The patient will see me in follow up in one week. Final Diagnosis:  Status post debridement skin and subcutaneous tissue of back.     [place]/mikey

## 2018-01-15 NOTE — PROGRESS NOTES
Michela Jesus is a 57 y.o. male      Chief Complaint   Patient presents with    Post OP Follow Up     1/12/18 Debridement of abscess of back (skin and subcutaneous tissue). 1. Have you been to the ER, urgent care clinic since your last visit? Hospitalized since your last visit? Yes, [place] [facility]12[place]18 [facility] [address] [facility] [place] [facility]    2. Have you seen or consulted any other health care providers outside of the [address] [place] [address] since your last visit? Include any pap smears or colon screening.   No

## 2018-01-15 NOTE — MR AVS SNAPSHOT
[address], [address] [facility], [address] [place] [address] [facility] 
[phone] Patient: Jacob Alberto 
MRN: [id number] :1955 Visit Information Date & Time Provider Department Dept. Phone Encounter #  
 1/15/2018 11:40 AM Holger Hebert MD [facility] Dr. Beltran Banegas [facility] [phone] [id number] Your Appointments 2018 11:00 AM  
POST OP with Holger Hebert MD  
[facility] Dr. Beltran Jones ([address]) Appt Note: PO DEBRIDEMENT OF ABSCESS/BACK 18  
 [address], [address] [facility], [address] [phone]  
[address], [address] [facility], [address] [place] [address] [address] [phone] Upcoming Health Maintenance Date Due Hepatitis C Screening 1955 FOOT EXAM Q1 1963 MICROALBUMIN Q1 1963 EYE EXAM RETINAL OR DILATED Q1 1963 DTaP/Tdap/Td series (1 - Tdap) 1974 FOBT Q 1 YEAR AGE 50-75 2003 ZOSTER VACCINE AGE 60> 2013 Pneumococcal 19-64 Highest Risk (2 of 3 - PCV13) 2013 Influenza Age 5 to Adult 2017 HEMOGLOBIN A1C Q6M 2018 LIPID PANEL Q1 2018 Allergies as of 1/15/2018  Review Complete On: 1/15/2018 By: Jolie Murray No Known Allergies Current Immunizations  Reviewed on 2013 Name Date ZZZ-RETIRED (DO NOT USE) Pneumococcal Vaccine (Unspecified Type) 2012 Not reviewed this visit Vitals BP Pulse Temp Resp Height(growth percentile) Weight(growth percentile) (!) 163/94 (BP 1 Location: Right arm, BP Patient Position: Sitting) 67 97.6 °F (36.4 °C) (Oral) 18 6' 5\" (1.956 m) 321 lb (145.6 kg) SpO2 BMI Smoking Status 99% 38.07 kg/m2 Never Smoker Vitals History BMI and BSA Data Body Mass Index Body Surface Area 38.07 kg/m 2 2.81 m 2 Preferred Pharmacy Pharmacy Name Phone [id number] [phone] [facility] [phone] Your Updated Medication List  
  
   
This list is accurate as of: 1/15/18  2:45 PM.  Always use your most recent med list.  
  
  
  
  
 ACCU-CHEK SOFÍA CONTROL SOLN Soln Generic drug:  Blood Glucose Control High&Low ACCU-CHEK SOFÍA PLUS TEST STRP strip Generic drug:  glucose blood VI test strips  
  
 alcohol swabs Padm Commonly known as:  ALCOHOL PADS  
1 Each by Apply Externally route two (2) times a day. amLODIPine 10 mg tablet Commonly known as:  Frann Bing Take 1 Tab by mouth daily. carvedilol 12.5 mg tablet Commonly known as:  COREG  
  
 cloNIDine HCl 0.2 mg tablet Commonly known as:  CATAPRES Take 1 Tab by mouth two (2) times a day. clopidogrel 75 mg Tab Commonly known as:  PLAVIX  
  
 digoxin 0.125 mg tablet Commonly known as:  LANOXIN Take 0.125 mg by mouth daily. Indications: afib  
  
 doxycycline 100 mg tablet Commonly known as:  VIBRA-TABS  
  
 doxycycline 100 mg tablet Commonly known as:  ADOXA Take 1 Tab by mouth two (2) times a day for 10 days. hydroCHLOROthiazide 12.5 mg capsule Commonly known as:  MICROZIDE  
  
 insulin lispro protamine/insulin lispro 100 unit/mL (75-25) injection Commonly known as:  HUMALOG MIX 75/25  
60 Units by SubCUTAneous route Before breakfast and dinner. lancets 30 gauge Misc NovoLIN 70/30 100 unit/mL (70-30) injection Generic drug:  insulin NPH/insulin regular  
  
 oxyCODONE-acetaminophen 5-325 mg per tablet Commonly known as:  PERCOCET Take 1-2 Tabs by mouth every four (4) hours as needed for Pain. Max Daily Amount: 12 Tabs. pravastatin 20 mg tablet Commonly known as:  PRAVACHOL Take 20 mg by mouth nightly. SURE COMFORT INSULIN SYRINGE 1 mL 31 gauge x 5/16 Syrg Generic drug:  Insulin Syringe-Needle U-100 Prescriptions Sent to Pharmacy Refills  
 doxycycline (ADOXA) 100 mg tablet 0 Sig: Take 1 Tab by mouth two (2) times a day for 10 days. Class: Normal  
 Pharmacy: [place] [facility] [address], [address] [place] [address] #: [phone] Route: Oral  
  
Introducing [facility] & HEALTH SERVICES! Magalie Rabago introduces [employer] patient portal. Now you can access parts of your medical record, email your doctor's office, and request medication refills online. 1. In your internet browser, go to [email or web address] 2. Click on the First Time User? Click Here link in the Sign In box. You will see the New Member Sign Up page. 3. Enter your [employer] Access Code exactly as it appears below. You will not need to use this code after youve completed the sign-up process. If you do not sign up before the expiration date, you must request a new code. · [employer] Access Code: [email or web address] Expires: 4/11/2018  1:38 PM 
 
4. Enter the last four digits of your Social Security Number (xxxx) and Date of Birth (mm/dd/yyyy) as indicated and click Submit. You will be taken to the next sign-up page. 5. Create a [employer] ID. This will be your [employer] login ID and cannot be changed, so think of one that is secure and easy to remember. 6. Create a [employer] password. You can change your password at any time. 7. Enter your Password Reset Question and Answer. This can be used at a later time if you forget your password. 8. Enter your e-mail address. You will receive e-mail notification when new information is available in [address] 9. Click Sign Up. You can now view and download portions of your medical record. 10. Click the Download Summary menu link to download a portable copy of your medical information. If you have questions, please visit the Frequently Asked Questions section of the [employer] website. Remember, [employer] is NOT to be used for urgent needs. For medical emergencies, dial 911. Now available from your iPhone and Android! Please provide this summary of care documentation to your next provider. Your primary care clinician is listed as Paul Winn. If you have any questions after today's visit, please call [phone].

## 2018-01-15 NOTE — TELEPHONE ENCOUNTER
Received call from Mr Jorge Mckinley PCP office requesting notes. Faxed 1/11/18 office note & 1/12/18 op note to (f) [phone], confirmation received.

## 2018-01-29 ENCOUNTER — OFFICE VISIT (OUTPATIENT)
Dept: SURGERY | Age: 63
End: 2018-01-29

## 2018-01-29 VITALS
BODY MASS INDEX: 36.84 KG/M2 | WEIGHT: 312 LBS | OXYGEN SATURATION: 98 % | SYSTOLIC BLOOD PRESSURE: 141 MMHG | HEART RATE: 73 BPM | HEIGHT: 77 IN | DIASTOLIC BLOOD PRESSURE: 73 MMHG

## 2018-01-29 DIAGNOSIS — L02.212 ABSCESS OF BACK: Primary | ICD-10-CM

## 2018-01-29 NOTE — PERIOP NOTES
PAT called Dr Yari Orta office. 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 Radha Hernandez to consult Dr Skyler Guaman and fax orders. Pt unable to be reached. Atul Paige office and made aware. No additional number available. Pharmacy flagged pt's  LR due to pt's Digoxin for DOS. Per Nataly Pereyra NP, Change LR to Normal Saline AM DOS. Soraya/ Dr Yari Orta nurse was made aware pt has not been reached after multiple attempts.  Radha Hernandez advised if pt calls office she will forward him to FABRICE Hernandez to send orders when available

## 2018-01-29 NOTE — MR AVS SNAPSHOT
[address], [address] 
[phone] Patient: Jeremy García 
MRN: [id number] :1955 Visit Information Date & Time Provider Department Dept. Phone Encounter #  
 2018 11:00 AM Radha Callejas MD [facility] [address] [phone] [id number] Upcoming Health Maintenance Date Due Hepatitis C Screening 1955 FOOT EXAM Q1 1963 MICROALBUMIN Q1 1963 EYE EXAM RETINAL OR DILATED Q1 1963 DTaP/Tdap/Td series (1 - Tdap) 1974 FOBT Q 1 YEAR AGE 50-75 2003 ZOSTER VACCINE AGE 60> 2013 Pneumococcal 19-64 Highest Risk (2 of 3 - PCV13) 2013 Influenza Age 5 to Adult 2017 HEMOGLOBIN A1C Q6M 2018 LIPID PANEL Q1 2018 Allergies as of 2018  Review Complete On: 2018 By: Benito Garcia LPN No Known Allergies Current Immunizations  Reviewed on 2013 Name Date ZZZ-RETIRED (DO NOT USE) Pneumococcal Vaccine (Unspecified Type) 2012 Not reviewed this visit Vitals BP Pulse Height(growth percentile) Weight(growth percentile) SpO2 BMI  
 141/73 (BP 1 Location: Right arm, BP Patient Position: Sitting) 73 6' 5\" (1.956 m) 312 lb (141.5 kg) 98% 37 kg/m2 Smoking Status Never Smoker Vitals History BMI and BSA Data Body Mass Index Body Surface Area  
 37 kg/m 2 2.77 m 2 Preferred Pharmacy Pharmacy Name Phone [phone] [phone] Your Updated Medication List  
  
   
This list is accurate as of: 18  4:08 PM.  Always use your most recent med list.  
  
  
  
  
 ACCU-CHEK SOFÍA CONTROL SOLN Soln Generic drug:  Blood Glucose Control High&Low ACCU-CHEK SOFÍA PLUS TEST STRP strip Generic drug:  glucose blood VI test strips alcohol swabs Padm Commonly known as:  ALCOHOL PADS  
1 Each by Apply Externally route two (2) times a day. amLODIPine 10 mg tablet Commonly known as:  Bradley Licea Take 1 Tab by mouth daily. carvedilol 12.5 mg tablet Commonly known as:  COREG  
  
 cloNIDine HCl 0.2 mg tablet Commonly known as:  CATAPRES Take 1 Tab by mouth two (2) times a day. clopidogrel 75 mg Tab Commonly known as:  PLAVIX  
  
 digoxin 0.125 mg tablet Commonly known as:  LANOXIN Take 0.125 mg by mouth daily. Indications: afib  
  
 doxycycline 100 mg tablet Commonly known as:  VIBRA-TABS  
  
 hydroCHLOROthiazide 12.5 mg capsule Commonly known as:  MICROZIDE  
  
 insulin lispro protamine/insulin lispro 100 unit/mL (75-25) injection Commonly known as:  HUMALOG MIX 75/25  
60 Units by SubCUTAneous route Before breakfast and dinner. lancets 30 gauge Misc NovoLIN 70/30 100 unit/mL (70-30) injection Generic drug:  insulin NPH/insulin regular  
  
 oxyCODONE-acetaminophen 5-325 mg per tablet Commonly known as:  PERCOCET Take 1-2 Tabs by mouth every four (4) hours as needed for Pain. Max Daily Amount: 12 Tabs. pravastatin 20 mg tablet Commonly known as:  PRAVACHOL Take 20 mg by mouth nightly. SURE COMFORT INSULIN SYRINGE 1 mL 31 gauge x 5/16 Syrg Generic drug:  Insulin Syringe-Needle U-100 Introducing [facility]! Carmelita Fothergill introduces [employer] patient portal. Now you can access parts of your medical record, email your doctor's office, and request medication refills online. 1. In your internet browser, go to [email or web address] 2. Click on the First Time User? Click Here link in the Sign In box. You will see the New Member Sign Up page. 3. Enter your [employer] Access Code exactly as it appears below. You will not need to use this code after youve completed the sign-up process.  If you do not sign up before the expiration date, you must request a new code. · [employer] Access Code: [email or web address] Expires: 4/11/2018  1:38 PM 
 
4. Enter the last four digits of your Social Security Number (xxxx) and Date of Birth (mm/dd/yyyy) as indicated and click Submit. You will be taken to the next sign-up page. 5. Create a [employer] Mobile ID. This will be your [employer] Mobile login ID and cannot be changed, so think of one that is secure and easy to remember. 6. Create a [employer] Mobile password. You can change your password at any time. 7. Enter your Password Reset Question and Answer. This can be used at a later time if you forget your password. 8. Enter your e-mail address. You will receive e-mail notification when new information is available in [address] 9. Click Sign Up. You can now view and download portions of your medical record. 10. Click the Download Summary menu link to download a portable copy of your medical information. If you have questions, please visit the Frequently Asked Questions section of the [employer] Mobile website. Remember, [employer] Mobile is NOT to be used for urgent needs. For medical emergencies, dial 911. Now available from your iPhone and Android! Please provide this summary of care documentation to your next provider. Your primary care clinician is listed as Patricia Hansen. If you have any questions after today's visit, please call [phone].

## 2018-01-29 NOTE — PROGRESS NOTES
The patient is status post debridement of an abscess site on the back. He reports he has a new area which is painful on the back. On examination the central area that was debrided has clean granulation and is about 2 cm across and 1 cm deep. He has to the right of that in the lower back an area about 4 cm across which has multiple punctate sites with purulence. There is induration. He has an area about 2 cm across in the right upper back of similar type and 1 area about a centimeter across in the left upper back of a similar appearance. These sites appear to be complex abscesses. I think they will require surgical debridement rather than simple drainage. I have recommended we plan to proceed with that tomorrow. I explained to the patient that I am concerned about inadequate control of glucose as a contributing factor to his frequent abscess formations. Risks versus benefits were reviewed with the patient. Risks of surgery include bleeding, infection, failure to resolve infection and nonhealing of wounds, risk of anesthesia, etc. The patient understands and wishes to proceed. Final Diagnosis:  Multiple cutaneous abscesses of back. MedDATA/gwo     Total Evaluation and Management time utilized (excluding any procedure time)  was 17 minutes, with 66 % in counseling and/or coordination of care.     My Hay MD

## 2018-01-30 ENCOUNTER — HOSPITAL ENCOUNTER (INPATIENT)
Age: 63
LOS: 7 days | Discharge: HOME HEALTH | DRG: 571 | End: 2018-02-06
Attending: SURGERY | Admitting: SURGERY
Payer: MEDICARE

## 2018-01-30 ENCOUNTER — ANESTHESIA (OUTPATIENT)
Dept: SURGERY | Age: 63
DRG: 571 | End: 2018-01-30
Payer: MEDICARE

## 2018-01-30 ENCOUNTER — ANESTHESIA EVENT (OUTPATIENT)
Dept: SURGERY | Age: 63
DRG: 571 | End: 2018-01-30
Payer: MEDICARE

## 2018-01-30 DIAGNOSIS — L02.91 ABSCESS: Primary | ICD-10-CM

## 2018-01-30 PROBLEM — L02.212 ABSCESS OF BACK: Status: ACTIVE | Noted: 2018-01-30

## 2018-01-30 LAB
ANION GAP SERPL CALC-SCNC: 9 MMOL/L (ref 5–15)
BUN SERPL-MCNC: 42 MG/DL (ref 6–20)
BUN/CREAT SERPL: 9 (ref 12–20)
CALCIUM SERPL-MCNC: 9.3 MG/DL (ref 8.5–10.1)
CHLORIDE SERPL-SCNC: 101 MMOL/L (ref 97–108)
CO2 SERPL-SCNC: 24 MMOL/L (ref 21–32)
CREAT SERPL-MCNC: 4.45 MG/DL (ref 0.7–1.3)
ERYTHROCYTE [DISTWIDTH] IN BLOOD BY AUTOMATED COUNT: 12.5 % (ref 11.5–14.5)
GLUCOSE BLD STRIP.AUTO-MCNC: 312 MG/DL (ref 65–100)
GLUCOSE BLD STRIP.AUTO-MCNC: 319 MG/DL (ref 65–100)
GLUCOSE BLD STRIP.AUTO-MCNC: 338 MG/DL (ref 65–100)
GLUCOSE BLD STRIP.AUTO-MCNC: 377 MG/DL (ref 65–100)
GLUCOSE BLD STRIP.AUTO-MCNC: 381 MG/DL (ref 65–100)
GLUCOSE SERPL-MCNC: 354 MG/DL (ref 65–100)
HCT VFR BLD AUTO: 28.3 % (ref 36.6–50.3)
HGB BLD-MCNC: 9.8 G/DL (ref 12.1–17)
MCH RBC QN AUTO: 30.4 PG (ref 26–34)
MCHC RBC AUTO-ENTMCNC: 34.6 G/DL (ref 30–36.5)
MCV RBC AUTO: 87.9 FL (ref 80–99)
NRBC # BLD: 0 K/UL (ref 0–0.01)
NRBC BLD-RTO: 0 PER 100 WBC
PLATELET # BLD AUTO: 185 K/UL (ref 150–400)
PMV BLD AUTO: 12 FL (ref 8.9–12.9)
POTASSIUM SERPL-SCNC: 4 MMOL/L (ref 3.5–5.1)
RBC # BLD AUTO: 3.22 M/UL (ref 4.1–5.7)
SERVICE CMNT-IMP: ABNORMAL
SODIUM SERPL-SCNC: 134 MMOL/L (ref 136–145)
WBC # BLD AUTO: 10.4 K/UL (ref 4.1–11.1)

## 2018-01-30 PROCEDURE — 77030021678 HC GLIDESCP STAT DISP VERT -B: Performed by: ANESTHESIOLOGY

## 2018-01-30 PROCEDURE — 74011250636 HC RX REV CODE- 250/636: Performed by: ANESTHESIOLOGY

## 2018-01-30 PROCEDURE — 74011000250 HC RX REV CODE- 250

## 2018-01-30 PROCEDURE — 74011250636 HC RX REV CODE- 250/636

## 2018-01-30 PROCEDURE — 74011250636 HC RX REV CODE- 250/636: Performed by: SURGERY

## 2018-01-30 PROCEDURE — 77030019895 HC PCKNG STRP IODO -A: Performed by: SURGERY

## 2018-01-30 PROCEDURE — 87075 CULTR BACTERIA EXCEPT BLOOD: CPT | Performed by: SURGERY

## 2018-01-30 PROCEDURE — 76010000153 HC OR TIME 1.5 TO 2 HR: Performed by: SURGERY

## 2018-01-30 PROCEDURE — 74011636637 HC RX REV CODE- 636/637: Performed by: ANESTHESIOLOGY

## 2018-01-30 PROCEDURE — 65270000029 HC RM PRIVATE

## 2018-01-30 PROCEDURE — 77030020782 HC GWN BAIR PAWS FLX 3M -B

## 2018-01-30 PROCEDURE — 77030018390 HC SPNG HEMSTAT2 J&J -B: Performed by: SURGERY

## 2018-01-30 PROCEDURE — 77030008684 HC TU ET CUF COVD -B: Performed by: ANESTHESIOLOGY

## 2018-01-30 PROCEDURE — 87070 CULTURE OTHR SPECIMN AEROBIC: CPT | Performed by: SURGERY

## 2018-01-30 PROCEDURE — 74011636637 HC RX REV CODE- 636/637

## 2018-01-30 PROCEDURE — 77030031139 HC SUT VCRL2 J&J -A: Performed by: SURGERY

## 2018-01-30 PROCEDURE — 87077 CULTURE AEROBIC IDENTIFY: CPT | Performed by: SURGERY

## 2018-01-30 PROCEDURE — 76060000034 HC ANESTHESIA 1.5 TO 2 HR: Performed by: SURGERY

## 2018-01-30 PROCEDURE — 85027 COMPLETE CBC AUTOMATED: CPT | Performed by: SURGERY

## 2018-01-30 PROCEDURE — 76210000016 HC OR PH I REC 1 TO 1.5 HR: Performed by: SURGERY

## 2018-01-30 PROCEDURE — 36415 COLL VENOUS BLD VENIPUNCTURE: CPT | Performed by: SURGERY

## 2018-01-30 PROCEDURE — 80048 BASIC METABOLIC PNL TOTAL CA: CPT | Performed by: SURGERY

## 2018-01-30 PROCEDURE — 87186 SC STD MICRODIL/AGAR DIL: CPT | Performed by: SURGERY

## 2018-01-30 PROCEDURE — 77030011640 HC PAD GRND REM COVD -A: Performed by: SURGERY

## 2018-01-30 PROCEDURE — 82962 GLUCOSE BLOOD TEST: CPT

## 2018-01-30 PROCEDURE — 77030018836 HC SOL IRR NACL ICUM -A: Performed by: SURGERY

## 2018-01-30 PROCEDURE — 0JB70ZZ EXCISION OF BACK SUBCUTANEOUS TISSUE AND FASCIA, OPEN APPROACH: ICD-10-PCS | Performed by: SURGERY

## 2018-01-30 PROCEDURE — 74011250637 HC RX REV CODE- 250/637: Performed by: SURGERY

## 2018-01-30 RX ORDER — CARVEDILOL 12.5 MG/1
12.5 TABLET ORAL 2 TIMES DAILY WITH MEALS
Status: DISCONTINUED | OUTPATIENT
Start: 2018-01-31 | End: 2018-02-02

## 2018-01-30 RX ORDER — DIPHENHYDRAMINE HYDROCHLORIDE 50 MG/ML
12.5 INJECTION, SOLUTION INTRAMUSCULAR; INTRAVENOUS
Status: ACTIVE | OUTPATIENT
Start: 2018-01-30 | End: 2018-01-31

## 2018-01-30 RX ORDER — HYDROMORPHONE HYDROCHLORIDE 1 MG/ML
0.2 INJECTION, SOLUTION INTRAMUSCULAR; INTRAVENOUS; SUBCUTANEOUS
Status: DISCONTINUED | OUTPATIENT
Start: 2018-01-30 | End: 2018-01-30 | Stop reason: HOSPADM

## 2018-01-30 RX ORDER — CLONIDINE HYDROCHLORIDE 0.1 MG/1
0.2 TABLET ORAL 2 TIMES DAILY
Status: DISCONTINUED | OUTPATIENT
Start: 2018-01-30 | End: 2018-02-06 | Stop reason: HOSPADM

## 2018-01-30 RX ORDER — METRONIDAZOLE 500 MG/100ML
500 INJECTION, SOLUTION INTRAVENOUS EVERY 8 HOURS
Status: DISCONTINUED | OUTPATIENT
Start: 2018-01-31 | End: 2018-02-01

## 2018-01-30 RX ORDER — SODIUM CHLORIDE 9 MG/ML
75 INJECTION, SOLUTION INTRAVENOUS CONTINUOUS
Status: DISCONTINUED | OUTPATIENT
Start: 2018-01-31 | End: 2018-02-01

## 2018-01-30 RX ORDER — PROPOFOL 10 MG/ML
INJECTION, EMULSION INTRAVENOUS AS NEEDED
Status: DISCONTINUED | OUTPATIENT
Start: 2018-01-30 | End: 2018-01-30 | Stop reason: HOSPADM

## 2018-01-30 RX ORDER — DIPHENHYDRAMINE HYDROCHLORIDE 50 MG/ML
12.5 INJECTION, SOLUTION INTRAMUSCULAR; INTRAVENOUS AS NEEDED
Status: DISCONTINUED | OUTPATIENT
Start: 2018-01-30 | End: 2018-01-30 | Stop reason: HOSPADM

## 2018-01-30 RX ORDER — HYDROMORPHONE HYDROCHLORIDE 1 MG/ML
0.5 INJECTION, SOLUTION INTRAMUSCULAR; INTRAVENOUS; SUBCUTANEOUS
Status: DISCONTINUED | OUTPATIENT
Start: 2018-01-30 | End: 2018-01-30

## 2018-01-30 RX ORDER — SODIUM CHLORIDE 9 MG/ML
25 INJECTION, SOLUTION INTRAVENOUS CONTINUOUS
Status: DISCONTINUED | OUTPATIENT
Start: 2018-01-30 | End: 2018-01-30

## 2018-01-30 RX ORDER — SUCCINYLCHOLINE CHLORIDE 20 MG/ML
INJECTION INTRAMUSCULAR; INTRAVENOUS AS NEEDED
Status: DISCONTINUED | OUTPATIENT
Start: 2018-01-30 | End: 2018-01-30 | Stop reason: HOSPADM

## 2018-01-30 RX ORDER — HEPARIN SODIUM 5000 [USP'U]/ML
5000 INJECTION, SOLUTION INTRAVENOUS; SUBCUTANEOUS EVERY 8 HOURS
Status: DISCONTINUED | OUTPATIENT
Start: 2018-01-31 | End: 2018-02-06 | Stop reason: HOSPADM

## 2018-01-30 RX ORDER — POLYETHYLENE GLYCOL 3350 17 G/17G
17 POWDER, FOR SOLUTION ORAL
Status: DISCONTINUED | OUTPATIENT
Start: 2018-01-30 | End: 2018-02-06 | Stop reason: HOSPADM

## 2018-01-30 RX ORDER — VANCOMYCIN/0.9 % SOD CHLORIDE 1.5G/250ML
1500 PLASTIC BAG, INJECTION (ML) INTRAVENOUS
Status: COMPLETED | OUTPATIENT
Start: 2018-01-30 | End: 2018-01-31

## 2018-01-30 RX ORDER — DEXTROSE 50 % IN WATER (D50W) INTRAVENOUS SYRINGE
12.5-25 AS NEEDED
Status: DISCONTINUED | OUTPATIENT
Start: 2018-01-30 | End: 2018-02-06 | Stop reason: HOSPADM

## 2018-01-30 RX ORDER — HYDRALAZINE HYDROCHLORIDE 20 MG/ML
5 INJECTION INTRAMUSCULAR; INTRAVENOUS
Status: DISCONTINUED | OUTPATIENT
Start: 2018-01-30 | End: 2018-02-02

## 2018-01-30 RX ORDER — SODIUM CHLORIDE 0.9 % (FLUSH) 0.9 %
5-10 SYRINGE (ML) INJECTION AS NEEDED
Status: DISCONTINUED | OUTPATIENT
Start: 2018-01-30 | End: 2018-01-30 | Stop reason: HOSPADM

## 2018-01-30 RX ORDER — SODIUM CHLORIDE 0.9 % (FLUSH) 0.9 %
5-10 SYRINGE (ML) INJECTION EVERY 8 HOURS
Status: DISCONTINUED | OUTPATIENT
Start: 2018-01-30 | End: 2018-02-06 | Stop reason: HOSPADM

## 2018-01-30 RX ORDER — AMLODIPINE BESYLATE 5 MG/1
10 TABLET ORAL DAILY
Status: DISCONTINUED | OUTPATIENT
Start: 2018-01-31 | End: 2018-02-06 | Stop reason: HOSPADM

## 2018-01-30 RX ORDER — INSULIN LISPRO 100 [IU]/ML
7 INJECTION, SOLUTION INTRAVENOUS; SUBCUTANEOUS ONCE
Status: COMPLETED | OUTPATIENT
Start: 2018-01-30 | End: 2018-01-30

## 2018-01-30 RX ORDER — PRAVASTATIN SODIUM 10 MG/1
20 TABLET ORAL
Status: DISCONTINUED | OUTPATIENT
Start: 2018-01-30 | End: 2018-02-06 | Stop reason: HOSPADM

## 2018-01-30 RX ORDER — VANCOMYCIN/0.9 % SOD CHLORIDE 1.5G/250ML
1500 PLASTIC BAG, INJECTION (ML) INTRAVENOUS ONCE
Status: COMPLETED | OUTPATIENT
Start: 2018-01-30 | End: 2018-01-30

## 2018-01-30 RX ORDER — NALOXONE HYDROCHLORIDE 0.4 MG/ML
0.4 INJECTION, SOLUTION INTRAMUSCULAR; INTRAVENOUS; SUBCUTANEOUS AS NEEDED
Status: DISCONTINUED | OUTPATIENT
Start: 2018-01-30 | End: 2018-02-06 | Stop reason: HOSPADM

## 2018-01-30 RX ORDER — LEVOFLOXACIN 5 MG/ML
500 INJECTION, SOLUTION INTRAVENOUS ONCE
Status: COMPLETED | OUTPATIENT
Start: 2018-01-30 | End: 2018-01-30

## 2018-01-30 RX ORDER — HYDROCHLOROTHIAZIDE 12.5 MG/1
12.5 CAPSULE ORAL DAILY
Status: DISCONTINUED | OUTPATIENT
Start: 2018-01-31 | End: 2018-01-30

## 2018-01-30 RX ORDER — SODIUM CHLORIDE, SODIUM LACTATE, POTASSIUM CHLORIDE, CALCIUM CHLORIDE 600; 310; 30; 20 MG/100ML; MG/100ML; MG/100ML; MG/100ML
25 INJECTION, SOLUTION INTRAVENOUS CONTINUOUS
Status: DISCONTINUED | OUTPATIENT
Start: 2018-01-30 | End: 2018-01-30 | Stop reason: HOSPADM

## 2018-01-30 RX ORDER — LIDOCAINE HYDROCHLORIDE 20 MG/ML
INJECTION, SOLUTION EPIDURAL; INFILTRATION; INTRACAUDAL; PERINEURAL AS NEEDED
Status: DISCONTINUED | OUTPATIENT
Start: 2018-01-30 | End: 2018-01-30 | Stop reason: HOSPADM

## 2018-01-30 RX ORDER — INSULIN LISPRO 100 [IU]/ML
INJECTION, SOLUTION INTRAVENOUS; SUBCUTANEOUS
Status: COMPLETED
Start: 2018-01-30 | End: 2018-01-30

## 2018-01-30 RX ORDER — DOCUSATE SODIUM 100 MG/1
100 CAPSULE, LIQUID FILLED ORAL 2 TIMES DAILY
Status: DISCONTINUED | OUTPATIENT
Start: 2018-01-30 | End: 2018-02-06 | Stop reason: HOSPADM

## 2018-01-30 RX ORDER — ACETAMINOPHEN 10 MG/ML
INJECTION, SOLUTION INTRAVENOUS AS NEEDED
Status: DISCONTINUED | OUTPATIENT
Start: 2018-01-30 | End: 2018-01-30 | Stop reason: HOSPADM

## 2018-01-30 RX ORDER — ONDANSETRON 2 MG/ML
4 INJECTION INTRAMUSCULAR; INTRAVENOUS
Status: DISCONTINUED | OUTPATIENT
Start: 2018-01-30 | End: 2018-02-06 | Stop reason: HOSPADM

## 2018-01-30 RX ORDER — INSULIN LISPRO 100 [IU]/ML
INJECTION, SOLUTION INTRAVENOUS; SUBCUTANEOUS
Status: DISCONTINUED | OUTPATIENT
Start: 2018-01-30 | End: 2018-01-31

## 2018-01-30 RX ORDER — MIDAZOLAM HYDROCHLORIDE 1 MG/ML
INJECTION, SOLUTION INTRAMUSCULAR; INTRAVENOUS AS NEEDED
Status: DISCONTINUED | OUTPATIENT
Start: 2018-01-30 | End: 2018-01-30 | Stop reason: HOSPADM

## 2018-01-30 RX ORDER — SODIUM CHLORIDE 0.9 % (FLUSH) 0.9 %
5-10 SYRINGE (ML) INJECTION AS NEEDED
Status: DISCONTINUED | OUTPATIENT
Start: 2018-01-30 | End: 2018-02-06 | Stop reason: HOSPADM

## 2018-01-30 RX ORDER — ONDANSETRON 2 MG/ML
INJECTION INTRAMUSCULAR; INTRAVENOUS AS NEEDED
Status: DISCONTINUED | OUTPATIENT
Start: 2018-01-30 | End: 2018-01-30 | Stop reason: HOSPADM

## 2018-01-30 RX ORDER — FENTANYL CITRATE 50 UG/ML
25 INJECTION, SOLUTION INTRAMUSCULAR; INTRAVENOUS
Status: DISCONTINUED | OUTPATIENT
Start: 2018-01-30 | End: 2018-01-30 | Stop reason: HOSPADM

## 2018-01-30 RX ORDER — DIGOXIN 125 MCG
0.12 TABLET ORAL DAILY
Status: DISCONTINUED | OUTPATIENT
Start: 2018-01-31 | End: 2018-02-06 | Stop reason: HOSPADM

## 2018-01-30 RX ORDER — HYDROMORPHONE HYDROCHLORIDE 2 MG/ML
1 INJECTION, SOLUTION INTRAMUSCULAR; INTRAVENOUS; SUBCUTANEOUS
Status: DISCONTINUED | OUTPATIENT
Start: 2018-01-30 | End: 2018-02-06 | Stop reason: HOSPADM

## 2018-01-30 RX ORDER — HYDROMORPHONE HYDROCHLORIDE 2 MG/ML
INJECTION, SOLUTION INTRAMUSCULAR; INTRAVENOUS; SUBCUTANEOUS AS NEEDED
Status: DISCONTINUED | OUTPATIENT
Start: 2018-01-30 | End: 2018-01-30 | Stop reason: HOSPADM

## 2018-01-30 RX ORDER — SODIUM CHLORIDE 0.9 % (FLUSH) 0.9 %
5-10 SYRINGE (ML) INJECTION EVERY 8 HOURS
Status: DISCONTINUED | OUTPATIENT
Start: 2018-01-30 | End: 2018-01-30 | Stop reason: HOSPADM

## 2018-01-30 RX ORDER — LIDOCAINE HYDROCHLORIDE 10 MG/ML
0.1 INJECTION, SOLUTION EPIDURAL; INFILTRATION; INTRACAUDAL; PERINEURAL AS NEEDED
Status: DISCONTINUED | OUTPATIENT
Start: 2018-01-30 | End: 2018-01-30 | Stop reason: HOSPADM

## 2018-01-30 RX ORDER — MAGNESIUM SULFATE 100 %
4 CRYSTALS MISCELLANEOUS AS NEEDED
Status: DISCONTINUED | OUTPATIENT
Start: 2018-01-30 | End: 2018-02-06 | Stop reason: HOSPADM

## 2018-01-30 RX ORDER — FENTANYL CITRATE 50 UG/ML
INJECTION, SOLUTION INTRAMUSCULAR; INTRAVENOUS AS NEEDED
Status: DISCONTINUED | OUTPATIENT
Start: 2018-01-30 | End: 2018-01-30 | Stop reason: HOSPADM

## 2018-01-30 RX ORDER — OXYCODONE AND ACETAMINOPHEN 5; 325 MG/1; MG/1
1-2 TABLET ORAL
Status: DISCONTINUED | OUTPATIENT
Start: 2018-01-30 | End: 2018-02-06 | Stop reason: HOSPADM

## 2018-01-30 RX ORDER — ROCURONIUM BROMIDE 10 MG/ML
INJECTION, SOLUTION INTRAVENOUS AS NEEDED
Status: DISCONTINUED | OUTPATIENT
Start: 2018-01-30 | End: 2018-01-30 | Stop reason: HOSPADM

## 2018-01-30 RX ADMIN — HYDROMORPHONE HYDROCHLORIDE 0.2 MG: 2 INJECTION, SOLUTION INTRAMUSCULAR; INTRAVENOUS; SUBCUTANEOUS at 18:34

## 2018-01-30 RX ADMIN — INSULIN HUMAN 8 UNITS: 100 INJECTION, SOLUTION PARENTERAL at 15:01

## 2018-01-30 RX ADMIN — PRAVASTATIN SODIUM 20 MG: 10 TABLET ORAL at 23:49

## 2018-01-30 RX ADMIN — FENTANYL CITRATE 25 MCG: 50 INJECTION, SOLUTION INTRAMUSCULAR; INTRAVENOUS at 19:55

## 2018-01-30 RX ADMIN — ACETAMINOPHEN 1000 MG: 10 INJECTION, SOLUTION INTRAVENOUS at 18:08

## 2018-01-30 RX ADMIN — ONDANSETRON 4 MG: 2 INJECTION INTRAMUSCULAR; INTRAVENOUS at 18:07

## 2018-01-30 RX ADMIN — VANCOMYCIN HYDROCHLORIDE 1500 MG: 10 INJECTION, POWDER, LYOPHILIZED, FOR SOLUTION INTRAVENOUS at 20:20

## 2018-01-30 RX ADMIN — HYDROMORPHONE HYDROCHLORIDE 1 MG: 2 INJECTION INTRAMUSCULAR; INTRAVENOUS; SUBCUTANEOUS at 23:48

## 2018-01-30 RX ADMIN — INSULIN LISPRO 7 UNITS: 100 INJECTION, SOLUTION INTRAVENOUS; SUBCUTANEOUS at 19:56

## 2018-01-30 RX ADMIN — SODIUM CHLORIDE: 900 INJECTION, SOLUTION INTRAVENOUS at 18:45

## 2018-01-30 RX ADMIN — FENTANYL CITRATE 100 MCG: 50 INJECTION, SOLUTION INTRAMUSCULAR; INTRAVENOUS at 17:37

## 2018-01-30 RX ADMIN — VANCOMYCIN HYDROCHLORIDE 1500 MG: 10 INJECTION, POWDER, LYOPHILIZED, FOR SOLUTION INTRAVENOUS at 14:15

## 2018-01-30 RX ADMIN — ROCURONIUM BROMIDE 5 MG: 10 INJECTION, SOLUTION INTRAVENOUS at 17:40

## 2018-01-30 RX ADMIN — FENTANYL CITRATE 25 MCG: 50 INJECTION, SOLUTION INTRAMUSCULAR; INTRAVENOUS at 19:50

## 2018-01-30 RX ADMIN — MIDAZOLAM HYDROCHLORIDE 2 MG: 1 INJECTION, SOLUTION INTRAMUSCULAR; INTRAVENOUS at 17:35

## 2018-01-30 RX ADMIN — CLONIDINE HYDROCHLORIDE 0.2 MG: 0.1 TABLET ORAL at 23:48

## 2018-01-30 RX ADMIN — LIDOCAINE HYDROCHLORIDE 80 MG: 20 INJECTION, SOLUTION EPIDURAL; INFILTRATION; INTRACAUDAL; PERINEURAL at 17:40

## 2018-01-30 RX ADMIN — SUCCINYLCHOLINE CHLORIDE 180 MG: 20 INJECTION INTRAMUSCULAR; INTRAVENOUS at 17:40

## 2018-01-30 RX ADMIN — SODIUM CHLORIDE 25 ML/HR: 900 INJECTION, SOLUTION INTRAVENOUS at 14:14

## 2018-01-30 RX ADMIN — DOCUSATE SODIUM 100 MG: 100 CAPSULE, LIQUID FILLED ORAL at 23:48

## 2018-01-30 RX ADMIN — HYDROMORPHONE HYDROCHLORIDE 0.2 MG: 2 INJECTION, SOLUTION INTRAMUSCULAR; INTRAVENOUS; SUBCUTANEOUS at 18:26

## 2018-01-30 RX ADMIN — PROPOFOL 300 MG: 10 INJECTION, EMULSION INTRAVENOUS at 17:40

## 2018-01-30 RX ADMIN — LEVOFLOXACIN 500 MG: 5 INJECTION, SOLUTION INTRAVENOUS at 17:52

## 2018-01-30 NOTE — IP AVS SNAPSHOT
[address] [facility] 
[phone] Patient: Bienvenido Vickers 
MRN: [id number] :1955 About your hospitalization You were admitted on:  2018 You last received care in the:  [facility] 2 GENERAL SURGERY You were discharged on:  2018 Why you were hospitalized Your primary diagnosis was:  Not on File Your diagnoses also included:  Abscess Of Back, Diabetes Mellitus, Insulin Dependent (Iddm), Uncontrolled (Hcc) Follow-up Information Follow up With Details Comments Contact Info [address]  will resume services at discharge [facility]. [address] [place] [address] 
[phone] Cade Ambriz MD In 1 week For wound re-check [address] [facility] [address] [facility] 
[phone] Kelsey Bumpers, NP In 2 weeks  [address] [phone] Kathe Martinez MD In 2 weeks  [facility] [place] [address] [facility] 
[phone] Discharge Orders None A check chula indicates which time of day the medication should be taken. My Medications START taking these medications Instructions Each Dose to Equal  
 Morning Noon Evening Bedtime  
 hydrALAZINE 50 mg tablet Commonly known as:  APRESOLINE Your last dose was: Your next dose is: Take 1 Tab by mouth three (3) times daily. 50 mg HYDROcodone-acetaminophen 5-325 mg per tablet Commonly known as:  [address] Your last dose was: Your next dose is: Take 1 Tab by mouth every four (4) hours as needed for Pain. Max Daily Amount: 6 Tabs. 1 Tab  
    
   
   
   
  
 sodium bicarbonate 650 mg tablet Start taking on:  2018 Your last dose was: Your next dose is: Take 1 Tab by mouth two (2) times a day. 650 mg CHANGE how you take these medications Instructions Each Dose to Equal  
 Morning Noon Evening Bedtime  
 carvedilol 25 mg tablet Commonly known as:  Sunitha Mayfield Start taking on:  2/7/2018 What changed:   
- medication strength 
- how much to take - when to take this Your last dose was: Your next dose is: Take 1 Tab by mouth two (2) times daily (with meals). 25 mg CONTINUE taking these medications Instructions Each Dose to Equal  
 Morning Noon Evening Bedtime ACCU-CHEK SOFÍA CONTROL SOLN Soln Generic drug:  Blood Glucose Control High&Low Your last dose was: Your next dose is:    
   
   
      
   
   
   
  
 ACCU-CHEK SOFÍA PLUS TEST STRP strip Generic drug:  glucose blood VI test strips Your last dose was: Your next dose is:    
   
   
      
   
   
   
  
 alcohol swabs Padm Commonly known as:  ALCOHOL PADS Your last dose was: Your next dose is:    
   
   
 1 Each by Apply Externally route two (2) times a day. 1 Each  
    
   
   
   
  
 amLODIPine 10 mg tablet Commonly known as:  Senora Yina Your last dose was: Your next dose is: Take 1 Tab by mouth daily. 10 mg  
    
   
   
   
  
 cloNIDine HCl 0.2 mg tablet Commonly known as:  CATAPRES Your last dose was: Your next dose is: Take 1 Tab by mouth two (2) times a day. 0.2 mg  
    
   
   
   
  
 clopidogrel 75 mg Tab Commonly known as:  PLAVIX Your last dose was: Your next dose is: Take 75 mg by mouth daily. 75 mg  
    
   
   
   
  
 digoxin 0.125 mg tablet Commonly known as:  LANOXIN Your last dose was: Your next dose is: Take 0.125 mg by mouth daily. Indications: afib  
 0.125 mg  
    
   
   
   
  
 lancets 30 gauge Misc Your last dose was: Your next dose is:    
   
   
      
   
   
   
  
 NovoLIN 70/30 100 unit/mL (70-30) injection Generic drug:  insulin NPH/insulin regular Your last dose was: Your next dose is:    
   
   
 65 Units by SubCUTAneous route Before breakfast and dinner. 65 Units  
    
   
   
   
  
 pravastatin 20 mg tablet Commonly known as:  PRAVACHOL Your last dose was: Your next dose is: Take 20 mg by mouth nightly. 20 mg  
    
   
   
   
  
 SURE COMFORT INSULIN SYRINGE 1 mL 31 gauge x 5/16 Syrg Generic drug:  Insulin Syringe-Needle U-100 Your last dose was: Your next dose is: ASK your doctor about these medications Instructions Each Dose to Equal  
 Morning Noon Evening Bedtime  
 hydroCHLOROthiazide 12.5 mg capsule Commonly known as:  Logan Shock Your last dose was: Your next dose is: Take 12.5 mg by mouth daily. 12.5 mg Where to Get Your Medications Information on where to get these meds will be given to you by the nurse or doctor. ! Ask your nurse or doctor about these medications  
  amLODIPine 10 mg tablet  
 carvedilol 25 mg tablet  
 hydrALAZINE 50 mg tablet HYDROcodone-acetaminophen 5-325 mg per tablet  
 sodium bicarbonate 650 mg tablet Discharge Instructions Discharge Instructions after Incision and Drainage of Abscess Home Health nurses will change packing and dressing daily. Take pain medicine as prescribed as needed. Stop using as soon as possible, use Tylenol as needed. Do not drive while taking narcotic pain medication. It is OK to shower before dressing changes. Do not share soap, towel, wash cloth, etc with other family members while you have an open wound. Measure your blood sugar level and give short acting insulin, dosed according to your Sliding Scale. Follow  a diabetic diet. See Dr. Rosendo Lundberg in the office in one week - call [phone] See Krunal Menchaca NP in 2 weeks. See Dr Bony Parks (kidney specialist) in the office in 2 weeks  -  [phone] If you have any problems, call Dr. Rosendo Lundberg  [phone] or [phone] (after hours). QUENTIN Lundberg MD 
 
 
 
 
 
 
 
 
 
  
  
  
Introducing [facility]! [facility] introduces [employer] patient portal. Now you can access parts of your medical record, email your doctor's office, and request medication refills online. 1. In your internet browser, go to [email or web address] 2. Click on the First Time User? Click Here link in the Sign In box. You will see the New Member Sign Up page. 3. Enter your [employer] Access Code exactly as it appears below. You will not need to use this code after youve completed the sign-up process. If you do not sign up before the expiration date, you must request a new code. · [employer] Access Code: [email or web address] Expires: 4/11/2018  1:38 PM 
 
4. Enter the last four digits of your Social Security Number (xxxx) and Date of Birth (mm/dd/yyyy) as indicated and click Submit. You will be taken to the next sign-up page. 5. Create a [employer] ID. This will be your [employer] login ID and cannot be changed, so think of one that is secure and easy to remember. 6. Create a [employer] password. You can change your password at any time. 7. Enter your Password Reset Question and Answer. This can be used at a later time if you forget your password. 8. Enter your e-mail address. You will receive e-mail notification when new information is available in [address] 9. Click Sign Up. You can now view and download portions of your medical record. 10. Click the Download Summary menu link to download a portable copy of your medical information. If you have questions, please visit the Frequently Asked Questions section of the [employer] website.  Remember, [employer] is NOT to be used for urgent needs. For medical emergencies, dial 911. Now available from your iPhone and Android! Providers Seen During Your Hospitalization Provider Specialty Primary office phone Arnel Rahman MD General Surgery [phone] Immunizations Administered for This Admission Name Date Influenza Vaccine (Quad) PF  Deferred () Your Primary Care Physician (PCP) Primary Care Physician Office Phone Office Fax Sophronia Felty [phone] You are allergic to the following No active allergies Recent Documentation Height Weight BMI Smoking Status 1.956 m 141.5 kg 37 kg/m2 Never Smoker Emergency Contacts Name Discharge Info Relation Home Work Mobile Patti Fierro DISCHARGE CAREGIVER [3] Other Relative [6] [phone] Ritesh Hobbs  Other Relative [6] [phone] Patient Belongings The following personal items are in your possession at time of discharge: 
  Dental Appliances: None  Visual Aid: None   Hearing Aids/Status: Does not own  Home Medications: None   Jewelry: None  Clothing: Socks, Footwear, Undergarments, Pants, Shirt, Jacket/Coat (To PACU)    Other Valuables: None  Personal Items Sent to Safe: Declines Please provide this summary of care documentation to your next provider. Signatures-by signing, you are acknowledging that this After Visit Summary has been reviewed with you and you have received a copy. Patient Signature:  ____________________________________________________________ Date:  ____________________________________________________________  
  
[facility] Provider Signature:  ____________________________________________________________ Date:  ____________________________________________________________

## 2018-01-30 NOTE — PERIOP NOTES
1500:  Patient's FSBS 377. Dr. Caio Good made aware. Medicated per [facility] with 8 units insulin IVP    1530:  Dr. Caio Good aware of FSBS. No new orders at this time.     1648:  Dr. Shayy Miller in to see the patient

## 2018-01-30 NOTE — ANESTHESIA PREPROCEDURE EVALUATION
Anesthetic History   No history of anesthetic complications            Review of Systems / Medical History  Patient summary reviewed, nursing notes reviewed and pertinent labs reviewed    Pulmonary  Within defined limits                 Neuro/Psych       CVA  TIA    Comments: H/O Meningitis  Diabetic neuropathy Cardiovascular    Hypertension        Dysrhythmias : atrial fibrillation      Exercise tolerance: <4 METS: Uses walker  Comments: TTE (8/1/17):  EF=50-55%   GI/Hepatic/Renal         Renal disease: CRI      Comments: CRI, Stage IV Endo/Other    Diabetes: poorly controlled, type 2, using insulin    Morbid obesity and arthritis    Comments: H/O Blastic NK-cell lymphoma/splenic lymphoma Other Findings   Comments: Abscess of right lower back  Vitamin D Deficiency           Physical Exam    Airway  Mallampati: III  TM Distance: > 6 cm  Neck ROM: normal range of motion   Mouth opening: Normal     Cardiovascular  Regular rate and rhythm,  S1 and S2 normal,  no murmur, click, rub, or gallop             Dental    Dentition: Poor dentition  Comments: Multiple missing, none loose.    Pulmonary  Breath sounds clear to auscultation               Abdominal  GI exam deferred       Other Findings            Anesthetic Plan    ASA: 3  Anesthesia type: general          Induction: Intravenous  Anesthetic plan and risks discussed with: Patient

## 2018-01-31 ENCOUNTER — APPOINTMENT (OUTPATIENT)
Dept: ULTRASOUND IMAGING | Age: 63
DRG: 571 | End: 2018-01-31
Attending: INTERNAL MEDICINE
Payer: MEDICARE

## 2018-01-31 LAB
ANION GAP SERPL CALC-SCNC: 7 MMOL/L (ref 5–15)
BASOPHILS # BLD: 0 K/UL (ref 0–0.1)
BASOPHILS NFR BLD: 0 % (ref 0–1)
BUN SERPL-MCNC: 45 MG/DL (ref 6–20)
BUN/CREAT SERPL: 11 (ref 12–20)
CALCIUM SERPL-MCNC: 8.2 MG/DL (ref 8.5–10.1)
CHLORIDE SERPL-SCNC: 104 MMOL/L (ref 97–108)
CHLORIDE UR-SCNC: 26 MMOL/L
CO2 SERPL-SCNC: 24 MMOL/L (ref 21–32)
CREAT SERPL-MCNC: 4.26 MG/DL (ref 0.7–1.3)
CREAT UR-MCNC: 136 MG/DL
DIFFERENTIAL METHOD BLD: ABNORMAL
EOSINOPHIL # BLD: 0.2 K/UL (ref 0–0.4)
EOSINOPHIL NFR BLD: 2 % (ref 0–7)
ERYTHROCYTE [DISTWIDTH] IN BLOOD BY AUTOMATED COUNT: 12.6 % (ref 11.5–14.5)
EST. AVERAGE GLUCOSE BLD GHB EST-MCNC: 243 MG/DL
GLUCOSE BLD STRIP.AUTO-MCNC: 327 MG/DL (ref 65–100)
GLUCOSE BLD STRIP.AUTO-MCNC: 344 MG/DL (ref 65–100)
GLUCOSE BLD STRIP.AUTO-MCNC: 345 MG/DL (ref 65–100)
GLUCOSE BLD STRIP.AUTO-MCNC: 378 MG/DL (ref 65–100)
GLUCOSE BLD STRIP.AUTO-MCNC: 414 MG/DL (ref 65–100)
GLUCOSE SERPL-MCNC: 312 MG/DL (ref 65–100)
HBA1C MFR BLD: 10.1 % (ref 4.2–6.3)
HCT VFR BLD AUTO: 21.3 % (ref 36.6–50.3)
HGB BLD-MCNC: 7.3 G/DL (ref 12.1–17)
IMM GRANULOCYTES # BLD: 0 K/UL (ref 0–0.04)
IMM GRANULOCYTES NFR BLD AUTO: 1 % (ref 0–0.5)
LYMPHOCYTES # BLD: 1.2 K/UL (ref 0.8–3.5)
LYMPHOCYTES NFR BLD: 17 % (ref 12–49)
MCH RBC QN AUTO: 30.5 PG (ref 26–34)
MCHC RBC AUTO-ENTMCNC: 34.3 G/DL (ref 30–36.5)
MCV RBC AUTO: 89.1 FL (ref 80–99)
MONOCYTES # BLD: 0.8 K/UL (ref 0–1)
MONOCYTES NFR BLD: 11 % (ref 5–13)
NEUTS SEG # BLD: 4.9 K/UL (ref 1.8–8)
NEUTS SEG NFR BLD: 69 % (ref 32–75)
NRBC # BLD: 0 K/UL (ref 0–0.01)
NRBC BLD-RTO: 0 PER 100 WBC
PLATELET # BLD AUTO: 154 K/UL (ref 150–400)
PMV BLD AUTO: 12.3 FL (ref 8.9–12.9)
POTASSIUM SERPL-SCNC: 4 MMOL/L (ref 3.5–5.1)
PROT UR-MCNC: 225 MG/DL (ref 0–11.9)
PROT/CREAT UR-RTO: 1.7
RBC # BLD AUTO: 2.39 M/UL (ref 4.1–5.7)
SERVICE CMNT-IMP: ABNORMAL
SODIUM SERPL-SCNC: 135 MMOL/L (ref 136–145)
SODIUM UR-SCNC: 31 MMOL/L
UUN UR-MCNC: 601 MG/DL
WBC # BLD AUTO: 7.1 K/UL (ref 4.1–11.1)

## 2018-01-31 PROCEDURE — 87205 SMEAR GRAM STAIN: CPT | Performed by: INTERNAL MEDICINE

## 2018-01-31 PROCEDURE — 84540 ASSAY OF URINE/UREA-N: CPT | Performed by: INTERNAL MEDICINE

## 2018-01-31 PROCEDURE — 65270000029 HC RM PRIVATE

## 2018-01-31 PROCEDURE — 82962 GLUCOSE BLOOD TEST: CPT

## 2018-01-31 PROCEDURE — 83036 HEMOGLOBIN GLYCOSYLATED A1C: CPT | Performed by: INTERNAL MEDICINE

## 2018-01-31 PROCEDURE — 82436 ASSAY OF URINE CHLORIDE: CPT | Performed by: INTERNAL MEDICINE

## 2018-01-31 PROCEDURE — 76770 US EXAM ABDO BACK WALL COMP: CPT

## 2018-01-31 PROCEDURE — 74011250636 HC RX REV CODE- 250/636: Performed by: SURGERY

## 2018-01-31 PROCEDURE — 36415 COLL VENOUS BLD VENIPUNCTURE: CPT | Performed by: INTERNAL MEDICINE

## 2018-01-31 PROCEDURE — 74011636637 HC RX REV CODE- 636/637: Performed by: INTERNAL MEDICINE

## 2018-01-31 PROCEDURE — 80048 BASIC METABOLIC PNL TOTAL CA: CPT | Performed by: SURGERY

## 2018-01-31 PROCEDURE — 74011250637 HC RX REV CODE- 250/637: Performed by: SURGERY

## 2018-01-31 PROCEDURE — 74011636637 HC RX REV CODE- 636/637: Performed by: SURGERY

## 2018-01-31 PROCEDURE — 74011000258 HC RX REV CODE- 258: Performed by: INTERNAL MEDICINE

## 2018-01-31 PROCEDURE — 85025 COMPLETE CBC W/AUTO DIFF WBC: CPT | Performed by: SURGERY

## 2018-01-31 PROCEDURE — 74011250636 HC RX REV CODE- 250/636: Performed by: INTERNAL MEDICINE

## 2018-01-31 PROCEDURE — 84156 ASSAY OF PROTEIN URINE: CPT | Performed by: INTERNAL MEDICINE

## 2018-01-31 PROCEDURE — 74011000250 HC RX REV CODE- 250: Performed by: INTERNAL MEDICINE

## 2018-01-31 PROCEDURE — 84300 ASSAY OF URINE SODIUM: CPT | Performed by: INTERNAL MEDICINE

## 2018-01-31 RX ORDER — INSULIN LISPRO 100 [IU]/ML
5 INJECTION, SOLUTION INTRAVENOUS; SUBCUTANEOUS
Status: DISCONTINUED | OUTPATIENT
Start: 2018-01-31 | End: 2018-02-02

## 2018-01-31 RX ORDER — INSULIN LISPRO 100 [IU]/ML
INJECTION, SOLUTION INTRAVENOUS; SUBCUTANEOUS
Status: DISCONTINUED | OUTPATIENT
Start: 2018-01-31 | End: 2018-02-06 | Stop reason: HOSPADM

## 2018-01-31 RX ORDER — VANCOMYCIN 2 GRAM/500 ML IN 0.9 % SODIUM CHLORIDE INTRAVENOUS
2000
Status: DISCONTINUED | OUTPATIENT
Start: 2018-02-01 | End: 2018-02-03

## 2018-01-31 RX ADMIN — DOCUSATE SODIUM 100 MG: 100 CAPSULE, LIQUID FILLED ORAL at 18:06

## 2018-01-31 RX ADMIN — CEFEPIME 2 G: 2 INJECTION, POWDER, FOR SOLUTION INTRAMUSCULAR; INTRAVENOUS at 02:38

## 2018-01-31 RX ADMIN — AMLODIPINE BESYLATE 10 MG: 5 TABLET ORAL at 09:12

## 2018-01-31 RX ADMIN — INSULIN LISPRO 5 UNITS: 100 INJECTION, SOLUTION INTRAVENOUS; SUBCUTANEOUS at 17:56

## 2018-01-31 RX ADMIN — METRONIDAZOLE 500 MG: 500 INJECTION, SOLUTION INTRAVENOUS at 00:27

## 2018-01-31 RX ADMIN — HUMAN INSULIN 45 UNITS: 100 INJECTION, SUSPENSION SUBCUTANEOUS at 17:57

## 2018-01-31 RX ADMIN — INSULIN HUMAN 20 UNITS: 100 INJECTION, SUSPENSION SUBCUTANEOUS at 00:22

## 2018-01-31 RX ADMIN — INSULIN LISPRO 5 UNITS: 100 INJECTION, SOLUTION INTRAVENOUS; SUBCUTANEOUS at 23:05

## 2018-01-31 RX ADMIN — HEPARIN SODIUM 5000 UNITS: 5000 INJECTION, SOLUTION INTRAVENOUS; SUBCUTANEOUS at 09:13

## 2018-01-31 RX ADMIN — ERYTHROPOIETIN 40000 UNITS: 40000 INJECTION, SOLUTION INTRAVENOUS; SUBCUTANEOUS at 18:06

## 2018-01-31 RX ADMIN — IRON SUCROSE 300 MG: 20 INJECTION, SOLUTION INTRAVENOUS at 14:03

## 2018-01-31 RX ADMIN — DOCUSATE SODIUM 100 MG: 100 CAPSULE, LIQUID FILLED ORAL at 09:13

## 2018-01-31 RX ADMIN — PRAVASTATIN SODIUM 20 MG: 10 TABLET ORAL at 23:04

## 2018-01-31 RX ADMIN — INSULIN LISPRO 10 UNITS: 100 INJECTION, SOLUTION INTRAVENOUS; SUBCUTANEOUS at 13:52

## 2018-01-31 RX ADMIN — INSULIN HUMAN 35 UNITS: 100 INJECTION, SUSPENSION SUBCUTANEOUS at 09:14

## 2018-01-31 RX ADMIN — HEPARIN SODIUM 5000 UNITS: 5000 INJECTION, SOLUTION INTRAVENOUS; SUBCUTANEOUS at 18:06

## 2018-01-31 RX ADMIN — Medication 10 ML: at 20:21

## 2018-01-31 RX ADMIN — CLONIDINE HYDROCHLORIDE 0.2 MG: 0.1 TABLET ORAL at 18:06

## 2018-01-31 RX ADMIN — CARVEDILOL 12.5 MG: 12.5 TABLET, FILM COATED ORAL at 18:06

## 2018-01-31 RX ADMIN — INSULIN LISPRO 10 UNITS: 100 INJECTION, SOLUTION INTRAVENOUS; SUBCUTANEOUS at 17:18

## 2018-01-31 RX ADMIN — CLONIDINE HYDROCHLORIDE 0.2 MG: 0.1 TABLET ORAL at 09:13

## 2018-01-31 RX ADMIN — Medication 10 ML: at 23:05

## 2018-01-31 RX ADMIN — HYDROMORPHONE HYDROCHLORIDE 1 MG: 2 INJECTION INTRAMUSCULAR; INTRAVENOUS; SUBCUTANEOUS at 20:14

## 2018-01-31 RX ADMIN — DIGOXIN 0.12 MG: 125 TABLET ORAL at 09:12

## 2018-01-31 RX ADMIN — METRONIDAZOLE 500 MG: 500 INJECTION, SOLUTION INTRAVENOUS at 09:12

## 2018-01-31 RX ADMIN — METRONIDAZOLE 500 MG: 500 INJECTION, SOLUTION INTRAVENOUS at 18:12

## 2018-01-31 RX ADMIN — CARVEDILOL 12.5 MG: 12.5 TABLET, FILM COATED ORAL at 09:12

## 2018-01-31 RX ADMIN — HYDROMORPHONE HYDROCHLORIDE 1 MG: 2 INJECTION INTRAMUSCULAR; INTRAVENOUS; SUBCUTANEOUS at 06:32

## 2018-01-31 RX ADMIN — SODIUM CHLORIDE 75 ML/HR: 900 INJECTION, SOLUTION INTRAVENOUS at 00:26

## 2018-01-31 RX ADMIN — INSULIN LISPRO 7 UNITS: 100 INJECTION, SOLUTION INTRAVENOUS; SUBCUTANEOUS at 09:14

## 2018-01-31 NOTE — PERIOP NOTES
1927-Handoff Report from Operating Room to PACU    Report received from Iveth Harrison and A Cindy CRNA regarding Bienvenido Vickers.      Surgeon(s):  Cade Ambriz MD  And Procedure(s) (LRB):  DEBRIDEMENT ABSCESS RIGHT LOWER BACK  (Right)  confirmed   with allergies and dressings discussed. Anesthesia type, drugs, patient history, complications, estimated blood loss, vital signs, intake and output, and last pain medication, lines and temperature were reviewed. 2050-TRANSFER - OUT REPORT:    Verbal report given to Renuka Cassidy RN(name) on Bienvenido Vickers  being transferred to gen surg(unit) for routine post - op       Report consisted of patients Situation, Background, Assessment and   Recommendations(SBAR). Information from the following report(s) SBAR, Kardex, OR Summary, Procedure Summary, Intake/Output, MAR and Recent Results was reviewed with the receiving nurse. Opportunity for questions and clarification was provided.       Patient transported with:   O2 @ 2 liters  Registered Nursex2

## 2018-01-31 NOTE — BRIEF OP NOTE
BRIEF OPERATIVE NOTE    Date of Procedure: 1/30/2018   Preoperative Diagnosis: ABSCESSES OF BACK   Postoperative Diagnosis: ABSCESSES OF BACK  Procedure(s):  DEBRIDEMENT ABSCESSES (four) OF BACK  Surgeon(s) and Role:     * Christ Bravo MD - Primary         Assistant Staff: None      Surgical Staff:  Circ-1: Yolande Medina  Scrub Tech-1: John Chou  Surg Asst-1: Nadia Higuera  Event Time In   Incision Start 1809   Incision Close      Anesthesia: General   Estimated Blood Loss: 200 ml  Specimens:   ID Type Source Tests Collected by Time Destination   1 : back abscess Wound Back AEROBIC/ANAEROBIC CULTURE, CULTURE, WOUND W GRAM STAIN Christ Bravo MD 1/30/2018 1825 Microbiology      Findings: Multiple abscesses of back with pus infiltrated into subcutaneous tissue. Debrided skin and subcut, packed open.   Complications: none  Implants: * No implants in log *

## 2018-01-31 NOTE — CONSULTS
[place]     NAME:Anant Norman  WCB:[id number]   :1955   -                Pt seen and examined ---[id number]  arf due to progression of ckd/ dm nephropathy most likely  Other possible etiology ain due to recent abx  Avoid acei or arb    No dialysis indicated at present   He will need avf placement in future  Okay to stop ivf  D/w patient. If he is discharged today - f/u in 2-4 weeks. Kanwal Faustin, [address] [place] [address] [facility]  [facility] Castillo , [facility], [address] [facility]  Phone - [phone]         Fax - [phone] [facility] Office  [address] [place] [address]  Phone - [phone]        Fax - [phone]     www. [email or web address].Simple IT

## 2018-01-31 NOTE — OP NOTES
[facility]  ACUTE CARE OP NOTE    Clifton Zeng  MR#: [id number]  : 1955  ACCOUNT #: [de-identified]   DATE OF SERVICE: 2018    PREOPERATIVE DIAGNOSIS:  Subcutaneous abscesses of back. POSTOPERATIVE DIAGNOSIS:  Subcutaneous abscesses of back. ANESTHESIA:  General.    SURGEON:  Suhail Luz MD.     ASSISTANT: staff. PROCEDURE PERFORMED:  Debridement of abscesses of back (skin and subcutaneous tissue) x4. IMPLANTS: none. OPERATIVE SUMMARY:  The patient was taken to the operating room and placed on the operating table initially in the supine position. General endotracheal anesthesia was induced. The patient was then turned to a left lateral decubitus position. A beanbag was utilized to assist in positioning. The patient had a history of previous subcutaneous abscesses of the back and thighs. He had history of diabetes mellitus. On his exam at this time, there was an area of induration with suggestion of necrotic tissue in the right lower back. The area appearing overtly necrotic was about 3 cm across with the induration being about 6 cm across. There was in the right upper back an area of induration about 2 cm across. There were smaller areas of induration in the left upper back about a cm across and left mid back about a cm across. The back had been sterilely prepared and draped. An incision was made initially into the area of induration about 2 cm across in the right upper back. This yielded pus. The cavity was palpated with a clamp, which extended slightly laterally to the area of induration seen at the skin. The skin was fairly thick and there was some infiltration of purulence into the subcutaneous tissue in the region of induration. A circular incision was made to excise the overtly indurated skin. The incision was extended slightly laterally so that the cavity which extended in that direction was fully opened.   At this point, there was no further necrotic tissue or infiltrating purulence in the surrounding subcutaneous tissue. This wound had hemostasis obtained by electrocautery. Debridement of the skin and subcutaneous tissue in this area had been done sharply with scalpel as excisional debridement. Wound size at the end was 3 cm x 2 cm x 2 cm deep. Attention was turned to the smaller areas of induration on the left back. Left upper site was incised first and a few drops of purulence were obtained. The indurated skin, which had infiltration of purulence into the subcutaneous tissue just under it, was excised sharply with scalpel. There was only a very small cavity in the subcutaneous tissue here and no deeper or broader extension. This site after excisional sharp debridement was about 1.5 cm in diameter and about 1.5 cm deep. It was packed open with 1/4 inch iodoform gauze. The left mid back site was then incised. Here, a couple of drops of pus were obtained. Here also the indurated skin about a cm across was excised together with the underlying subcutaneous tissue, as there was infiltration of purulence into that subcutaneous tissue. All abnormal tissue was debrided with sharp excisional debridement utilizing a scalpel. Once this was done, probing the site and inspecting it with compression surrounding tissues did not yield any further extension or purulence. The wound after debridement was 1.5 cm diameter and 1 cm deep. This cavity was packed open with 1/4 inch iodoform gauze. The site at the right upper back was then inspected again and electrocautery was used further for hemostasis. That cavity was then packed open with 1/2 inch plain Nu Gauze. Attention was then turned to the larger area, which was the right lower back area. Initially, an incision was made through the middle of the necrotic zone, through skin into the subcutaneous tissue. A large quantity of pus was obtained. This was cultured.   The entire circular region of necrotic tissue was excised and this tissue was about 3 cm across. This revealed both a cavity in the subcutaneous tissue and revealed subcutaneous tissue, which was infiltrated with purulence in a sponge-like manner. With #10 scalpel, sharp excisional debridement was carried out of the subcutaneous tissue of essentially the entire wall of the cavity. There was then found to be a deeper tracking of purulence in the subcutaneous tissue and this site was opened and subcutaneous tissue, which appeared either necrotic or to be infiltrated with purulence was excised sharply with scalpel. Considerable sharp excisional debridement was needed here of skin and subcutaneous tissue. After this debridement was completed, the wound in this area was approximately 6 cm across in a semicircular shape and was about 4 cm deep. Multiple bleeding points were controlled by electrocautery. There was a fair amount of oozing also and the site was packed with a lap pad and compression maintained for a few minutes. This was then removed. The cavity was then packed open with 3 pieces of Surgicel, as well as 2 inch plain Nu Gauze. Once this was completed, then a dry dressing was applied to the site. Patient was awakened from anesthesia and returned to the supine position. He was taken to the recovery room in stable condition. SPECIMENS REMOVED:  Fluid for culture. Debrided skin and subcutaneous tissue was not sent for pathological examination. ESTIMATED BLOOD LOSS:  250 mL. COMPLICATIONS:  None.       MD JUAN Naik / ORQUIDEA  D: 01/31/2018 10:30     T: 01/31/2018 11:00  JOB #: [id number]

## 2018-01-31 NOTE — PROGRESS NOTES
10:59 PM  Paged MD on call, Felicia Elizabeth, for     11:00PM  MD returned call. Spoke with nurse. No orders were given for BG of 381. Was told to try to reach out to [facility]. 11:05 PM  Paged number for [facility] was told Felicia Elizabeth was on call. Paged Felicia Elizabeth again. 11:10 PM  [facility] returned page. Will call consult for hospitalist.     11:19 PM  Paged for in house hospita    11:19 PM  Anis consulted for hospitalist    11:30 PM  Anis at bedside. Orders for insulin placed.

## 2018-01-31 NOTE — PROGRESS NOTES
Admission Medication Reconciliation:    Information obtained from: patient interview and RX query    Significant PMH/Disease States:   Past Medical History:   Diagnosis Date    A-fib [facility])     Resolved.  Abscess of buttock 2016    wound culture grew out MSSA    Arthritis     hands, legs    Blastic NK-cell [facility] ([facility]     Cancer ([facility]     splenic lymphoma    Chronic kidney disease     CKD stage 4 due to type 2 diabetes mellitus ([facility]     Diabetes ([facility]     Hypertension     Ill-defined condition     diabetic neuropathy james feet    Neuropathy     Other ill-defined conditions(799.89)     spinal meningitis    Other ill-defined conditions(799.89)     broken blood vessel to nose    Stroke ([facility] ,     per patient    Vitamin D deficiency        Chief Complaint for this Admission:  back abscess, insulin dependent DM (uncontrolled)    Allergies:  Review of patient's allergies indicates no known allergies. Prior to Admission Medications:   Prior to Admission Medications   Prescriptions Last Dose Informant Patient Reported? Taking? ACCU-CHEK SOFÍA CONTROL SOLN soln   Yes No   ACCU-CHEK SOFÍA PLUS TEST STRP strip   Yes No   NOVOLIN 70/30 100 unit/mL (70-30) injection 2018 at Unknown time  Yes Yes   Si Units by SubCUTAneous route Before breakfast and dinner. SURE COMFORT INSULIN SYRINGE 1 mL 31 gauge x 5/16 syrg   Yes No   alcohol swabs (ALCOHOL PADS) padm   No No   Si Each by Apply Externally route two (2) times a day. carvedilol (COREG) 12.5 mg tablet 2018 at 0800  Yes Yes   Sig: Take 12.5 mg by mouth two (2) times a day. cloNIDine HCl (CATAPRES) 0.2 mg tablet 2018 at 0800  No Yes   Sig: Take 1 Tab by mouth two (2) times a day. clopidogrel (PLAVIX) 75 mg tab 2018 at Unknown time  Yes Yes   Sig: Take 75 mg by mouth daily. digoxin (LANOXIN) 0.125 mg tablet 2018 at 0800  Yes Yes   Sig: Take 0.125 mg by mouth daily.  Indications: afib hydroCHLOROthiazide (MICROZIDE) 12.5 mg capsule 1/30/2018 at 0800  Yes Yes   Sig: Take 12.5 mg by mouth daily. lancets 30 gauge misc   Yes No   pravastatin (PRAVACHOL) 20 mg tablet 1/29/2018 at Unknown time  Yes Yes   Sig: Take 20 mg by mouth nightly. Facility-Administered Medications: None     Comments/Recommendations:   1. Per patient interview no longer taking Norvasc (amoldipine) 10 mg daily- deleted from PTA med list.  2. Clarified insulin regimen: currently taking Novolin 70/30 65 units sq ac B/D   3.  Doxycycline 100 mg PO BID filled for a 10 day supply on 1/15/18- removed from PTA list as patient should have completed course    Shweta Gautam II, PHARMD

## 2018-01-31 NOTE — ANESTHESIA POSTPROCEDURE EVALUATION
Post-Anesthesia Evaluation and Assessment    Patient: Rosa Handy MRN: [id number]  SSN: [id number]    YOB: 1955  Age: 57 y.o. Sex: male       Cardiovascular Function/Vital Signs  Visit Vitals    /76    Pulse 78    Temp 37.1 °C (98.8 °F)    Resp 17    Ht 6' 5\" (1.956 m)    Wt 141.6 kg (312 lb 2.7 oz)    SpO2 97%    BMI 37.02 kg/m2       Patient is status post general anesthesia for Procedure(s):  DEBRIDEMENT ABSCESS RIGHT LOWER BACK . Nausea/Vomiting: None    Postoperative hydration reviewed and adequate. Pain:  Pain Scale 1: Numeric (0 - 10) (01/30/18 2000)  Pain Intensity 1: 4 (01/30/18 2000)   Managed    Neurological Status:   Neuro (WDL): Exceptions to WDL (01/30/18 1927)  Neuro  Neurologic State: Drowsy (01/30/18 1927)  Orientation Level: Oriented X4 (01/30/18 1927)  Cognition: Follows commands (01/30/18 1927)  Speech: Clear (01/30/18 1927)  LUE Motor Response: Purposeful (01/30/18 1927)  LLE Motor Response: Purposeful (01/30/18 1927)  RUE Motor Response: Purposeful (01/30/18 1927)  RLE Motor Response: Purposeful (01/30/18 1927)   At baseline    Mental Status and Level of Consciousness: Arousable    Pulmonary Status:   O2 Device: Nasal cannula (01/30/18 2015)   Adequate oxygenation and airway patent    Complications related to anesthesia: None    Post-anesthesia assessment completed.  No concerns    Signed By: Manuel Dee MD     January 30, 2018

## 2018-01-31 NOTE — PROGRESS NOTES
Spiritual Care Assessment/Progress Notes    Kristyn Madsen [id number]  [id number]    1955  57 y.o.  male    Patient Telephone Number: [phone] (home)   Faith Affiliation: No Denominational   Language: English   Extended Emergency Contact Information  Primary Emergency Contact: [address] Phone: [phone]  Relation: Other Relative  Secondary Emergency Contact: Ritesh oHbbs  Home Phone: [phone]  Relation: Other Relative   Patient Active Problem List    Diagnosis Date Noted    Abscess of back 01/30/2018    Diabetes mellitus, insulin dependent (IDDM), uncontrolled ([facility] 01/30/2018    Type 2 diabetes mellitus with nephropathy ([facility] 01/11/2018    Stroke ([facility] 07/29/2017    Abscess 05/22/2017    Acute renal failure ([facility] 12/20/2016    A-fib ([facility] 05/10/2016     Class: Chronic    Vitamin D deficiency 05/10/2016     Class: Chronic    CKD (chronic kidney disease), stage IV ([address] 05/10/2016     Class: Chronic    HTN (hypertension) 07/29/2013    DM (diabetes mellitus) ([facility] 07/26/2013    Hairy cell leukemia, in remission ([facility] 07/26/2013     Class: Present on Admission    Morbid obesity ([facility] 07/26/2013        Date: 1/31/2018       Level of Faith/Spiritual Activity:  [x]         Involved in ale tradition/spiritual practice    []         Not involved in ale tradition/spiritual practice  [x]         Spiritually oriented    []         Claims no spiritual orientation    []         seeking spiritual identity  []         Feels alienated from Yarsanism practice/tradition  []         Feels angry about Yarsanism practice/tradition  [x]         Spirituality/Yarsanism tradition is a resource for coping at this time.   []         Not able to assess due to medical condition    Services Provided Today:  []         crisis intervention    []         reading Scriptures  [x]         spiritual assessment    [x]         prayer  [x]         empathic listening/emotional support []         rites and rituals (cite in comments)  []         life review     []         Baptism support  []         theological development   []         advocacy  []         ethical dialog     []         blessing  []         bereavement support    []         support to family  []         anticipatory grief support   []         help with AMD  []         spiritual guidance    []         meditation      Spiritual Care Needs  []         Emotional Support  [x]         Spiritual/Religion Care  []         Loss/Adjustment  []         Advocacy/Referral                /Ethics  []         No needs expressed at               this time  []         Other: (note in               comments)  [address]  []         Follow up visits with               pt/family  []         Provide materials  []         Schedule sacraments  []         Contact Community               Clergy  [x]         Follow up as needed  []         Other: (note in               comments)     Initial Spiritual Assessment in 2115. Pt sitting up in bed watching television. Processed through events leading to hospitalization which included surgery. Pt believes he will be going home today. Pt shared how he has leaned on his ale to cope through medical issues spanning 7 years. Pt identified as a  himself and voiced his love and trust in God his Father. Affirmed pt's ale adding words of encouragement. Prayed with pt who expressed appreciation for talk and pastoral visit. CARA Rowell. Estrellita Irizarrylain

## 2018-01-31 NOTE — PROGRESS NOTES
Pharmacy Automatic Renal Dosing Protocol - Antimicrobials    Indication for Antimicrobials: SSTI     Current Regimen of Each Antimicrobial:  Cefepime 2 gm IV every 24 hr  (Start Date ; Day # 1)  Vancomyin 1500 mg X 2 doses (load of 300 mg) then 2000 mg IV every 48 hr    Goal Level: VANCOMYCIN TROUGH GOAL RANGE    Vancomycin Trough: 10 - 15 mcg/mL    Measured / Extrapolated Vancomycin Level: projected trough 13    Significant Cultures:   Wound cx  - pending  Anaerobic cx     Paralysis, amputations, malnutrition: no    Labs:  Recent Labs      18   1405   CREA  4.45*   BUN  42*   WBC  10.4     Temp (24hrs), Av °F (36.7 °C), Min:97.4 °F (36.3 °C), Max:98.8 °F (37.1 °C)      Creatinine Clearance (mL/min) or Dialysis: 21  No results found for: PCT    Impression/Plan:   Cefepime and vancomycin appropriate for indication and renal function  Cbc and bmp morning of      Pharmacy will follow daily and adjust medications as appropriate for renal function and/or serum levels. Thank you,  JAKOB SinhaD          Recommended duration of therapy  [email or web address]/[facility]/Pharmacy/Clinical%20Companion/[email or web address]%[email or web address]. docx    Renal Dosing  [email or web address]/[facility]/Pharmacy/Clinical%20Companion/Renal%[id number]. pdf

## 2018-01-31 NOTE — PROGRESS NOTES
10:59 PM  Paged MD on call, Linda Crouch, for     11:00PM  MD returned call. Spoke with nurse. Linda Crouch currently in surgery. MD asked if pt was with group or. No orders were given for BG of 381. Was told to try to reach Chelsi Aritaing. 11:05 PM  Paged number for Chel[facility] Catching was told Linda Crouch was on call. Paged Linda Crouch again.

## 2018-01-31 NOTE — PROGRESS NOTES
Interdisciplinary Rounds were completed on this patient. Rounds included nursing, clinical care leader, pharmacy, and case management. Patient was doing well without problems. Patient had the following concerns: none. Goals for the day will include: continue RX as is.      Case management to follow up regarding home health needs at discharge (wound care)

## 2018-01-31 NOTE — CONSULTS
Hospitalist Consultation Note    NAME:  Sumaya Quiñones   :   1955   MRN:   [id number]     ATTENDING: Angel Shelley MD  PCP:  Radha Vasquez NP    Date/Time:  2018 11:39 PM      Recommendations/Plan:     Diabetes mellitus, insulin dependent, uncontrolled  Blood sugars running in 300s  On 70/30 65 units BID at home  Pt didn't have dinner tonight, s/p surgery  Will give NPH 20 units now and place on 35 units BID for now  Continue with SSI  Check A1C    Abscesses of back   S/p debridement earlier today  I see he is covered with IV vancomycin to cover MRSA which he has history of such  Considering risk for anaerobe bacteria and gram negative with h/o DM, will add Cefepime and IV flagyl    Acute on chronic renal failure with baseline CKD3  Suspect dehydration with being on HCTZ  I will hold HCTZ and start IVF  Will ask nephrology to see the patient  Monitor lytes    HTN  Continue Clonidine, amlodipine and norvasc  Holding HCTZ as above    Hyperlipidemia  Continue Statins    H/o a.fib  H/o CVA  Not on anticoagulation  He seemed to be on plavix but claims not on it  I will have pharmacy for medication reconcilation    H/o hair cell leukemia    Code Status: full  DVT Prophylaxis: per surgery    Dr Antonio Sahni will resume care on consult tomorrow 7am        Subjective:   REQUESTING PHYSICIAN: Dr Princess Cho:      Madisyn Licea is a 57 y.o.  male who I was asked to see for HTN, DM and management of other medical problems perioperatively. As per patient, he is admitted for abscesses of his back. He has history of recurrent abscesses. Pt denies any fever, chills, nausea, vomiting, diarrhea, chest pain, cough, shortness of breath, problems urination, diarrhea. Pt noted to be blood sugars running in 300s, in renal failure with Cr of 4.45 with baseline around 2.7. Past Medical History:   Diagnosis Date    A-fib [facility])     Resolved.    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[facility] [address]     Cancer ([facility] [address]     splenic lymphoma    Chronic kidney disease     CKD stage 4 due to type 2 diabetes mellitus ([facility] [address]     Diabetes ([facility] [address]     Hypertension     Ill-defined condition     diabetic neuropathy james feet    Neuropathy     Other ill-defined conditions(799.89)     spinal meningitis    Other ill-defined conditions(799.89)     broken blood vessel to nose    Stroke ([facility] [address] 2007, 2017    per patient    Vitamin D deficiency       Past Surgical History:   Procedure Laterality Date    HX OTHER SURGICAL  02/06/2017    Excisional debridement of abscess right thigh & right lower back     HX OTHER SURGICAL  05/23/2017    I&D & excisional debridement (skin & subcutaneous tissue) back & right thigh    HX OTHER SURGICAL  01/12/2018    debridement of abscess back     Social History   Substance Use Topics    Smoking status: Never Smoker    Smokeless tobacco: Never Used    Alcohol use No      Family History   Problem Relation Age of Onset    Diabetes Mother     Hypertension Father     Hypertension Sister        No Known Allergies   Prior to Admission medications    Medication Sig Start Date End Date Taking? Authorizing Provider   carvedilol (COREG) 12.5 mg tablet Take 12.5 mg by mouth two (2) times a day. 10/31/17  Yes Historical Provider   clopidogrel (PLAVIX) 75 mg tab  12/8/17  Yes Historical Provider   doxycycline (VIBRA-TABS) 100 mg tablet  10/30/17  Yes Historical Provider   hydroCHLOROthiazide (MICROZIDE) 12.5 mg capsule  12/8/17  Yes Historical Provider   NOVOLIN 70/30 100 unit/mL (70-30) injection  1/5/18  Yes Historical Provider   cloNIDine HCl (CATAPRES) 0.2 mg tablet Take 1 Tab by mouth two (2) times a day. 5/28/17  Yes Yuliya Cristina MD   insulin lispro protamine/insulin lispro (HUMALOG MIX 75/25) 100 unit/mL (75-25) injection 60 Units by SubCUTAneous route Before breakfast and dinner. 12/23/16  Yes Austyn Price NP   amLODIPine (NORVASC) 10 mg tablet Take 1 Tab by mouth daily. 10/3/15  Yes Emma Lindsey MD   pravastatin (PRAVACHOL) 20 mg tablet Take 20 mg by mouth nightly. Yes Magaly Lyons MD   digoxin (LANOXIN) 0.125 mg tablet Take 0.125 mg by mouth daily. Indications: afib   Yes Historical Provider   lancets 30 gauge misc  11/21/17   Historical Provider   SURE COMFORT INSULIN SYRINGE 1 mL 31 gauge x 5/16 syrg  11/21/17   Historical Provider   ACCU-CHEK SOFÍA CONTROL SOLN soln  11/21/17   Historical Provider   ACCU-CHEK SOFÍA PLUS TEST STRP strip  11/21/17   Historical Provider   oxyCODONE-acetaminophen (PERCOCET) 5-325 mg per tablet Take 1-2 Tabs by mouth every four (4) hours as needed for Pain. Max Daily Amount: 12 Tabs. 1/12/18   Conner Arnett MD   alcohol swabs (ALCOHOL PADS) padm 1 Each by Apply Externally route two (2) times a day. 10/3/15   Emma Lindsey MD       REVIEW OF SYSTEMS:     Total of 12 systems reviewed as follows:   I am not able to complete the review of systems because:    The patient is intubated and sedated    The patient has altered mental status due to his acute medical problems    The patient has baseline aphasia from prior stroke(s)    The patient has baseline dementia and is not reliable historian                 POSITIVE= underlined text  Negative = text not underlined  General:  fever, chills, sweats, generalized weakness, weight loss/gain,      loss of appetite   Eyes:    blurred vision, eye pain, loss of vision, double vision  ENT:    rhinorrhea, pharyngitis   Respiratory:   cough, sputum production, SOB, wheezing, NAVARRO, pleuritic pain   Cardiology:   chest pain, palpitations, orthopnea, PND, edema, syncope   Gastrointestinal:  abdominal pain , N/V, dysphagia, diarrhea, constipation, bleeding   Genitourinary:  frequency, urgency, dysuria, hematuria, incontinence   Muskuloskeletal :  arthralgia, myalgia   Hematology:  easy bruising, nose or gum bleeding, lymphadenopathy   Dermatological: Abscesses on his back   Endocrine:   hot flashes or polydipsia   Neurological:  headache, dizziness, confusion, focal weakness, paresthesia,     Speech difficulties, memory loss, gait disturbance  Psychological: Feelings of anxiety, depression, agitation    Objective:   VITALS:    Visit Vitals    /58    Pulse 74    Temp 97.6 °F (36.4 °C)    Resp 16    Ht 6' 5\" (1.956 m)    Wt 141.6 kg (312 lb 2.7 oz)    SpO2 96%    BMI 37.02 kg/m2     Temp (24hrs), Av °F (36.7 °C), Min:97.4 °F (36.3 °C), Max:98.8 °F (37.1 °C)      PHYSICAL EXAM:   General:    Alert, cooperative, no distress, appears stated age. HEENT: Atraumatic, anicteric sclerae, pink conjunctivae     No oral ulcers, mucosa moist, throat clear  Neck:  Supple, symmetrical,  thyroid: non tender  Lungs:   Clear to auscultation bilaterally. No Wheezing or Rhonchi. No rales. Chest wall:  No tenderness  No Accessory muscle use. Heart:   Regular  rhythm,  No  murmur   No edema  Abdomen:   Soft, non-tender. Not distended. Bowel sounds normal  Extremities: No cyanosis. No clubbing  Skin:     Not pale. Not Jaundiced I have not examined his back   Psych:  Good insight. Not depressed. Not anxious or agitated. Neurologic: EOMs intact. No facial asymmetry. No aphasia or slurred speech.  Symmetrical strength, Alert and oriented X 4.     _______________________________________________________________________  Care Plan discussed with:    Comments   Patient y    Family      RN y    Care Manager                    Consultant:      ____________________________________________________________________  TOTAL TIME:  60 mins    Comments    y Reviewed previous records   >50% of visit spent in counseling and coordination of care y Discussion with patient and questions answered       Critical Care Provided     Minutes non procedure based  ________________________________________________________________________  Signed: Deondre Thompson MD      Procedures: see electronic medical records for all procedures/Xrays and details which were not copied into this note but were reviewed prior to creation of Plan.     LAB DATA REVIEWED:    Recent Results (from the past 24 hour(s))   CBC W/O DIFF    Collection Time: 01/30/18  2:05 PM   Result Value Ref Range    WBC 10.4 4.1 - 11.1 K/uL    RBC 3.22 (L) 4.10 - 5.70 M/uL    HGB 9.8 (L) 12.1 - 17.0 g/dL    HCT 28.3 (L) 36.6 - 50.3 %    MCV 87.9 80.0 - 99.0 FL    MCH 30.4 26.0 - 34.0 PG    MCHC 34.6 30.0 - 36.5 g/dL    RDW 12.5 11.5 - 14.5 %    PLATELET 157 501 - 497 K/uL    MPV 12.0 8.9 - 12.9 FL    NRBC 0.0 0  WBC    ABSOLUTE NRBC 0.00 0.00 - 0.75 K/uL   METABOLIC PANEL, BASIC    Collection Time: 01/30/18  2:05 PM   Result Value Ref Range    Sodium 134 (L) 136 - 145 mmol/L    Potassium 4.0 3.5 - 5.1 mmol/L    Chloride 101 97 - 108 mmol/L    CO2 24 21 - 32 mmol/L    Anion gap 9 5 - 15 mmol/L    Glucose 354 (H) 65 - 100 mg/dL    BUN 42 (H) 6 - 20 MG/DL    Creatinine 4.45 (H) 0.70 - 1.30 MG/DL    BUN/Creatinine ratio 9 (L) 12 - 20      GFR est AA 16 (L) >60 ml/min/1.73m2    GFR est non-AA 13 (L) >60 ml/min/1.73m2    Calcium 9.3 8.5 - 10.1 MG/DL   GLUCOSE, POC    Collection Time: 01/30/18  2:53 PM   Result Value Ref Range    Glucose (POC) 377 (H) 65 - 100 mg/dL    Performed by Raheel Sexton    GLUCOSE, POC    Collection Time: 01/30/18  3:29 PM   Result Value Ref Range    Glucose (POC) 338 (H) 65 - 100 mg/dL    Performed by Renaldo Banerjee    GLUCOSE, POC    Collection Time: 01/30/18  4:04 PM   Result Value Ref Range    Glucose (POC) 319 (H) 65 - 100 mg/dL    Performed by Renaldo Banerjee    CULTURE, WOUND Marcie Heft STAIN    Collection Time: 01/30/18  6:28 PM   Result Value Ref Range    Special Requests: NO SPECIAL REQUESTS      GRAM STAIN NO WBC'S SEEN      GRAM STAIN 1+ GRAM POSITIVE COCCI      Culture result: PENDING GLUCOSE, POC    Collection Time: 01/30/18  7:28 PM   Result Value Ref Range    Glucose (POC) 312 (H) 65 - 100 mg/dL    Performed by [address], POC    Collection Time: 01/30/18 10:30 PM   Result Value Ref Range    Glucose (POC) 381 (H) 65 - 100 mg/dL    Performed by Oliva Boas        _____________________________  Hospitalist: Radha Smith MD

## 2018-01-31 NOTE — PROGRESS NOTES
Surgery    S/P debridement of multiple complex abscesses of skin and subcut of back. Afebrile, VSS. Dressing in place. Hospitlaist consult appreciated. Continue emperic antibiotics. Await culture results.     Salma Gooden MD

## 2018-01-31 NOTE — PROGRESS NOTES
Nutrition:  Consult received, chart reviewed. Provided written and verbal education on CHO counting. Pt has had multiple educations in the past.  Pt did not provide very specific information during diet recall. Suspect he was not being particularly truthful about his home diet based on hgba1c. Pt reports lack of support at home regarding his Diabetic diet. Pt did share that he was drinking soda and Koolaid at one point but has stopped. Encouraged pt to try Crystal Light instead. Provided my contact info for any further questions. Expected compliance is fair to poor. Thank you!     Gt, [address]   Pager [phone]

## 2018-01-31 NOTE — CONSULTS
[facility]  Mary [place]  MR#: [id number]  : 1955  ACCOUNT #: [de-identified]   DATE OF SERVICE: 2018    REFERRING PHYSICIAN:  Angeles Wise MD.    REASON FOR CONSULTATION:  Acute renal failure. HISTORY OF PRESENT ILLNESS:  The patient is a 58-year-old -American male with history of CKD, hypertension, who is admitted with an abscess on his back. He was found to have acute renal failure with a creatinine level of 4.45 with BUN of 42 on 2018. Patient has been followed by us as outpatient, although he has cancelled 6 office appointments in 2017. He was seen in 2016 the last time in our clinic. Patient has a history of diabetic nephropathy. His diabetes is not controlled. His hemoglobin A1c is 10.1. Prior to admission, patient has been having multiple skin abscesses. He has taken antibiotic a few weeks ago. He does not recall the name of the antibiotic, although it seems like that he may have taken Bactrim. Currently, he is on vancomycin and Levaquin. He is also on hydrochlorothiazide for his blood pressure along with clonidine. He has been on ACE inhibitors in the past, although he does not recall the name of his medications. The patient is very noncompliant. He denies any vomiting or diarrhea. Denies taking any NSAIDs. He has hypertension since . He has diabetes since . He denies any history of renal stone. He has history of cerebrovascular  accident. He has diabetic neuropathy and retinopathy. He has hairy cell lymphoma treated with chemo in . He is followed by Dr. Ismael Leyva. His labs from 2017, creatinine 2.7. Creatinine 2.7 in 2017. Creatinine 1.66 in 2015. Creatinine 1.7 in 2010. PAST MEDICAL HISTORY:  1.  CKD. 2.  Hypertension. 3.  Diabetes. 4.  Hyperlipidemia. 5.  AFib. 6.  Spinal meningitis. 7.  Lower extremity cellulitis. 8.  Hairy cell leukemia.   9. Multiple skin abscesses. 10.  CVA. 11.  Vitamin D deficiency. 12.  Debridement of abscess in the right thigh and right lower back. SOCIAL HISTORY:  Denies smoking, alcohol abuse or drug abuse. FAMILY HISTORY:  Mother has diabetes. No history of end-stage renal disease. Father had hypertension. ALLERGIES:  NO KNOWN DRUG ALLERGIES. HOME MEDICATIONS:  Coreg, hydrochlorothiazide, Catapres, insulin, Norvasc, digoxin, Pravachol. REVIEW OF SYSTEMS:  As per HPI. Total of 11 systems reviewed, essentially negative. PHYSICAL EXAMINATION:  VITAL SIGNS:  Temperature 99.1, pulse 73, blood pressure 148/80, respiratory rate 16. GENERAL:  Lying in the bed, comfortable, not in apparent distress, alert, awake, oriented x 3. NECK:  Supple. No palpable neck mass. NOSE:  Normal nose. EARS:  Normal hearing. LUNGS:  Clear to auscultation bilaterally. No wheezing or crackles. CARDIOVASCULAR:  Normal S1, S2, regular rate and rhythm, no murmur. ABDOMEN:  Obese, soft, nontender, bowel sounds present. BACK:  Dressing over I and D area. EXTREMITIES:  No edema. NEUROLOGIC:  Nonfocal.  Normal speech. GENITOURINARY:  No Venegas. JOINTS:  No joint effusion or tenderness. LABORATORY AND DIAGNOSTIC DATA:  Labs from this morning, sodium 135, potassium 4.0, BUN 45, creatinine 4.26. Hemoglobin A1c 10.1. Creatinine from 01/30/2018 was 4.45. Creatinine 2.7 from 08/2017. Hemoglobin 7.3. ASSESSMENT:  1. Acute kidney injury, multifactorial, could be dehydration from hydrochlorothiazide or possible progression of his chronic  kidney disease. 2.  Anemia. 3.  Hypertension. 4.  Uncontrolled diabetes. 5.  Skin abscesses. 6.  History of atrial  fibrillation. 7.  History of cerebrovascular  accident. 8.  History of hairy cell leukemia. PLAN:    1. Continue IV fluid till tomorrow morning. 2.  Start IV iron. 3.  Give Epogen. 4.  Check renal ultrasound. 5.  Check urine protein creatinine ratio. Check urine electrolytes. 6.  No indication to start dialysis. 7.  Dialysis education done with the patient. The patient will need AV fistula placement in the near future once the infection issue clears up. Thanks for the consultation. We will follow the patient with you.       MD SHANEL Barcenas / REDD  D: 01/31/2018 15:06     T: 01/31/2018 15:42  JOB #: [id number]  CC: Tiera Lee NP

## 2018-01-31 NOTE — ROUTINE PROCESS
General Surgery End of Shift Nursing Note    Bedside shift change report given to Haylie (oncoming nurse) by Roque Solano (offgoing nurse). Report included the following information SBAR, Kardex, OR Summary, Procedure Summary, Intake/Output, MAR and Recent Results. Shift worked:   night   Summary of shift:    POD#1. Pt  at 2200. Orders placed. Voiding at least 100.     Issues for physician to address:   none     Number times ambulated in hallway past shift: 0    Number of times OOB to chair past shift: 0    Pain Management:  Current medication: dilaudid  Patient states pain is manageable on current pain medication: YES    GI:    Current diet:  DIET DIABETIC WITH OPTIONS Consistent Carb 1800kcal; Regular    Tolerating current diet: YES  Passing flatus: NO  Last Bowel Movement: several days ago   Appearance:     Respiratory:    Incentive Spirometer at bedside: YES  Patient instructed on use: YES    Patient Safety:    Falls Score: 6000 Shaun Friedman

## 2018-01-31 NOTE — PROGRESS NOTES
Hospitalist Progress Note    NAME: Comfort Mullins   :  1955   MRN:  [id number]       Assessment / Plan:  Diabetes mellitus, insulin dependent, uncontrolled POA  A1c= 10.1  Blood sugars running in 300s  On /30 65 units BID at home    s/p NPH 20 units  Last night  Increase NPH to 45 units BID today  Continue with SSI- change to resistant scale  Add Lispor 5 units Q AC  DTC consult    Abscesses of back   H/o MRSA wound infection  S/p debridement     Cont  IV vancomycin to cover MRSA   cont Cefepime and IV flagyl for now  Surgery following post op     Acute on chronic renal failure with baseline CKD3  Suspect dehydration with being on HCTZ  I will hold HCTZ     Cont IVF  Nephrology consult appreciated  Monitor lytes     HTN  Continue Clonidine, amlodipine and norvasc  Holding HCTZ as above     Hyperlipidemia  Continue Statins     H/o a.fib  H/o CVA  Not on anticoagulation  He seemed to be on plavix but claims not on it  I will have pharmacy for medication reconcilation     H/o hair cell leukemia     Code Status: full  DVT Prophylaxis: per surgery    Obesity -class 2 POA  Body mass index is 37.02 kg/(m^2). Nutrition consult  Weight loss recommended  Recommended Disposition: Home w/Family     Subjective:     Chief Complaint / Reason for Physician Visit :F/u DM, Renal failure, MRSA wound infection  \"I feel better\". Discussed with RN events overnight. Review of Systems:  Symptom Y/N Comments  Symptom Y/N Comments   Fever/Chills n   Chest Pain n    Poor Appetite n   Edema n    Cough n   Abdominal Pain n    Sputum n   Joint Pain     SOB/NAVARRO n   Pruritis/Rash     Nausea/vomit n   Tolerating PT/OT y    Diarrhea n   Tolerating Diet y    Constipation    Other       Could NOT obtain due to:      Objective:     VITALS:   Last 24hrs VS reviewed since prior progress note.  Most recent are:  Patient Vitals for the past 24 hrs:   Temp Pulse Resp BP SpO2   18 1112 99.1 °F (37.3 °C) 73 16 148/60 96 % 01/31/18 0842 98.2 °F (36.8 °C) 79 16 139/53 95 %   01/31/18 0242 98 °F (36.7 °C) 71 16 157/61 96 %   01/30/18 2358 97.6 °F (36.4 °C) 70 16 146/58 98 %   01/30/18 2250 97.6 °F (36.4 °C) 74 16 130/58 96 %   01/30/18 2157 98.3 °F (36.8 °C) 75 16 146/56 94 %   01/30/18 2130 97.4 °F (36.3 °C) 78 16 151/68 99 %   01/30/18 2045 97.6 °F (36.4 °C) 83 16 154/69 95 %   01/30/18 2030 - 80 16 142/72 96 %   01/30/18 2015 - 78 17 154/62 97 %   01/30/18 2000 - 75 13 169/76 100 %   01/30/18 1945 - 75 20 175/80 99 %   01/30/18 1940 - 76 12 174/78 98 %   01/30/18 1935 - 76 15 150/86 98 %   01/30/18 1930 - 77 22 149/75 99 %   01/30/18 1928 98.8 °F (37.1 °C) 76 26 142/76 96 %   01/30/18 1927 - - - 142/76 -   01/30/18 1354 98 °F (36.7 °C) 74 18 169/71 98 %       Intake/Output Summary (Last 24 hours) at 01/31/18 1317  Last data filed at 01/31/18 0753   Gross per 24 hour   Intake              550 ml   Output              725 ml   Net             -175 ml        PHYSICAL EXAM:  General: WD, WN. Alert, cooperative, no acute distress, Obese +    EENT:  EOMI. Anicteric sclerae. MMM  Resp:  CTA bilaterally, no wheezing or rales. No accessory muscle use  CV:  Regular  rhythm,  No edema  GI:  Soft, Non distended, Non tender.  +Bowel sounds  Neurologic:  Alert and oriented X 3, normal speech,   Psych:   Good insight. Not anxious nor agitated  Skin:  No rashes.   No jaundice    Reviewed most current lab test results and cultures  YES  Reviewed most current radiology test results   YES  Review and summation of old records today    NO  Reviewed patient's current orders and MAR    YES  PMH/SH reviewed - no change compared to H&P  ________________________________________________________________________  Care Plan discussed with:    Comments   Patient x    Family      RN x    Care Manager     Consultant  x Dr Melisa Orellana (Renal)                     Multidiciplinary team rounds were held today with , nursing, pharmacist and clinical coordinator. Patient's plan of care was discussed; medications were reviewed and discharge planning was addressed. ________________________________________________________________________  Total NON critical care TIME:  36   Minutes    Total CRITICAL CARE TIME Spent:   Minutes non procedure based      Comments   >50% of visit spent in counseling and coordination of care     ________________________________________________________________________  Neymar Venegas MD     Procedures: see electronic medical records for all procedures/Xrays and details which were not copied into this note but were reviewed prior to creation of Plan. LABS:  I reviewed today's most current labs and imaging studies.   Pertinent labs include:  Recent Labs      01/31/18 0249 01/30/18   1405   WBC  7.1  10.4   HGB  7.3*  9.8*   HCT  21.3*  28.3*   PLT  154  185     Recent Labs      01/31/18 0249 01/30/18   1405   NA  135*  134*   K  4.0  4.0   CL  104  101   CO2  24  24   GLU  312*  354*   BUN  45*  42*   CREA  4.26*  4.45*   CA  8.2*  9.3       Signed: Neymar Venegas MD

## 2018-01-31 NOTE — DIABETES MGMT
DTC Consult Note    Recommendations/ Comments: continue to titrate NPH until fasting and dinner BG values are consistently <150 mg/dl. Current hospital DM medication: adding NPH 45 unit bid - starting with dinner tonight, Lispro 4 unit tid ac and Lispro correctional insulin - resistant scale ac and hs. Consult received for:  [x]             Assessment of home management                []      Medication Recommendations                []             Meter/monitoring     []             Insulin instruction     []             New diagnosis     []             Outpatient education     []             Insulin pump patient     []             Insulin infusion     []             DKA/HHS    Chart reviewed and initial evaluation complete on Jeremy García.    Patient is a 57 y.o. male with known DM on \"70/30\" 65 units bid at home. Pt shared that he shakes his insulin to mix and then draws up his dose - encouraged pt to roll his insulin vial between hands. Pt demonstrated drawing up but cannot see air in syringe with clear fluids. We discussed ensuring voiding air from syringe for accurate dosing. Pt shared that he eats 3 meals and dose not miss but was not very forthcoming with details about his food choices. \"I am not eating the way used to\" - finally sharing that he is trying to limit intakes of sweets and chocolate. Best I can establish is that he is avoiding starches and not consuming fruit with meals - eats PB with crackers for HS snack. BG monitoring at home 2-3 times per day. Patient reports BG levels at home fasting range and prior to dinner: \"200's\". Shared that he has adequate supplies for testing consistently.      Assessed and instructed patient on the following:   ·  interpretation of lab results, blood sugar goals, complications of diabetes mellitus, insulin adjustments, illness management, SMBG skills, nutrition, referred to Diabetes Educator, site rotation, self-injection of insulin and storage of insulin along with appropriate disposal of sharps. · Discussed including carbohydrates and working on balanced choices to assist with wound healing. Encouraged the following:   · Continued testing, follow up with  to help with titrating insulin if BG increase at home. Provided patient with the following: [x]             Survival skills education materials               [x]             Insulin education materials               []             CHO counting education materials               []             Outpatient DTC contact number               []             Glucometer               Patient was able to give return demonstration of    [x]       Glucometer- verbalized    [x]       saline injection - demonstrated drawing up     with [x]     without []       assistance needed. []       Nurse to have patient self inject prior to discharge. Discussed with patient and/or family need for follow up appointment for diabetes management after discharge. A1c:   Lab Results   Component Value Date/Time    Hemoglobin A1c 10.1 01/31/2018 02:49 AM       Recent Glucose Results:   Lab Results   Component Value Date/Time     (H) 01/31/2018 02:49 AM    GLUCPOC 378 (H) 01/31/2018 12:02 PM    GLUCPOC 344 (H) 01/31/2018 08:30 AM    GLUCPOC 327 (H) 01/31/2018 08:06 AM        Lab Results   Component Value Date/Time    Creatinine 4.26 01/31/2018 02:49 AM     Estimated Creatinine Clearance: 27.3 mL/min (based on Cr of 4.26). Active Orders   Diet    DIET DIABETIC WITH OPTIONS Consistent Carb 1800kcal; Regular        PO intake: No data found. Will continue to follow as needed. Thank you.   Yumiko Dawson RD CDE

## 2018-02-01 LAB
ALBUMIN SERPL-MCNC: 2.2 G/DL (ref 3.5–5)
ANION GAP SERPL CALC-SCNC: 8 MMOL/L (ref 5–15)
BACTERIA SPEC CULT: ABNORMAL
BACTERIA SPEC CULT: NORMAL
BUN SERPL-MCNC: 47 MG/DL (ref 6–20)
BUN/CREAT SERPL: 11 (ref 12–20)
CALCIUM SERPL-MCNC: 8.2 MG/DL (ref 8.5–10.1)
CHLORIDE SERPL-SCNC: 107 MMOL/L (ref 97–108)
CO2 SERPL-SCNC: 24 MMOL/L (ref 21–32)
CREAT SERPL-MCNC: 4.38 MG/DL (ref 0.7–1.3)
EOSINOPHIL #/AREA URNS HPF: NEGATIVE /[HPF]
GLUCOSE BLD STRIP.AUTO-MCNC: 182 MG/DL (ref 65–100)
GLUCOSE BLD STRIP.AUTO-MCNC: 269 MG/DL (ref 65–100)
GLUCOSE BLD STRIP.AUTO-MCNC: 323 MG/DL (ref 65–100)
GLUCOSE BLD STRIP.AUTO-MCNC: 401 MG/DL (ref 65–100)
GLUCOSE SERPL-MCNC: 174 MG/DL (ref 65–100)
GRAM STN SPEC: ABNORMAL
GRAM STN SPEC: ABNORMAL
PHOSPHATE SERPL-MCNC: 3.8 MG/DL (ref 2.6–4.7)
POTASSIUM SERPL-SCNC: 3.6 MMOL/L (ref 3.5–5.1)
SERVICE CMNT-IMP: ABNORMAL
SERVICE CMNT-IMP: NORMAL
SODIUM SERPL-SCNC: 139 MMOL/L (ref 136–145)

## 2018-02-01 PROCEDURE — 74011636637 HC RX REV CODE- 636/637: Performed by: INTERNAL MEDICINE

## 2018-02-01 PROCEDURE — 74011000258 HC RX REV CODE- 258: Performed by: INTERNAL MEDICINE

## 2018-02-01 PROCEDURE — 74011000250 HC RX REV CODE- 250: Performed by: INTERNAL MEDICINE

## 2018-02-01 PROCEDURE — 80069 RENAL FUNCTION PANEL: CPT | Performed by: INTERNAL MEDICINE

## 2018-02-01 PROCEDURE — 65270000029 HC RM PRIVATE

## 2018-02-01 PROCEDURE — 82962 GLUCOSE BLOOD TEST: CPT

## 2018-02-01 PROCEDURE — 74011250637 HC RX REV CODE- 250/637: Performed by: SURGERY

## 2018-02-01 PROCEDURE — 74011250637 HC RX REV CODE- 250/637: Performed by: INTERNAL MEDICINE

## 2018-02-01 PROCEDURE — 74011250636 HC RX REV CODE- 250/636: Performed by: INTERNAL MEDICINE

## 2018-02-01 PROCEDURE — 36415 COLL VENOUS BLD VENIPUNCTURE: CPT | Performed by: INTERNAL MEDICINE

## 2018-02-01 PROCEDURE — 74011250636 HC RX REV CODE- 250/636: Performed by: SURGERY

## 2018-02-01 RX ORDER — SODIUM BICARBONATE 650 MG/1
1300 TABLET ORAL 2 TIMES DAILY
Status: DISCONTINUED | OUTPATIENT
Start: 2018-02-01 | End: 2018-02-06

## 2018-02-01 RX ADMIN — HYDROMORPHONE HYDROCHLORIDE 1 MG: 2 INJECTION INTRAMUSCULAR; INTRAVENOUS; SUBCUTANEOUS at 15:59

## 2018-02-01 RX ADMIN — INSULIN LISPRO 10 UNITS: 100 INJECTION, SOLUTION INTRAVENOUS; SUBCUTANEOUS at 21:57

## 2018-02-01 RX ADMIN — DOCUSATE SODIUM 100 MG: 100 CAPSULE, LIQUID FILLED ORAL at 18:11

## 2018-02-01 RX ADMIN — CLONIDINE HYDROCHLORIDE 0.2 MG: 0.1 TABLET ORAL at 08:20

## 2018-02-01 RX ADMIN — CARVEDILOL 12.5 MG: 12.5 TABLET, FILM COATED ORAL at 08:20

## 2018-02-01 RX ADMIN — DIGOXIN 0.12 MG: 125 TABLET ORAL at 08:20

## 2018-02-01 RX ADMIN — HUMAN INSULIN 10 UNITS: 100 INJECTION, SUSPENSION SUBCUTANEOUS at 21:57

## 2018-02-01 RX ADMIN — INSULIN LISPRO 5 UNITS: 100 INJECTION, SOLUTION INTRAVENOUS; SUBCUTANEOUS at 08:25

## 2018-02-01 RX ADMIN — Medication 10 ML: at 06:45

## 2018-02-01 RX ADMIN — LACTULOSE 30 G: 20 SOLUTION ORAL at 09:20

## 2018-02-01 RX ADMIN — INSULIN LISPRO 5 UNITS: 100 INJECTION, SOLUTION INTRAVENOUS; SUBCUTANEOUS at 13:56

## 2018-02-01 RX ADMIN — VANCOMYCIN HYDROCHLORIDE 2000 MG: 10 INJECTION, POWDER, LYOPHILIZED, FOR SOLUTION INTRAVENOUS at 15:58

## 2018-02-01 RX ADMIN — METRONIDAZOLE 500 MG: 500 INJECTION, SOLUTION INTRAVENOUS at 19:53

## 2018-02-01 RX ADMIN — HUMAN INSULIN 45 UNITS: 100 INJECTION, SUSPENSION SUBCUTANEOUS at 19:47

## 2018-02-01 RX ADMIN — Medication 10 ML: at 21:56

## 2018-02-01 RX ADMIN — DOCUSATE SODIUM 100 MG: 100 CAPSULE, LIQUID FILLED ORAL at 08:20

## 2018-02-01 RX ADMIN — PRAVASTATIN SODIUM 20 MG: 10 TABLET ORAL at 21:56

## 2018-02-01 RX ADMIN — INSULIN LISPRO 3 UNITS: 100 INJECTION, SOLUTION INTRAVENOUS; SUBCUTANEOUS at 08:24

## 2018-02-01 RX ADMIN — IRON SUCROSE 300 MG: 20 INJECTION, SOLUTION INTRAVENOUS at 09:33

## 2018-02-01 RX ADMIN — HYDROMORPHONE HYDROCHLORIDE 1 MG: 2 INJECTION INTRAMUSCULAR; INTRAVENOUS; SUBCUTANEOUS at 03:25

## 2018-02-01 RX ADMIN — CEFEPIME 2 G: 2 INJECTION, POWDER, FOR SOLUTION INTRAMUSCULAR; INTRAVENOUS at 02:34

## 2018-02-01 RX ADMIN — HEPARIN SODIUM 5000 UNITS: 5000 INJECTION, SOLUTION INTRAVENOUS; SUBCUTANEOUS at 08:19

## 2018-02-01 RX ADMIN — METRONIDAZOLE 500 MG: 500 INJECTION, SOLUTION INTRAVENOUS at 03:28

## 2018-02-01 RX ADMIN — CLONIDINE HYDROCHLORIDE 0.2 MG: 0.1 TABLET ORAL at 18:11

## 2018-02-01 RX ADMIN — SODIUM BICARBONATE 1300 MG: 650 TABLET ORAL at 18:11

## 2018-02-01 RX ADMIN — AMLODIPINE BESYLATE 10 MG: 5 TABLET ORAL at 08:20

## 2018-02-01 RX ADMIN — METRONIDAZOLE 500 MG: 500 INJECTION, SOLUTION INTRAVENOUS at 14:05

## 2018-02-01 RX ADMIN — HEPARIN SODIUM 5000 UNITS: 5000 INJECTION, SOLUTION INTRAVENOUS; SUBCUTANEOUS at 02:34

## 2018-02-01 RX ADMIN — SODIUM BICARBONATE 1300 MG: 650 TABLET ORAL at 13:55

## 2018-02-01 RX ADMIN — INSULIN LISPRO 5 UNITS: 100 INJECTION, SOLUTION INTRAVENOUS; SUBCUTANEOUS at 19:47

## 2018-02-01 RX ADMIN — HUMAN INSULIN 45 UNITS: 100 INJECTION, SUSPENSION SUBCUTANEOUS at 08:24

## 2018-02-01 RX ADMIN — CARVEDILOL 12.5 MG: 12.5 TABLET, FILM COATED ORAL at 18:25

## 2018-02-01 RX ADMIN — INSULIN LISPRO 7 UNITS: 100 INJECTION, SOLUTION INTRAVENOUS; SUBCUTANEOUS at 13:57

## 2018-02-01 RX ADMIN — Medication 10 ML: at 09:26

## 2018-02-01 RX ADMIN — INSULIN LISPRO 10 UNITS: 100 INJECTION, SOLUTION INTRAVENOUS; SUBCUTANEOUS at 19:48

## 2018-02-01 RX ADMIN — HEPARIN SODIUM 5000 UNITS: 5000 INJECTION, SOLUTION INTRAVENOUS; SUBCUTANEOUS at 18:11

## 2018-02-01 RX ADMIN — Medication 10 ML: at 15:58

## 2018-02-01 NOTE — PROGRESS NOTES
Nephrology Progress Note     Reagan Key     www. [facility]                  Phone - [phone]   Patient: Nikita Salomon   YOB: 1955    Date- 2/1/2018     CC: Follow up for arf  On ckd  Subjective: Interval History:   -  Cr. Remained unchanged after hydration  bp on high side  Mild metabolic acidosis  Blood sugar improving  ROS-No c/o sob, fever. No c/o nausea or vomiting  No c/o chest pain     Assessment:   1. Acute kidney injury, multifactorial, could be dehydration from hydrochlorothiazide or possible progression of his chronic  kidney disease. 2.  Anemia. - received epogen and iv iron  3. Hypertension. 4.  metabolic acidosis. 5.  Skin abscesses. 6.  History of atrial  fibrillation. 7.  History of cerebrovascular  accident. 8.  History of hairy cell leukemia. 9. Uncontrolled diabetes     Plan:   Stop ivf  Start po bicarb  S/p epogen  Transfuse PRN  Continue norvasc 10 mg, coreg 12.5 bid, clonidine 0.2 mg bid,   If cr. Stable - add lisinopril   Follow bmp      Care Plan discussed with: patient   Review of Systems: Pertinent items are noted in HPI.   Objective:   Vitals:    01/31/18 1940 01/31/18 2308 02/01/18 0225 02/01/18 0816   BP: 198/88 150/74 146/57 161/71   Pulse: 79 66 73 79   Resp: 16 16 16 14   Temp: 97.7 °F (36.5 °C) 98.6 °F (37 °C) 98 °F (36.7 °C) 97.9 °F (36.6 °C)   SpO2: 93% 98%  93%   Weight:       Height:         Last 3 Recorded Weights in this Encounter    01/30/18 1354   Weight: 141.6 kg (312 lb 2.7 oz)       Intake/Output Summary (Last 24 hours) at 02/01/18 1208  Last data filed at 02/01/18 1123   Gross per 24 hour   Intake           2837.5 ml   Output             1775 ml   Net           1062.5 ml     Physical Exam:   GEN: NAD  NECK- Supple, no thyromegaly  RESP: Clear b/l, no wheezing, No accessory muscle use  CVS: RRR,S1,S2    EXT: No Edema   NEURO: non focal, normal speech  SKIN: No Rash, No Jaundice  ABDO: soft , non tender, No hepatosplenomegaly  Dressing on back   Chart reviewed. Pertinent Notes reviewed.      Medications list  reviewed       Data Review :  Recent Labs      02/01/18   0231  01/31/18   0249  01/30/18   1405   NA  139  135*  134*   K  3.6  4.0  4.0   CL  107  104  101   CO2  24  24  24   GLU  174*  312*  354*   BUN  47*  45*  42*   CREA  4.38*  4.26*  4.45*   CA  8.2*  8.2*  9.3   PHOS  3.8   --    --    ALB  2.2*   --    --      Recent Labs      01/31/18   0249  01/30/18   1405   WBC  7.1  10.4   HGB  7.3*  9.8*   HCT  21.3*  28.3*   PLT  154  185     Lab Results   Component Value Date/Time    Specimen Description: PBC 07/26/2013 05:28 PM    Specimen Description: BLOOD 09/30/2009 04:55 PM     Current Facility-Administered Medications   Medication    sodium bicarbonate tablet 1,300 mg    vancomycin (VANCOCIN) 2000 mg in  ml infusion    influenza vaccine 2017-18 (3 yrs+)(PF) (FLUZONE QUAD/FLUARIX QUAD) injection 0.5 mL    insulin lispro (HUMALOG) injection    insulin lispro (HUMALOG) injection 5 Units    insulin NPH (NOVOLIN N, HUMULIN N) injection 45 Units    amLODIPine (NORVASC) tablet 10 mg    carvedilol (COREG) tablet 12.5 mg    cloNIDine HCl (CATAPRES) tablet 0.2 mg    digoxin (LANOXIN) tablet 0.125 mg    pravastatin (PRAVACHOL) tablet 20 mg    glucose chewable tablet 16 g    dextrose (D50W) injection syrg 12.5-25 g    glucagon (GLUCAGEN) injection 1 mg    sodium chloride (NS) flush 5-10 mL    sodium chloride (NS) flush 5-10 mL    oxyCODONE-acetaminophen (PERCOCET) 5-325 mg per tablet 1-2 Tab    naloxone (NARCAN) injection 0.4 mg    ondansetron (ZOFRAN) injection 4 mg    docusate sodium (COLACE) capsule 100 mg    polyethylene glycol (MIRALAX) packet 17 g    heparin (porcine) injection 5,000 Units    HYDROmorphone (DILAUDID) injection 1 mg    hydrALAZINE (APRESOLINE) 20 mg/mL injection 5 mg    metroNIDAZOLE (FLAGYL) IVPB premix 500 mg    cefepime (MAXIPIME) 2 g in 0.9 %  mL Suraj Otto MD  [facility]  [email or web address]  [address], Shanda Alvarado  [address] [facility] [address], [address]  Phone - [phone]         Fax - [phone]  [facility] Office  [address] [address]  Phone - [phone]        Fax - [phone]

## 2018-02-01 NOTE — ROUTINE PROCESS
General Surgery End of Shift Nursing Note    Bedside shift change report given to Haylie (oncoming nurse) by Jud Leon (offgoing nurse). Report included the following information SBAR, Kardex, OR Summary, Procedure Summary, Intake/Output, MAR and Recent Results. Shift worked:   night   Summary of shift:    POD#2.     Issues for physician to address:   none     Number times ambulated in hallway past shift: 0    Number of times OOB to chair past shift: 0    Pain Management:  Current medication: dilaudid  Patient states pain is manageable on current pain medication: YES    GI:    Current diet:  DIET DIABETIC WITH OPTIONS Consistent Carb 1800kcal; Regular    Tolerating current diet: YES  Passing flatus: NO  Last Bowel Movement: several days ago   Appearance:     Respiratory:    Incentive Spirometer at bedside: YES  Patient instructed on use: YES    Patient Safety:    Falls Score: [place]

## 2018-02-01 NOTE — PROGRESS NOTES
Hospitalist Progress Note    NAME: Comfort Mullins   :  1955   MRN:  [id number]       Assessment / Plan:  Diabetes mellitus, insulin dependent, uncontrolled POA  -A1C 10  -hyperglycemic yesterday in the 400s. He was taken 70/30 at 65 units BID prior to admission  -cont' NPH 45 units BID, Lispro 5 units qAc. Adjust prn  -SSI, diabetic diet, DTC consulted     Constipation  -last BM on . Pt passing flatus  -cont' bowel regimen as ordered. Will add lactulose       Abscesses of back   H/o MRSA wound infection  -S/p debridement   -Cont  IV vancomycin, cefepime, and flagyl -managed by primary team     Acute on chronic renal failure with baseline CKD3  -? Progression of CKD/DM nephropathy and AIN due to recent abx  -nephro following     HTN  -continue Clonidine, amlodipine and norvasc  -holding HCTZ as above     Hyperlipidemia  -continue Statins     H/o a.fib  H/o CVA  -ot on anticoagulation  -He seemed to be on plavix but claims not on it  -pharm to help with med rec     H/o hair cell leukemia     Code Status: full  DVT Prophylaxis: per surgery    Obesity -class 2 POA  Body mass index is 37.02 kg/(m^2). Nutrition consult  Weight loss recommended  Recommended Disposition: Home w/Family     Subjective:     Chief Complaint / Reason for Physician Visit :F/u DM, Renal failure, MRSA wound infection  Pt seen at bedside. No acute complaints.  this morning. He c/o abd discomfort, associated with no BM for almost 1 week. He states he is having flatus but no bm yet. Discussed with RN events overnight.      Review of Systems:  Symptom Y/N Comments  Symptom Y/N Comments   Fever/Chills n   Chest Pain n    Poor Appetite    Edema n    Cough    Abdominal Pain n    Sputum    Joint Pain     SOB/NAVARRO n   Pruritis/Rash     Nausea/vomit n   Tolerating PT/OT y    Diarrhea    Tolerating Diet y    Constipation y   Other       Could NOT obtain due to:      Objective:     VITALS:   Last 24hrs VS reviewed since prior progress note. Most recent are:  Patient Vitals for the past 24 hrs:   Temp Pulse Resp BP SpO2   02/01/18 0816 97.9 °F (36.6 °C) 79 14 161/71 93 %   02/01/18 0225 98 °F (36.7 °C) 73 16 146/57 -   01/31/18 2308 98.6 °F (37 °C) 66 16 150/74 98 %   01/31/18 1940 97.7 °F (36.5 °C) 79 16 198/88 93 %   01/31/18 1112 99.1 °F (37.3 °C) 73 16 148/60 96 %       Intake/Output Summary (Last 24 hours) at 02/01/18 0849  Last data filed at 02/01/18 0816   Gross per 24 hour   Intake           2837.5 ml   Output             1625 ml   Net           1212.5 ml        PHYSICAL EXAM:  General: WD, WN. Alert, cooperative, no acute distress, Obese +    EENT:  EOMI. Anicteric sclerae. MMM  Resp:  CTA bilaterally, no wheezing or rales. No accessory muscle use  CV:  Regular  rhythm,  No edema  GI:  Soft, Non distended, NT.  +BS  Neurologic:  Alert and oriented X 3, normal speech   Psych:   Good insight. Not anxious nor agitated  Skin:  No rashes. No jaundice    Reviewed most current lab test results and cultures  YES  Reviewed most current radiology test results   YES  Review and summation of old records today    NO  Reviewed patient's current orders and MAR    YES  PMH/ reviewed - no change compared to H&P  ________________________________________________________________________  Care Plan discussed with:    Comments   Patient x    Family      RN x    Care Manager     Consultant                        Multidiciplinary team rounds were held today with , nursing, pharmacist and clinical coordinator. Patient's plan of care was discussed; medications were reviewed and discharge planning was addressed.      ________________________________________________________________________  Total NON critical care TIME:  35   Minutes    Total CRITICAL CARE TIME Spent:   Minutes non procedure based      Comments   >50% of visit spent in counseling and coordination of care ________________________________________________________________________  Aisha Abdi MD     Procedures: see electronic medical records for all procedures/Xrays and details which were not copied into this note but were reviewed prior to creation of Plan. LABS:  I reviewed today's most current labs and imaging studies.   Pertinent labs include:  Recent Labs      01/31/18   0249  01/30/18   1405   WBC  7.1  10.4   HGB  7.3*  9.8*   HCT  21.3*  28.3*   PLT  154  185     Recent Labs      02/01/18   0231  01/31/18   0249  01/30/18   1405   NA  139  135*  134*   K  3.6  4.0  4.0   CL  107  104  101   CO2  24  24  24   GLU  174*  312*  354*   BUN  47*  45*  42*   CREA  4.38*  4.26*  4.45*   CA  8.2*  8.2*  9.3   PHOS  3.8   --    --    ALB  2.2*   --    --        Signed: Aisha Abdi MD

## 2018-02-02 LAB
ALBUMIN SERPL-MCNC: 2.2 G/DL (ref 3.5–5)
ANION GAP SERPL CALC-SCNC: 8 MMOL/L (ref 5–15)
BASOPHILS # BLD: 0 K/UL (ref 0–0.1)
BASOPHILS NFR BLD: 0 % (ref 0–1)
BUN SERPL-MCNC: 40 MG/DL (ref 6–20)
BUN/CREAT SERPL: 10 (ref 12–20)
CALCIUM SERPL-MCNC: 8 MG/DL (ref 8.5–10.1)
CHLORIDE SERPL-SCNC: 108 MMOL/L (ref 97–108)
CO2 SERPL-SCNC: 23 MMOL/L (ref 21–32)
CREAT SERPL-MCNC: 3.89 MG/DL (ref 0.7–1.3)
DIFFERENTIAL METHOD BLD: ABNORMAL
EOSINOPHIL # BLD: 0.2 K/UL (ref 0–0.4)
EOSINOPHIL NFR BLD: 3 % (ref 0–7)
ERYTHROCYTE [DISTWIDTH] IN BLOOD BY AUTOMATED COUNT: 12.4 % (ref 11.5–14.5)
GLUCOSE BLD STRIP.AUTO-MCNC: 256 MG/DL (ref 65–100)
GLUCOSE BLD STRIP.AUTO-MCNC: 312 MG/DL (ref 65–100)
GLUCOSE BLD STRIP.AUTO-MCNC: 331 MG/DL (ref 65–100)
GLUCOSE BLD STRIP.AUTO-MCNC: 370 MG/DL (ref 65–100)
GLUCOSE SERPL-MCNC: 226 MG/DL (ref 65–100)
HCT VFR BLD AUTO: 23.5 % (ref 36.6–50.3)
HGB BLD-MCNC: 7.8 G/DL (ref 12.1–17)
IMM GRANULOCYTES # BLD: 0.1 K/UL (ref 0–0.04)
IMM GRANULOCYTES NFR BLD AUTO: 2 % (ref 0–0.5)
LYMPHOCYTES # BLD: 0.9 K/UL (ref 0.8–3.5)
LYMPHOCYTES NFR BLD: 17 % (ref 12–49)
MCH RBC QN AUTO: 29.9 PG (ref 26–34)
MCHC RBC AUTO-ENTMCNC: 33.2 G/DL (ref 30–36.5)
MCV RBC AUTO: 90 FL (ref 80–99)
MONOCYTES # BLD: 0.6 K/UL (ref 0–1)
MONOCYTES NFR BLD: 11 % (ref 5–13)
NEUTS SEG # BLD: 3.2 K/UL (ref 1.8–8)
NEUTS SEG NFR BLD: 67 % (ref 32–75)
NRBC # BLD: 0.06 K/UL (ref 0–0.01)
NRBC BLD-RTO: 1.2 PER 100 WBC
PHOSPHATE SERPL-MCNC: 3.5 MG/DL (ref 2.6–4.7)
PLATELET # BLD AUTO: 136 K/UL (ref 150–400)
PMV BLD AUTO: 11.7 FL (ref 8.9–12.9)
POTASSIUM SERPL-SCNC: 3.7 MMOL/L (ref 3.5–5.1)
RBC # BLD AUTO: 2.61 M/UL (ref 4.1–5.7)
RBC MORPH BLD: ABNORMAL
SERVICE CMNT-IMP: ABNORMAL
SODIUM SERPL-SCNC: 139 MMOL/L (ref 136–145)
WBC # BLD AUTO: 5 K/UL (ref 4.1–11.1)

## 2018-02-02 PROCEDURE — 74011250637 HC RX REV CODE- 250/637: Performed by: INTERNAL MEDICINE

## 2018-02-02 PROCEDURE — 80069 RENAL FUNCTION PANEL: CPT | Performed by: INTERNAL MEDICINE

## 2018-02-02 PROCEDURE — 74011636637 HC RX REV CODE- 636/637: Performed by: INTERNAL MEDICINE

## 2018-02-02 PROCEDURE — 74011250637 HC RX REV CODE- 250/637: Performed by: SURGERY

## 2018-02-02 PROCEDURE — 82962 GLUCOSE BLOOD TEST: CPT

## 2018-02-02 PROCEDURE — 85025 COMPLETE CBC W/AUTO DIFF WBC: CPT | Performed by: INTERNAL MEDICINE

## 2018-02-02 PROCEDURE — 36415 COLL VENOUS BLD VENIPUNCTURE: CPT | Performed by: INTERNAL MEDICINE

## 2018-02-02 PROCEDURE — 65270000029 HC RM PRIVATE

## 2018-02-02 PROCEDURE — 74011250636 HC RX REV CODE- 250/636: Performed by: SURGERY

## 2018-02-02 RX ORDER — CARVEDILOL 12.5 MG/1
25 TABLET ORAL 2 TIMES DAILY WITH MEALS
Status: DISCONTINUED | OUTPATIENT
Start: 2018-02-02 | End: 2018-02-06 | Stop reason: HOSPADM

## 2018-02-02 RX ORDER — HYDRALAZINE HYDROCHLORIDE 50 MG/1
50 TABLET, FILM COATED ORAL 2 TIMES DAILY
Status: DISCONTINUED | OUTPATIENT
Start: 2018-02-02 | End: 2018-02-05

## 2018-02-02 RX ORDER — INSULIN LISPRO 100 [IU]/ML
10 INJECTION, SOLUTION INTRAVENOUS; SUBCUTANEOUS
Status: DISCONTINUED | OUTPATIENT
Start: 2018-02-02 | End: 2018-02-03

## 2018-02-02 RX ORDER — HYDRALAZINE HYDROCHLORIDE 20 MG/ML
10 INJECTION INTRAMUSCULAR; INTRAVENOUS
Status: DISCONTINUED | OUTPATIENT
Start: 2018-02-02 | End: 2018-02-06 | Stop reason: HOSPADM

## 2018-02-02 RX ADMIN — INSULIN HUMAN 55 UNITS: 100 INJECTION, SUSPENSION SUBCUTANEOUS at 16:47

## 2018-02-02 RX ADMIN — AMLODIPINE BESYLATE 10 MG: 5 TABLET ORAL at 08:25

## 2018-02-02 RX ADMIN — SODIUM BICARBONATE 1300 MG: 650 TABLET ORAL at 08:25

## 2018-02-02 RX ADMIN — INSULIN LISPRO 10 UNITS: 100 INJECTION, SOLUTION INTRAVENOUS; SUBCUTANEOUS at 16:47

## 2018-02-02 RX ADMIN — SODIUM BICARBONATE 1300 MG: 650 TABLET ORAL at 18:19

## 2018-02-02 RX ADMIN — Medication 10 ML: at 21:02

## 2018-02-02 RX ADMIN — DOCUSATE SODIUM 100 MG: 100 CAPSULE, LIQUID FILLED ORAL at 08:25

## 2018-02-02 RX ADMIN — INSULIN LISPRO 10 UNITS: 100 INJECTION, SOLUTION INTRAVENOUS; SUBCUTANEOUS at 16:48

## 2018-02-02 RX ADMIN — HYDRALAZINE HYDROCHLORIDE 50 MG: 50 TABLET, FILM COATED ORAL at 18:19

## 2018-02-02 RX ADMIN — HEPARIN SODIUM 5000 UNITS: 5000 INJECTION, SOLUTION INTRAVENOUS; SUBCUTANEOUS at 01:47

## 2018-02-02 RX ADMIN — CARVEDILOL 25 MG: 12.5 TABLET, FILM COATED ORAL at 16:47

## 2018-02-02 RX ADMIN — HEPARIN SODIUM 5000 UNITS: 5000 INJECTION, SOLUTION INTRAVENOUS; SUBCUTANEOUS at 08:25

## 2018-02-02 RX ADMIN — CLONIDINE HYDROCHLORIDE 0.2 MG: 0.1 TABLET ORAL at 18:19

## 2018-02-02 RX ADMIN — INSULIN HUMAN 55 UNITS: 100 INJECTION, SUSPENSION SUBCUTANEOUS at 08:26

## 2018-02-02 RX ADMIN — CARVEDILOL 12.5 MG: 12.5 TABLET, FILM COATED ORAL at 08:25

## 2018-02-02 RX ADMIN — PRAVASTATIN SODIUM 20 MG: 10 TABLET ORAL at 21:02

## 2018-02-02 RX ADMIN — DIGOXIN 0.12 MG: 125 TABLET ORAL at 08:25

## 2018-02-02 RX ADMIN — INSULIN LISPRO 10 UNITS: 100 INJECTION, SOLUTION INTRAVENOUS; SUBCUTANEOUS at 12:07

## 2018-02-02 RX ADMIN — HEPARIN SODIUM 5000 UNITS: 5000 INJECTION, SOLUTION INTRAVENOUS; SUBCUTANEOUS at 16:47

## 2018-02-02 RX ADMIN — CLONIDINE HYDROCHLORIDE 0.2 MG: 0.1 TABLET ORAL at 08:25

## 2018-02-02 RX ADMIN — INSULIN LISPRO 10 UNITS: 100 INJECTION, SOLUTION INTRAVENOUS; SUBCUTANEOUS at 21:03

## 2018-02-02 NOTE — PROGRESS NOTES
Surgery    Moderate pain at incisions. Afebrile, VSS. Glucose remains quite elevated. Cultures MRSA - I will DC Cefepime and Flagyl. Continue Vanc.     I will change packing in AM.    Kristina Davila MD

## 2018-02-02 NOTE — DIABETES MGMT
DTC Progress Note    Recommendations/ Comments: Noted NPH and prandial humalog increased today. Will continue to follow as needed. Recommend continuing to titrate NPH as needed for elevated fasting BG and prandial humalog as needed for elevated day time BG. Current hospital DM medication: NPH 55units BID and humalog 10units ac tid plus correction    Chart reviewed on Luan Castaneda.    Patient is a 57 y.o. male with known Type 2 Diabetes on Novolin 70/30 65units ac b/d at home. A1c:   Lab Results   Component Value Date/Time    Hemoglobin A1c 10.1 01/31/2018 02:49 AM    Hemoglobin A1c 9.7 07/30/2017 04:53 AM       Recent Glucose Results:   Lab Results   Component Value Date/Time     (H) 02/02/2018 01:48 AM    GLUCPOC 312 (H) 02/02/2018 12:10 PM    GLUCPOC 256 (H) 02/02/2018 08:41 AM    GLUCPOC 401 (H) 02/01/2018 08:36 PM        Lab Results   Component Value Date/Time    Creatinine 3.89 02/02/2018 01:48 AM     Estimated Creatinine Clearance: 29.9 mL/min (based on Cr of 3.89). Active Orders   Diet    DIET DIABETIC WITH OPTIONS Consistent Carb 1800kcal; Regular        PO intake: No data found. Will continue to follow as needed.     Thank you  Jovita Boeck, BSN, RN, [address]

## 2018-02-02 NOTE — PROGRESS NOTES
Hospitalist Progress Note    NAME: Coby Herrera   :  1955   MRN:  [id number]       Assessment / Plan:  Diabetes mellitus, insulin dependent, uncontrolled POA  -A1C 10  -remains hyperglycemic despite updosing NPH  -will increase NPH to 55 units BID, adding 10 units lispro premeals. .  PT was taking 70/30 at 65 units BID prior to admission  -SSI, diabetic diet, DTC consulted     Constipation  -last BM on .  -cont' bowel regimen as ordered      Abscesses of back   H/o MRSA wound infection  -S/p debridement   -Cont  IV vancomycin-managed by primary team     Acute on chronic renal failure with baseline CKD3  -? Progression of CKD/DM nephropathy and AIN due to recent abx  -nephro following     HTN  -continue Clonidine, norvasc. Increase coreg  -holding HCTZ as above  -hydralazine prn     Hyperlipidemia  -continue Statins     H/o a.fib  H/o CVA  -ot on anticoagulation  -He seemed to be on plavix but claims not on it  -pharm to help with med rec     H/o hair cell leukemia     Code Status: full  DVT Prophylaxis: per surgery    Obesity -class 2 POA  Body mass index is 37 kg/(m^2). Nutrition consult  Weight loss recommended  Recommended Disposition: Home w/Family     Subjective:     Chief Complaint / Reason for Physician Visit :F/u DM, Renal failure, MRSA wound infection  Pt seen at bedside. No acute event overnight. BG remains high. Tolerating diet. Appetite at baseline. Review of Systems:  Symptom Y/N Comments  Symptom Y/N Comments   Fever/Chills n   Chest Pain n    Poor Appetite    Edema n    Cough    Abdominal Pain n    Sputum    Joint Pain     SOB/NAVARRO n   Pruritis/Rash     Nausea/vomit n   Tolerating PT/OT y    Diarrhea    Tolerating Diet y    Constipation    Other       Could NOT obtain due to:      Objective:     VITALS:   Last 24hrs VS reviewed since prior progress note.  Most recent are:  Patient Vitals for the past 24 hrs:   Temp Pulse Resp BP SpO2   18 0837 97.6 °F (36.4 °C) 82 16 196/80 95 %   02/02/18 0400 97.1 °F (36.2 °C) 64 16 167/74 98 %   02/01/18 2329 98.1 °F (36.7 °C) 69 16 174/77 99 %       Intake/Output Summary (Last 24 hours) at 02/02/18 1424  Last data filed at 02/02/18 0630   Gross per 24 hour   Intake                0 ml   Output             2150 ml   Net            -2150 ml        PHYSICAL EXAM:  General: WD, WN. Alert, cooperative, no acute distress, Obese +    EENT:  EOMI. Anicteric sclerae. MMM  Resp:  CTA bilaterally, no wheezing or rales. No accessory muscle use  CV:  Regular  rhythm,  No edema  GI:  Soft, Non distended, NT.  +BS  Neurologic:  Alert and oriented X 3, normal speech   Psych:   Good insight. Not anxious nor agitated  Skin:  No rashes. No jaundice    Reviewed most current lab test results and cultures  YES  Reviewed most current radiology test results   YES  Review and summation of old records today    NO  Reviewed patient's current orders and MAR    YES  PMH/SH reviewed - no change compared to H&P  ________________________________________________________________________  Care Plan discussed with:    Comments   Patient x    Family      RN x    Care Manager     Consultant                        Multidiciplinary team rounds were held today with , nursing, pharmacist and clinical coordinator. Patient's plan of care was discussed; medications were reviewed and discharge planning was addressed. ________________________________________________________________________  Total NON critical care TIME:  35   Minutes    Total CRITICAL CARE TIME Spent:   Minutes non procedure based      Comments   >50% of visit spent in counseling and coordination of care     ________________________________________________________________________  Juan Antonio Vitale MD     Procedures: see electronic medical records for all procedures/Xrays and details which were not copied into this note but were reviewed prior to creation of Plan.       LABS:  I reviewed today's most current labs and imaging studies.   Pertinent labs include:  Recent Labs      02/02/18 0148 01/31/18 0249   WBC  5.0  7.1   HGB  7.8*  7.3*   HCT  23.5*  21.3*   PLT  136*  154     Recent Labs      02/02/18 0148 02/01/18 0231 01/31/18 0249   NA  139  139  135*   K  3.7  3.6  4.0   CL  108  107  104   CO2  23  24  24   GLU  226*  174*  312*   BUN  40*  47*  45*   CREA  3.89*  4.38*  4.26*   CA  8.0*  8.2*  8.2*   PHOS  3.5  3.8   --    ALB  2.2*  2.2*   --        Signed: Ivy Lassiter MD

## 2018-02-02 NOTE — PROGRESS NOTES
Nephrology Progress Note     Reagan Key     www. [facility] [employer]                  Phone - [phone]   Patient: Luan Castaneda   YOB: 1955    Date- 2/2/2018     CC: Follow up for arf  On ckd  Subjective: Interval History:   -  Cr. Improved  bp high  Hb low  No c/o sob, fever. No c/o nausea or vomiting  No c/o chest pain       Assessment:   1. Acute kidney injury, multifactorial, could be dehydration from hydrochlorothiazide or possible progression of his chronic  kidney disease. 2.  Anemia. - received epogen and iv iron  3. Hypertension. 4.  metabolic acidosis. 5.  Skin abscesses. 6.  History of atrial  fibrillation. 7.  History of cerebrovascular  accident. 8.  History of hairy cell leukemia. 9. Uncontrolled diabetes     Plan:   Start hydralazine 50 mg bid  continue po bicarb  S/p epogen  Transfuse PRN  Continue norvasc 10 mg, coreg 12.5 bid, clonidine 0.2 mg bid,   Follow bmp  On vanco. Per primary team.     Care Plan discussed with: patient   Review of Systems: Pertinent items are noted in HPI.   Objective:   Vitals:    02/02/18 0543 02/02/18 0837 02/02/18 1300 02/02/18 1501   BP:  196/80 180/84 162/76   Pulse:  82 80 84   Resp:  16 16 16   Temp:  97.6 °F (36.4 °C) 97 °F (36.1 °C) 98.2 °F (36.8 °C)   SpO2:  95% 95% 95%   Weight: 141.5 kg (312 lb)      Height:         Last 3 Recorded Weights in this Encounter    01/30/18 1354 02/02/18 0543   Weight: 141.6 kg (312 lb 2.7 oz) 141.5 kg (312 lb)       Intake/Output Summary (Last 24 hours) at 02/02/18 1519  Last data filed at 02/02/18 1448   Gross per 24 hour   Intake              720 ml   Output             3550 ml   Net            -2830 ml     Physical Exam:   GEN: NAD  NECK- Supple, no thyromegaly  RESP: Clear b/l, no wheezing, No accessory muscle use  CVS: RRR,S1,S2    EXT: No Edema   NEURO: non focal, normal speech  SKIN: No Rash, No Jaundice  ABDO: soft , non tender, No hepatosplenomegaly  Dressing on back Chart reviewed. Pertinent Notes reviewed. Medications list  reviewed       Data Review :  Recent Labs      02/02/18   0148  02/01/18   0231  01/31/18   0249   NA  139  139  135*   K  3.7  3.6  4.0   CL  108  107  104   CO2  23  24  24   GLU  226*  174*  312*   BUN  40*  47*  45*   CREA  3.89*  4.38*  4.26*   CA  8.0*  8.2*  8.2*   PHOS  3.5  3.8   --    ALB  2.2*  2.2*   --      Recent Labs      02/02/18   0148  01/31/18   0249   WBC  5.0  7.1   HGB  7.8*  7.3*   HCT  23.5*  21.3*   PLT  136*  154     Lab Results   Component Value Date/Time    Specimen Description: PBC 07/26/2013 05:28 PM    Specimen Description: BLOOD 09/30/2009 04:55 PM     Current Facility-Administered Medications   Medication    insulin lispro (HUMALOG) injection 10 Units    insulin NPH (NOVOLIN N, HUMULIN N) injection 55 Units    carvedilol (COREG) tablet 25 mg    hydrALAZINE (APRESOLINE) 20 mg/mL injection 10 mg    sodium bicarbonate tablet 1,300 mg    vancomycin (VANCOCIN) 2000 mg in  ml infusion    influenza vaccine 2017-18 (3 yrs+)(PF) (FLUZONE QUAD/FLUARIX QUAD) injection 0.5 mL    insulin lispro (HUMALOG) injection    amLODIPine (NORVASC) tablet 10 mg    cloNIDine HCl (CATAPRES) tablet 0.2 mg    digoxin (LANOXIN) tablet 0.125 mg    pravastatin (PRAVACHOL) tablet 20 mg    glucose chewable tablet 16 g    dextrose (D50W) injection syrg 12.5-25 g    glucagon (GLUCAGEN) injection 1 mg    sodium chloride (NS) flush 5-10 mL    sodium chloride (NS) flush 5-10 mL    oxyCODONE-acetaminophen (PERCOCET) 5-325 mg per tablet 1-2 Tab    naloxone (NARCAN) injection 0.4 mg    ondansetron (ZOFRAN) injection 4 mg    docusate sodium (COLACE) capsule 100 mg    polyethylene glycol (MIRALAX) packet 17 g    heparin (porcine) injection 5,000 Units    HYDROmorphone (DILAUDID) injection 1 mg       Amanda Haider MD  [facility]  www. [email or web address].[employer]  [address], [facility] [place], [address] [place]  Phone - [phone]         Fax - [phone]  [facility] Office  [address] [facility] [address], [facility]  Phone - [phone]        Fax - [phone]

## 2018-02-02 NOTE — PROGRESS NOTES
General Surgery End of Shift Nursing Note    Bedside shift change report given to EMMY Tyler (oncoming nurse) by Marge Weller RN (offgoing nurse). Report included the following information SBAR, Kardex, Intake/Output and MAR.       Chucky Charlton RN

## 2018-02-03 LAB
ALBUMIN SERPL-MCNC: 2.3 G/DL (ref 3.5–5)
ANION GAP SERPL CALC-SCNC: 10 MMOL/L (ref 5–15)
BUN SERPL-MCNC: 35 MG/DL (ref 6–20)
BUN/CREAT SERPL: 10 (ref 12–20)
CALCIUM SERPL-MCNC: 8.2 MG/DL (ref 8.5–10.1)
CHLORIDE SERPL-SCNC: 110 MMOL/L (ref 97–108)
CO2 SERPL-SCNC: 23 MMOL/L (ref 21–32)
CREAT SERPL-MCNC: 3.45 MG/DL (ref 0.7–1.3)
GLUCOSE BLD STRIP.AUTO-MCNC: 179 MG/DL (ref 65–100)
GLUCOSE BLD STRIP.AUTO-MCNC: 262 MG/DL (ref 65–100)
GLUCOSE BLD STRIP.AUTO-MCNC: 312 MG/DL (ref 65–100)
GLUCOSE SERPL-MCNC: 140 MG/DL (ref 65–100)
PHOSPHATE SERPL-MCNC: 2.8 MG/DL (ref 2.6–4.7)
POTASSIUM SERPL-SCNC: 3.9 MMOL/L (ref 3.5–5.1)
SERVICE CMNT-IMP: ABNORMAL
SODIUM SERPL-SCNC: 143 MMOL/L (ref 136–145)
VANCOMYCIN SERPL-MCNC: 13.2 UG/ML

## 2018-02-03 PROCEDURE — 74011250637 HC RX REV CODE- 250/637: Performed by: INTERNAL MEDICINE

## 2018-02-03 PROCEDURE — 74011250636 HC RX REV CODE- 250/636: Performed by: SURGERY

## 2018-02-03 PROCEDURE — 74011636637 HC RX REV CODE- 636/637: Performed by: INTERNAL MEDICINE

## 2018-02-03 PROCEDURE — 80069 RENAL FUNCTION PANEL: CPT | Performed by: INTERNAL MEDICINE

## 2018-02-03 PROCEDURE — 65270000029 HC RM PRIVATE

## 2018-02-03 PROCEDURE — 82962 GLUCOSE BLOOD TEST: CPT

## 2018-02-03 PROCEDURE — 74011250637 HC RX REV CODE- 250/637: Performed by: SURGERY

## 2018-02-03 PROCEDURE — 36415 COLL VENOUS BLD VENIPUNCTURE: CPT | Performed by: INTERNAL MEDICINE

## 2018-02-03 PROCEDURE — 80202 ASSAY OF VANCOMYCIN: CPT | Performed by: INTERNAL MEDICINE

## 2018-02-03 RX ORDER — VANCOMYCIN 1.75 GRAM/500 ML IN 0.9 % SODIUM CHLORIDE INTRAVENOUS
1750
Status: DISCONTINUED | OUTPATIENT
Start: 2018-02-03 | End: 2018-02-05

## 2018-02-03 RX ORDER — INSULIN LISPRO 100 [IU]/ML
14 INJECTION, SOLUTION INTRAVENOUS; SUBCUTANEOUS
Status: DISCONTINUED | OUTPATIENT
Start: 2018-02-03 | End: 2018-02-04

## 2018-02-03 RX ADMIN — Medication 10 ML: at 21:14

## 2018-02-03 RX ADMIN — INSULIN LISPRO 10 UNITS: 100 INJECTION, SOLUTION INTRAVENOUS; SUBCUTANEOUS at 09:06

## 2018-02-03 RX ADMIN — HYDRALAZINE HYDROCHLORIDE 50 MG: 50 TABLET, FILM COATED ORAL at 09:07

## 2018-02-03 RX ADMIN — CLONIDINE HYDROCHLORIDE 0.2 MG: 0.1 TABLET ORAL at 09:07

## 2018-02-03 RX ADMIN — INSULIN LISPRO 10 UNITS: 100 INJECTION, SOLUTION INTRAVENOUS; SUBCUTANEOUS at 17:15

## 2018-02-03 RX ADMIN — INSULIN LISPRO 14 UNITS: 100 INJECTION, SOLUTION INTRAVENOUS; SUBCUTANEOUS at 17:15

## 2018-02-03 RX ADMIN — DIGOXIN 0.12 MG: 125 TABLET ORAL at 09:07

## 2018-02-03 RX ADMIN — CLONIDINE HYDROCHLORIDE 0.2 MG: 0.1 TABLET ORAL at 17:14

## 2018-02-03 RX ADMIN — INSULIN LISPRO 5 UNITS: 100 INJECTION, SOLUTION INTRAVENOUS; SUBCUTANEOUS at 21:15

## 2018-02-03 RX ADMIN — HEPARIN SODIUM 5000 UNITS: 5000 INJECTION, SOLUTION INTRAVENOUS; SUBCUTANEOUS at 03:20

## 2018-02-03 RX ADMIN — SODIUM BICARBONATE 1300 MG: 650 TABLET ORAL at 09:07

## 2018-02-03 RX ADMIN — AMLODIPINE BESYLATE 10 MG: 5 TABLET ORAL at 09:07

## 2018-02-03 RX ADMIN — INSULIN HUMAN 65 UNITS: 100 INJECTION, SUSPENSION SUBCUTANEOUS at 17:15

## 2018-02-03 RX ADMIN — HEPARIN SODIUM 5000 UNITS: 5000 INJECTION, SOLUTION INTRAVENOUS; SUBCUTANEOUS at 17:14

## 2018-02-03 RX ADMIN — CARVEDILOL 25 MG: 12.5 TABLET, FILM COATED ORAL at 09:07

## 2018-02-03 RX ADMIN — PRAVASTATIN SODIUM 20 MG: 10 TABLET ORAL at 21:14

## 2018-02-03 RX ADMIN — SODIUM BICARBONATE 1300 MG: 650 TABLET ORAL at 17:14

## 2018-02-03 RX ADMIN — INSULIN LISPRO 10 UNITS: 100 INJECTION, SOLUTION INTRAVENOUS; SUBCUTANEOUS at 12:06

## 2018-02-03 RX ADMIN — INSULIN LISPRO 7 UNITS: 100 INJECTION, SOLUTION INTRAVENOUS; SUBCUTANEOUS at 12:05

## 2018-02-03 RX ADMIN — VANCOMYCIN HYDROCHLORIDE 1750 MG: 10 INJECTION, POWDER, LYOPHILIZED, FOR SOLUTION INTRAVENOUS at 12:10

## 2018-02-03 RX ADMIN — CARVEDILOL 25 MG: 12.5 TABLET, FILM COATED ORAL at 17:14

## 2018-02-03 RX ADMIN — INSULIN HUMAN 65 UNITS: 100 INJECTION, SUSPENSION SUBCUTANEOUS at 09:06

## 2018-02-03 RX ADMIN — Medication 5 ML: at 14:00

## 2018-02-03 RX ADMIN — HEPARIN SODIUM 5000 UNITS: 5000 INJECTION, SOLUTION INTRAVENOUS; SUBCUTANEOUS at 09:06

## 2018-02-03 RX ADMIN — HYDRALAZINE HYDROCHLORIDE 50 MG: 50 TABLET, FILM COATED ORAL at 17:14

## 2018-02-03 RX ADMIN — INSULIN LISPRO 3 UNITS: 100 INJECTION, SOLUTION INTRAVENOUS; SUBCUTANEOUS at 09:06

## 2018-02-03 NOTE — PROGRESS NOTES
Hospitalist Progress Note    NAME: Rosa Handy   :  1955   MRN:  [id number]       Assessment / Plan:  Diabetes mellitus, insulin dependent, uncontrolled POA  -A1C 10  -BG better today.  -cont' NPO 65 units BID. Lispro premeals. Pt was taking 70/30 at 65 units BID prior to admission  -SSI, diabetic diet, DTC consulted     Constipation  -cont' bowel regimen as ordered      Abscesses of back   H/o MRSA wound infection  -S/p debridement   -Cont  IV vancomycin-managed by primary team     Acute on chronic renal failure with baseline CKD3  -? Progression of CKD/DM nephropathy and AIN due to recent abx  -nephro following     HTN  -continue Clonidine, norvasc. Increase coreg  -holding HCTZ as above  -hydralazine prn     Hyperlipidemia  -continue Statins     H/o a.fib  H/o CVA  -not on anticoagulation  -He seemed to be on plavix but claims not on it  -pharm to help with med rec     H/o hair cell leukemia     Code Status: full  DVT Prophylaxis: per surgery    Obesity -class 2 POA  Body mass index is 37 kg/(m^2). Nutrition consult  Weight loss recommended  Recommended Disposition: Home w/Family     Subjective:     Chief Complaint / Reason for Physician Visit :F/u DM, Renal failure, MRSA wound infection  Pt seen at bedside. BG improving. No acute complaints    Review of Systems:  Symptom Y/N Comments  Symptom Y/N Comments   Fever/Chills n   Chest Pain n    Poor Appetite    Edema n    Cough    Abdominal Pain n    Sputum    Joint Pain     SOB/NAVARRO n   Pruritis/Rash     Nausea/vomit n   Tolerating PT/OT y    Diarrhea    Tolerating Diet y    Constipation    Other       Could NOT obtain due to:      Objective:     VITALS:   Last 24hrs VS reviewed since prior progress note.  Most recent are:  Patient Vitals for the past 24 hrs:   Temp Pulse Resp BP SpO2   18 0957 - - - 170/54 -   18 0913 97.8 °F (36.6 °C) 74 16 183/83 100 %   18 98.5 °F (36.9 °C) 73 16 156/70 99 %   18 1501 98.2 °F (36.8 °C) 84 16 162/76 95 %   02/02/18 1300 97 °F (36.1 °C) 80 16 180/84 95 %       Intake/Output Summary (Last 24 hours) at 02/03/18 1252  Last data filed at 02/03/18 1214   Gross per 24 hour   Intake             1370 ml   Output             3850 ml   Net            -2480 ml        PHYSICAL EXAM:  General: WD, WN. Alert, cooperative, no acute distress, Obese +    EENT:  EOMI. Anicteric sclerae. MMM  Resp:  CTA bilaterally, no wheezing or rales. No accessory muscle use  CV:  Regular  rhythm,  No edema  GI:  Soft, Non distended, NT.  +BS  Neurologic:  Alert and oriented X 3, normal speech   Psych:   Good insight. Not anxious nor agitated  Skin:  No rashes. No jaundice    Reviewed most current lab test results and cultures  YES  Reviewed most current radiology test results   YES  Review and summation of old records today    NO  Reviewed patient's current orders and MAR    YES  PMH/SH reviewed - no change compared to H&P  ________________________________________________________________________  Care Plan discussed with:    Comments   Patient x    Family      RN x    Care Manager     Consultant                        Multidiciplinary team rounds were held today with , nursing, pharmacist and clinical coordinator. Patient's plan of care was discussed; medications were reviewed and discharge planning was addressed. ________________________________________________________________________  Total NON critical care TIME:  35   Minutes    Total CRITICAL CARE TIME Spent:   Minutes non procedure based      Comments   >50% of visit spent in counseling and coordination of care     ________________________________________________________________________  Milly Nichole MD     Procedures: see electronic medical records for all procedures/Xrays and details which were not copied into this note but were reviewed prior to creation of Plan. LABS:  I reviewed today's most current labs and imaging studies.   Pertinent labs include:  Recent Labs      02/02/18 0148   WBC  5.0   HGB  7.8*   HCT  23.5*   PLT  136*     Recent Labs      02/03/18   0322  02/02/18 0148 02/01/18   0231   NA  143  139  139   K  3.9  3.7  3.6   CL  110*  108  107   CO2  23  23  24   GLU  140*  226*  174*   BUN  35*  40*  47*   CREA  3.45*  3.89*  4.38*   CA  8.2*  8.0*  8.2*   PHOS  2.8  3.5  3.8   ALB  2.3*  2.2*  2.2*       Signed: Bridgett Bocanegra MD

## 2018-02-03 NOTE — PROGRESS NOTES
Surgery      Weekend Surgical Coverage    Nursing changing dressings    Glucoses into 300's    Cont present RX. Camron WEBSTER.  Bobo Tadeo MD, [facility]

## 2018-02-03 NOTE — PROGRESS NOTES
General Surgery End of Shift Nursing Note     Bedside shift change report given to EMMY Tyler (oncoming nurse) by EMMY Newton (offgoing nurse).  Report included the following information SBAR, Kardex, Intake/Output and MAR.        Sheree Prado RN

## 2018-02-03 NOTE — PROGRESS NOTES
Pharmacy Automatic Renal Dosing Protocol - Antimicrobials    Indication for Antimicrobials: Abscesses of back    Current Regimen of Each Antimicrobial:  Vancomyin load of 3000 mg then 2000 mg IV every 48 hr (Start Date ; Day # 5)    Previous abx:  Cefepime 2 gm IV every 24 hr    Flagyl 500 mg IV every 8 hr     Goal Level: VANCOMYCIN TROUGH GOAL RANGE  Vancomycin Trough: 10 - 15 mcg/mL  Measured / Extrapolated Vancomycin Level:   2/3/18 @ 03:22= 13.2 mcg/mL (extrapolated to 9.6 mcg/mL)    Significant Cultures:   Wound cx  - heavy MRSA- vanco SANJUANA= 1= FINAL  Anaerobic cx - NEG= final    Paralysis, amputations, malnutrition: None documented     Labs:  Recent Labs      18   0322  18   0148  18   0231   CREA  3.45*  3.89*  4.38*   BUN  35*  40*  47*   WBC   --   5.0   --      Temp (24hrs), Av.9 °F (36.6 °C), Min:97 °F (36.1 °C), Max:98.5 °F (36.9 °C)    Creatinine Clearance (mL/min) or Dialysis: 27 mL/min   No results found for: PCT    Impression/Plan: Vancomycin level was ordered to be drawn with AM labs instead of prior to the 15:00 dose as mentioned on the [facility]. The level resulted as sub-therapeutic at 9.6 mcg/mL. Will adjust vancomycin to 1750 mg IV every 36 hours for an estimated trough of 13.4 mcg/mL. Pharmacy will follow daily and adjust medications as appropriate for renal function and/or serum levels. Recommended duration of therapy  [email or web address]/Pharmacy/Clinical%20Companion/Duration%20of%[email or web address]%[email or web address]. docx    Renal Dosing  [email or web address]/[facility]/Pharmacy/Clinical%20Companion/Renal%[email or web address]. pdf

## 2018-02-04 LAB
GLUCOSE BLD STRIP.AUTO-MCNC: 160 MG/DL (ref 65–100)
GLUCOSE BLD STRIP.AUTO-MCNC: 178 MG/DL (ref 65–100)
GLUCOSE BLD STRIP.AUTO-MCNC: 202 MG/DL (ref 65–100)
GLUCOSE BLD STRIP.AUTO-MCNC: 233 MG/DL (ref 65–100)
SERVICE CMNT-IMP: ABNORMAL

## 2018-02-04 PROCEDURE — 74011636637 HC RX REV CODE- 636/637: Performed by: INTERNAL MEDICINE

## 2018-02-04 PROCEDURE — 65270000029 HC RM PRIVATE

## 2018-02-04 PROCEDURE — 74011250637 HC RX REV CODE- 250/637: Performed by: SURGERY

## 2018-02-04 PROCEDURE — 74011250637 HC RX REV CODE- 250/637: Performed by: INTERNAL MEDICINE

## 2018-02-04 PROCEDURE — 74011250636 HC RX REV CODE- 250/636: Performed by: SURGERY

## 2018-02-04 PROCEDURE — 82962 GLUCOSE BLOOD TEST: CPT

## 2018-02-04 RX ORDER — INSULIN LISPRO 100 [IU]/ML
18 INJECTION, SOLUTION INTRAVENOUS; SUBCUTANEOUS
Status: DISCONTINUED | OUTPATIENT
Start: 2018-02-04 | End: 2018-02-06 | Stop reason: HOSPADM

## 2018-02-04 RX ADMIN — HEPARIN SODIUM 5000 UNITS: 5000 INJECTION, SOLUTION INTRAVENOUS; SUBCUTANEOUS at 17:01

## 2018-02-04 RX ADMIN — HYDRALAZINE HYDROCHLORIDE 50 MG: 50 TABLET, FILM COATED ORAL at 17:02

## 2018-02-04 RX ADMIN — INSULIN LISPRO 18 UNITS: 100 INJECTION, SOLUTION INTRAVENOUS; SUBCUTANEOUS at 17:00

## 2018-02-04 RX ADMIN — DIGOXIN 0.12 MG: 125 TABLET ORAL at 09:06

## 2018-02-04 RX ADMIN — INSULIN HUMAN 65 UNITS: 100 INJECTION, SUSPENSION SUBCUTANEOUS at 09:07

## 2018-02-04 RX ADMIN — HEPARIN SODIUM 5000 UNITS: 5000 INJECTION, SOLUTION INTRAVENOUS; SUBCUTANEOUS at 09:05

## 2018-02-04 RX ADMIN — INSULIN LISPRO 14 UNITS: 100 INJECTION, SOLUTION INTRAVENOUS; SUBCUTANEOUS at 09:07

## 2018-02-04 RX ADMIN — Medication 5 ML: at 06:00

## 2018-02-04 RX ADMIN — INSULIN LISPRO 4 UNITS: 100 INJECTION, SOLUTION INTRAVENOUS; SUBCUTANEOUS at 17:01

## 2018-02-04 RX ADMIN — SODIUM BICARBONATE 1300 MG: 650 TABLET ORAL at 09:06

## 2018-02-04 RX ADMIN — HUMAN INSULIN 65 UNITS: 100 INJECTION, SUSPENSION SUBCUTANEOUS at 16:30

## 2018-02-04 RX ADMIN — HUMAN INSULIN 10 UNITS: 100 INJECTION, SUSPENSION SUBCUTANEOUS at 10:00

## 2018-02-04 RX ADMIN — INSULIN LISPRO 4 UNITS: 100 INJECTION, SOLUTION INTRAVENOUS; SUBCUTANEOUS at 11:30

## 2018-02-04 RX ADMIN — HYDRALAZINE HYDROCHLORIDE 50 MG: 50 TABLET, FILM COATED ORAL at 09:06

## 2018-02-04 RX ADMIN — CLONIDINE HYDROCHLORIDE 0.2 MG: 0.1 TABLET ORAL at 09:06

## 2018-02-04 RX ADMIN — HEPARIN SODIUM 5000 UNITS: 5000 INJECTION, SOLUTION INTRAVENOUS; SUBCUTANEOUS at 00:00

## 2018-02-04 RX ADMIN — CARVEDILOL 25 MG: 12.5 TABLET, FILM COATED ORAL at 09:06

## 2018-02-04 RX ADMIN — INSULIN LISPRO 14 UNITS: 100 INJECTION, SOLUTION INTRAVENOUS; SUBCUTANEOUS at 11:30

## 2018-02-04 RX ADMIN — CLONIDINE HYDROCHLORIDE 0.2 MG: 0.1 TABLET ORAL at 17:01

## 2018-02-04 RX ADMIN — SODIUM BICARBONATE 1300 MG: 650 TABLET ORAL at 17:01

## 2018-02-04 RX ADMIN — INSULIN LISPRO 3 UNITS: 100 INJECTION, SOLUTION INTRAVENOUS; SUBCUTANEOUS at 09:07

## 2018-02-04 RX ADMIN — OXYCODONE HYDROCHLORIDE AND ACETAMINOPHEN 2 TABLET: 5; 325 TABLET ORAL at 13:27

## 2018-02-04 RX ADMIN — CARVEDILOL 25 MG: 12.5 TABLET, FILM COATED ORAL at 17:02

## 2018-02-04 RX ADMIN — AMLODIPINE BESYLATE 10 MG: 5 TABLET ORAL at 09:06

## 2018-02-04 RX ADMIN — DOCUSATE SODIUM 100 MG: 100 CAPSULE, LIQUID FILLED ORAL at 09:06

## 2018-02-04 NOTE — PROGRESS NOTES
Surgery      Weekend Surgical Coverage    Pt resting comfortably    Nursing doing dressing changes    Glucose still elevated 200's    Cont present RX    Dr Shante Pavon returns in [facility] JESSICA Belcher MD, [facility]

## 2018-02-04 NOTE — PROGRESS NOTES
Hospitalist Progress Note    NAME: Jacob Alberto   :  1955   MRN:  [id number]       Assessment / Plan:  Diabetes mellitus, insulin dependent, uncontrolled POA  -A1C 10  -BG high in the evening. BG~160 in the AM  -NHP 70 units before breakfast, 65 units before dinner  -lispro premeals, titrate prn, SSI  -diabetic diet, DTC consulted     Constipation  -cont' bowel regimen as ordered      Abscesses of back   H/o MRSA wound infection  -S/p debridement   -Cont  IV vancomycin-managed by primary team     Acute on chronic renal failure with baseline CKD3  -? Progression of CKD/DM nephropathy and AIN due to recent abx  -nephro following     HTN  -continue Clonidine, norvasc. Increase coreg  -holding HCTZ as above  -hydralazine prn     Hyperlipidemia  -continue Statins     H/o a.fib  H/o CVA  -not on anticoagulation  -He seemed to be on plavix but claims not on it  -pharm to help with med rec     H/o hair cell leukemia     Code Status: full  DVT Prophylaxis: per surgery    Obesity -class 2 POA  Body mass index is 37 kg/(m^2). Nutrition consult  Weight loss recommended  Recommended Disposition: Home w/Family     Subjective:     Chief Complaint / Reason for Physician Visit :F/u DM, Renal failure, MRSA wound infection  Pt seen at bedside. No complaints. BG high in the evening,  But stable around 160s in the AM.    Review of Systems:  Symptom Y/N Comments  Symptom Y/N Comments   Fever/Chills n   Chest Pain n    Poor Appetite    Edema n    Cough    Abdominal Pain n    Sputum    Joint Pain     SOB/NAVARRO n   Pruritis/Rash     Nausea/vomit n   Tolerating PT/OT y    Diarrhea    Tolerating Diet y    Constipation    Other       Could NOT obtain due to:      Objective:     VITALS:   Last 24hrs VS reviewed since prior progress note.  Most recent are:  Patient Vitals for the past 24 hrs:   Temp Pulse Resp BP SpO2   18 0738 98.2 °F (36.8 °C) 63 16 158/75 97 %   18 0400 97.6 °F (36.4 °C) 74 16 144/56 98 % 02/03/18 1859 98.1 °F (36.7 °C) 71 16 130/46 99 %   02/03/18 1635 98.4 °F (36.9 °C) 70 16 159/72 100 %       Intake/Output Summary (Last 24 hours) at 02/04/18 0959  Last data filed at 02/04/18 5624   Gross per 24 hour   Intake              720 ml   Output             2100 ml   Net            -1380 ml        PHYSICAL EXAM:  General: WD, WN. Alert, cooperative, no acute distress, Obese +    EENT:  EOMI. Anicteric sclerae. MMM  Resp:  CTA bilaterally, no wheezing or rales. No accessory muscle use  CV:  Regular  rhythm,  No edema  GI:  Soft, Non distended, NT.  +BS  Neurologic:  Alert and oriented X 3, normal speech   Psych:   Good insight. Not anxious nor agitated  Skin:  No rashes. No jaundice    Reviewed most current lab test results and cultures  YES  Reviewed most current radiology test results   YES  Review and summation of old records today    NO  Reviewed patient's current orders and MAR    YES  PMH/SH reviewed - no change compared to H&P  ________________________________________________________________________  Care Plan discussed with:    Comments   Patient x    Family      RN x    Care Manager     Consultant                        Multidiciplinary team rounds were held today with , nursing, pharmacist and clinical coordinator. Patient's plan of care was discussed; medications were reviewed and discharge planning was addressed. ________________________________________________________________________  Total NON critical care TIME:  35   Minutes    Total CRITICAL CARE TIME Spent:   Minutes non procedure based      Comments   >50% of visit spent in counseling and coordination of care     ________________________________________________________________________  Hoda Love MD     Procedures: see electronic medical records for all procedures/Xrays and details which were not copied into this note but were reviewed prior to creation of Plan.       LABS:  I reviewed today's most current labs and imaging studies.   Pertinent labs include:  Recent Labs      02/02/18 0148   WBC  5.0   HGB  7.8*   HCT  23.5*   PLT  136*     Recent Labs      02/03/18 0322  02/02/18 0148   NA  143  139   K  3.9  3.7   CL  110*  108   CO2  23  23   GLU  140*  226*   BUN  35*  40*   CREA  3.45*  3.89*   CA  8.2*  8.0*   PHOS  2.8  3.5   ALB  2.3*  2.2*       Signed: Haroldo Chery MD

## 2018-02-05 LAB
ALBUMIN SERPL-MCNC: 2.6 G/DL (ref 3.5–5)
ANION GAP SERPL CALC-SCNC: 8 MMOL/L (ref 5–15)
BUN SERPL-MCNC: 29 MG/DL (ref 6–20)
BUN/CREAT SERPL: 10 (ref 12–20)
CALCIUM SERPL-MCNC: 8.3 MG/DL (ref 8.5–10.1)
CHLORIDE SERPL-SCNC: 108 MMOL/L (ref 97–108)
CO2 SERPL-SCNC: 25 MMOL/L (ref 21–32)
CREAT SERPL-MCNC: 2.96 MG/DL (ref 0.7–1.3)
GLUCOSE BLD STRIP.AUTO-MCNC: 119 MG/DL (ref 65–100)
GLUCOSE BLD STRIP.AUTO-MCNC: 221 MG/DL (ref 65–100)
GLUCOSE BLD STRIP.AUTO-MCNC: 311 MG/DL (ref 65–100)
GLUCOSE SERPL-MCNC: 91 MG/DL (ref 65–100)
PHOSPHATE SERPL-MCNC: 3.4 MG/DL (ref 2.6–4.7)
POTASSIUM SERPL-SCNC: 4.1 MMOL/L (ref 3.5–5.1)
SERVICE CMNT-IMP: ABNORMAL
SODIUM SERPL-SCNC: 141 MMOL/L (ref 136–145)

## 2018-02-05 PROCEDURE — 74011250636 HC RX REV CODE- 250/636: Performed by: SURGERY

## 2018-02-05 PROCEDURE — 74011250637 HC RX REV CODE- 250/637: Performed by: INTERNAL MEDICINE

## 2018-02-05 PROCEDURE — 74011636637 HC RX REV CODE- 636/637: Performed by: INTERNAL MEDICINE

## 2018-02-05 PROCEDURE — 36415 COLL VENOUS BLD VENIPUNCTURE: CPT | Performed by: INTERNAL MEDICINE

## 2018-02-05 PROCEDURE — 80069 RENAL FUNCTION PANEL: CPT | Performed by: INTERNAL MEDICINE

## 2018-02-05 PROCEDURE — 65270000029 HC RM PRIVATE

## 2018-02-05 PROCEDURE — 82962 GLUCOSE BLOOD TEST: CPT

## 2018-02-05 PROCEDURE — 74011250637 HC RX REV CODE- 250/637: Performed by: SURGERY

## 2018-02-05 RX ORDER — HYDRALAZINE HYDROCHLORIDE 50 MG/1
50 TABLET, FILM COATED ORAL 3 TIMES DAILY
Status: DISCONTINUED | OUTPATIENT
Start: 2018-02-05 | End: 2018-02-06 | Stop reason: HOSPADM

## 2018-02-05 RX ADMIN — HEPARIN SODIUM 5000 UNITS: 5000 INJECTION, SOLUTION INTRAVENOUS; SUBCUTANEOUS at 08:39

## 2018-02-05 RX ADMIN — HYDRALAZINE HYDROCHLORIDE 50 MG: 50 TABLET, FILM COATED ORAL at 08:38

## 2018-02-05 RX ADMIN — POLYETHYLENE GLYCOL 3350 17 G: 17 POWDER, FOR SOLUTION ORAL at 00:08

## 2018-02-05 RX ADMIN — SODIUM BICARBONATE 1300 MG: 650 TABLET ORAL at 08:38

## 2018-02-05 RX ADMIN — INSULIN LISPRO 2 UNITS: 100 INJECTION, SOLUTION INTRAVENOUS; SUBCUTANEOUS at 23:00

## 2018-02-05 RX ADMIN — VANCOMYCIN HYDROCHLORIDE 1750 MG: 10 INJECTION, POWDER, LYOPHILIZED, FOR SOLUTION INTRAVENOUS at 00:13

## 2018-02-05 RX ADMIN — DOCUSATE SODIUM 100 MG: 100 CAPSULE, LIQUID FILLED ORAL at 08:38

## 2018-02-05 RX ADMIN — HEPARIN SODIUM 5000 UNITS: 5000 INJECTION, SOLUTION INTRAVENOUS; SUBCUTANEOUS at 00:08

## 2018-02-05 RX ADMIN — AMLODIPINE BESYLATE 10 MG: 5 TABLET ORAL at 08:39

## 2018-02-05 RX ADMIN — CARVEDILOL 25 MG: 12.5 TABLET, FILM COATED ORAL at 08:39

## 2018-02-05 RX ADMIN — CARVEDILOL 25 MG: 12.5 TABLET, FILM COATED ORAL at 18:36

## 2018-02-05 RX ADMIN — HYDRALAZINE HYDROCHLORIDE 50 MG: 50 TABLET, FILM COATED ORAL at 21:55

## 2018-02-05 RX ADMIN — HUMAN INSULIN 65 UNITS: 100 INJECTION, SUSPENSION SUBCUTANEOUS at 16:30

## 2018-02-05 RX ADMIN — HYDRALAZINE HYDROCHLORIDE 50 MG: 50 TABLET, FILM COATED ORAL at 18:36

## 2018-02-05 RX ADMIN — PRAVASTATIN SODIUM 20 MG: 10 TABLET ORAL at 00:08

## 2018-02-05 RX ADMIN — INSULIN HUMAN 70 UNITS: 100 INJECTION, SUSPENSION SUBCUTANEOUS at 08:38

## 2018-02-05 RX ADMIN — INSULIN LISPRO 18 UNITS: 100 INJECTION, SOLUTION INTRAVENOUS; SUBCUTANEOUS at 08:37

## 2018-02-05 RX ADMIN — OXYCODONE HYDROCHLORIDE AND ACETAMINOPHEN 2 TABLET: 5; 325 TABLET ORAL at 12:46

## 2018-02-05 RX ADMIN — INSULIN LISPRO 10 UNITS: 100 INJECTION, SOLUTION INTRAVENOUS; SUBCUTANEOUS at 18:33

## 2018-02-05 RX ADMIN — CLONIDINE HYDROCHLORIDE 0.2 MG: 0.1 TABLET ORAL at 18:36

## 2018-02-05 RX ADMIN — INSULIN LISPRO 18 UNITS: 100 INJECTION, SOLUTION INTRAVENOUS; SUBCUTANEOUS at 18:32

## 2018-02-05 RX ADMIN — CLONIDINE HYDROCHLORIDE 0.2 MG: 0.1 TABLET ORAL at 08:39

## 2018-02-05 RX ADMIN — DIGOXIN 0.12 MG: 125 TABLET ORAL at 08:38

## 2018-02-05 RX ADMIN — OXYCODONE HYDROCHLORIDE AND ACETAMINOPHEN 2 TABLET: 5; 325 TABLET ORAL at 00:08

## 2018-02-05 RX ADMIN — PRAVASTATIN SODIUM 20 MG: 10 TABLET ORAL at 21:55

## 2018-02-05 RX ADMIN — SODIUM BICARBONATE 1300 MG: 650 TABLET ORAL at 18:36

## 2018-02-05 NOTE — PROGRESS NOTES
Problem: Falls - Risk of  Goal: *Absence of Falls  Document Vishnu Fall Risk and appropriate interventions in the flowsheet.    Outcome: Progressing Towards Goal  Fall Risk Interventions:  Mobility Interventions: Patient to call before getting OOB         Medication Interventions: Patient to call before getting OOB

## 2018-02-05 NOTE — PROGRESS NOTES
Nephrology Progress Note     [place]     [email or web address]. [email or web address]                  Phone - [phone]   Patient: Nico Godoy   YOB: 1955    Date- 2/5/2018     CC: Follow up for arf  On ckd  Subjective: Interval History:   -  Cr. Improved  bp high  Hb low    ROS:  Pt feeling relatively well and denies any complaints. No HEENT complaints. No fever/chills. No SOB. No GI sx's. No skin rashes. No joint pain. Assessment:     1. Acute kidney injury, multifactorial, could be dehydration from hydrochlorothiazide or possible progression of his chronic  kidney disease. The renal function is slightly improved on 2/5 with a Bun/creat down to 29/3.0  2. Anemia. - received epogen and iv iron. Hb down to 7.7 on 2/5.  3.  Hypertension. 4.  metabolic acidosis. 5.  Skin abscesses. 6.  History of atrial  fibrillation. 7.  History of cerebrovascular  accident. 8.  History of hairy cell leukemia. 9.  Diabetes Mellitus     Plan:     Increase hydralazine to 50 mg tid  continue po bicarb  S/p epogen and iron  Will recheck iron stores in AM.  Continue norvasc 10 mg, coreg 25 mg bid, clonidine 0.2 mg bid,   Daily labs. On vanco. Per primary team.     Care Plan discussed with: patient   Review of Systems: Pertinent items are noted in HPI.   Objective:   Vitals:    02/04/18 1117 02/04/18 1524 02/05/18 0407 02/05/18 0728   BP: 173/68 152/67 137/64 162/82   Pulse: 68 68 63 64   Resp: 16 15 18 16   Temp: 98.5 °F (36.9 °C) 98.2 °F (36.8 °C) 98 °F (36.7 °C) 97.6 °F (36.4 °C)   SpO2: 96% 93% 96% 95%   Weight:       Height:         Last 3 Recorded Weights in this Encounter    02/02/18 0543 02/03/18 0635 02/04/18 0648   Weight: 141.5 kg (312 lb) 141.5 kg (312 lb) 141.5 kg (312 lb)       Intake/Output Summary (Last 24 hours) at 02/05/18 0953  Last data filed at 02/05/18 0733   Gross per 24 hour   Intake                0 ml   Output             2110 ml   Net            -2110 ml Physical Exam:   GEN: obese man in no distress  NECK- Supple, no JVD  RESP: clear lungs; No accessory muscle use  CVS: RRR; no gallop or rub  EXT: No Edema   NEURO: non focal, normal speech; no tremor  SKIN: No Rash, No Jaundice  ABDO: soft , non tender, No hepatosplenomegaly  Psych: normal affect and mood    Chart reviewed. Pertinent Notes reviewed. Medications list  reviewed       Data Review :  Recent Labs      02/05/18   0412  02/03/18   0322   NA  141  143   K  4.1  3.9   CL  108  110*   CO2  25  23   GLU  91  140*   BUN  29*  35*   CREA  2.96*  3.45*   CA  8.3*  8.2*   PHOS  3.4  2.8   ALB  2.6*  2.3*     No results for input(s): WBC, HGB, HCT, PLT, HGBEXT, HCTEXT, PLTEXT, HGBEXT, HCTEXT, PLTEXT in the last 72 hours.   Lab Results   Component Value Date/Time    Specimen Description: PBC 07/26/2013 05:28 PM    Specimen Description: BLOOD 09/30/2009 04:55 PM     Current Facility-Administered Medications   Medication    insulin NPH (NOVOLIN N, HUMULIN N) injection 70 Units    insulin NPH (NOVOLIN N, HUMULIN N) injection 65 Units    insulin lispro (HUMALOG) injection 18 Units    carvedilol (COREG) tablet 25 mg    hydrALAZINE (APRESOLINE) 20 mg/mL injection 10 mg    hydrALAZINE (APRESOLINE) tablet 50 mg    sodium bicarbonate tablet 1,300 mg    influenza vaccine 2017-18 (3 yrs+)(PF) (FLUZONE QUAD/FLUARIX QUAD) injection 0.5 mL    insulin lispro (HUMALOG) injection    amLODIPine (NORVASC) tablet 10 mg    cloNIDine HCl (CATAPRES) tablet 0.2 mg    digoxin (LANOXIN) tablet 0.125 mg    pravastatin (PRAVACHOL) tablet 20 mg    glucose chewable tablet 16 g    dextrose (D50W) injection syrg 12.5-25 g    glucagon (GLUCAGEN) injection 1 mg    sodium chloride (NS) flush 5-10 mL    sodium chloride (NS) flush 5-10 mL    oxyCODONE-acetaminophen (PERCOCET) 5-325 mg per tablet 1-2 Tab    naloxone (NARCAN) injection 0.4 mg    ondansetron (ZOFRAN) injection 4 mg    docusate sodium (COLACE) capsule 100 mg    polyethylene glycol (MIRALAX) packet 17 g    heparin (porcine) injection 5,000 Units    HYDROmorphone (DILAUDID) injection 1 mg       Concepción Najera MD  [facility]  [email or web address]  [address], [address]  [place], [address]  Phone - [phone]         Fax - [phone]  [facility] Office  [address] [place] [address]  Phone - [phone]        Fax - [phone]

## 2018-02-05 NOTE — PROGRESS NOTES
Hospitalist Progress Note    NAME: Sabino Shaikh   :  1955   MRN:  [id number]       Assessment / Plan:  Diabetes mellitus, insulin dependent, uncontrolled POA  -A1C 10  -BG improved on NHP 70 units before breakfast, 65 units before dinner  -lispro premeals, titrate prn, SSI  -diabetic diet, DTC consulted     Constipation  -cont' bowel regimen as ordered      Abscesses of back   H/o MRSA wound infection  -S/p debridement   -Cont  IV vancomycin-managed by primary team     Acute on chronic renal failure with baseline CKD3  -? Progression of CKD/DM nephropathy and AIN due to recent abx  -nephro following     HTN  -continue clonidine, norvasc, coreg  -holding HCTZ as above  -hydralazine prn     Hyperlipidemia  -continue Statins     H/o a.fib  H/o CVA  -not on anticoagulation  -He seemed to be on plavix but claims not on it  -pharm to help with med rec     H/o hair cell leukemia     Code Status: full  DVT Prophylaxis: per surgery    Obesity -class 2 POA  Body mass index is 37 kg/(m^2). Nutrition consult  Weight loss recommended  Recommended Disposition: Home w/Family     Subjective:     Chief Complaint / Reason for Physician Visit :F/u DM, Renal failure, MRSA wound infection  Pt seen at bedside. No complaints. BG better. Review of Systems:  Symptom Y/N Comments  Symptom Y/N Comments   Fever/Chills n   Chest Pain n    Poor Appetite    Edema n    Cough    Abdominal Pain n    Sputum    Joint Pain     SOB/NAVARRO n   Pruritis/Rash     Nausea/vomit n   Tolerating PT/OT     Diarrhea    Tolerating Diet y    Constipation    Other       Could NOT obtain due to:      Objective:     VITALS:   Last 24hrs VS reviewed since prior progress note.  Most recent are:  Patient Vitals for the past 24 hrs:   Temp Pulse Resp BP SpO2   18 1114 97.9 °F (36.6 °C) 66 16 158/74 94 %   18 0728 97.6 °F (36.4 °C) 64 16 162/82 95 %   18 0407 98 °F (36.7 °C) 63 18 137/64 96 %       Intake/Output Summary (Last 24 hours) at 02/05/18 1526  Last data filed at 02/05/18 0733   Gross per 24 hour   Intake                0 ml   Output             1750 ml   Net            -1750 ml        PHYSICAL EXAM:  General: WD, WN. Alert, cooperative, no acute distress, Obese +    EENT:  EOMI. Anicteric sclerae. MMM  Resp:  CTA bilaterally, no wheezing or rales. No accessory muscle use  CV:  Regular  rhythm,  No edema  GI:  Soft, Non distended, NT.  +BS  Neurologic:  Alert and oriented X 3, normal speech   Psych:   Good insight. Not anxious nor agitated  Skin:  No rashes. No jaundice    Reviewed most current lab test results and cultures  YES  Reviewed most current radiology test results   YES  Review and summation of old records today    NO  Reviewed patient's current orders and MAR    YES  PMH/SH reviewed - no change compared to H&P  ________________________________________________________________________  Care Plan discussed with:    Comments   Patient x    Family      RN x    Care Manager     Consultant                        Multidiciplinary team rounds were held today with , nursing, pharmacist and clinical coordinator. Patient's plan of care was discussed; medications were reviewed and discharge planning was addressed. ________________________________________________________________________  Total NON critical care TIME:  35   Minutes    Total CRITICAL CARE TIME Spent:   Minutes non procedure based      Comments   >50% of visit spent in counseling and coordination of care     ________________________________________________________________________  Marilee Dixon MD     Procedures: see electronic medical records for all procedures/Xrays and details which were not copied into this note but were reviewed prior to creation of Plan. LABS:  I reviewed today's most current labs and imaging studies.   Pertinent labs include:  No results for input(s): WBC, HGB, HCT, PLT, HGBEXT, HCTEXT, PLTEXT, HGBEXT, HCTEXT, PLTEXT in the last 72 hours.   Recent Labs      02/05/18   0412  02/03/18   0322   NA  141  143   K  4.1  3.9   CL  108  110*   CO2  25  23   GLU  91  140*   BUN  29*  35*   CREA  2.96*  3.45*   CA  8.3*  8.2*   PHOS  3.4  2.8   ALB  2.6*  2.3*       Signed: Janis Patterson MD

## 2018-02-05 NOTE — PROGRESS NOTES
Surgery    No complaint. Afebrile, BP mildy elevated. Dressing changes BID. Hydralazine increased today by nephrologist.  Creatinie improving. I have stopped IV vancomycin      Imp - Stable / Improving. Plan - If BP, creatinine, glucose satisfactory tomorrow, plan discharge with Home Health.     Bob López MD

## 2018-02-05 NOTE — PROGRESS NOTES
The patient is a 57year old male who was admitted for poorly controlled diabetes, recurrent skjn and subcutaneous abscesses and presented to the [facility] ith several new abscesses on the back too large for outpatient treatment. He has a history of afib, Blastic NK-cell hymphoma, cancer, chronic kidney disease, CKD stage 4, DM,HTN, and neuropathy. Care Management Interventions  PCP Verified by CM: Yes (April 2017)  Mode of Transport at Discharge: Other (see comment) (girlfriend by car)  Transition of Care Consult (CM Consult): Home Health ([address] [place] [address])  [address]: No  Reason Outside [facility]: Patient already serviced by other home care/hospice agency  Discharge Durable Medical Equipment: No (rolling walker)  Physical Therapy Consult: No  Occupational Therapy Consult: No  Speech Therapy Consult: No  Current Support Network: Lives with Caregiver (and girlfriend-ambulates with rolling walker. has 3 children at home one story home. he ambulates independently and has a rolling walker at home if needed. she takes him to his appointments and does his wound care)  Confirm Follow Up Transport:  (girlfriend by car)  Plan discussed with Pt/Family/Caregiver: Yes (plans to go home with home health )   Referral sent to [address] [place] [address]. CM will follow for discharge planning.  Devyn Turpin RN #[id number]

## 2018-02-06 VITALS
HEART RATE: 77 BPM | OXYGEN SATURATION: 96 % | DIASTOLIC BLOOD PRESSURE: 76 MMHG | BODY MASS INDEX: 36.84 KG/M2 | WEIGHT: 312 LBS | RESPIRATION RATE: 18 BRPM | TEMPERATURE: 98.1 F | HEIGHT: 77 IN | SYSTOLIC BLOOD PRESSURE: 150 MMHG

## 2018-02-06 LAB
ALBUMIN SERPL-MCNC: 2.6 G/DL (ref 3.5–5)
ANION GAP SERPL CALC-SCNC: 8 MMOL/L (ref 5–15)
BUN SERPL-MCNC: 28 MG/DL (ref 6–20)
BUN/CREAT SERPL: 9 (ref 12–20)
CALCIUM SERPL-MCNC: 8.3 MG/DL (ref 8.5–10.1)
CHLORIDE SERPL-SCNC: 106 MMOL/L (ref 97–108)
CO2 SERPL-SCNC: 25 MMOL/L (ref 21–32)
CREAT SERPL-MCNC: 3.05 MG/DL (ref 0.7–1.3)
ERYTHROCYTE [DISTWIDTH] IN BLOOD BY AUTOMATED COUNT: 13.1 % (ref 11.5–14.5)
GLUCOSE BLD STRIP.AUTO-MCNC: 156 MG/DL (ref 65–100)
GLUCOSE BLD STRIP.AUTO-MCNC: 205 MG/DL (ref 65–100)
GLUCOSE BLD STRIP.AUTO-MCNC: 265 MG/DL (ref 65–100)
GLUCOSE SERPL-MCNC: 115 MG/DL (ref 65–100)
HCT VFR BLD AUTO: 23.7 % (ref 36.6–50.3)
HGB BLD-MCNC: 7.9 G/DL (ref 12.1–17)
IRON SATN MFR SERPL: 41 % (ref 20–50)
IRON SERPL-MCNC: 79 UG/DL (ref 35–150)
MCH RBC QN AUTO: 30.3 PG (ref 26–34)
MCHC RBC AUTO-ENTMCNC: 33.3 G/DL (ref 30–36.5)
MCV RBC AUTO: 90.8 FL (ref 80–99)
NRBC # BLD: 0.06 K/UL (ref 0–0.01)
NRBC BLD-RTO: 0.8 PER 100 WBC
PHOSPHATE SERPL-MCNC: 3.4 MG/DL (ref 2.6–4.7)
PLATELET # BLD AUTO: 189 K/UL (ref 150–400)
PMV BLD AUTO: 11.9 FL (ref 8.9–12.9)
POTASSIUM SERPL-SCNC: 4.2 MMOL/L (ref 3.5–5.1)
RBC # BLD AUTO: 2.61 M/UL (ref 4.1–5.7)
SERVICE CMNT-IMP: ABNORMAL
SODIUM SERPL-SCNC: 139 MMOL/L (ref 136–145)
TIBC SERPL-MCNC: 195 UG/DL (ref 250–450)
WBC # BLD AUTO: 7.6 K/UL (ref 4.1–11.1)

## 2018-02-06 PROCEDURE — 83540 ASSAY OF IRON: CPT | Performed by: INTERNAL MEDICINE

## 2018-02-06 PROCEDURE — 74011250637 HC RX REV CODE- 250/637: Performed by: INTERNAL MEDICINE

## 2018-02-06 PROCEDURE — 82962 GLUCOSE BLOOD TEST: CPT

## 2018-02-06 PROCEDURE — 74011250637 HC RX REV CODE- 250/637: Performed by: SURGERY

## 2018-02-06 PROCEDURE — 85027 COMPLETE CBC AUTOMATED: CPT | Performed by: INTERNAL MEDICINE

## 2018-02-06 PROCEDURE — 36415 COLL VENOUS BLD VENIPUNCTURE: CPT | Performed by: INTERNAL MEDICINE

## 2018-02-06 PROCEDURE — 74011636637 HC RX REV CODE- 636/637: Performed by: INTERNAL MEDICINE

## 2018-02-06 PROCEDURE — 74011250636 HC RX REV CODE- 250/636: Performed by: SURGERY

## 2018-02-06 PROCEDURE — 80069 RENAL FUNCTION PANEL: CPT | Performed by: INTERNAL MEDICINE

## 2018-02-06 RX ORDER — HYDROCODONE BITARTRATE AND ACETAMINOPHEN 5; 325 MG/1; MG/1
1 TABLET ORAL
Qty: 20 TAB | Refills: 0 | Status: ON HOLD | OUTPATIENT
Start: 2018-02-06 | End: 2018-03-29

## 2018-02-06 RX ORDER — SODIUM BICARBONATE 650 MG/1
650 TABLET ORAL 2 TIMES DAILY
Qty: 60 TAB | Refills: 1 | Status: SHIPPED | OUTPATIENT
Start: 2018-02-07

## 2018-02-06 RX ORDER — SODIUM BICARBONATE 650 MG/1
650 TABLET ORAL 2 TIMES DAILY
Status: DISCONTINUED | OUTPATIENT
Start: 2018-02-06 | End: 2018-02-06 | Stop reason: HOSPADM

## 2018-02-06 RX ORDER — CARVEDILOL 25 MG/1
25 TABLET ORAL 2 TIMES DAILY WITH MEALS
Qty: 60 TAB | Refills: 1 | Status: SHIPPED | OUTPATIENT
Start: 2018-02-07

## 2018-02-06 RX ORDER — HYDRALAZINE HYDROCHLORIDE 50 MG/1
50 TABLET, FILM COATED ORAL 3 TIMES DAILY
Qty: 90 TAB | Refills: 1 | Status: SHIPPED | OUTPATIENT
Start: 2018-02-06

## 2018-02-06 RX ORDER — AMLODIPINE BESYLATE 10 MG/1
10 TABLET ORAL DAILY
Qty: 30 TAB | Refills: 1 | Status: SHIPPED | OUTPATIENT
Start: 2018-02-06

## 2018-02-06 RX ADMIN — HEPARIN SODIUM 5000 UNITS: 5000 INJECTION, SOLUTION INTRAVENOUS; SUBCUTANEOUS at 01:03

## 2018-02-06 RX ADMIN — INSULIN LISPRO 18 UNITS: 100 INJECTION, SOLUTION INTRAVENOUS; SUBCUTANEOUS at 17:34

## 2018-02-06 RX ADMIN — INSULIN LISPRO 4 UNITS: 100 INJECTION, SOLUTION INTRAVENOUS; SUBCUTANEOUS at 11:30

## 2018-02-06 RX ADMIN — SODIUM BICARBONATE 650 MG: 650 TABLET ORAL at 17:35

## 2018-02-06 RX ADMIN — Medication 10 ML: at 01:03

## 2018-02-06 RX ADMIN — HUMAN INSULIN 65 UNITS: 100 INJECTION, SUSPENSION SUBCUTANEOUS at 17:35

## 2018-02-06 RX ADMIN — INSULIN LISPRO 18 UNITS: 100 INJECTION, SOLUTION INTRAVENOUS; SUBCUTANEOUS at 08:30

## 2018-02-06 RX ADMIN — INSULIN HUMAN 70 UNITS: 100 INJECTION, SUSPENSION SUBCUTANEOUS at 08:29

## 2018-02-06 RX ADMIN — SODIUM BICARBONATE 1300 MG: 650 TABLET ORAL at 08:29

## 2018-02-06 RX ADMIN — HEPARIN SODIUM 5000 UNITS: 5000 INJECTION, SOLUTION INTRAVENOUS; SUBCUTANEOUS at 08:30

## 2018-02-06 RX ADMIN — HYDRALAZINE HYDROCHLORIDE 50 MG: 50 TABLET, FILM COATED ORAL at 17:35

## 2018-02-06 RX ADMIN — INSULIN LISPRO 18 UNITS: 100 INJECTION, SOLUTION INTRAVENOUS; SUBCUTANEOUS at 11:30

## 2018-02-06 RX ADMIN — CLONIDINE HYDROCHLORIDE 0.2 MG: 0.1 TABLET ORAL at 17:35

## 2018-02-06 RX ADMIN — CARVEDILOL 25 MG: 12.5 TABLET, FILM COATED ORAL at 08:29

## 2018-02-06 RX ADMIN — HYDRALAZINE HYDROCHLORIDE 50 MG: 50 TABLET, FILM COATED ORAL at 08:29

## 2018-02-06 RX ADMIN — CARVEDILOL 25 MG: 12.5 TABLET, FILM COATED ORAL at 17:35

## 2018-02-06 RX ADMIN — AMLODIPINE BESYLATE 10 MG: 5 TABLET ORAL at 08:29

## 2018-02-06 RX ADMIN — OXYCODONE HYDROCHLORIDE AND ACETAMINOPHEN 2 TABLET: 5; 325 TABLET ORAL at 15:08

## 2018-02-06 RX ADMIN — CLONIDINE HYDROCHLORIDE 0.2 MG: 0.1 TABLET ORAL at 08:29

## 2018-02-06 RX ADMIN — INSULIN LISPRO 4 UNITS: 100 INJECTION, SOLUTION INTRAVENOUS; SUBCUTANEOUS at 17:34

## 2018-02-06 RX ADMIN — DIGOXIN 0.12 MG: 125 TABLET ORAL at 08:29

## 2018-02-06 NOTE — PROGRESS NOTES
Surgery    No complaint. Mild pain with packing change. Afebrile, VSS. Surgical sites OK. Glucose mostly below 200  -  Good for him. BP improved. OK for discharge. I will send out on current meds. Follow up with me in 1 week. Follow up with primary MD in 2 weeks. Follow up with Dr Franco [facility] 2 weeks.     Ava Isabel MD

## 2018-02-06 NOTE — PROGRESS NOTES
Nephrology Progress Note     [place]     [facility]. [email or web address]                  Phone - [phone]   Patient: Benigno Patel   YOB: 1955    Date- 2/6/2018     CC: Follow up for arf  On ckd  Subjective: Interval History:     Renal function improved overall, but not much change from yesterday. BP's 370/187 systolic. Hydralazine just increased yesterday to 50 mg tid. The patient is feeling well and has no complaints except that he wants to go home. He says he has been here too long. ROS:  Pt feeling relatively well and denies any complaints. No HEENT complaints. No fever/chills. No SOB. No GI sx's. No skin rashes but +itching. No joint pain. Assessment:     1. Acute kidney injury, multifactorial, could be dehydration from hydrochlorothiazide or possible progression of his chronic  kidney disease. The renal function was slightly improved on 2/5 with a Bun/creat down to 29/3.0 but not much change on 2/6: 28/3.1.  2.  Anemia. - received epogen and iv iron but is not on EPO now. Hb 7.9 on 2/6. Iron stores now good. 3.  Hypertension. 4.  metabolic acidosis. 5.  Skin abscesses. 6.  History of atrial  fibrillation. 7.  History of cerebrovascular  accident. 8.  History of hairy cell leukemia. 9.  Diabetes Mellitus     Plan:     Increase hydralazine to 50 mg tid  decrease po bicarb to 650 mg bid. Reorder EPO. Continue norvasc 10 mg, coreg 25 mg bid, clonidine 0.2 mg bid,   Daily labs. Vanco per primary team.     Care Plan discussed with: patient   Review of Systems: Pertinent items are noted in HPI.   Objective:   Vitals:    02/05/18 1550 02/05/18 2152 02/06/18 0113 02/06/18 0753   BP: 151/75 132/60 134/58 174/76   Pulse: 75 64 66 74   Resp: 16 16 16 18   Temp: 97.6 °F (36.4 °C) 98.2 °F (36.8 °C) 97.6 °F (36.4 °C) 97.9 °F (36.6 °C)   SpO2: 97% 98% 98%    Weight:       Height:         Last 3 Recorded Weights in this Encounter    02/02/18 0543 02/03/18 0692 02/04/18 0648   Weight: 141.5 kg (312 lb) 141.5 kg (312 lb) 141.5 kg (312 lb)       Intake/Output Summary (Last 24 hours) at 02/06/18 1014  Last data filed at 02/06/18 0640   Gross per 24 hour   Intake                0 ml   Output             1750 ml   Net            -1750 ml     Physical Exam:   GEN: obese man in no distress  NECK- Supple, no JVD  RESP: clear lungs; No accessory muscle use  CVS: RRR; no gallop or rub  EXT: No Edema   NEURO: non focal, normal speech; no tremor  SKIN: No Rash, No Jaundice but there is sign of scratching. The patient says he can't help himself  ABDO: soft , non tender, No hepatosplenomegaly  Psych: normal affect and mood    Chart reviewed. Pertinent Notes reviewed.      Medications list  reviewed       Data Review :  Recent Labs      02/06/18   0307  02/05/18   0412   NA  139  141   K  4.2  4.1   CL  106  108   CO2  25  25   GLU  115*  91   BUN  28*  29*   CREA  3.05*  2.96*   CA  8.3*  8.3*   PHOS  3.4  3.4   ALB  2.6*  2.6*     Recent Labs      02/06/18   0307   WBC  7.6   HGB  7.9*   HCT  23.7*   PLT  189     Lab Results   Component Value Date/Time    Specimen Description: PBC 07/26/2013 05:28 PM    Specimen Description: BLOOD 09/30/2009 04:55 PM     Current Facility-Administered Medications   Medication    hydrALAZINE (APRESOLINE) tablet 50 mg    insulin NPH (NOVOLIN N, HUMULIN N) injection 70 Units    insulin NPH (NOVOLIN N, HUMULIN N) injection 65 Units    insulin lispro (HUMALOG) injection 18 Units    carvedilol (COREG) tablet 25 mg    hydrALAZINE (APRESOLINE) 20 mg/mL injection 10 mg    sodium bicarbonate tablet 1,300 mg    influenza vaccine 2017-18 (3 yrs+)(PF) (FLUZONE QUAD/FLUARIX QUAD) injection 0.5 mL    insulin lispro (HUMALOG) injection    amLODIPine (NORVASC) tablet 10 mg    cloNIDine HCl (CATAPRES) tablet 0.2 mg    digoxin (LANOXIN) tablet 0.125 mg    pravastatin (PRAVACHOL) tablet 20 mg    glucose chewable tablet 16 g    dextrose (D50W) injection syrg 12.5-25 g    glucagon (GLUCAGEN) injection 1 mg    sodium chloride (NS) flush 5-10 mL    sodium chloride (NS) flush 5-10 mL    oxyCODONE-acetaminophen (PERCOCET) 5-325 mg per tablet 1-2 Tab    naloxone (NARCAN) injection 0.4 mg    ondansetron (ZOFRAN) injection 4 mg    docusate sodium (COLACE) capsule 100 mg    polyethylene glycol (MIRALAX) packet 17 g    heparin (porcine) injection 5,000 Units    HYDROmorphone (DILAUDID) injection 1 mg       Nirav Rhodes MD  [facility]  [email or web address]  [address] , [facility], [address] [facility]  Phone - [phone]         Fax - [phone]  [facility] Office  [address] [place] [address]  Phone - [phone]        Fax - [phone]

## 2018-02-06 NOTE — PROGRESS NOTES
Interdisciplinary Rounds were completed on this patient. Rounds included nursing, clinical care leader, pharmacy, and case management. Patient was doing well without problems. Goals for the day will include: monitor labs (creatinine increased). Home health set up with [facility].

## 2018-02-06 NOTE — ROUTINE PROCESS
General Surgery End of Shift Nursing Note    Bedside shift change report given to 81Shon Yuan (oncoming nurse) by Elton Mcarthur (offgoing nurse). Report included the following information SBAR, Kardex, OR Summary, Procedure Summary, Intake/Output, MAR and Recent Results. Shift worked:   night   Summary of shift:    POD#; dressing change done; cr slightly elevated from yesterday.    Issues for physician to address:   none     Number times ambulated in hallway past shift: 0    Number of times OOB to chair past shift: 0    Pain Management:  Current medication: dilaudid  Patient states pain is manageable on current pain medication: YES    GI:    Current diet:  DIET DIABETIC WITH OPTIONS Consistent Carb 1800kcal; Regular    Tolerating current diet: YES  Passing flatus: NO  Last Bowel Movement: several days ago   Appearance:     Respiratory:    Incentive Spirometer at bedside: YES  Patient instructed on use: YES    Patient Safety:    Falls Score: [place]

## 2018-02-06 NOTE — DISCHARGE INSTRUCTIONS
Discharge Instructions after Incision and Drainage of Abscess        Home Health nurses will change packing and dressing daily. Take pain medicine as prescribed as needed. Stop using as soon as possible, use Tylenol as needed. Do not drive while taking narcotic pain medication. It is OK to shower before dressing changes. Do not share soap, towel, wash cloth, etc with other family members while you have an open wound. Measure your blood sugar level and give short acting insulin, dosed according to your Sliding Scale. Follow  a diabetic diet. See Dr. Leti Morales in the office in one week - call [phone]    See Sean Andersen NP in 2 weeks. See Dr Melisa Orellana (kidney specialist) in the office in 2 weeks  -  [phone]      If you have any problems, call Dr. Leti Morales  [phone] or [phone] (after hours). QUENTIN Morales MD

## 2018-02-06 NOTE — DIABETES MGMT
DTC Progress Note    Recommendations/ Comments: Please consider increasing prandial humalog to 22units ac tid. Current hospital DM medication: NPH 70units ac b, 65units ac d and humalog 18units ac tid plus correction    Chart reviewed on Nico Godoy.    Patient is a 57 y.o. male with known Type 2 Diabetes on Novolin 70/30 65units ac b/d at home. A1c:   Lab Results   Component Value Date/Time    Hemoglobin A1c 10.1 (H) 01/31/2018 02:49 AM    Hemoglobin A1c 9.7 (H) 07/30/2017 04:53 AM       Recent Glucose Results:   Lab Results   Component Value Date/Time     (H) 02/06/2018 03:07 AM    GLUCPOC 205 (H) 02/06/2018 04:29 PM    GLUCPOC 265 (H) 02/06/2018 11:34 AM    GLUCPOC 156 (H) 02/06/2018 08:03 AM        Lab Results   Component Value Date/Time    Creatinine 3.05 (H) 02/06/2018 03:07 AM     Estimated Creatinine Clearance: 38.1 mL/min (based on Cr of 3.05). Active Orders   Diet    DIET DIABETIC WITH OPTIONS Consistent Carb 1800kcal; Regular        PO intake:   Patient Vitals for the past 72 hrs:   % Diet Eaten   02/04/18 0738 100 %   02/03/18 1743 100 %       Will continue to follow as needed.     Thank you  MICH Vega, RN, [address]

## 2018-02-07 NOTE — PROGRESS NOTES
Peripheral IV access removed. Discharge instructions and prescriptions given to patient and spouse. No further questions for MD or RN at this time. Pt taken out in wheelchair by PCT. All of belongings with patients family.

## 2018-02-08 NOTE — PROGRESS NOTES
SHELBI spoke to Isabel Theodore from [employer] [facility] and she indicated that they were actually seeing this Pt prior to the hospitalization. CM uploaded information into [employer] so that they have this information. Kristina has been in contact with PCP and they are following this Pt at home.       Meek Florez [phone]

## 2018-02-25 NOTE — DISCHARGE SUMMARY
Physician Discharge Summary     Patient ID:  Jacob Alberto  [id number]  57 y.o.  1955    Allergies: Review of patient's allergies indicates no known allergies. Admit Date: 1/30/2018    Discharge Date: 2/6/2018. * Admission Diagnoses: ABSCESS BACK, RIGHT LOWER  ;Abscess of back;Diabetes mellit*    * Discharge Diagnoses:    Hospital Problems as of 2/6/2018  Date Reviewed: 2/5/2018          Codes Class Noted - Resolved POA    Abscess of back ICD-10-CM: L02.212  ICD-9-CM: 682.2  1/30/2018 - Present Unknown        Diabetes mellitus, insulin dependent (IDDM), uncontrolled ([facility] [address] ICD-10-CM: E10.65  ICD-9-CM: 250.03  1/30/2018 - Present Unknown               Admission Condition: Fair    * Discharge Condition: improved    * Procedures: Procedure(s):  [facility] Course:   Normal hospital course for this procedure. Consults: Hospitalist    Significant Diagnostic Studies: microbiology: wound culture: positive for MRSA    * Disposition: Home with Home Health    Discharge Medications:   Discharge Medication List as of 2/6/2018  6:04 PM      START taking these medications    Details   hydrALAZINE (APRESOLINE) 50 mg tablet Take 1 Tab by mouth three (3) times daily. , Print, Disp-90 Tab, R-1      sodium bicarbonate 650 mg tablet Take 1 Tab by mouth two (2) times a day., Print, Disp-60 Tab, R-1      HYDROcodone-acetaminophen (NORCO) 5-325 mg per tablet Take 1 Tab by mouth every four (4) hours as needed for Pain. Max Daily Amount: 6 Tabs., Print, Disp-20 Tab, R-0         CONTINUE these medications which have CHANGED    Details   amLODIPine (NORVASC) 10 mg tablet Take 1 Tab by mouth daily. , Print, Disp-30 Tab, R-1      carvedilol (COREG) 25 mg tablet Take 1 Tab by mouth two (2) times daily (with meals). , Print, Disp-60 Tab, R-1         CONTINUE these medications which have NOT CHANGED    Details   clopidogrel (PLAVIX) 75 mg tab Take 75 mg by mouth daily. , Historical Med NOVOLIN 70/30 100 unit/mL (70-30) injection 65 Units by SubCUTAneous route Before breakfast and dinner., Historical Med, FLORIDALMA      cloNIDine HCl (CATAPRES) 0.2 mg tablet Take 1 Tab by mouth two (2) times a day., Print, Disp-30 Tab, R-0      pravastatin (PRAVACHOL) 20 mg tablet Take 20 mg by mouth nightly., Historical Med      digoxin (LANOXIN) 0.125 mg tablet Take 0.125 mg by mouth daily. Indications: afib, Historical Med      lancets 30 gauge misc Historical Med      SURE COMFORT INSULIN SYRINGE 1 mL 31 gauge x 5/16 syrg Historical Med, FLORIDALMA      ACCU-CHEK SOFÍA CONTROL SOLN soln Historical Med, FLORIDALMA      ACCU-CHEK SOFÍA PLUS TEST STRP strip Historical Med, FLORIDALMA      alcohol swabs (ALCOHOL PADS) padm 1 Each by Apply Externally route two (2) times a day., Print, Disp-100 Each, R-1         STOP taking these medications       hydroCHLOROthiazide (MICROZIDE) 12.5 mg capsule Comments:   Reason for Stopping:               * Follow-up Care/Patient Instructions: Activity: Activity as tolerated  Diet: Diabetic Diet  Wound Care: Home Eder with daily packing change. Follow-up Information     Follow up With Details Comments [address] [place] [address]  will resume services at discharge [facility].   [address] [facility]    Felicia Cox MD In 1 week For wound re-check [place] [address] [address] [place] [address], NP In 2 weeks  [facility] Gela Lynn MD In 2 weeks  [place] [address]  [phone]          Follow-up tests/labs none    Signed:  Felicia Cox MD  2/25/2018  6:41 PM

## 2018-02-26 ENCOUNTER — TELEPHONE (OUTPATIENT)
Dept: SURGERY | Age: 63
End: 2018-02-26

## 2018-03-20 ENCOUNTER — TELEPHONE (OUTPATIENT)
Dept: SURGERY | Age: 63
End: 2018-03-20

## 2018-03-21 ENCOUNTER — HOSPITAL ENCOUNTER (INPATIENT)
Age: 63
LOS: 7 days | Discharge: HOME | DRG: 854 | End: 2018-03-29
Attending: EMERGENCY MEDICINE | Admitting: INTERNAL MEDICINE
Payer: MEDICARE

## 2018-03-21 DIAGNOSIS — R73.9 HYPERGLYCEMIA: ICD-10-CM

## 2018-03-21 DIAGNOSIS — L02.91 ABSCESS: ICD-10-CM

## 2018-03-21 DIAGNOSIS — R79.89 AZOTEMIA: ICD-10-CM

## 2018-03-21 DIAGNOSIS — L02.212 ABSCESS OF BACK: Primary | ICD-10-CM

## 2018-03-21 PROCEDURE — 99285 EMERGENCY DEPT VISIT HI MDM: CPT

## 2018-03-21 PROCEDURE — 99284 EMERGENCY DEPT VISIT MOD MDM: CPT

## 2018-03-21 NOTE — IP AVS SNAPSHOT
[address] [facility] 
[phone] Patient: Bienvenido Vickers 
MRN: [id number] :1955 About your hospitalization You were admitted on:  2018 You last received care in the:  [facility] 3 [facility] You were discharged on:  2018 Why you were hospitalized Your primary diagnosis was:  Not on File Your diagnoses also included:  Severe Sepsis (Hcc) Follow-up Information Follow up With Details Comments Contact Info [address] [facility],[address] [facility] 
[phone] Outpatient Infusion Center  Tomorrow, 3/29, at [address] [place] [address]   [address] [address] Kelsey Bumpers, NP   [place] [phone] Marily Ellis MD On 4/10/2018 3pm [address] 
[phone] Your Scheduled Appointments 2018  3:00 PM EDT INFUSION 15 RI with RUBY INFUSION NURSE 4  
[place] ([address]) [address]  
[phone] Go to [facility], [facility], [address] [facility], [address] 2018 11:30 AM EDT INFUSION 15 RI with BREMO FT CHAIR 1  
[address] ([address]) [address] [phone] Go to [address], ground floor. 2018 11:30 AM EDT INFUSION 15 RI with BREMO FT CHAIR 1  
[address] ([address]) [address] [phone] Go to [address], ground floor. 2018  3:00 PM EDT INFUSION 15 RI with RUBY INFUSION NURSE 4  
 [facility] RUBY ([address]) [address]  
[phone] Go to [facility], 85[address] Critical access hospital, [place], [address] Tuesday April 03, 2018  3:00 PM EDT INFUSION 15 RI with RUBY INFUSION NURSE 4  
[place] ([address]) [address]  
[phone] Go to [facility], [facility], 85[address] Critical access hospital, [place], [address] Wednesday April 04, 2018  3:00 PM EDT INFUSION 15 RI with RUBY INFUSION NURSE 4  
[place] ([address]) [address]  
[phone] Go to [facility], [facility], 85[address] Critical access hospital, [place], [address] Thursday April 05, 2018  3:00 PM EDT INFUSION 15 RI with RUBY INFUSION NURSE 4  
[place] ([address]) [address]  
[phone] Go to [facility], [facility], [address] Critical access hospital, [place], [address] Friday April 06, 2018  3:00 PM EDT INFUSION 15 RI with RUBY INFUSION NURSE 4  
[place] ([address]) [address]  
[phone] Go to [facility], [facility], 85[address] Critical access hospital, [place], [address] Tuesday April 10, 2018  3:00 PM EDT New Patient with Vaughn Motley MD  
[facility] at ED [facility] [address]) [address] [place] [address] [facility]  
[phone] Discharge Orders None A check chula indicates which time of day the medication should be taken. My Medications START taking these medications Instructions Each Dose to Equal  
 Morning Noon Evening Bedtime  
 cefTRIAXone 2 gram 2 g IVPB Start taking on:  3/30/2018 Your last dose was: Your next dose is:    
   
   
 2 g by IntraVENous route every twenty-four (24) hours for 11 days. 2 g DAPTOmycin (CUBICIN RF) IVPB Start taking on:  3/30/2018 Your last dose was: Your next dose is:    
   
   
 900 mg by IntraVENous route every twenty-four (24) hours for 11 days. 900 mg CONTINUE taking these medications Instructions Each Dose to Equal  
 Morning Noon Evening Bedtime ACCU-CHEK SOFÍA CONTROL SOLN Soln Generic drug:  Blood Glucose Control High&Low Your last dose was: Your next dose is:    
   
   
      
   
   
   
  
 amLODIPine 10 mg tablet Commonly known as:  Flor Monzon Your last dose was: Your next dose is: Take 1 Tab by mouth daily. 10 mg  
    
   
   
   
  
 carvedilol 25 mg tablet Commonly known as:  Jeanna Terrazas Your last dose was: Your next dose is: Take 1 Tab by mouth two (2) times daily (with meals). 25 mg  
    
   
   
   
  
 cloNIDine HCl 0.2 mg tablet Commonly known as:  CATAPRES Your last dose was: Your next dose is: Take 1 Tab by mouth two (2) times a day. 0.2 mg  
    
   
   
   
  
 clopidogrel 75 mg Tab Commonly known as:  PLAVIX Your last dose was: Your next dose is: Take 75 mg by mouth daily. 75 mg  
    
   
   
   
  
 digoxin 0.125 mg tablet Commonly known as:  LANOXIN Your last dose was: Your next dose is: Take 0.125 mg by mouth daily. Indications: afib  
 0.125 mg  
    
   
   
   
  
 hydrALAZINE 50 mg tablet Commonly known as:  APRESOLINE Your last dose was: Your next dose is: Take 1 Tab by mouth three (3) times daily. 50 mg HYDROcodone-acetaminophen 5-325 mg per tablet Commonly known as:  Becky Oshea  
   
 Your last dose was: Your next dose is: Take 1 Tab by mouth every four (4) hours as needed for Pain. Max Daily Amount: 6 Tabs. 1 Tab  
    
   
   
   
  
 lancets 30 gauge Misc Your last dose was: Your next dose is:    
   
   
      
   
   
   
  
 NovoLIN 70/30 U-100 Insulin 100 unit/mL (70-30) injection Generic drug:  insulin NPH/insulin regular Your last dose was: Your next dose is:    
   
   
 65 Units by SubCUTAneous route Before breakfast and dinner. 65 Units  
    
   
   
   
  
 sodium bicarbonate 650 mg tablet Your last dose was: Your next dose is: Take 1 Tab by mouth two (2) times a day. 650 mg  
    
   
   
   
  
 SURE COMFORT INSULIN SYRINGE 1 mL 31 gauge x 5/16 Syrg Generic drug:  Insulin Syringe-Needle U-100 Your last dose was: Your next dose is: STOP taking these medications ACCU-CHEK SOFÍA PLUS TEST STRP strip Generic drug:  glucose blood VI test strips  
   
  
 alcohol swabs Padm Commonly known as:  ALCOHOL PADS  
   
  
 pravastatin 20 mg tablet Commonly known as:  PRAVACHOL Where to Get Your Medications Information on where to get these meds will be given to you by the nurse or doctor. ! Ask your nurse or doctor about these medications  
  cefTRIAXone 2 gram 2 g IVPB DAPTOmycin (CUBICIN RF) IVPB HYDROcodone-acetaminophen 5-325 mg per tablet Opioid Education Prescription Opioids: What You Need to Know: 
 
 
NAME: Rosa Handy  
:  1955 MRN:  [id number] Date/Time:  3/29/2018 12:58 PM 
 ADMIT DATE: 3/21/2018 DISCHARGE DATE: 3/29/2018 · It is important that you take the medication exactly as they are prescribed. · Keep your medication in the bottles provided by the pharmacist and keep a list of the medication names, dosages, and times to be taken in your wallet. · Do not take other medications without consulting your doctor. What to do at [facility] Recommended diet:  Diabetic Diet Recommended activity: PT/OT per Home Health If you have questions regarding the hospital related prescriptions or hospital related issues please call [facility] at [phone]. You can always direct your questions to your primary care doctor if you are unable to reach your hospital physician; your PCP works as an extension of your hospital doctor just like your hospital doctor is an extension of your PCP for your time at the [facility], [facility]) If you experience any of the following symptoms then please call your primary care physician or return to the emergency room if you cannot get hold of your doctor: 
 
Fever, chills, nausea, vomiting, or persistent diarrhea Worsening weakness or new problems with your speech or balance Dark stools or visible blood in your stools New Leg swelling or shortness of breath as these could be signs of a clot Additional Instructions: You will need to complete 2 weeks of antibiotics. The last dose in scheduled for April 10 You need blood work completed weekly and results sent to Dr Katey Vega office Weekly CBC, CMP, CK fax reports to the office [phone] Stop your pravastatin while you are on the antibiotics Home health will help guide and oversee your wound care Information obtained by : 
I understand that if any problems occur once I am at home I am to contact my physician. I understand and acknowledge receipt of the instructions indicated above. Physician's or R.N.'s Signature                                                                  Date/Time Patient or Representative Signature intelworks Announcement We are excited to announce that we are making your provider's discharge notes available to you in intelworks. You will see these notes when they are completed and signed by the physician that discharged you from your recent hospital stay. If you have any questions or concerns about any information you see in intelworks, please call the Health Information Department where you were seen or reach out to your Primary Care Provider for more information about your plan of care. Introducing [facility]! Odilia Wagner introduces intelworks patient portal. Now you can access parts of your medical record, email your doctor's office, and request medication refills online. 1. In your internet browser, go to [email or web address] 2. Click on the First Time User? Click Here link in the Sign In box. You will see the New Member Sign Up page. 3. Enter your intelworks Access Code exactly as it appears below. You will not need to use this code after youve completed the sign-up process. If you do not sign up before the expiration date, you must request a new code. · intelworks Access Code: [email or web address] Expires: 4/11/2018  2:38 PM 
 
4. Enter the last four digits of your Social Security Number (xxxx) and Date of Birth (mm/dd/yyyy) as indicated and click Submit. You will be taken to the next sign-up page. 5. Create a intelworks ID. This will be your intelworks login ID and cannot be changed, so think of one that is secure and easy to remember. 6. Create a [address] [employer] password. You can change your password at any time. 7. Enter your Password Reset Question and Answer. This can be used at a later time if you forget your password. 8. Enter your e-mail address. You will receive e-mail notification when new information is available in [address] 9. Click Sign Up. You can now view and download portions of your medical record. 10. Click the Download Summary menu link to download a portable copy of your medical information. If you have questions, please visit the Frequently Asked Questions section of the [employer] website. Remember, [employer] is NOT to be used for urgent needs. For medical emergencies, dial 911. Now available from your iPhone and Android! Introducing Rupert Hanson As a [facility] patient, I wanted to make you aware of our electronic visit tool called Rupert Hanson. [facility] 24[employer] allows you to connect within minutes with a medical provider 24 hours a day, seven days a week via a mobile device or tablet or logging into a secure website from your computer. You can access Rupert Hanson from anywhere in the [place]. A virtual visit might be right for you when you have a simple condition and feel like you just dont want to get out of bed, or cant get away from work for an appointment, when your regular [facility]en[facility] Ch[facility] provider is not available (evenings, weekends or holidays), or when youre out of town and need minor care. Electronic visits cost only $49 and if the [facility] 24/7 provider determines a prescription is needed to treat your condition, one can be electronically transmitted to a nearby pharmacy*. Please take a moment to enroll today if you have not already done so. The enrollment process is free and takes just a few minutes. To enroll, please download the [employer] ila to your tablet or phone, or visit [email or web address] to enroll on your computer. And, as an [address] [place] [address] patient with a Adea account, the results of your visits will be scanned into your electronic medical record and your primary care provider will be able to view the scanned results. We urge you to continue to see your regular Barbara Caballero provider for your ongoing medical care. And while your primary care provider may not be the one available when you seek a Rupert Willardfin virtual visit, the peace of mind you get from getting a real diagnosis real time can be priceless. For more information on Bolongaro Trevorkendallfin, view our Frequently Asked Questions (FAQs) at [email or web address]. Sincerely, 
 
Ivan Carroll MD 
Chief Medical Officer Felisha8 Gretel Lira *:  certain medications cannot be prescribed via [employer] Unresulted Labs-Please follow up with your PCP about these lab tests Order Current Status CULTURE, BLOOD, PERIPHERAL Preliminary result Providers Seen During Your Hospitalization Provider Specialty Primary office phone Amber Nair. Meghann Talbert MD Emergency Medicine [phone] Peter Reyna MD Internal Medicine [phone] Marcelo Gomes MD Internal Medicine [phone] Immunizations Administered for This Admission Name Date Influenza Vaccine (Quad) PF 3/29/2018 Your Primary Care Physician (PCP) Primary Care Physician Office Phone Office Fax Oleg Dakins [phone] You are allergic to the following No active allergies Recent Documentation Height Weight BMI Smoking Status 1.956 m 145.2 kg 37.95 kg/m2 Never Smoker Emergency Contacts Name Discharge Info Relation Home Work Mobile Ivettedk Mckinnonn DISCHARGE CAREGIVER [3] Other Relative [6] [phone] Patient Belongings  The following personal items are in your possession at time of discharge: 
  Dental Appliances: None  Visual Aid: At home, Glasses   Hearing Aids/Status: Does not own  Home Medications: None   Jewelry: None  Clothing: None    Other Valuables: None  Personal Items Sent to Safe: No valuables brought to Pre Op Please provide this summary of care documentation to your next provider. Signatures-by signing, you are acknowledging that this After Visit Summary has been reviewed with you and you have received a copy. Patient Signature:  ____________________________________________________________ Date:  ____________________________________________________________  
  
Gatito Mealing Provider Signature:  ____________________________________________________________ Date:  ____________________________________________________________

## 2018-03-21 NOTE — IP AVS SNAPSHOT
[address] [facility] 
[phone] Patient: Rosa Handy 
MRN: [id number] :1955 A check chula indicates which time of day the medication should be taken. My Medications START taking these medications Instructions Each Dose to Equal  
 Morning Noon Evening Bedtime  
 cefTRIAXone 2 gram 2 g IVPB Start taking on:  3/30/2018 Your last dose was: Your next dose is:    
   
   
 2 g by IntraVENous route every twenty-four (24) hours for 11 days. 2 g DAPTOmycin (CUBICIN RF) IVPB Start taking on:  3/30/2018 Your last dose was: Your next dose is:    
   
   
 900 mg by IntraVENous route every twenty-four (24) hours for 11 days. 900 mg CONTINUE taking these medications Instructions Each Dose to Equal  
 Morning Noon Evening Bedtime ACCU-CHEK SOFÍA CONTROL SOLN Soln Generic drug:  Blood Glucose Control High&Low Your last dose was: Your next dose is:    
   
   
      
   
   
   
  
 amLODIPine 10 mg tablet Commonly known as:  Bradley Licea Your last dose was: Your next dose is: Take 1 Tab by mouth daily. 10 mg  
    
   
   
   
  
 carvedilol 25 mg tablet Commonly known as:  Lurlene Solo Your last dose was: Your next dose is: Take 1 Tab by mouth two (2) times daily (with meals). 25 mg  
    
   
   
   
  
 cloNIDine HCl 0.2 mg tablet Commonly known as:  CATAPRES Your last dose was: Your next dose is: Take 1 Tab by mouth two (2) times a day. 0.2 mg  
    
   
   
   
  
 clopidogrel 75 mg Tab Commonly known as:  PLAVIX Your last dose was: Your next dose is: Take 75 mg by mouth daily. 75 mg  
    
   
   
   
  
 digoxin 0.125 mg tablet Commonly known as:  LANOXIN Your last dose was: Your next dose is: Take 0.125 mg by mouth daily. Indications: afib  
 0.125 mg  
    
   
   
   
  
 hydrALAZINE 50 mg tablet Commonly known as:  APRESOLINE Your last dose was: Your next dose is: Take 1 Tab by mouth three (3) times daily. 50 mg HYDROcodone-acetaminophen 5-325 mg per tablet Commonly known as:  Radonna Cerulean Your last dose was: Your next dose is: Take 1 Tab by mouth every four (4) hours as needed for Pain. Max Daily Amount: 6 Tabs. 1 Tab  
    
   
   
   
  
 lancets 30 gauge Misc Your last dose was: Your next dose is:    
   
   
      
   
   
   
  
 NovoLIN 70/30 U-100 Insulin 100 unit/mL (70-30) injection Generic drug:  insulin NPH/insulin regular Your last dose was: Your next dose is:    
   
   
 65 Units by SubCUTAneous route Before breakfast and dinner. 65 Units  
    
   
   
   
  
 sodium bicarbonate 650 mg tablet Your last dose was: Your next dose is: Take 1 Tab by mouth two (2) times a day. 650 mg  
    
   
   
   
  
 SURE COMFORT INSULIN SYRINGE 1 mL 31 gauge x 5/16 Syrg Generic drug:  Insulin Syringe-Needle U-100 Your last dose was: Your next dose is: STOP taking these medications ACCU-CHEK SOFÍA PLUS TEST STRP strip Generic drug:  glucose blood VI test strips  
   
  
 alcohol swabs Padm Commonly known as:  ALCOHOL PADS  
   
  
 pravastatin 20 mg tablet Commonly known as:  PRAVACHOL Where to Get Your Medications Information on where to get these meds will be given to you by the nurse or doctor. ! Ask your nurse or doctor about these medications  
  cefTRIAXone 2 gram 2 g IVPB DAPTOmycin (CUBICIN RF) IVPB HYDROcodone-acetaminophen 5-325 mg per tablet

## 2018-03-22 PROBLEM — A41.9 SEVERE SEPSIS (HCC): Status: ACTIVE | Noted: 2018-03-22

## 2018-03-22 PROBLEM — R65.20 SEVERE SEPSIS (HCC): Status: ACTIVE | Noted: 2018-03-22

## 2018-03-22 LAB
ALBUMIN SERPL-MCNC: 2.7 G/DL (ref 3.5–5)
ALBUMIN/GLOB SERPL: 0.6 {RATIO} (ref 1.1–2.2)
ALP SERPL-CCNC: 188 U/L (ref 45–117)
ALT SERPL-CCNC: 23 U/L (ref 12–78)
ANION GAP SERPL CALC-SCNC: 12 MMOL/L (ref 5–15)
AST SERPL-CCNC: 14 U/L (ref 15–37)
BASOPHILS # BLD: 0.1 K/UL (ref 0–0.1)
BASOPHILS NFR BLD: 0 % (ref 0–1)
BILIRUB SERPL-MCNC: 0.4 MG/DL (ref 0.2–1)
BUN SERPL-MCNC: 52 MG/DL (ref 6–20)
BUN/CREAT SERPL: 10 (ref 12–20)
CALCIUM SERPL-MCNC: 8.9 MG/DL (ref 8.5–10.1)
CHLORIDE SERPL-SCNC: 97 MMOL/L (ref 97–108)
CO2 SERPL-SCNC: 23 MMOL/L (ref 21–32)
CREAT SERPL-MCNC: 4.99 MG/DL (ref 0.7–1.3)
DIFFERENTIAL METHOD BLD: ABNORMAL
DIGOXIN SERPL-MCNC: 0.5 NG/ML (ref 0.9–2)
EOSINOPHIL # BLD: 0.1 K/UL (ref 0–0.4)
EOSINOPHIL NFR BLD: 0 % (ref 0–7)
ERYTHROCYTE [DISTWIDTH] IN BLOOD BY AUTOMATED COUNT: 12.7 % (ref 11.5–14.5)
GLOBULIN SER CALC-MCNC: 4.9 G/DL (ref 2–4)
GLUCOSE BLD STRIP.AUTO-MCNC: 375 MG/DL (ref 65–100)
GLUCOSE BLD STRIP.AUTO-MCNC: 434 MG/DL (ref 65–100)
GLUCOSE BLD STRIP.AUTO-MCNC: 434 MG/DL (ref 65–100)
GLUCOSE BLD STRIP.AUTO-MCNC: 514 MG/DL (ref 65–100)
GLUCOSE BLD STRIP.AUTO-MCNC: 515 MG/DL (ref 65–100)
GLUCOSE SERPL-MCNC: 514 MG/DL (ref 65–100)
HCT VFR BLD AUTO: 32.3 % (ref 36.6–50.3)
HGB BLD-MCNC: 10.9 G/DL (ref 12.1–17)
IMM GRANULOCYTES # BLD: 0.1 K/UL (ref 0–0.04)
IMM GRANULOCYTES NFR BLD AUTO: 1 % (ref 0–0.5)
LACTATE SERPL-SCNC: 1.1 MMOL/L (ref 0.4–2)
LACTATE SERPL-SCNC: 2.2 MMOL/L (ref 0.4–2)
LYMPHOCYTES # BLD: 1.3 K/UL (ref 0.8–3.5)
LYMPHOCYTES NFR BLD: 7 % (ref 12–49)
MAGNESIUM SERPL-MCNC: 2.3 MG/DL (ref 1.6–2.4)
MCH RBC QN AUTO: 29 PG (ref 26–34)
MCHC RBC AUTO-ENTMCNC: 33.7 G/DL (ref 30–36.5)
MCV RBC AUTO: 85.9 FL (ref 80–99)
MONOCYTES # BLD: 1.2 K/UL (ref 0–1)
MONOCYTES NFR BLD: 6 % (ref 5–13)
NEUTS SEG # BLD: 16.4 K/UL (ref 1.8–8)
NEUTS SEG NFR BLD: 86 % (ref 32–75)
NRBC # BLD: 0 K/UL (ref 0–0.01)
NRBC BLD-RTO: 0 PER 100 WBC
PLATELET # BLD AUTO: 271 K/UL (ref 150–400)
PMV BLD AUTO: 12.2 FL (ref 8.9–12.9)
POTASSIUM SERPL-SCNC: 4.3 MMOL/L (ref 3.5–5.1)
PROT SERPL-MCNC: 7.6 G/DL (ref 6.4–8.2)
RBC # BLD AUTO: 3.76 M/UL (ref 4.1–5.7)
SERVICE CMNT-IMP: ABNORMAL
SODIUM SERPL-SCNC: 132 MMOL/L (ref 136–145)
WBC # BLD AUTO: 19.1 K/UL (ref 4.1–11.1)

## 2018-03-22 PROCEDURE — 87147 CULTURE TYPE IMMUNOLOGIC: CPT | Performed by: EMERGENCY MEDICINE

## 2018-03-22 PROCEDURE — 87186 SC STD MICRODIL/AGAR DIL: CPT | Performed by: EMERGENCY MEDICINE

## 2018-03-22 PROCEDURE — 74011636637 HC RX REV CODE- 636/637: Performed by: EMERGENCY MEDICINE

## 2018-03-22 PROCEDURE — 83605 ASSAY OF LACTIC ACID: CPT | Performed by: EMERGENCY MEDICINE

## 2018-03-22 PROCEDURE — 87070 CULTURE OTHR SPECIMN AEROBIC: CPT | Performed by: EMERGENCY MEDICINE

## 2018-03-22 PROCEDURE — 97530 THERAPEUTIC ACTIVITIES: CPT

## 2018-03-22 PROCEDURE — 80053 COMPREHEN METABOLIC PANEL: CPT | Performed by: EMERGENCY MEDICINE

## 2018-03-22 PROCEDURE — 97161 PT EVAL LOW COMPLEX 20 MIN: CPT

## 2018-03-22 PROCEDURE — 83735 ASSAY OF MAGNESIUM: CPT | Performed by: EMERGENCY MEDICINE

## 2018-03-22 PROCEDURE — 74011250636 HC RX REV CODE- 250/636: Performed by: EMERGENCY MEDICINE

## 2018-03-22 PROCEDURE — G8978 MOBILITY CURRENT STATUS: HCPCS

## 2018-03-22 PROCEDURE — 87040 BLOOD CULTURE FOR BACTERIA: CPT | Performed by: EMERGENCY MEDICINE

## 2018-03-22 PROCEDURE — 74011000258 HC RX REV CODE- 258: Performed by: INTERNAL MEDICINE

## 2018-03-22 PROCEDURE — 96365 THER/PROPH/DIAG IV INF INIT: CPT

## 2018-03-22 PROCEDURE — 87205 SMEAR GRAM STAIN: CPT | Performed by: EMERGENCY MEDICINE

## 2018-03-22 PROCEDURE — 74011250636 HC RX REV CODE- 250/636: Performed by: INTERNAL MEDICINE

## 2018-03-22 PROCEDURE — 74011250637 HC RX REV CODE- 250/637: Performed by: INTERNAL MEDICINE

## 2018-03-22 PROCEDURE — 85025 COMPLETE CBC W/AUTO DIFF WBC: CPT | Performed by: EMERGENCY MEDICINE

## 2018-03-22 PROCEDURE — 82962 GLUCOSE BLOOD TEST: CPT

## 2018-03-22 PROCEDURE — 74011636637 HC RX REV CODE- 636/637: Performed by: INTERNAL MEDICINE

## 2018-03-22 PROCEDURE — 80162 ASSAY OF DIGOXIN TOTAL: CPT | Performed by: INTERNAL MEDICINE

## 2018-03-22 PROCEDURE — 65660000000 HC RM CCU STEPDOWN

## 2018-03-22 PROCEDURE — G8979 MOBILITY GOAL STATUS: HCPCS

## 2018-03-22 PROCEDURE — 96361 HYDRATE IV INFUSION ADD-ON: CPT

## 2018-03-22 PROCEDURE — 36415 COLL VENOUS BLD VENIPUNCTURE: CPT | Performed by: EMERGENCY MEDICINE

## 2018-03-22 PROCEDURE — 87077 CULTURE AEROBIC IDENTIFY: CPT | Performed by: EMERGENCY MEDICINE

## 2018-03-22 PROCEDURE — 83605 ASSAY OF LACTIC ACID: CPT | Performed by: INTERNAL MEDICINE

## 2018-03-22 RX ORDER — HYDRALAZINE HYDROCHLORIDE 50 MG/1
50 TABLET, FILM COATED ORAL 3 TIMES DAILY
Status: DISCONTINUED | OUTPATIENT
Start: 2018-03-22 | End: 2018-03-26

## 2018-03-22 RX ORDER — POLYETHYLENE GLYCOL 3350 17 G/17G
17 POWDER, FOR SOLUTION ORAL DAILY
Status: DISCONTINUED | OUTPATIENT
Start: 2018-03-22 | End: 2018-03-29 | Stop reason: HOSPADM

## 2018-03-22 RX ORDER — SODIUM CHLORIDE 0.9 % (FLUSH) 0.9 %
5-10 SYRINGE (ML) INJECTION EVERY 8 HOURS
Status: DISCONTINUED | OUTPATIENT
Start: 2018-03-22 | End: 2018-03-23

## 2018-03-22 RX ORDER — DEXTROSE 50 % IN WATER (D50W) INTRAVENOUS SYRINGE
12.5-25 AS NEEDED
Status: DISCONTINUED | OUTPATIENT
Start: 2018-03-22 | End: 2018-03-25 | Stop reason: SDUPTHER

## 2018-03-22 RX ORDER — HEPARIN SODIUM 5000 [USP'U]/ML
5000 INJECTION, SOLUTION INTRAVENOUS; SUBCUTANEOUS EVERY 12 HOURS
Status: DISCONTINUED | OUTPATIENT
Start: 2018-03-22 | End: 2018-03-23

## 2018-03-22 RX ORDER — INSULIN LISPRO 100 [IU]/ML
INJECTION, SOLUTION INTRAVENOUS; SUBCUTANEOUS
Status: DISCONTINUED | OUTPATIENT
Start: 2018-03-22 | End: 2018-03-25 | Stop reason: SDUPTHER

## 2018-03-22 RX ORDER — INSULIN LISPRO 100 [IU]/ML
10 INJECTION, SOLUTION INTRAVENOUS; SUBCUTANEOUS ONCE
Status: COMPLETED | OUTPATIENT
Start: 2018-03-22 | End: 2018-03-22

## 2018-03-22 RX ORDER — CLOPIDOGREL BISULFATE 75 MG/1
75 TABLET ORAL DAILY
Status: DISCONTINUED | OUTPATIENT
Start: 2018-03-22 | End: 2018-03-23

## 2018-03-22 RX ORDER — LABETALOL HYDROCHLORIDE 5 MG/ML
10 INJECTION, SOLUTION INTRAVENOUS
Status: DISCONTINUED | OUTPATIENT
Start: 2018-03-22 | End: 2018-03-29 | Stop reason: HOSPADM

## 2018-03-22 RX ORDER — PRAVASTATIN SODIUM 10 MG/1
20 TABLET ORAL
Status: DISCONTINUED | OUTPATIENT
Start: 2018-03-22 | End: 2018-03-27

## 2018-03-22 RX ORDER — SODIUM BICARBONATE 650 MG/1
650 TABLET ORAL 2 TIMES DAILY
Status: DISCONTINUED | OUTPATIENT
Start: 2018-03-22 | End: 2018-03-23

## 2018-03-22 RX ORDER — CARVEDILOL 12.5 MG/1
25 TABLET ORAL 2 TIMES DAILY WITH MEALS
Status: DISCONTINUED | OUTPATIENT
Start: 2018-03-22 | End: 2018-03-29 | Stop reason: HOSPADM

## 2018-03-22 RX ORDER — ONDANSETRON 2 MG/ML
4 INJECTION INTRAMUSCULAR; INTRAVENOUS
Status: DISCONTINUED | OUTPATIENT
Start: 2018-03-22 | End: 2018-03-23

## 2018-03-22 RX ORDER — MAGNESIUM SULFATE 100 %
4 CRYSTALS MISCELLANEOUS AS NEEDED
Status: DISCONTINUED | OUTPATIENT
Start: 2018-03-22 | End: 2018-03-25 | Stop reason: SDUPTHER

## 2018-03-22 RX ORDER — SODIUM CHLORIDE 0.9 % (FLUSH) 0.9 %
5-10 SYRINGE (ML) INJECTION AS NEEDED
Status: DISCONTINUED | OUTPATIENT
Start: 2018-03-22 | End: 2018-03-23

## 2018-03-22 RX ORDER — VANCOMYCIN/0.9 % SOD CHLORIDE 1.5G/250ML
1500 PLASTIC BAG, INJECTION (ML) INTRAVENOUS
Status: COMPLETED | OUTPATIENT
Start: 2018-03-22 | End: 2018-03-22

## 2018-03-22 RX ORDER — ACETAMINOPHEN 325 MG/1
650 TABLET ORAL
Status: DISCONTINUED | OUTPATIENT
Start: 2018-03-22 | End: 2018-03-23

## 2018-03-22 RX ORDER — DIGOXIN 125 MCG
0.12 TABLET ORAL DAILY
Status: DISCONTINUED | OUTPATIENT
Start: 2018-03-22 | End: 2018-03-29 | Stop reason: HOSPADM

## 2018-03-22 RX ORDER — SODIUM CHLORIDE 9 MG/ML
100 INJECTION, SOLUTION INTRAVENOUS CONTINUOUS
Status: DISCONTINUED | OUTPATIENT
Start: 2018-03-22 | End: 2018-03-23

## 2018-03-22 RX ORDER — CLONIDINE HYDROCHLORIDE 0.1 MG/1
0.2 TABLET ORAL 2 TIMES DAILY
Status: DISCONTINUED | OUTPATIENT
Start: 2018-03-22 | End: 2018-03-29 | Stop reason: HOSPADM

## 2018-03-22 RX ORDER — AMLODIPINE BESYLATE 5 MG/1
10 TABLET ORAL DAILY
Status: DISCONTINUED | OUTPATIENT
Start: 2018-03-22 | End: 2018-03-29 | Stop reason: HOSPADM

## 2018-03-22 RX ADMIN — VANCOMYCIN HYDROCHLORIDE 1500 MG: 10 INJECTION, POWDER, LYOPHILIZED, FOR SOLUTION INTRAVENOUS at 02:32

## 2018-03-22 RX ADMIN — INSULIN HUMAN 10 UNITS: 100 INJECTION, SOLUTION PARENTERAL at 02:31

## 2018-03-22 RX ADMIN — PIPERACILLIN SODIUM,TAZOBACTAM SODIUM 3.38 G: 3; .375 INJECTION, POWDER, FOR SOLUTION INTRAVENOUS at 05:09

## 2018-03-22 RX ADMIN — AMLODIPINE BESYLATE 10 MG: 5 TABLET ORAL at 10:07

## 2018-03-22 RX ADMIN — SODIUM CHLORIDE 1000 ML: 900 INJECTION, SOLUTION INTRAVENOUS at 00:38

## 2018-03-22 RX ADMIN — Medication 10 ML: at 15:47

## 2018-03-22 RX ADMIN — PIPERACILLIN SODIUM,TAZOBACTAM SODIUM 3.38 G: 3; .375 INJECTION, POWDER, FOR SOLUTION INTRAVENOUS at 22:07

## 2018-03-22 RX ADMIN — SODIUM BICARBONATE 650 MG: 650 TABLET ORAL at 11:39

## 2018-03-22 RX ADMIN — CLONIDINE HYDROCHLORIDE 0.2 MG: 0.1 TABLET ORAL at 18:21

## 2018-03-22 RX ADMIN — PRAVASTATIN SODIUM 20 MG: 40 TABLET ORAL at 05:09

## 2018-03-22 RX ADMIN — INSULIN LISPRO 10 UNITS: 100 INJECTION, SOLUTION INTRAVENOUS; SUBCUTANEOUS at 18:36

## 2018-03-22 RX ADMIN — PRAVASTATIN SODIUM 20 MG: 40 TABLET ORAL at 22:08

## 2018-03-22 RX ADMIN — HEPARIN SODIUM 5000 UNITS: 5000 INJECTION, SOLUTION INTRAVENOUS; SUBCUTANEOUS at 22:08

## 2018-03-22 RX ADMIN — CARVEDILOL 25 MG: 12.5 TABLET, FILM COATED ORAL at 11:38

## 2018-03-22 RX ADMIN — CLONIDINE HYDROCHLORIDE 0.2 MG: 0.1 TABLET ORAL at 10:06

## 2018-03-22 RX ADMIN — SODIUM CHLORIDE 100 ML/HR: 900 INJECTION, SOLUTION INTRAVENOUS at 18:22

## 2018-03-22 RX ADMIN — SODIUM CHLORIDE 75 ML/HR: 900 INJECTION, SOLUTION INTRAVENOUS at 05:09

## 2018-03-22 RX ADMIN — HYDRALAZINE HYDROCHLORIDE 50 MG: 50 TABLET, FILM COATED ORAL at 10:07

## 2018-03-22 RX ADMIN — HEPARIN SODIUM 5000 UNITS: 5000 INJECTION, SOLUTION INTRAVENOUS; SUBCUTANEOUS at 10:07

## 2018-03-22 RX ADMIN — SODIUM CHLORIDE 2000 ML: 900 INJECTION, SOLUTION INTRAVENOUS at 02:11

## 2018-03-22 RX ADMIN — HYDRALAZINE HYDROCHLORIDE 50 MG: 50 TABLET, FILM COATED ORAL at 22:11

## 2018-03-22 RX ADMIN — CLOPIDOGREL BISULFATE 75 MG: 75 TABLET ORAL at 10:07

## 2018-03-22 RX ADMIN — INSULIN HUMAN 20 UNITS: 100 INJECTION, SUSPENSION SUBCUTANEOUS at 22:07

## 2018-03-22 RX ADMIN — SODIUM BICARBONATE 650 MG: 650 TABLET ORAL at 18:20

## 2018-03-22 RX ADMIN — HYDRALAZINE HYDROCHLORIDE 50 MG: 50 TABLET, FILM COATED ORAL at 18:21

## 2018-03-22 RX ADMIN — CARVEDILOL 25 MG: 12.5 TABLET, FILM COATED ORAL at 18:20

## 2018-03-22 RX ADMIN — INSULIN LISPRO 10 UNITS: 100 INJECTION, SOLUTION INTRAVENOUS; SUBCUTANEOUS at 10:27

## 2018-03-22 RX ADMIN — INSULIN LISPRO 10 UNITS: 100 INJECTION, SOLUTION INTRAVENOUS; SUBCUTANEOUS at 22:06

## 2018-03-22 RX ADMIN — PIPERACILLIN SODIUM,TAZOBACTAM SODIUM 3.38 G: 3; .375 INJECTION, POWDER, FOR SOLUTION INTRAVENOUS at 11:38

## 2018-03-22 RX ADMIN — Medication 10 ML: at 22:09

## 2018-03-22 RX ADMIN — DIGOXIN 0.12 MG: 125 TABLET ORAL at 10:07

## 2018-03-22 NOTE — ED NOTES
Bedside and Verbal shift change report received from Kay Resendiz (offgoing nurse). Report included the following information SBAR, ED Summary, MAR and Cardiac Rhythm NSR. Patient alert and oriented x 4. IV fluid boluses infusing at this time. Antibiotic infusing at this time. Patient voids in urinal.    Denies concerns at this time.

## 2018-03-22 NOTE — ED NOTES
Pt blood glucose 434; MD paged and insulin held. Pt asked to discontinue eating breakfast until insulin administered.

## 2018-03-22 NOTE — PROGRESS NOTES
Nephrology Progress Note     [place]     [facility]. [email or web address]                  Phone - [phone]   Patient: Nico Godoy   YOB: 1955    Date- 3/22/2018     CC: Follow up for arf on ckd         Subjective: Interval History:   -  Mr. Junior is admitted with sepsis. Skin abscess. His is found to have arf with wth cr. 4.99 , bun 52  His na is low 132  He has hyperglycemia  His cr. Was 2.9 on 2-5-18  He denies taking NSAIDS    Cr. Trends     Ref. Range 2/1/2018 02:31 2/2/2018 01:48 2/3/2018 03:22 2/5/2018 04:12 2/6/2018 03:07 3/22/2018 00:36 3/22/2018 06:37   BUN Latest Ref Range: 6 - 20 MG/DL 47 (H) 40 (H) 35 (H) 29 (H) 28 (H) 52 (H)    Creatinine Latest Ref Range: 0.70 - 1.30 MG/DL 4.38 (H) 3.89 (H) 3.45 (H) 2.96 (H) 3.05 (H) 4.99 (H)      ROS-   Assessment:   PEDRO likely due to dehydration from hyperglycemia  ckd 4bl. cr. 2.9-3.5  Anemia  Sepsis  hyponatremia     Plan:   Start ivf  Daily labs  No dialysis indicated at present      Care Plan discussed with: pt and nurse  Review of Systems: Pertinent items are noted in HPI. Objective:   Vitals:    03/22/18 0330 03/22/18 0539 03/22/18 0730 03/22/18 0815   BP: 176/73  (!) 174/95    Pulse: 96  89 94   Resp: 18  18    Temp: 97.9 °F (36.6 °C)  98.9 °F (37.2 °C)    SpO2: 99% 98% 99% 94%   Weight:       Height:         Last 3 Recorded Weights in this Encounter    03/22/18 0014   Weight: 145.2 kg (320 lb)     Patient Vitals for the past 8 hrs:   BP Temp Pulse Resp SpO2   03/22/18 0815 - - 94 - 94 %   03/22/18 0730 (!) 174/95 98.9 °F (37.2 °C) 89 18 99 %   03/22/18 0539 - - - - 98 %   03/22/18 0330 176/73 97.9 °F (36.6 °C) 96 18 99 %   03/22/18 0300 181/71 - 94 - 98 %           Physical Exam:   GEN: NAD  NECK- Supple, no thyromegaly  RESP: Clear b/l, no wheezing, No accessory muscle use  CVS: RRR,S1,S2    EXT: + Edema   NEURO: non focal, normal speech  ABDO: soft , non tender, No hepatosplenomegaly    Chart reviewed. Pertinent Notes reviewed. Medications list  reviewed       Data Review :  Recent Labs      03/22/18   0036   NA  132*   K  4.3   CL  97   CO2  23   GLU  514*   BUN  52*   CREA  4.99*   CA  8.9   MG  2.3   ALB  2.7*   SGOT  14*   ALT  23     Recent Labs      03/22/18   0036   WBC  19.1*   HGB  10.9*   HCT  32.3*   PLT  271     Lab Results   Component Value Date/Time    Specimen Description: PBC 07/26/2013 05:28 PM    Specimen Description: BLOOD 09/30/2009 04:55 PM     Current Facility-Administered Medications   Medication    piperacillin-tazobactam (ZOSYN) 3.375 g in 0.9% sodium chloride (MBP/ADV) 100 mL    amLODIPine (NORVASC) tablet 10 mg    carvedilol (COREG) tablet 25 mg    cloNIDine HCl (CATAPRES) tablet 0.2 mg    clopidogrel (PLAVIX) tablet 75 mg    digoxin (LANOXIN) tablet 0.125 mg    hydrALAZINE (APRESOLINE) tablet 50 mg    pravastatin (PRAVACHOL) tablet 20 mg    sodium bicarbonate tablet 650 mg    acetaminophen (TYLENOL) tablet 650 mg    ondansetron (ZOFRAN) injection 4 mg    labetalol (NORMODYNE;TRANDATE) injection 10 mg    polyethylene glycol (MIRALAX) packet 17 g    heparin (porcine) injection 5,000 Units    0.9% sodium chloride infusion    insulin lispro (HUMALOG) injection    glucose chewable tablet 16 g    dextrose (D50W) injection syrg 12.5-25 g    glucagon (GLUCAGEN) injection 1 mg    sodium chloride (NS) flush 5-10 mL    sodium chloride (NS) flush 5-10 mL     Current Outpatient Prescriptions   Medication Sig    amLODIPine (NORVASC) 10 mg tablet Take 1 Tab by mouth daily.  carvedilol (COREG) 25 mg tablet Take 1 Tab by mouth two (2) times daily (with meals).  hydrALAZINE (APRESOLINE) 50 mg tablet Take 1 Tab by mouth three (3) times daily.  sodium bicarbonate 650 mg tablet Take 1 Tab by mouth two (2) times a day.  HYDROcodone-acetaminophen (NORCO) 5-325 mg per tablet Take 1 Tab by mouth every four (4) hours as needed for Pain. Max Daily Amount: 6 Tabs.     clopidogrel (PLAVIX) 75 mg tab Take 75 mg by mouth daily.  NOVOLIN 70/30 100 unit/mL (70-30) injection 65 Units by SubCUTAneous route Before breakfast and dinner.  lancets 30 gauge misc     SURE COMFORT INSULIN SYRINGE 1 mL 31 gauge x 5/16 syrg     ACCU-CHEK SOFÍA CONTROL SOLN soln     ACCU-CHEK SOFÍA PLUS TEST STRP strip     cloNIDine HCl (CATAPRES) 0.2 mg tablet Take 1 Tab by mouth two (2) times a day.  alcohol swabs (ALCOHOL PADS) padm 1 Each by Apply Externally route two (2) times a day.  pravastatin (PRAVACHOL) 20 mg tablet Take 20 mg by mouth nightly.  digoxin (LANOXIN) 0.125 mg tablet Take 0.125 mg by mouth daily. Indications: Meño Garcia MD  [facility]  www. [email or web address].[employer]  [facility] [address] [place] [address], [address] [place]  Phone - [phone]         Fax - [phone]  [facility] Office  [address] [place] [address]  Phone - [phone]        Fax - [phone]

## 2018-03-22 NOTE — ED NOTES
TRANSFER - OUT REPORT:    Verbal report given to Lincoln Gonzalez RN on Nelia Juarez  being transferred to Renal  For Routine progression of care        Report consisted of patients Situation, Background, Assessment and   Recommendations(SBAR). Information from the following report(s) SBAR, ED Summary, Intake/Output, MAR and Recent Results was reviewed with the receiving nurse. Lines:   Peripheral IV 03/22/18 Left Antecubital (Active)   Site Assessment Clean, dry, & intact 3/22/2018  8:28 AM   Phlebitis Assessment 0 3/22/2018  8:28 AM   Infiltration Assessment 0 3/22/2018  8:28 AM   Dressing Status Clean, dry, & intact 3/22/2018  8:28 AM   Dressing Type Tape 3/22/2018  8:28 AM   Hub Color/Line Status Pink 3/22/2018  8:28 AM   Action Taken Blood drawn 3/22/2018 12:35 AM       Peripheral IV 03/22/18 Right (Active)   Site Assessment Clean, dry, & intact 3/22/2018  8:28 AM   Phlebitis Assessment 0 3/22/2018  8:28 AM   Infiltration Assessment 0 3/22/2018  8:28 AM   Dressing Status Clean, dry, & intact 3/22/2018  8:28 AM   Dressing Type Tape 3/22/2018  8:28 AM   Hub Color/Line Status Green 3/22/2018  8:28 AM   Action Taken Blood drawn 3/22/2018 12:35 AM        Opportunity for questions and clarification was provided.       Patient transported with:   IV fluids on Alaris Pump

## 2018-03-22 NOTE — PROGRESS NOTES
Hospitalist Progress Note    NAME: Daniel Lerma   :  1955   MRN:  [id number]       Assessment / Plan:  Severe sepsis presumed due to infected skin abscesses  Acidosis, lactic  --Patient meets criteria for severe sepsis and is highly suspected to have an infection with suspected source to be skin. -Cultures drawn in ED and I have ensured they were drawn by discussion with ED RN. -Lactic acid ordered in ED. repeat Q6hrs if elevated until <2 or trend is towards normal  -Discussed with ED attending regarding use of antibiotics. Antibiotic administered in the ED were vanco and I have added zosyn for known MRSA and to cover possible polymicrobial infection given new wounds and fall at home  -Requested Large bore IV x 2 sites. If unable to be attained have requested for ED to place central line and then will monitor CVP. -Continue volume expansion with cystalloid IV fluids  Await SWound care, I am concerned that some are worrisome for ? Needing I& D  Continue Vanco and Zosyn     Diabetes mellitus 2 uncontrolled with hyperglycemia, 2md to infection  Pseudohyponatremia  Severe dehydration from uncontrolled diabetes leading to acute renal failure on chronic disease suspect nearing stage IV  -ssi for now  -give insulin IV in ED to bring bs <400 quickly  -if unable to bring the bs down then will add insulin gtt  -with drop in bs the sodium should improve  -IVF NS to be used as well to help with ARF on CKD  -patient followed by Dr Raza Styles OP but has not been compliant with her wishes.  Will have her follow IP  -strict I/O     Hypertension, Benign essential   -remain on tele  -labetalol IV prn sbp >170  -check digoxin level given change in renal function and his statement of being compliant with medications  -resume all antihypertensives for now        I have personally reviewed the radiographs, laboratory data in Epic and decisions and statements above are based partially on this personal interpretation.     Code Status: Full Code  DVT Prophylaxis: Hep SQ  GI Prophylaxis: not indicated      Body mass index is 37.95 kg/(m^2). Subjective:     Chief Complaint / Reason for Physician Visit   3/22  I cam because my sugars were high. Almost Erroll Shingles  Discussed with RN events overnight. Review of Systems:  Symptom Y/N Comments  Symptom Y/N Comments   Fever/Chills y   Chest Pain     Poor Appetite    Edema     Cough    Abdominal Pain     Sputum    Joint Pain     SOB/NAVARRO n   Pruritis/Rash     Nausea/vomit    Tolerating PT/OT     Diarrhea    Tolerating Diet     Constipation    Other y  BS were high     Could NOT obtain due to:      Objective:     VITALS:   Last 24hrs VS reviewed since prior progress note. Most recent are:  Patient Vitals for the past 24 hrs:   Temp Pulse Resp BP SpO2   03/22/18 1338 98.8 °F (37.1 °C) 74 18 150/66 94 %   03/22/18 0815 - 94 - - 94 %   03/22/18 0730 98.9 °F (37.2 °C) 89 18 (!) 174/95 99 %   03/22/18 0539 - - - - 98 %   03/22/18 0330 97.9 °F (36.6 °C) 96 18 176/73 99 %   03/22/18 0300 - 94 - 181/71 98 %   03/22/18 0014 98.9 °F (37.2 °C) (!) 106 22 188/84 100 %     No intake or output data in the 24 hours ending 03/22/18 1539     PHYSICAL EXAM: Observed work with PT with slow deliberate mobility with a walker  General: WD, WN. Alert, cooperative, no acute distress    EENT:  EOMI. Anicteric sclerae. MMM  Resp:  CTA bilaterally, no wheezing or rales. No accessory muscle use  CV:  Regular  rhythm,  No edema  GI:  Soft, Non distended, Non tender.  +Bowel sounds  Neurologic:  Alert and oriented X 3, normal speech,   Psych:   Good insight. Not anxious nor agitated  Skin:  No rashes. No jaundice, multiple skin ulceration some with indurations concerning for an underlying Abcess?   Reviewed most current lab test results and cultures  YES  Reviewed most current radiology test results   YES  Review and summation of old records today    NO  Reviewed patient's current orders and [facility] YES  PMH/SH reviewed - no change compared to H&P  ________________________________________________________________________  Care Plan discussed with:    Comments   Patient y    Family      RN     Care Manager     Consultant   PT                     Multidiciplinary team rounds were held today with , nursing, pharmacist and clinical coordinator. Patient's plan of care was discussed; medications were reviewed and discharge planning was addressed. ________________________________________________________________________  Total NON critical care TIME:  30  Minutes    Total CRITICAL CARE TIME Spent:   Minutes non procedure based      Comments   >50% of visit spent in counseling and coordination of care     ________________________________________________________________________  Trey Finn MD     Procedures: see electronic medical records for all procedures/Xrays and details which were not copied into this note but were reviewed prior to creation of Plan. LABS:  I reviewed today's most current labs and imaging studies.   Pertinent labs include:  Recent Labs      03/22/18 0036   WBC  19.1*   HGB  10.9*   HCT  32.3*   PLT  271     Recent Labs      03/22/18 0036   NA  132*   K  4.3   CL  97   CO2  23   GLU  514*   BUN  52*   CREA  4.99*   CA  8.9   MG  2.3   ALB  2.7*   TBILI  0.4   SGOT  14*   ALT  23       Signed: Trey Finn MD

## 2018-03-22 NOTE — PROGRESS NOTES
Discussed with Dr. Brayan Mason who debrided right upper buttock wound this AM.  That and the right back wound look good, clean. Wound care consult is pending. Patient and family say they don't understand why these keep coming up. I explained that it is because of his poorly controlled diabetes and obesity which make the immune system unable to fight off infection. Explained that he needs to do a better job of controlling his DM. Also recommended weight loss and using antibacterial soap daily. Either Dr. Magi Vargas or I will see him tomorrow. Thanks.

## 2018-03-22 NOTE — ED NOTES
Fluid (Normal Saline) rate increased to 100ml/hr per orders, pt drowsy but responds when addressed by name. Glucose remains at 434 despite insulin administration. Will continue to monitor.  Call bell in reach

## 2018-03-22 NOTE — CDMP QUERY
Dr. Braxton Beard MD :  Patient is noted to have a BMI of 37.95. Please clarify if this patient is:     =>Morbidly obese (BMI ³ 40)  =>Obese (BMI 30 - 39.9)  =>Overweight (BMI 25 - 29.9)  =>Other explanation of clinical findings  =>Unable to determine (no explanation for clinical findings)    Presentation: 6'5\", 320 lbs = BMI 37.95    REFERENCE:  The [address] [place] [address] has issued a statement indicating that, \"Individuals who are overweight, obese, or morbidly obese are at an increased risk for certain medical conditions when compared to persons of normal weight. Therefore, these conditions are always clinically significant and reportable when documented by the provider. Please clarify and document your clinical opinion in the progress notes and discharge summary, including the definitive and or presumptive diagnosis, (suspected or probable), related to the above clinical findings. Please include clinical findings supporting your diagnosis.      Thank Brigido Mane

## 2018-03-22 NOTE — CONSULTS
[place]     NAME:Anant Norman  AVC:[id number]   :1955   -            arf  Due to dehydration +/- progression of ckd  CKD due to uncontrolled dm    Plan  ivf  No indication for hd YET. Follow daily labs. Kanwal Faustin, [address] [place] [address] [facility]  [facility] FR[facility]ISC[facility], [address]  [place], [address] [place]  Phone - [phone]         Fax - [phone] [facility] Office  [address] [address]  Phone - [phone]        Fax - [phone]     [email or web address]

## 2018-03-22 NOTE — PROGRESS NOTES
Addendum  Repeat lactic: 1.1. No need for further following .  Noted was normal cap refill on H&P  Debbie Rushing MD

## 2018-03-22 NOTE — PROGRESS NOTES
Primary Nurse Georgie Vizcaino and EMMY Tripp performed a dual skin assessment on this patient Impairment noted- see wound doc flow sheet  Nicolás score is 19    3cm open area noted to upper back, red/ yellow base; 7cm x 4cm noted to lower back approx. 2 cm deep, red/pink base; 4cm x 4cm irregular area noted to left buttock yellow drainage from area; per patient all noted areas are from abscess that have been removed; all areas cleaned and dressed at this time; bilateral feet appear to be dry and flaky, residual from what appears to be betadine noted to rt foot; wound care consult submitted. Patient also has several scars to BLE, back and BUE.

## 2018-03-22 NOTE — ED NOTES
Bedside and Verbal shift change report given to Giuliana Rodriguez RN (oncoming nurse) by Shanon Boogie (offgoing nurse). Report included the following information SBAR, Kardex, ED Summary, MAR, Recent Results and Cardiac Rhythm NSR. New Miraplex placed to wound buttocks. Oncoming RN notified of all consults. Reviewed IV fluid rate. Urinal and call bell at bedside. Patient placed on hospital bed at this time.

## 2018-03-22 NOTE — ED TRIAGE NOTES
Assumed care of pt via EMS stretcher. Dr. Meghann Eric 90 at bedside. Per pt CC of elevated Blood Glucose. Per  in transit. Pt also presents with sacral wounds. Pt is A&O x 4 on monitor x 3.

## 2018-03-22 NOTE — ED PROVIDER NOTES
EMERGENCY DEPARTMENT HISTORY AND PHYSICAL EXAM          Date: 3/21/2018  Patient Name: Nikita Salomon    History of Presenting Illness     Chief Complaint   Patient presents with    High Blood Sugar    Fall       History Provided By: Patient and EMS    HPI: Nikita Salomon is a 57 y.o. male, pmhx HTN / DM / CKD / stroke / CA, who presents via EMS to the ED for evaluation s/p GLF PTA this evening. Pt states he got up to use the bathroom when he became generally weak and slowly fell into his dresser, lowering himself to the ground. Pt reports additional nausea and vomiting this evening. EMS states they have been called to the house for similar events multiple times tonight so far. They advised the pt to follow up after his second episode. Pt states \"I think I'm just wore out from everything going on\". He is noted to have multiple healing sounds to his back and sacral region. Per chart review, pt had multiple abscesses surgically debrided in January 2018. He states the surgeons office is currently covering his wound care and notes he has home health care that comes to check on him daily. EMS vitals: /97, SpO2 96% RA, and , . Pt notes adherence with his daily medications, including his Novolin. Pt otherwise specifically denies any recent fevers, chills, nausea, vomiting, diarrhea, abd pain, CP, SOB, urinary sxs, changes in BM, or headache. PCP: Michael Fisher NP    Allergies: NKDA  PMHx: Significant for HTN, DM, CKD, stroke, arthritis, splenic lymphoma, diabetic neuropathy  PSHx: Significant for excisional debridement of abscess, I&D  Social Hx: -tobacco, -EtOH, -Illicit Drugs    There are no other complaints, changes, or physical findings at this time. Current Facility-Administered Medications   Medication Dose Route Frequency Provider Last Rate Last Dose    vancomycin (VANCOCIN) 1500 mg in  ml infusion  1,500 mg IntraVENous NOW MD Silva Jin piperacillin-tazobactam (ZOSYN) 3.375 g in 0.9% sodium chloride (MBP/ADV) 100 mL  3.375 g IntraVENous Q8H Edson Vallecillo MD        amLODIPine (NORVASC) tablet 10 mg  10 mg Oral DAILY Edson Vallecillo MD        carvedilol (COREG) tablet 25 mg  25 mg Oral BID WITH MEALS Edson Vallecillo MD        cloNIDine HCl (CATAPRES) tablet 0.2 mg  0.2 mg Oral BID Edson Vallecillo MD        clopidogrel (PLAVIX) tablet 75 mg  75 mg Oral DAILY Edson Vallecillo MD        digoxin (LANOXIN) tablet 0.125 mg  0.125 mg Oral DAILY Edson Vallecillo MD        hydrALAZINE (APRESOLINE) tablet 50 mg  50 mg Oral TID Edson Vallecillo MD        pravastatin (PRAVACHOL) tablet 20 mg  20 mg Oral QHS Edson Vallecillo MD        sodium bicarbonate tablet 650 mg  650 mg Oral BID Edson Vallecillo MD        acetaminophen (TYLENOL) tablet 650 mg  650 mg Oral Q4H PRN Edson Vallecillo MD        ondansetron [facility]) injection 4 mg  4 mg IntraVENous Q4H PRN Edson Vallecillo MD        labetalol (NORMODYNE;TRANDATE) injection 10 mg  10 mg IntraVENous Q2H PRN Edson Vallecillo MD        polyethylene glycol (MIRALAX) packet 17 g  17 g Oral DAILY Edson Vallecillo MD        heparin (porcine) injection 5,000 Units  5,000 Units SubCUTAneous Q12H Edson Vallecillo MD        0.9% sodium chloride infusion  75 mL/hr IntraVENous CONTINUOUS Edson Vallecillo MD        insulin lispro (HUMALOG) injection   SubCUTAneous AC&HS Edson Vallecillo MD        glucose chewable tablet 16 g  4 Tab Oral PRN Edson Vallecillo MD        dextrose (D50W) injection syrg 12.5-25 g  12.5-25 g IntraVENous PRN Edson Vallecillo MD        glucagon (GLUCAGEN) injection 1 mg  1 mg IntraMUSCular PRN Edson Vallecillo MD        sodium chloride (NS) flush 5-10 mL  5-10 mL IntraVENous Q8H Edson Vallecillo MD        sodium chloride (NS) flush 5-10 mL  5-10 mL IntraVENous PRN Edson Vallecillo MD         Current Outpatient Prescriptions   Medication Sig Dispense Refill    amLODIPine (NORVASC) 10 mg tablet Take 1 Tab by mouth daily. 30 Tab 1    carvedilol (COREG) 25 mg tablet Take 1 Tab by mouth two (2) times daily (with meals). 60 Tab 1    hydrALAZINE (APRESOLINE) 50 mg tablet Take 1 Tab by mouth three (3) times daily. 90 Tab 1    sodium bicarbonate 650 mg tablet Take 1 Tab by mouth two (2) times a day. 60 Tab 1    HYDROcodone-acetaminophen (NORCO) 5-325 mg per tablet Take 1 Tab by mouth every four (4) hours as needed for Pain. Max Daily Amount: 6 Tabs. 20 Tab 0    clopidogrel (PLAVIX) 75 mg tab Take 75 mg by mouth daily.  NOVOLIN 70/30 100 unit/mL (70-30) injection 65 Units by SubCUTAneous route Before breakfast and dinner.  lancets 30 gauge misc       SURE COMFORT INSULIN SYRINGE 1 mL 31 gauge x 5/16 syrg       ACCU-CHEK SOFÍA CONTROL SOLN soln       ACCU-CHEK SOFÍA PLUS TEST STRP strip       cloNIDine HCl (CATAPRES) 0.2 mg tablet Take 1 Tab by mouth two (2) times a day. 30 Tab 0    alcohol swabs (ALCOHOL PADS) padm 1 Each by Apply Externally route two (2) times a day. 100 Each 1    pravastatin (PRAVACHOL) 20 mg tablet Take 20 mg by mouth nightly.  digoxin (LANOXIN) 0.125 mg tablet Take 0.125 mg by mouth daily. Indications: afib         Past History     Past Medical History:  Past Medical History:   Diagnosis Date    A-fib [facility]) 2009    Resolved.    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[address]     Cancer ([address]     splenic lymphoma    Chronic kidney disease     CKD stage 4 due to type 2 diabetes mellitus ([address]     Diabetes ([address]     Hypertension     Ill-defined condition     diabetic neuropathy james feet    Neuropathy     Other ill-defined conditions(799.89)     spinal meningitis    Other ill-defined conditions(799.89)     broken blood vessel to nose    Stroke ([address] 2007, 2017    per patient    Vitamin D deficiency        Past Surgical History:  Past Surgical History:   Procedure Laterality Date    HX OTHER SURGICAL  02/06/2017 Excisional debridement of abscess right thigh & right lower back     HX OTHER SURGICAL  05/23/2017    I&D & excisional debridement (skin & subcutaneous tissue) back & right thigh    HX OTHER SURGICAL  01/12/2018    debridement of abscess back       Family History:  Family History   Problem Relation Age of Onset    Diabetes Mother     Hypertension Father     Hypertension Sister        Social History:  Social History   Substance Use Topics    Smoking status: Never Smoker    Smokeless tobacco: Never Used    Alcohol use No       Allergies:  No Known Allergies      Review of Systems   Review of Systems   Constitutional: Negative for activity change, appetite change, chills, fever and unexpected weight change. HENT: Negative for congestion. Eyes: Negative for pain and visual disturbance. Respiratory: Negative for cough and shortness of breath. Cardiovascular: Negative for chest pain. Gastrointestinal: Positive for nausea and vomiting. Negative for abdominal pain and diarrhea. Genitourinary: Negative for dysuria. Musculoskeletal: Negative for back pain. Skin: Positive for wound. Negative for rash. Neurological: Positive for weakness (generalized). Negative for headaches. Physical Exam   Physical Exam   Constitutional: He is oriented to person, place, and time. He appears well-developed. He appears distressed. obese elderly male, moderate distress, disheveled appearing   HENT:   Head: Normocephalic and atraumatic. Mouth/Throat: Oropharynx is clear and moist.   Eyes: Conjunctivae and EOM are normal. Pupils are equal, round, and reactive to light. Right eye exhibits no discharge. Left eye exhibits no discharge. Neck: Normal range of motion. Neck supple. Cardiovascular: Regular rhythm and normal heart sounds. No murmur heard. borderline tachycardia   Pulmonary/Chest: Effort normal and breath sounds normal. No respiratory distress. He has no wheezes. He has no rales.    Abdominal: Soft. Bowel sounds are normal. He exhibits no distension. There is no tenderness. Musculoskeletal: Normal range of motion. He exhibits no edema. Neurological: He is alert and oriented to person, place, and time. No cranial nerve deficit. He exhibits normal muscle tone. Skin: Skin is warm and dry. No rash noted. He is not diaphoretic.   4cm R lower thoracic wound that appears well healing without acute pustular drainage and without surrounding induration or erythema,  L upper buttock has an irregularly shaped ~3x2cm cavitary lesion with surrounding induration and tenderness and active pusy drainage; weeping blister ~2cm proximal to this site  upper thoracic spine has a 2.5cm well healing wound that is partially closed without surrounding induration     Nursing note and vitals reviewed. Diagnostic Study Results     Labs -     Recent Results (from the past 12 hour(s))   LACTIC ACID    Collection Time: 03/22/18 12:36 AM   Result Value Ref Range    Lactic acid 2.2 (HH) 0.4 - 2.0 MMOL/L   CBC WITH AUTOMATED DIFF    Collection Time: 03/22/18 12:36 AM   Result Value Ref Range    WBC 19.1 (H) 4.1 - 11.1 K/uL    RBC 3.76 (L) 4.10 - 5.70 M/uL    HGB 10.9 (L) 12.1 - 17.0 g/dL    HCT 32.3 (L) 36.6 - 50.3 %    MCV 85.9 80.0 - 99.0 FL    MCH 29.0 26.0 - 34.0 PG    MCHC 33.7 30.0 - 36.5 g/dL    RDW 12.7 11.5 - 14.5 %    PLATELET 666 646 - 913 K/uL    MPV 12.2 8.9 - 12.9 FL    NRBC 0.0 0  WBC    ABSOLUTE NRBC 0.00 0.00 - 0.01 K/uL    NEUTROPHILS 86 (H) 32 - 75 %    LYMPHOCYTES 7 (L) 12 - 49 %    MONOCYTES 6 5 - 13 %    EOSINOPHILS 0 0 - 7 %    BASOPHILS 0 0 - 1 %    IMMATURE GRANULOCYTES 1 (H) 0.0 - 0.5 %    ABS. NEUTROPHILS 16.4 (H) 1.8 - 8.0 K/UL    ABS. LYMPHOCYTES 1.3 0.8 - 3.5 K/UL    ABS. MONOCYTES 1.2 (H) 0.0 - 1.0 K/UL    ABS. EOSINOPHILS 0.1 0.0 - 0.4 K/UL    ABS. BASOPHILS 0.1 0.0 - 0.1 K/UL    ABS. IMM.  GRANS. 0.1 (H) 0.00 - 0.04 K/UL    DF AUTOMATED     MAGNESIUM    Collection Time: 03/22/18 12:36 AM Result Value Ref Range    Magnesium 2.3 1.6 - 2.4 mg/dL   METABOLIC PANEL, COMPREHENSIVE    Collection Time: 03/22/18 12:36 AM   Result Value Ref Range    Sodium 132 (L) 136 - 145 mmol/L    Potassium 4.3 3.5 - 5.1 mmol/L    Chloride 97 97 - 108 mmol/L    CO2 23 21 - 32 mmol/L    Anion gap 12 5 - 15 mmol/L    Glucose 514 (H) 65 - 100 mg/dL    BUN 52 (H) 6 - 20 MG/DL    Creatinine 4.99 (H) 0.70 - 1.30 MG/DL    BUN/Creatinine ratio 10 (L) 12 - 20      GFR est AA 14 (L) >60 ml/min/1.73m2    GFR est non-AA 12 (L) >60 ml/min/1.73m2    Calcium 8.9 8.5 - 10.1 MG/DL    Bilirubin, total 0.4 0.2 - 1.0 MG/DL    ALT (SGPT) 23 12 - 78 U/L    AST (SGOT) 14 (L) 15 - 37 U/L    Alk. phosphatase 188 (H) 45 - 117 U/L    Protein, total 7.6 6.4 - 8.2 g/dL    Albumin 2.7 (L) 3.5 - 5.0 g/dL    Globulin 4.9 (H) 2.0 - 4.0 g/dL    A-G Ratio 0.6 (L) 1.1 - 2.2     GLUCOSE, POC    Collection Time: 03/22/18  3:10 AM   Result Value Ref Range    Glucose (POC) 514 (H) 65 - 100 mg/dL    Performed by Becka Mariano        Radiologic Studies -   No orders to display     CT Results  (Last 48 hours)    None        CXR Results  (Last 48 hours)    None            Medical Decision Making   I am the first provider for this patient. I reviewed the vital signs, available nursing notes, past medical history, past surgical history, family history and social history. Vital Signs-Reviewed the patient's vital signs.   Patient Vitals for the past 12 hrs:   Temp Pulse Resp BP SpO2   03/22/18 0330 - 95 18 176/73 99 %   03/22/18 0300 - 94 - 181/71 98 %   03/22/18 0014 98.9 °F (37.2 °C) (!) 106 22 188/84 100 %       Pulse Oximetry Analysis - 99% on RA    Cardiac Monitor:   Rate: 105 bpm  Rhythm: Sinus Tachycardia     Records Reviewed: Nursing Notes, Old Medical Records, Previous electrocardiograms, Ambulance Run Sheet, Previous Radiology Studies and Previous Laboratory Studies    Provider Notes (Medical Decision Making):     MDM: poorly cared for, non-compliant diabetic who has not follow up as recommended by surgery, presenting with recurrent abscess and evidence of possible early sepsis. Prior cultures notable for MRSA during OR debridement. ED Course:   Initial assessment performed. The patients presenting problems have been discussed, and they are in agreement with the care plan formulated and outlined with them. I have encouraged them to ask questions as they arise throughout their visit. PROGRESS NOTE:  12:35 AM  Per chart review, pt non-compliant with follow ups. Pt had scheduled appointment with endocrinology on 3/19 that was missed. Home health nurse reports finding 2 new abscesses on 3/20 at which time pt was instructed to follow up with Dr. Daniel Gutierrez in office. No further communication noted between pt and surgeon's office. Written by DONAVAN Locke, as dictated by Aure Patton MD    CONSULT NOTE:   1:40 AM  Aure Patton MD spoke with Shameka Cruz MD,   Specialty: General Surgery  Discussed pt's hx, disposition, and available diagnostic and imaging results. Reviewed care plans. Consultant requests admission through hospitalist and will consult as needed. Written by DONAVAN Locke, as dictated by Aure Patton MD.    CONSULT NOTE:   1:45 AM  Aure Patton MD spoke with Kylie Lanier MD,   Specialty: Hospitalist  Discussed pt's hx, disposition, and available diagnostic and imaging results. Reviewed care plans. Consultant states this is a surgical issue and will speak with Dr. Umm Ngo regarding further disposition. Written by DONAVAN Locke, as dictated by Aure Patton MD    PROGRESS NOTE:  2:32 AM  Dr. Umm Ngo in to evaluate pt. Please see his notes for further details. He does not feel the pt needs to be taken to the OR at this time. Will speak with hospitalist regarding admission.    Written by DONAVAN Locke, as dictated by Aure Patton MD    PROGRESS NOTE:  3:03 AM  Vs remain stable with improvement in HR. Antibiotics infused. No further issues. Written by Maria Isabel Kay, ED Scribe, as dictated by Erika Murphy MD      Diagnosis     Clinical Impression:   1. Abscess of back    2. Hyperglycemia    3. Azotemia        PLAN:  1. Admit to hospitalist    Disposition:    ADMISSION NOTE:  1:45 AM  Patient is being admitted to the hospital by Dr. Mandi Nuñez. The results of their tests and reasons for their admission have been discussed with them and/or available family. They convey agreement and understanding for the need to be admitted and for their admission diagnosis. Written by Amie Crenshaw, ED Scribe, as dictated by Erika Murphy MD.            Attestations: This note is prepared by Maria Isabel Kay, acting as MD Erika Dial MD : The scribe's documentation has been prepared under my direction and personally reviewed by me in its entirety. I confirm that the note above accurately reflects all work, treatment, procedures, and medical decision making performed by me. This note will not be viewable in [address]

## 2018-03-22 NOTE — ED NOTES
Assisted pt to bathroom. Pt reported no dizziness or lightheadedness when ambulating. Gait unsteady.  Pt resting comfortably with call bell in reach at this time

## 2018-03-22 NOTE — PROGRESS NOTES
Spoke with Dr. Benjie Gatica regarding patient's blood glucose of 375; per MD give patient 10 units; no further orders given at this time. 1931  Bedside shift change report given to David Burton (oncoming nurse) by Kathrin Boone (offgoing nurse). Report included the following information SBAR, Kardex, Intake/Output, MAR and Accordion. Zone Phone for oncoming shift:   [phone]    Shift Summary: ; wound care consult done    LDAs               Peripheral IV 03/22/18 Left Antecubital (Active)   Site Assessment Clean, dry, & intact 3/22/2018  3:00 PM   Phlebitis Assessment 0 3/22/2018  3:00 PM   Infiltration Assessment 0 3/22/2018  3:00 PM   Dressing Status Clean, dry, & intact 3/22/2018  3:00 PM   Dressing Type Tape;Transparent 3/22/2018  3:00 PM   Hub Color/Line Status Pink; Infusing 3/22/2018  3:00 PM   Action Taken Blood drawn 3/22/2018 12:35 AM       Peripheral IV 03/22/18 Right (Active)   Site Assessment Clean, dry, & intact 3/22/2018  3:00 PM   Phlebitis Assessment 0 3/22/2018  3:00 PM   Infiltration Assessment 0 3/22/2018  3:00 PM   Dressing Status Clean, dry, & intact 3/22/2018  3:00 PM   Dressing Type Tape;Transparent 3/22/2018  3:00 PM   Hub Color/Line Status Green;Flushed 3/22/2018  3:00 PM   Action Taken Blood drawn 3/22/2018 12:35 AM                        Intake & Output   Date 03/21/18 0700 - 03/22/18 0659 03/22/18 0700 - 03/23/18 0659   Shift 9465-8318 9163-5259 24 Hour Total 6201-0234 3248-7514 24 Hour Total   I  N  T  A  K  E   P.O.    820  820      P. O.    820  820    Shift Total  (mL/kg)    820  (5.6)  820  (5.6)   O  U  T  P  U  T   Urine    400  400      Urine Voided    400  400    Shift Total  (mL/kg)    400  (2.8)  400  (2.8)   NET    420  420   Weight (kg)  145. 1 145. 1 145. 1 145. 1 145. 1      Last Bowel Movement Last Bowel Movement Date: 03/22/18   Glucose Checks [] N/A  [x] AC/HS  [] Q6  Concerns:   Nutrition Active Orders   Diet    DIET DIABETIC CONSISTENT CARB Regular; 2 GM NA (House Low NA); FR 1500ML;  No ConcAlec Wagoner       Consults []PT  []OT  []Speech  []Case Management   Cardiac Monitoring []N/A []Yes Expires:

## 2018-03-22 NOTE — PROGRESS NOTES
Problem: Mobility Impaired (Adult and Pediatric)  Goal: *Acute Goals and Plan of Care (Insert Text)  Physical Therapy Goals  Initiated 3/22/2018  1. Patient will move from supine to sit and sit to supine , scoot up and down and roll side to side in bed with independence within 7 day(s). 2.  Patient will transfer from bed to chair and chair to bed with modified independence using the least restrictive device within 7 day(s). 3.  Patient will perform sit to stand with modified independence within 7 day(s). 4.  Patient will ambulate with modified independence for 656 feet with the least restrictive device within 7 day(s). 5.  Patient will ascend/descend 4 stairs with dual handrail(s) with modified independence within 7 day(s). physical Therapy EVALUATION  Patient: Vlad Horner (41 y.o. male)  Date: 3/22/2018  Primary Diagnosis: Severe sepsis (HCC)        Precautions:  Contact, Fall    ASSESSMENT :  Based on the objective data described below, the patient presents with diabetic neuropathy with history of falls, activity intolerance with fatigue, minor generalized weakness impacting functional mobility. Patient with multiple open wounds, poor toenail condition with high BG (400+ at time of visit) despite voiced reported understanding of insulin regimen and desire to improve on co morbidities. Received in supine and agreeable, with patient quite fatigued from admissions process and ED stay. Despite this, he mobilized with S-CGA throughout with excellent use of bariatric RW device, although with very slowed gait speed, wide KATEY likely 2/2 neuropathy. During gait, patient briefed on basic DM2 foot care and impact on vision, nervous system among others to with handouts provided for carryover. Author planned to perform Romberg, TUG or similar test yet MD arrived where further activity aborted and at patient's request patient A to supine via S.  Patient with poor RW position and impulsive demeanor for stand>sit transfer with very poor hand position for task. MD advised of baseline and deficits noted above where she noted desire for patient to likely DC with IV antibiotics. As such, alongside patient's fair home situation (home alone often, with poor ability to follow diabetic diet), this should be adequate to best control multiple co morbidities that will likely only further impact his functional mobility ahead if not altered. However, patient may not be agreeable to this per current demeanor. Patient will benefit from skilled intervention to address the above impairments. Patients rehabilitation potential is considered to be Good  Factors which may influence rehabilitation potential include:   []         None noted  []         Mental ability/status  [x]         Medical condition  []         Home/family situation and support systems  [x]         Safety awareness  []         Pain tolerance/management  []         Other:      PLAN :  Recommendations and Planned Interventions:  [x]           Bed Mobility Training             []    Neuromuscular Re-Education  [x]           Transfer Training                   []    Orthotic/Prosthetic Training  [x]           Gait Training                         []    Modalities  [x]           Therapeutic Exercises           []    Edema Management/Control  [x]           Therapeutic Activities            [x]    Patient and Family Training/Education  []           Other (comment):    Frequency/Duration: Patient will be followed by physical therapy  3 times a week to address goals. Discharge Recommendations: [facility]ly [facility] (per MD, patient in need of IV antibiotics at DC)  Further Equipment Recommendations for Discharge: defer     SUBJECTIVE:   Patient stated I don't remember much about that.  referring to DM2 foot care    OBJECTIVE DATA SUMMARY:   HISTORY:    Past Medical History:   Diagnosis Date    A-fib [facility]) 2009    Resolved.    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     Cancer ([facility] [address]     splenic lymphoma    Chronic kidney disease     CKD stage 4 due to type 2 diabetes mellitus ([facility]     Diabetes ([facility] [facility]     Hypertension     Ill-defined condition     diabetic neuropathy james feet    Neuropathy     Other ill-defined conditions(799.89)     spinal meningitis    Other ill-defined conditions(799.89)     broken blood vessel to nose    Stroke ([facility] [address] 2007, 2017    per patient    Vitamin D deficiency      Past Surgical History:   Procedure Laterality Date    HX OTHER SURGICAL  02/06/2017    Excisional debridement of abscess right thigh & right lower back     HX OTHER SURGICAL  05/23/2017    I&D & excisional debridement (skin & subcutaneous tissue) back & right thigh    HX OTHER SURGICAL  01/12/2018    debridement of abscess back     Prior Level of Function/Home Situation: Mod-I with use of RW (which he reports is too short for him) at home with sedentary lifestyle. He lives with his 'friend' Pawan, and step-son, both of whom who work and largely control his food intake (reports high salt, no DM2 diet). He does drive but rarely so 2/2 sedentary demeanor. No formal exercise. Patient reports high BG despite compliance with medication; he denies difficulty reading his glucometer (despite reporting need to return to eye specialist 'due to something'; denies retinopathy). Neuropathy in B feet, hands. Reports no knowledge of wound care/foot care for DM2; he states 'my doctor' cuts his toenails. Reports fall at home in the past week, sliding out of bed onto his bottom with trouble arising.    Personal factors and/or comorbidities impacting plan of care: DM2, HTN, CKD stage 4    Home Situation  Home Environment: Private residence  # Steps to Enter: 4  Rails to Enter: Yes  Hand Rails : Bilateral  Wheelchair Ramp: No  One/Two Story Residence: One story  Living Alone: No  Support Systems: Spouse/Significant Other/Partner  Patient Expects to be Discharged to[de-identified] Private residence  Current DME Used/Available at Home: Walker, rolling, Glucometer (reports RW is too short for him)  Tub or Shower Type: Tub/Shower combination    EXAMINATION/PRESENTATION/DECISION MAKING:   Critical Behavior:  Neurologic State: Alert  Orientation Level: Oriented X4  Cognition: Appropriate decision making  Safety/Judgement: Awareness of environment  Hearing: Auditory  Auditory Impairment: None  Skin:  Multiple wounds noted; see RN notation   Range Of Motion:  AROM: Within functional limits                       Strength:    Strength: Generally decreased, functional                    Tone & Sensation:                  Sensation: Impaired (hands, feet, )               Coordination:     Vision:   Corrective Lenses: Reading glasses  Functional Mobility:  Bed Mobility:  Rolling: Supervision; Additional time  Supine to Sit: Supervision; Additional time     Scooting: Supervision; Additional time  Transfers:  Sit to Stand: Contact guard assistance  Stand to Sit: Contact guard assistance (poor hand placement)                       Balance:   Sitting: Intact; Without support  Standing: Intact; With support (RW)  Ambulation/Gait Training:  Distance (ft): 25 Feet (ft)  Assistive Device: Walker, rolling;Gait belt (bariatric RW)  Ambulation - Level of Assistance: Contact guard assistance     Gait Description (WDL): Exceptions to WDL  Gait Abnormalities: Decreased step clearance        Base of Support: Widened     Speed/Sanjana: Pace decreased (<100 feet/min)               Slowed, but effective use of RW device.  Limited by arrival of MD   Functional Measure:  Barthel Index:    Bathin  Bladder: 5  Bowels: 5  Groomin  Dressin  Feeding: 10  Mobility: 0  Stairs: 0  Toilet Use: 5  Transfer (Bed to Chair and Back): 10  Total: 45       Barthel and G-code impairment scale:  Percentage of impairment CH  0% CI  1-19% CJ  20-39% CK  40-59% CL  60-79% CM  80-99% CN  100%   Barthel Score 0-100 100 99-80 79-60 59-40 20-39 1-19   0   Barthel Score 0-20 20 17-19 13-16 9-12 5-8 1-4 0      The Barthel ADL Index: Guidelines  1. The index should be used as a record of what a patient does, not as a record of what a patient could do. 2. The main aim is to establish degree of independence from any help, physical or verbal, however minor and for whatever reason. 3. The need for supervision renders the patient not independent. 4. A patient's performance should be established using the best available evidence. Asking the patient, friends/relatives and nurses are the usual sources, but direct observation and common sense are also important. However direct testing is not needed. 5. Usually the patient's performance over the preceding 24-48 hours is important, but occasionally longer periods will be relevant. 6. Middle categories imply that the patient supplies over 50 per cent of the effort. 7. Use of aids to be independent is allowed. Daksha Nuñez., Barthel, D.W. (1018). Functional evaluation: the Barthel Index. [address] [facility] (14)2. REGIS MagañaF, Gigi Sexton., Piyush Castellon., [place], [address] [place] [address] (1999). Measuring the change indisability after inpatient rehabilitation; comparison of the responsiveness of the Barthel Index and Functional Sanders Measure. Journal of Neurology, Neurosurgery, and Psychiatry, 66(4), 733-035. Jack Rice, N.J.A, JENNY Roger, & Ileana Meza MAlecA. (2004.) Assessment of post-stroke quality of life in cost-effectiveness studies: The usefulness of the Barthel Index and the EuroQoL-5D. Quality of Life Research, 13, 430-11         G codes: In compliance with CMSs Claims Based Outcome Reporting, the following G-code set was chosen for this patient based on their primary functional limitation being treated:     The outcome measure chosen to determine the severity of the functional limitation was the barthel with a score of 45/100 which was correlated with the impairment scale. ? Mobility - Walking and Moving Around:     - CURRENT STATUS: CK - 40%-59% impaired, limited or restricted    - GOAL STATUS: CI - 1%-19% impaired, limited or restricted    - D/C STATUS:  ---------------To be determined---------------    Pain:  Pain Scale 1: Numeric (0 - 10)  Pain Intensity 1: 0              Activity Tolerance: WNL    Please refer to the flowsheet for vital signs taken during this treatment. After treatment:   []         Patient left in no apparent distress sitting up in chair  [x]         Patient left in no apparent distress in bed at his request  [x]         Call bell left within reach  [x]         Nursing notified  []         Caregiver present  []         Bed alarm activated    COMMUNICATION/EDUCATION:   The patients plan of care was discussed with: Registered Nurse and Physician. [x]         Fall prevention education was provided and the patient/caregiver indicated understanding. [x]         Patient/family have participated as able in goal setting and plan of care. [x]         Patient/family agree to work toward stated goals and plan of care. []         Patient understands intent and goals of therapy, but is neutral about his/her participation. []         Patient is unable to participate in goal setting and plan of care.     Thank you for this referral.  Kenya Robles, PT, DPT, CEEAA      Time Calculation: 29 mins

## 2018-03-22 NOTE — CONSULTS
Surgery Consult    Subjective:      Isabela Garnett is a 57 y.o. male well known to the surgical service s/p surgical debridement in OR of multiple cutaneous abscesses in the back by Dr. Ananda Johnson on 1/30/18. Home health nurse recently called the office stating that he was developing new wounds in the buttocks and lower back and Dr. Ananda Johnson instructed the patient to come in to the office but he did not. Today, he fell while using the bathroom and was unable to get up and called EMS. He is found to have a blood glucose >500, serum creatinine 5, up from 3 in January, multiple deep wounds on the back in various stages of wound care treatment, and 2 new wounds on the left lower back and left buttock which are actively draining purulent material.      Past Medical History:   Diagnosis Date    A-fib [facility]) 2009    Resolved.    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[address]     Cancer ([address]     splenic lymphoma    Chronic kidney disease     CKD stage 4 due to type 2 diabetes mellitus ([address]     Diabetes ([address]     Hypertension     Ill-defined condition     diabetic neuropathy james feet    Neuropathy     Other ill-defined conditions(799.89)     spinal meningitis    Other ill-defined conditions(799.89)     broken blood vessel to nose    Stroke ([address] 2007, 2017    per patient    Vitamin D deficiency      Past Surgical History:   Procedure Laterality Date    HX OTHER SURGICAL  02/06/2017    Excisional debridement of abscess right thigh & right lower back     HX OTHER SURGICAL  05/23/2017    I&D & excisional debridement (skin & subcutaneous tissue) back & right thigh    HX OTHER SURGICAL  01/12/2018    debridement of abscess back      Family History   Problem Relation Age of Onset    Diabetes Mother     Hypertension Father     Hypertension Sister      Social History     Social History    Marital status:      Spouse name: N/A   Hope Vyas Number of children: N/A    Years of education: N/A     Social History Main Topics    Smoking status: Never Smoker    Smokeless tobacco: Never Used    Alcohol use No    Drug use: No    Sexual activity: Not Asked     Other Topics Concern    None     Social History Narrative    ** Merged History Encounter **           Current Facility-Administered Medications   Medication Dose Route Frequency Provider Last Rate Last Dose    vancomycin (VANCOCIN) 1500 mg in  ml infusion  1,500 mg IntraVENous NOW Ely Hebert MD        insulin regular (NOVOLIN R, HUMULIN R) injection 10 Units  10 Units SubCUTAneous NOW Ely Hebert MD         Current Outpatient Prescriptions   Medication Sig Dispense Refill    amLODIPine (NORVASC) 10 mg tablet Take 1 Tab by mouth daily. 30 Tab 1    carvedilol (COREG) 25 mg tablet Take 1 Tab by mouth two (2) times daily (with meals). 60 Tab 1    hydrALAZINE (APRESOLINE) 50 mg tablet Take 1 Tab by mouth three (3) times daily. 90 Tab 1    sodium bicarbonate 650 mg tablet Take 1 Tab by mouth two (2) times a day. 60 Tab 1    HYDROcodone-acetaminophen (NORCO) 5-325 mg per tablet Take 1 Tab by mouth every four (4) hours as needed for Pain. Max Daily Amount: 6 Tabs. 20 Tab 0    clopidogrel (PLAVIX) 75 mg tab Take 75 mg by mouth daily.  NOVOLIN 70/30 100 unit/mL (70-30) injection 65 Units by SubCUTAneous route Before breakfast and dinner.  lancets 30 gauge misc       SURE COMFORT INSULIN SYRINGE 1 mL 31 gauge x 5/16 syrg       ACCU-CHEK SOFÍA CONTROL SOLN soln       ACCU-CHEK SOFÍA PLUS TEST STRP strip       cloNIDine HCl (CATAPRES) 0.2 mg tablet Take 1 Tab by mouth two (2) times a day. 30 Tab 0    alcohol swabs (ALCOHOL PADS) padm 1 Each by Apply Externally route two (2) times a day. 100 Each 1    pravastatin (PRAVACHOL) 20 mg tablet Take 20 mg by mouth nightly.  digoxin (LANOXIN) 0.125 mg tablet Take 0.125 mg by mouth daily.  Indications: afib          No Known Allergies    Review of Systems:  Pertinent items are noted in the History of Present Illness. Objective:      Patient Vitals for the past 8 hrs:   BP Temp Pulse Resp SpO2 Height Weight   18 0014 188/84 98.9 °F (37.2 °C) (!) 106 22 100 % 6' 5\" (1.956 m) 320 lb (145.2 kg)       Temp (24hrs), Av.9 °F (37.2 °C), Min:98.9 °F (37.2 °C), Max:98.9 °F (37.2 °C)      Physical Exam:  GENERAL: alert, cooperative, no distress, appears stated age, LUNG: clear to auscultation bilaterally, HEART: regular rate and rhythm, ABDOMEN: soft, non-tender. Bowel sounds normal. No masses,  no organomegaly, EXTREMITIES:  extremities normal, atraumatic, no cyanosis or edema, SKIN:  small draining wound left lower back without surrounding erythema or induration. Larger full thickness skin breakdown with underlying necrotic debris and purulent drainage left buttock in a dependent pressure ulcer fashion. Necrotic debris debrided at bedside with suture removal set, no undrained abscess cavity or evidence of necrotizing fasciitis, etc.    Assessment:     Hospital Problems  Date Reviewed: 2018    None        Chronic infected back wounds in various stages of healing, with new wound x 2 in lower back and buttocks, related to non-compliance with medical regimen with glucose > 500, and with worsening renal failure secondary to ongoing process. Wounds draining and no indication at present for larger surgical debridement in OR. Plan:     Inpatient admission to medical service with culture directed antibiotic therapy, management of diabetes and renal failure. Consult wound care. Dr. Gagnon Catching to follow up in am from surgical standpoint. Discussed with Dr. Heather Schofield, appreciate his assistance in the care of this poorly compliant gentleman.     Signed By: Jamilah Ni MD, [facility]    2018

## 2018-03-22 NOTE — ED NOTES
Bedside and Verbal shift change report given to Dean Gabriel (oncoming nurse) by Justino Sam (offgoing nurse). Report included the following information SBAR, ED Summary and Recent Results.

## 2018-03-22 NOTE — H&P
Hospitalist Admission Note    NAME: Benigno Patel   :  1955   MRN:  [id number]     Date/Time:  3/22/2018 3:25 AM    Patient PCP: Melody Loyd, NP  ________________________________________________________________________    Given the patient's current clinical presentation, I have a high level of concern for decompensation if discharged from the emergency department. Complex decision making was performed, which includes reviewing the patient's available past medical records, laboratory results, and x-ray films. My assessment of this patient's clinical condition and my plan of care is as follows. Assessment / Plan:  Severe sepsis presumed due to infected skin abscesses  Acidosis, lactic  --Patient meets criteria for severe sepsis and is highly suspected to have an infection with suspected source to be skin. -Cultures drawn in ED and I have ensured they were drawn by discussion with ED RN. -Lactic acid ordered in ED. repeat Q6hrs if elevated until <2 or trend is towards normal  -Discussed with ED attending regarding use of antibiotics. Antibiotic administered in the ED were vanco and I have added zosyn for known MRSA and to cover possible polymicrobial infection given new wounds and fall at home  -Requested Large bore IV x 2 sites. If unable to be attained have requested for ED to place central line and then will monitor CVP. -Continue volume expansion with cystalloid IV fluids    Diabetes mellitus 2 uncontrolled with hyperglycemia  Pseudohyponatremia  Severe dehydration from uncontrolled diabetes leading to acute renal failure on chronic disease suspect nearing stage IV  -ssi for now  -give insulin IV in ED to bring bs <400 quickly  -if unable to bring the bs down then will add insulin gtt  -with drop in bs the sodium should improve  -IVF NS to be used as well to help with ARF on CKD  -patient followed by Dr Alicia Dixon OP but has not been compliant with her wishes.  Will have her follow IP  -strict I/O    Hypertension, Benign essential   -remain on tele  -labetalol IV prn sbp >170  -check digoxin level given change in renal function and his statement of being compliant with medications  -resume all antihypertensives for now      I have personally reviewed the radiographs, laboratory data in Epic and decisions and statements above are based partially on this personal interpretation.     Code Status: Full Code  DVT Prophylaxis: Hep SQ  GI Prophylaxis: not indicated     Documenting Sepsis +/- Septic Shock (on admission values used)    TERMS OF SEPSIS  YES NO     Documented Source of suspected or possible infection y   <--l                     <---l       l                          l            l                          l   Temperature >38.3 (100.9)OR <36 (96.8)  n <--l      l                          l   Heart Rate >90 y       l       l      l-----SEPSIS       l       (Source + 2 SIRS)   l   Respiratory Rate >20 y       l----SIRS Criteria       l                          l   White Blood Count >12,000 or <4,000 y       l     (TWO required)      l                          l   Bandemia >10%  n <--l <--l                           l                                       l------SEVERE SEPSIS                                 (Sepsis + end organ Dysfunction)   Systolic Blood Pressure <26 OR decrease in systolic blood pressure >72 from normal  n   <--l       l                                 l                                  l                                  l   Mean Arterial Pressure <65  n      l                                 l   Creatinine >2 OR urine output <0.5 ml/kg/hr for 2 hours y       l----End Organ                Damage          (defined by any ONE of           these present)                                 l                                  l                                  l   Bilirubin >2mg/dl        l                                 l   Platelets <406,964        l                                 l   INR > 1.5 OR aPTT > 60        l                                 l   Lactate >2 and <4 mmol/L (severe sepsis) y  <--l                            <--l          Lactate >4mmol/L (shock)  n <----------------------------- Lactic >4 defines Septic Shock  (severe sepsis must be present OR have hypotension in   the first 60min after completion of 30cc/kg IVF bolus)          Subjective:   CHIEF COMPLAINT: \"i fell\"    HISTORY OF PRESENT ILLNESS:     Doug Ardon is a 57 y.o.  male with known history as listed below presents to ED with complaint noted above. Available records were reviewed at the time of H&P. Patient known poorly compliant DM2 and CKD4 patient recently admitted by Dr Jero Goodman for surgical debridement in OR of multiple cutaneous abscesses in the back on 1/30/18. Patient was sent home with [facility] wound care. They noted a possible new wound buttock/hip and patient was to follow with Dr Jero Goodman OP but did not come to the office. He fell on 3/21 at home while using the bathroom and could not get up - called EMS x multiple given recurrent inability to get himself up due to weakness. Eventually they found bs of >500 and he was Brought to ED for further work up and evaluation. In ED noted further renal insufficiency creat to 5. He is CKD IV followed by renal team Dr Savage Honeycutt but is noncompliant with visits/medicine. In ED further noted that the new left buttock wound draining purulent material, seen by Dr Fernando Taylor whom did not feel the patient required OR, debrided in ED. Given elevated bs, creat bump, we were asked to admit for work up and evaluation of the above problems. Past Medical History:   Diagnosis Date    A-fib [facility]) 2009    Resolved.    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     Cancer ([facility]     splenic lymphoma    Chronic kidney disease     CKD stage 4 due to type 2 diabetes mellitus ([facility]     Diabetes ([facility]     Hypertension     Ill-defined condition     diabetic neuropathy james feet    Neuropathy     Other ill-defined conditions(799.89)     spinal meningitis    Other ill-defined conditions(799.89)     broken blood vessel to nose    Stroke ([facility] 2007, 2017    per patient    Vitamin D deficiency       Past Surgical History:   Procedure Laterality Date    HX OTHER SURGICAL  02/06/2017    Excisional debridement of abscess right thigh & right lower back     HX OTHER SURGICAL  05/23/2017    I&D & excisional debridement (skin & subcutaneous tissue) back & right thigh    HX OTHER SURGICAL  01/12/2018    debridement of abscess back     Social History   Substance Use Topics    Smoking status: Never Smoker    Smokeless tobacco: Never Used    Alcohol use No      Family History   Problem Relation Age of Onset    Diabetes Mother     Hypertension Father     Hypertension Sister        No Known Allergies     Prior to Admission medications    Medication Sig Start Date End Date Taking? Authorizing Provider   amLODIPine (NORVASC) 10 mg tablet Take 1 Tab by mouth daily. 2/6/18   Niles Wu MD   carvedilol (COREG) 25 mg tablet Take 1 Tab by mouth two (2) times daily (with meals). 2/7/18   Niles Wu MD   hydrALAZINE (APRESOLINE) 50 mg tablet Take 1 Tab by mouth three (3) times daily. 2/6/18   Niles Wu MD   sodium bicarbonate 650 mg tablet Take 1 Tab by mouth two (2) times a day. 2/7/18   Niles Wu MD   HYDROcodone-acetaminophen [facility]) 5-325 mg per tablet Take 1 Tab by mouth every four (4) hours as needed for Pain. Max Daily Amount: 6 Tabs. 2/6/18   Niles Wu MD   clopidogrel (PLAVIX) 75 mg tab Take 75 mg by mouth daily. 12/8/17   Historical Provider   NOVOLIN 70/30 100 unit/mL (70-30) injection 65 Units by SubCUTAneous route Before breakfast and dinner.  1/5/18   Historical Provider   lancets 30 gauge misc  11/21/17   Historical Provider   SURE COMFORT INSULIN SYRINGE 1 mL 31 gauge x 5/16 syrg  11/21/17   Historical Provider   ACCU-CHEK SOFÍA CONTROL SOLN soln  11/21/17   Historical Provider   ACCU-CHEK SOFÍA PLUS TEST STRP strip  11/21/17   Historical Provider   cloNIDine HCl (CATAPRES) 0.2 mg tablet Take 1 Tab by mouth two (2) times a day. 5/28/17   Rosalino Newton MD   alcohol swabs (ALCOHOL PADS) padm 1 Each by Apply Externally route two (2) times a day. 10/3/15   Alpa Fierro MD   pravastatin (PRAVACHOL) 20 mg tablet Take 20 mg by mouth nightly. Magaly Lyons MD   digoxin (LANOXIN) 0.125 mg tablet Take 0.125 mg by mouth daily.  Indications: afib    Historical Provider     REVIEW OF SYSTEMS:  See HPI for details  General: negative for fever, chills, sweats, +weakness, NO weight loss  Eyes: negative for blurred vision, eye pain, loss of vision, diplopia  Ear Nose and Throat: negative for rhinorrhea, pharyngitis, otalgia, tinnitus, speech or swallowing difficulties  Respiratory:  negative for pleuritic pain, cough, sputum production, wheezing, SOB, NAVARRO  Cardiology:  negative for chest pain, palpitations, orthopnea, PND, edema, syncope   Gastrointestinal: negative for abdominal pain, N/V, dysphagia, change in bowel habits, bleeding  Genitourinary: negative for frequency, urgency, dysuria, hematuria, incontinence  Muskuloskeletal : negative for arthralgia, myalgia  Hematology: negative for easy bruising, bleeding, lymphadenopathy  Dermatological: +wounds, no mole change, +new lesion  Endocrine: negative for hot flashes or polydipsia  Neurological: negative for headache, dizziness, confusion, focal weakness, paresthesia, memory loss, gait disturbance  Psychological: negative for anxiety, depression, agitation    Objective:   VITALS:    Visit Vitals    /84 (BP 1 Location: Right arm, BP Patient Position: At rest)    Pulse (!) 106    Temp 98.9 °F (37.2 °C)    Resp 22    Ht 6' 5\" (1.956 m)    Wt 145.2 kg (320 lb)    SpO2 100%    BMI 37.95 kg/m2     PHYSICAL EXAM:     GENERAL:    WD y   WN y   Cachectic    Thin    Obese y   Disheveled y   Ill Appearing Critically    Ill Appearing Chronically y   Acute Distress n   Other      HEENT:    NC/AT/EOMI y   PERRLA y   Conjunctivae Pink    Conjunctivae Pale y   Moist Mucosa    Dry Mucosa y   Hearing intact to voice y   Other      NECK:    Supple y   Masses n   Thyroid Tender n   Other                   RESPIRATORY:    CTA bilaterally WITHOUT wheezing/rhonchi/rales or crackles y   Wheezing    Rhonchi    Crackles    Use of accessory muscles n   Other      CARDIAC:    regular rate and rhythm No murmurs/rubs/gallops y   Murmur    Rubs    Gallops    Rate Regular/Irregular reg   Carotid Bruit Left/Right n   Lower Extremity Edema 2+   JVP  n   Other Normal capillary refill     ABDOMEN:    Soft non distended non tender +bowel sounds no HSM y   Rigid    Tenderness    Hepatomegaly    Splenomegaly    Distended    Increased girth due to habitus y   Normal/Hyper/Hypo Active Bowel Sounds nml   Other      SKIN / MUSCULOSKELETAL:    Rashes n   Ecchymosis n   Ulcers y throughout body small skin tears luis eduardo to lower ext bilat. Noted left buttock full thickness skin ulceration with necrotic tissue with purulent output. + foul smelling. Noted further wet necrotic lesion between right LE digit 2/3. Back wounds noted - appear healing.    Tight to palpitation    Turgor Good/Poor poor   Cyanosis/Clubbing y   Amputation(s)    Other      NEUROLOGY:    cranial nerves II-XII grossly intact y   Cranial Nerve Deficit    Facial Droop n   Slurred Speech n   Aphasia    Strength Normal    Weakness y global   Meningismus/Kernig's Sign/ Brudzinsky n   Follows Commands y   Other      PSYCHIATRIC:    AAOx3 in no acute distress y   Insight Poor y   Insight Good    Alert and Oriented to Person     Alert and Oriented to Place    Alert and Oriented toTime    Depressed n   Anxious n   Agitated n   Lethargic n   Stuporous    Sedated    Other _______________________________________________________________________  Care Plan discussed with:    Comments   Patient x Discussed with patient in room. POC outlined and Questions answered (22   Family      RN x    Care Manager                    Consultant:  anup GO MD 5   _______________________________________________________________________  Recommended Disposition:   Home with Family    HH/PT/OT/RN y   SNF/LTC    DARIN    ________________________________________________________________________  TOTAL TIME:  48 Minutes    Critical Care Provided     Minutes non procedure based      Comments   >50% of visit spent in counseling and coordination of care x Chart review  Discussion with patient and/or family and questions answered     ________________________________________________________________________  Signed: Zuleyma Ruano MD    This note will not be viewable in [address] Procedures: see electronic medical records for all procedures/Xrays and details which were not copied into this note but were reviewed prior to creation of Plan. LAB DATA REVIEWED:    Recent Results (from the past 24 hour(s))   LACTIC ACID    Collection Time: 03/22/18 12:36 AM   Result Value Ref Range    Lactic acid 2.2 (HH) 0.4 - 2.0 MMOL/L   CBC WITH AUTOMATED DIFF    Collection Time: 03/22/18 12:36 AM   Result Value Ref Range    WBC 19.1 (H) 4.1 - 11.1 K/uL    RBC 3.76 (L) 4.10 - 5.70 M/uL    HGB 10.9 (L) 12.1 - 17.0 g/dL    HCT 32.3 (L) 36.6 - 50.3 %    MCV 85.9 80.0 - 99.0 FL    MCH 29.0 26.0 - 34.0 PG    MCHC 33.7 30.0 - 36.5 g/dL    RDW 12.7 11.5 - 14.5 %    PLATELET 293 531 - 556 K/uL    MPV 12.2 8.9 - 12.9 FL    NRBC 0.0 0  WBC    ABSOLUTE NRBC 0.00 0.00 - 0.01 K/uL    NEUTROPHILS 86 (H) 32 - 75 %    LYMPHOCYTES 7 (L) 12 - 49 %    MONOCYTES 6 5 - 13 %    EOSINOPHILS 0 0 - 7 %    BASOPHILS 0 0 - 1 %    IMMATURE GRANULOCYTES 1 (H) 0.0 - 0.5 %    ABS. NEUTROPHILS 16.4 (H) 1.8 - 8.0 K/UL    ABS.  LYMPHOCYTES 1.3 0.8 - 3.5 K/UL    ABS. MONOCYTES 1.2 (H) 0.0 - 1.0 K/UL    ABS. EOSINOPHILS 0.1 0.0 - 0.4 K/UL    ABS. BASOPHILS 0.1 0.0 - 0.1 K/UL    ABS. IMM. GRANS. 0.1 (H) 0.00 - 0.04 K/UL    DF AUTOMATED     MAGNESIUM    Collection Time: 03/22/18 12:36 AM   Result Value Ref Range    Magnesium 2.3 1.6 - 2.4 mg/dL   METABOLIC PANEL, COMPREHENSIVE    Collection Time: 03/22/18 12:36 AM   Result Value Ref Range    Sodium 132 (L) 136 - 145 mmol/L    Potassium 4.3 3.5 - 5.1 mmol/L    Chloride 97 97 - 108 mmol/L    CO2 23 21 - 32 mmol/L    Anion gap 12 5 - 15 mmol/L    Glucose 514 (H) 65 - 100 mg/dL    BUN 52 (H) 6 - 20 MG/DL    Creatinine 4.99 (H) 0.70 - 1.30 MG/DL    BUN/Creatinine ratio 10 (L) 12 - 20      GFR est AA 14 (L) >60 ml/min/1.73m2    GFR est non-AA 12 (L) >60 ml/min/1.73m2    Calcium 8.9 8.5 - 10.1 MG/DL    Bilirubin, total 0.4 0.2 - 1.0 MG/DL    ALT (SGPT) 23 12 - 78 U/L    AST (SGOT) 14 (L) 15 - 37 U/L    Alk.  phosphatase 188 (H) 45 - 117 U/L    Protein, total 7.6 6.4 - 8.2 g/dL    Albumin 2.7 (L) 3.5 - 5.0 g/dL    Globulin 4.9 (H) 2.0 - 4.0 g/dL    A-G Ratio 0.6 (L) 1.1 - 2.2     GLUCOSE, POC    Collection Time: 03/22/18  3:10 AM   Result Value Ref Range    Glucose (POC) 514 (H) 65 - 100 mg/dL    Performed by Brooklyn Corrales

## 2018-03-23 ENCOUNTER — ANESTHESIA EVENT (OUTPATIENT)
Dept: SURGERY | Age: 63
DRG: 854 | End: 2018-03-23
Payer: MEDICARE

## 2018-03-23 ENCOUNTER — ANESTHESIA (OUTPATIENT)
Dept: SURGERY | Age: 63
DRG: 854 | End: 2018-03-23
Payer: MEDICARE

## 2018-03-23 LAB
ANION GAP SERPL CALC-SCNC: 7 MMOL/L (ref 5–15)
BACTERIA SPEC CULT: ABNORMAL
BACTERIA SPEC CULT: ABNORMAL
BASOPHILS # BLD: 0 K/UL (ref 0–0.1)
BASOPHILS NFR BLD: 0 % (ref 0–1)
BUN SERPL-MCNC: 43 MG/DL (ref 6–20)
BUN/CREAT SERPL: 11 (ref 12–20)
CALCIUM SERPL-MCNC: 8.3 MG/DL (ref 8.5–10.1)
CHLORIDE SERPL-SCNC: 108 MMOL/L (ref 97–108)
CO2 SERPL-SCNC: 22 MMOL/L (ref 21–32)
CREAT SERPL-MCNC: 3.99 MG/DL (ref 0.7–1.3)
DIFFERENTIAL METHOD BLD: ABNORMAL
EOSINOPHIL # BLD: 0.1 K/UL (ref 0–0.4)
EOSINOPHIL NFR BLD: 1 % (ref 0–7)
ERYTHROCYTE [DISTWIDTH] IN BLOOD BY AUTOMATED COUNT: 12.6 % (ref 11.5–14.5)
EST. AVERAGE GLUCOSE BLD GHB EST-MCNC: 252 MG/DL
GLUCOSE BLD STRIP.AUTO-MCNC: 244 MG/DL (ref 65–100)
GLUCOSE BLD STRIP.AUTO-MCNC: 265 MG/DL (ref 65–100)
GLUCOSE BLD STRIP.AUTO-MCNC: 344 MG/DL (ref 65–100)
GLUCOSE BLD STRIP.AUTO-MCNC: 355 MG/DL (ref 65–100)
GLUCOSE SERPL-MCNC: 343 MG/DL (ref 65–100)
HBA1C MFR BLD: 10.4 % (ref 4.2–6.3)
HCT VFR BLD AUTO: 24.7 % (ref 36.6–50.3)
HGB BLD-MCNC: 8.1 G/DL (ref 12.1–17)
IMM GRANULOCYTES # BLD: 0.1 K/UL (ref 0–0.04)
IMM GRANULOCYTES NFR BLD AUTO: 1 % (ref 0–0.5)
LYMPHOCYTES # BLD: 1.2 K/UL (ref 0.8–3.5)
LYMPHOCYTES NFR BLD: 12 % (ref 12–49)
MAGNESIUM SERPL-MCNC: 2.1 MG/DL (ref 1.6–2.4)
MCH RBC QN AUTO: 28.5 PG (ref 26–34)
MCHC RBC AUTO-ENTMCNC: 32.8 G/DL (ref 30–36.5)
MCV RBC AUTO: 87 FL (ref 80–99)
MONOCYTES # BLD: 0.7 K/UL (ref 0–1)
MONOCYTES NFR BLD: 7 % (ref 5–13)
NEUTS SEG # BLD: 7.8 K/UL (ref 1.8–8)
NEUTS SEG NFR BLD: 79 % (ref 32–75)
NRBC # BLD: 0 K/UL (ref 0–0.01)
NRBC BLD-RTO: 0 PER 100 WBC
PHOSPHATE SERPL-MCNC: 2.8 MG/DL (ref 2.6–4.7)
PLATELET # BLD AUTO: 186 K/UL (ref 150–400)
PMV BLD AUTO: 11.9 FL (ref 8.9–12.9)
POTASSIUM SERPL-SCNC: 4.1 MMOL/L (ref 3.5–5.1)
RBC # BLD AUTO: 2.84 M/UL (ref 4.1–5.7)
SERVICE CMNT-IMP: ABNORMAL
SODIUM SERPL-SCNC: 137 MMOL/L (ref 136–145)
WBC # BLD AUTO: 10 K/UL (ref 4.1–11.1)

## 2018-03-23 PROCEDURE — 87205 SMEAR GRAM STAIN: CPT | Performed by: INTERNAL MEDICINE

## 2018-03-23 PROCEDURE — 0JB70ZZ EXCISION OF BACK SUBCUTANEOUS TISSUE AND FASCIA, OPEN APPROACH: ICD-10-PCS | Performed by: SURGERY

## 2018-03-23 PROCEDURE — 74011250636 HC RX REV CODE- 250/636: Performed by: ANESTHESIOLOGY

## 2018-03-23 PROCEDURE — 74011250637 HC RX REV CODE- 250/637: Performed by: INTERNAL MEDICINE

## 2018-03-23 PROCEDURE — 74011636637 HC RX REV CODE- 636/637: Performed by: INTERNAL MEDICINE

## 2018-03-23 PROCEDURE — 80048 BASIC METABOLIC PNL TOTAL CA: CPT | Performed by: INTERNAL MEDICINE

## 2018-03-23 PROCEDURE — 76010000153 HC OR TIME 1.5 TO 2 HR: Performed by: SURGERY

## 2018-03-23 PROCEDURE — 65660000000 HC RM CCU STEPDOWN

## 2018-03-23 PROCEDURE — P9045 ALBUMIN (HUMAN), 5%, 250 ML: HCPCS

## 2018-03-23 PROCEDURE — 74011250636 HC RX REV CODE- 250/636: Performed by: INTERNAL MEDICINE

## 2018-03-23 PROCEDURE — 84100 ASSAY OF PHOSPHORUS: CPT | Performed by: INTERNAL MEDICINE

## 2018-03-23 PROCEDURE — 87075 CULTR BACTERIA EXCEPT BLOOD: CPT | Performed by: INTERNAL MEDICINE

## 2018-03-23 PROCEDURE — 77030019895 HC PCKNG STRP IODO -A

## 2018-03-23 PROCEDURE — 83036 HEMOGLOBIN GLYCOSYLATED A1C: CPT | Performed by: INTERNAL MEDICINE

## 2018-03-23 PROCEDURE — 74011250636 HC RX REV CODE- 250/636: Performed by: SURGERY

## 2018-03-23 PROCEDURE — 74011000250 HC RX REV CODE- 250

## 2018-03-23 PROCEDURE — 36415 COLL VENOUS BLD VENIPUNCTURE: CPT | Performed by: INTERNAL MEDICINE

## 2018-03-23 PROCEDURE — 76210000006 HC OR PH I REC 0.5 TO 1 HR: Performed by: SURGERY

## 2018-03-23 PROCEDURE — 74011000258 HC RX REV CODE- 258: Performed by: INTERNAL MEDICINE

## 2018-03-23 PROCEDURE — 74011250636 HC RX REV CODE- 250/636

## 2018-03-23 PROCEDURE — 87147 CULTURE TYPE IMMUNOLOGIC: CPT | Performed by: INTERNAL MEDICINE

## 2018-03-23 PROCEDURE — 82962 GLUCOSE BLOOD TEST: CPT

## 2018-03-23 PROCEDURE — 0HB6XZZ EXCISION OF BACK SKIN, EXTERNAL APPROACH: ICD-10-PCS | Performed by: SURGERY

## 2018-03-23 PROCEDURE — 77030011640 HC PAD GRND REM COVD -A: Performed by: SURGERY

## 2018-03-23 PROCEDURE — 77030018836 HC SOL IRR NACL ICUM -A: Performed by: SURGERY

## 2018-03-23 PROCEDURE — 0KBP0ZZ EXCISION OF LEFT HIP MUSCLE, OPEN APPROACH: ICD-10-PCS | Performed by: SURGERY

## 2018-03-23 PROCEDURE — 76060000034 HC ANESTHESIA 1.5 TO 2 HR: Performed by: SURGERY

## 2018-03-23 PROCEDURE — 77010033678 HC OXYGEN DAILY

## 2018-03-23 PROCEDURE — 77030011256 HC DRSG MEPILEX <16IN NO BORD MOLN -A

## 2018-03-23 PROCEDURE — 77030021678 HC GLIDESCP STAT DISP VERT -B: Performed by: ANESTHESIOLOGY

## 2018-03-23 PROCEDURE — 77030019908 HC STETH ESOPH SIMS -A: Performed by: ANESTHESIOLOGY

## 2018-03-23 PROCEDURE — 77030013079 HC BLNKT BAIR HGGR 3M -A: Performed by: ANESTHESIOLOGY

## 2018-03-23 PROCEDURE — 77030026438 HC STYL ET INTUB CARD -A: Performed by: ANESTHESIOLOGY

## 2018-03-23 PROCEDURE — 77030008684 HC TU ET CUF COVD -B: Performed by: ANESTHESIOLOGY

## 2018-03-23 PROCEDURE — 85025 COMPLETE CBC W/AUTO DIFF WBC: CPT | Performed by: INTERNAL MEDICINE

## 2018-03-23 PROCEDURE — 77030008771 HC TU NG SALEM SUMP -A: Performed by: ANESTHESIOLOGY

## 2018-03-23 PROCEDURE — 83735 ASSAY OF MAGNESIUM: CPT | Performed by: INTERNAL MEDICINE

## 2018-03-23 PROCEDURE — 77030018390 HC SPNG HEMSTAT2 J&J -B: Performed by: SURGERY

## 2018-03-23 RX ORDER — PROPOFOL 10 MG/ML
INJECTION, EMULSION INTRAVENOUS AS NEEDED
Status: DISCONTINUED | OUTPATIENT
Start: 2018-03-23 | End: 2018-03-23 | Stop reason: HOSPADM

## 2018-03-23 RX ORDER — FENTANYL CITRATE 50 UG/ML
INJECTION, SOLUTION INTRAMUSCULAR; INTRAVENOUS AS NEEDED
Status: DISCONTINUED | OUTPATIENT
Start: 2018-03-23 | End: 2018-03-23 | Stop reason: HOSPADM

## 2018-03-23 RX ORDER — ROCURONIUM BROMIDE 10 MG/ML
INJECTION, SOLUTION INTRAVENOUS AS NEEDED
Status: DISCONTINUED | OUTPATIENT
Start: 2018-03-23 | End: 2018-03-23 | Stop reason: HOSPADM

## 2018-03-23 RX ORDER — VANCOMYCIN 1.75 GRAM/500 ML IN 0.9 % SODIUM CHLORIDE INTRAVENOUS
1750
Status: DISCONTINUED | OUTPATIENT
Start: 2018-03-25 | End: 2018-03-25

## 2018-03-23 RX ORDER — SODIUM CHLORIDE, SODIUM LACTATE, POTASSIUM CHLORIDE, CALCIUM CHLORIDE 600; 310; 30; 20 MG/100ML; MG/100ML; MG/100ML; MG/100ML
25 INJECTION, SOLUTION INTRAVENOUS CONTINUOUS
Status: DISCONTINUED | OUTPATIENT
Start: 2018-03-23 | End: 2018-03-23 | Stop reason: HOSPADM

## 2018-03-23 RX ORDER — FENTANYL CITRATE 50 UG/ML
25 INJECTION, SOLUTION INTRAMUSCULAR; INTRAVENOUS
Status: DISCONTINUED | OUTPATIENT
Start: 2018-03-23 | End: 2018-03-23 | Stop reason: HOSPADM

## 2018-03-23 RX ORDER — ONDANSETRON 2 MG/ML
4 INJECTION INTRAMUSCULAR; INTRAVENOUS AS NEEDED
Status: DISCONTINUED | OUTPATIENT
Start: 2018-03-23 | End: 2018-03-23 | Stop reason: HOSPADM

## 2018-03-23 RX ORDER — MIDAZOLAM HYDROCHLORIDE 1 MG/ML
INJECTION, SOLUTION INTRAMUSCULAR; INTRAVENOUS AS NEEDED
Status: DISCONTINUED | OUTPATIENT
Start: 2018-03-23 | End: 2018-03-23 | Stop reason: HOSPADM

## 2018-03-23 RX ORDER — ONDANSETRON 2 MG/ML
INJECTION INTRAMUSCULAR; INTRAVENOUS AS NEEDED
Status: DISCONTINUED | OUTPATIENT
Start: 2018-03-23 | End: 2018-03-23 | Stop reason: HOSPADM

## 2018-03-23 RX ORDER — SODIUM CHLORIDE 0.9 % (FLUSH) 0.9 %
5-10 SYRINGE (ML) INJECTION AS NEEDED
Status: DISCONTINUED | OUTPATIENT
Start: 2018-03-23 | End: 2018-03-23 | Stop reason: HOSPADM

## 2018-03-23 RX ORDER — LIDOCAINE HYDROCHLORIDE 20 MG/ML
INJECTION, SOLUTION EPIDURAL; INFILTRATION; INTRACAUDAL; PERINEURAL AS NEEDED
Status: DISCONTINUED | OUTPATIENT
Start: 2018-03-23 | End: 2018-03-23 | Stop reason: HOSPADM

## 2018-03-23 RX ORDER — EPHEDRINE SULFATE 50 MG/ML
INJECTION, SOLUTION INTRAVENOUS AS NEEDED
Status: DISCONTINUED | OUTPATIENT
Start: 2018-03-23 | End: 2018-03-23 | Stop reason: HOSPADM

## 2018-03-23 RX ORDER — MORPHINE SULFATE 10 MG/ML
2 INJECTION, SOLUTION INTRAMUSCULAR; INTRAVENOUS
Status: DISCONTINUED | OUTPATIENT
Start: 2018-03-23 | End: 2018-03-23 | Stop reason: HOSPADM

## 2018-03-23 RX ORDER — LIDOCAINE HYDROCHLORIDE 10 MG/ML
0.1 INJECTION, SOLUTION EPIDURAL; INFILTRATION; INTRACAUDAL; PERINEURAL AS NEEDED
Status: DISCONTINUED | OUTPATIENT
Start: 2018-03-23 | End: 2018-03-23 | Stop reason: HOSPADM

## 2018-03-23 RX ORDER — PHENYLEPHRINE HCL IN 0.9% NACL 0.4MG/10ML
SYRINGE (ML) INTRAVENOUS AS NEEDED
Status: DISCONTINUED | OUTPATIENT
Start: 2018-03-23 | End: 2018-03-23 | Stop reason: HOSPADM

## 2018-03-23 RX ORDER — SUCCINYLCHOLINE CHLORIDE 20 MG/ML
INJECTION INTRAMUSCULAR; INTRAVENOUS AS NEEDED
Status: DISCONTINUED | OUTPATIENT
Start: 2018-03-23 | End: 2018-03-23 | Stop reason: HOSPADM

## 2018-03-23 RX ORDER — HYDROMORPHONE HYDROCHLORIDE 2 MG/ML
INJECTION, SOLUTION INTRAMUSCULAR; INTRAVENOUS; SUBCUTANEOUS AS NEEDED
Status: DISCONTINUED | OUTPATIENT
Start: 2018-03-23 | End: 2018-03-23 | Stop reason: HOSPADM

## 2018-03-23 RX ORDER — SODIUM CHLORIDE 9 MG/ML
50 INJECTION, SOLUTION INTRAVENOUS CONTINUOUS
Status: DISCONTINUED | OUTPATIENT
Start: 2018-03-23 | End: 2018-03-27

## 2018-03-23 RX ORDER — DIPHENHYDRAMINE HYDROCHLORIDE 50 MG/ML
12.5 INJECTION, SOLUTION INTRAMUSCULAR; INTRAVENOUS
Status: DISCONTINUED | OUTPATIENT
Start: 2018-03-23 | End: 2018-03-23 | Stop reason: HOSPADM

## 2018-03-23 RX ORDER — ALBUMIN HUMAN 50 G/1000ML
SOLUTION INTRAVENOUS AS NEEDED
Status: DISCONTINUED | OUTPATIENT
Start: 2018-03-23 | End: 2018-03-23 | Stop reason: HOSPADM

## 2018-03-23 RX ORDER — SODIUM CHLORIDE 0.9 % (FLUSH) 0.9 %
5-10 SYRINGE (ML) INJECTION EVERY 8 HOURS
Status: DISCONTINUED | OUTPATIENT
Start: 2018-03-23 | End: 2018-03-23 | Stop reason: HOSPADM

## 2018-03-23 RX ORDER — OXYCODONE AND ACETAMINOPHEN 5; 325 MG/1; MG/1
1-2 TABLET ORAL
Status: DISCONTINUED | OUTPATIENT
Start: 2018-03-23 | End: 2018-03-29 | Stop reason: HOSPADM

## 2018-03-23 RX ADMIN — HYDRALAZINE HYDROCHLORIDE 50 MG: 50 TABLET, FILM COATED ORAL at 09:46

## 2018-03-23 RX ADMIN — ROCURONIUM BROMIDE 10 MG: 10 INJECTION, SOLUTION INTRAVENOUS at 19:21

## 2018-03-23 RX ADMIN — FENTANYL CITRATE 25 MCG: 50 INJECTION, SOLUTION INTRAMUSCULAR; INTRAVENOUS at 21:26

## 2018-03-23 RX ADMIN — INSULIN LISPRO 2 UNITS: 100 INJECTION, SOLUTION INTRAVENOUS; SUBCUTANEOUS at 21:13

## 2018-03-23 RX ADMIN — PROPOFOL 250 MG: 10 INJECTION, EMULSION INTRAVENOUS at 19:21

## 2018-03-23 RX ADMIN — PRAVASTATIN SODIUM 20 MG: 40 TABLET ORAL at 22:45

## 2018-03-23 RX ADMIN — AMLODIPINE BESYLATE 10 MG: 5 TABLET ORAL at 09:47

## 2018-03-23 RX ADMIN — ONDANSETRON 4 MG: 2 INJECTION INTRAMUSCULAR; INTRAVENOUS at 20:42

## 2018-03-23 RX ADMIN — ALBUMIN HUMAN 250 ML: 50 SOLUTION INTRAVENOUS at 20:07

## 2018-03-23 RX ADMIN — INSULIN LISPRO 7 UNITS: 100 INJECTION, SOLUTION INTRAVENOUS; SUBCUTANEOUS at 12:50

## 2018-03-23 RX ADMIN — HYDROMORPHONE HYDROCHLORIDE 0.2 MG: 2 INJECTION, SOLUTION INTRAMUSCULAR; INTRAVENOUS; SUBCUTANEOUS at 20:18

## 2018-03-23 RX ADMIN — VANCOMYCIN HYDROCHLORIDE 3000 MG: 10 INJECTION, POWDER, LYOPHILIZED, FOR SOLUTION INTRAVENOUS at 13:11

## 2018-03-23 RX ADMIN — HYDRALAZINE HYDROCHLORIDE 50 MG: 50 TABLET, FILM COATED ORAL at 22:45

## 2018-03-23 RX ADMIN — EPHEDRINE SULFATE 10 MG: 50 INJECTION, SOLUTION INTRAVENOUS at 19:45

## 2018-03-23 RX ADMIN — INSULIN HUMAN 15 UNITS: 100 INJECTION, SUSPENSION SUBCUTANEOUS at 09:46

## 2018-03-23 RX ADMIN — SODIUM CHLORIDE, POTASSIUM CHLORIDE, SODIUM LACTATE AND CALCIUM CHLORIDE: 600; 310; 30; 20 INJECTION, SOLUTION INTRAVENOUS at 18:45

## 2018-03-23 RX ADMIN — CLONIDINE HYDROCHLORIDE 0.2 MG: 0.1 TABLET ORAL at 09:47

## 2018-03-23 RX ADMIN — SODIUM BICARBONATE 650 MG: 650 TABLET ORAL at 09:46

## 2018-03-23 RX ADMIN — ERYTHROPOIETIN 10000 UNITS: 10000 INJECTION, SOLUTION INTRAVENOUS; SUBCUTANEOUS at 17:47

## 2018-03-23 RX ADMIN — FENTANYL CITRATE 25 MCG: 50 INJECTION, SOLUTION INTRAMUSCULAR; INTRAVENOUS at 21:30

## 2018-03-23 RX ADMIN — Medication 120 MCG: at 20:12

## 2018-03-23 RX ADMIN — INSULIN LISPRO 4 UNITS: 100 INJECTION, SOLUTION INTRAVENOUS; SUBCUTANEOUS at 09:45

## 2018-03-23 RX ADMIN — HYDRALAZINE HYDROCHLORIDE 50 MG: 50 TABLET, FILM COATED ORAL at 15:45

## 2018-03-23 RX ADMIN — Medication 10 ML: at 05:29

## 2018-03-23 RX ADMIN — HYDROMORPHONE HYDROCHLORIDE 0.2 MG: 2 INJECTION, SOLUTION INTRAMUSCULAR; INTRAVENOUS; SUBCUTANEOUS at 19:52

## 2018-03-23 RX ADMIN — PIPERACILLIN SODIUM,TAZOBACTAM SODIUM 3.38 G: 3; .375 INJECTION, POWDER, FOR SOLUTION INTRAVENOUS at 22:48

## 2018-03-23 RX ADMIN — PIPERACILLIN SODIUM,TAZOBACTAM SODIUM 3.38 G: 3; .375 INJECTION, POWDER, FOR SOLUTION INTRAVENOUS at 15:45

## 2018-03-23 RX ADMIN — DIGOXIN 0.12 MG: 125 TABLET ORAL at 09:47

## 2018-03-23 RX ADMIN — SODIUM CHLORIDE 50 ML/HR: 900 INJECTION, SOLUTION INTRAVENOUS at 21:22

## 2018-03-23 RX ADMIN — CARVEDILOL 25 MG: 12.5 TABLET, FILM COATED ORAL at 09:47

## 2018-03-23 RX ADMIN — CARVEDILOL 25 MG: 12.5 TABLET, FILM COATED ORAL at 17:46

## 2018-03-23 RX ADMIN — SODIUM BICARBONATE 650 MG: 650 TABLET ORAL at 17:46

## 2018-03-23 RX ADMIN — PIPERACILLIN SODIUM,TAZOBACTAM SODIUM 3.38 G: 3; .375 INJECTION, POWDER, FOR SOLUTION INTRAVENOUS at 05:30

## 2018-03-23 RX ADMIN — CLONIDINE HYDROCHLORIDE 0.2 MG: 0.1 TABLET ORAL at 17:47

## 2018-03-23 RX ADMIN — FENTANYL CITRATE 25 MCG: 50 INJECTION, SOLUTION INTRAMUSCULAR; INTRAVENOUS at 21:23

## 2018-03-23 RX ADMIN — FENTANYL CITRATE 100 MCG: 50 INJECTION, SOLUTION INTRAMUSCULAR; INTRAVENOUS at 19:21

## 2018-03-23 RX ADMIN — MIDAZOLAM HYDROCHLORIDE 2 MG: 1 INJECTION, SOLUTION INTRAMUSCULAR; INTRAVENOUS at 19:12

## 2018-03-23 RX ADMIN — SUCCINYLCHOLINE CHLORIDE 160 MG: 20 INJECTION INTRAMUSCULAR; INTRAVENOUS at 19:21

## 2018-03-23 RX ADMIN — LIDOCAINE HYDROCHLORIDE 100 MG: 20 INJECTION, SOLUTION EPIDURAL; INFILTRATION; INTRACAUDAL; PERINEURAL at 19:21

## 2018-03-23 RX ADMIN — HYDROMORPHONE HYDROCHLORIDE 0.2 MG: 2 INJECTION, SOLUTION INTRAMUSCULAR; INTRAVENOUS; SUBCUTANEOUS at 19:54

## 2018-03-23 RX ADMIN — SODIUM CHLORIDE 100 ML/HR: 900 INJECTION, SOLUTION INTRAVENOUS at 13:11

## 2018-03-23 RX ADMIN — FENTANYL CITRATE 25 MCG: 50 INJECTION, SOLUTION INTRAMUSCULAR; INTRAVENOUS at 21:20

## 2018-03-23 RX ADMIN — Medication 10 ML: at 13:13

## 2018-03-23 RX ADMIN — HYDROMORPHONE HYDROCHLORIDE 0.2 MG: 2 INJECTION, SOLUTION INTRAMUSCULAR; INTRAVENOUS; SUBCUTANEOUS at 20:21

## 2018-03-23 NOTE — PROGRESS NOTES
Nephrology Progress Note     [place]     [email or web address]. [email or web address]                  Phone - [phone]   Patient: Jacob Alberto   YOB: 1955    Date- 3/23/2018     CC: Follow up for arf on ckd         Subjective: Interval History:   - cr. improving  Plan for surgery noted. ROS-   Assessment:   PEDRO likely due to dehydration from hyperglycemia  CKD 4bl. cr. 2.9-3.5  Anemia  Sepsis  Hyponatremia  Buttock abscess     Plan:   Continue ivf  Daily labs  No dialysis indicated at present  Continue norvasc 10, coreg 25/25, clonidine 0.2/0.2 , hydrlazine 50/50/50  Appreciate DR. Alvarado's input  Start [address] [place] [address] discussed with: pt and nurse  Review of Systems: Pertinent items are noted in HPI. Objective:   Vitals:    03/22/18 2018 03/22/18 2206 03/23/18 0256 03/23/18 0759   BP: 126/69 143/75 174/68 157/75   Pulse: 76 80 75 73   Resp: 16 18 19 19   Temp: 97.8 °F (36.6 °C) 98.2 °F (36.8 °C) 97.9 °F (36.6 °C) 97.6 °F (36.4 °C)   SpO2: 96% 95% 95% 98%   Weight:       Height:         Last 3 Recorded Weights in this Encounter    03/22/18 0014   Weight: 145.2 kg (320 lb)     Patient Vitals for the past 8 hrs:   BP Temp Pulse Resp SpO2   03/23/18 0759 157/75 97.6 °F (36.4 °C) 73 19 98 %   03/23/18 0256 174/68 97.9 °F (36.6 °C) 75 19 95 %     03/23 0701 - 03/23 1900  In: 700 [P.O.:700]  Out: 200 [Urine:200]  03/21 1901 - 03/23 0700  In: 1270 [P.O.:1270]  Out: 1025 [Urine:1025]  Physical Exam:   General: NAD  Resp:  Clear lungs, no wheezing or rales. No accessory muscle use  CV:  Regular  rhythm,  no murmur or rub  GI:  Soft, Non distended, Non tender. +Bowel sounds,   Neurologic:  Alert and oriented X 3. Non Focal      Chart reviewed. Pertinent Notes reviewed.      Medications list  reviewed       Data Review :  Recent Labs      03/23/18   0304  03/22/18   0036   NA  137  132*   K  4.1  4.3   CL  108  97   CO2  22  23   GLU  343*  514*   BUN  43*  52*   CREA  3.99*  4.99* CA  8.3*  8.9   MG  2.1  2.3   PHOS  2.8   --    ALB   --   2.7*   SGOT   --   14*   ALT   --   23     Recent Labs      03/23/18   0304  03/22/18   0036   WBC  10.0  19.1*   HGB  8.1*  10.9*   HCT  24.7*  32.3*   PLT  186  271     Lab Results   Component Value Date/Time    Specimen Description: PBC 07/26/2013 05:28 PM    Specimen Description: BLOOD 09/30/2009 04:55 PM     Current Facility-Administered Medications   Medication    [START ON 3/24/2018] insulin NPH (NOVOLIN N, HUMULIN N) injection 20 Units    vancomycin (VANCOCIN) 3,000 mg in 0.9% sodium chloride 500 mL IVPB    piperacillin-tazobactam (ZOSYN) 3.375 g in 0.9% sodium chloride (MBP/ADV) 100 mL    amLODIPine (NORVASC) tablet 10 mg    carvedilol (COREG) tablet 25 mg    cloNIDine HCl (CATAPRES) tablet 0.2 mg    digoxin (LANOXIN) tablet 0.125 mg    hydrALAZINE (APRESOLINE) tablet 50 mg    pravastatin (PRAVACHOL) tablet 20 mg    sodium bicarbonate tablet 650 mg    acetaminophen (TYLENOL) tablet 650 mg    ondansetron (ZOFRAN) injection 4 mg    labetalol (NORMODYNE;TRANDATE) injection 10 mg    polyethylene glycol (MIRALAX) packet 17 g    0.9% sodium chloride infusion    insulin lispro (HUMALOG) injection    glucose chewable tablet 16 g    dextrose (D50W) injection syrg 12.5-25 g    glucagon (GLUCAGEN) injection 1 mg    sodium chloride (NS) flush 5-10 mL    sodium chloride (NS) flush 5-10 mL    influenza vaccine 2017-18 (3 yrs+)(PF) (FLUZONE QUAD/FLUARIX QUAD) injection 0.5 mL       Kanwal Faustin MD  [facility]  [email or web address]  [address] [place] [address], [address] [facility]  Phone - [phone]         Fax - [phone]  [facility] Office  [address] [place] [address]  Phone - [phone]        Fax - [phone]

## 2018-03-23 NOTE — PROGRESS NOTES
Surgery    Buttock abscess in left draining larger amounts of pus. Several other smaller abscess also draining. I will plan for debridement in OR later today. Risks and benefits reviewed with the patient who understands and wishes to proceed. I have stopped Plavix and heparin for now.     Eduardo Lopez MD

## 2018-03-23 NOTE — ROUTINE PROCESS
Bedside shift change report given to Leyla Jack  (oncoming nurse) by Lois Duque (offgoing nurse). Report included the following information SBAR, Kardex, Intake/Output, MAR, Accordion, Recent Results and Med Rec Status.

## 2018-03-23 NOTE — PROGRESS NOTES
Hospitalist Progress Note    NAME: Sabino Shaikh   :  1955   MRN:  [id number]       Assessment / Plan:  Severe sepsis presumed due to infected skin abscesses  Acidosis, lactic  --Patient meets criteria for severe sepsis and is highly suspected to have an infection with suspected source to be skin. -Cultures drawn in ED and I have ensured they were drawn by discussion with ED RN. -Lactic acid ordered in ED. repeat Q6hrs if elevated until <2 or trend is towards normal  -Discussed with ED attending regarding use of antibiotics. Antibiotic administered in the ED were vanco and I have added zosyn for known MRSA and to cover possible polymicrobial infection given new wounds and fall at home  -Requested Large bore IV x 2 sites. If unable to be attained have requested for ED to place central line and then will monitor CVP. -Continue volume expansion with cystalloid IV fluids  Await SWound care, I am concerned that some are worrisome for ? Needing I& D  Continue Vanco and Zosyn, Suspect this will be MRSA so will NOT change antibiotics until Official C/S     Diabetes mellitus 2 uncontrolled with hyperglycemia, 2md to infection  Pseudohyponatremia  Severe dehydration from uncontrolled diabetes leading to   acute renal failure on chronic disease suspect nearing stage IV  -ssi for now  -give insulin IV in ED to bring bs <400 quickly  -if unable to bring the bs down then will add insulin gtt  -with drop in bs the sodium should improve  -IVF NS to be used as well to help with ARF on CKD  -patient followed by Dr Jack Saliva OP but has not been compliant with her wishes.  Will have her follow IP  -strict I/O  Adjusted NPH and SSI today  For Surgery, resume same dose post Surgery     Hypertension, Benign essential   -remain on tele  -labetalol IV prn sbp >170  -check digoxin level given change in renal function and his statement of being compliant with medications  -resume all antihypertensives for now        I have personally reviewed the radiographs, laboratory data in Epic and decisions and statements above are based partially on this personal interpretation.     Code Status: Full Code  DVT Prophylaxis: Hep SQ  GI Prophylaxis: not indicated      Body mass index is 37.95 kg/(m^2). Subjective:     Chief Complaint / Reason for Physician Visit  3/23  Im waiting to go to Surgery   3/22  I cam because my sugars were high. Almost Kenroy Child  Discussed with RN events overnight. Review of Systems:  Symptom Y/N Comments  Symptom Y/N Comments   Fever/Chills y   Chest Pain     Poor Appetite    Edema     Cough    Abdominal Pain     Sputum    Joint Pain     SOB/NAVARRO n   Pruritis/Rash     Nausea/vomit    Tolerating PT/OT     Diarrhea    Tolerating Diet     Constipation    Other y  BS were high     Could NOT obtain due to:      Objective:     VITALS:   Last 24hrs VS reviewed since prior progress note. Most recent are:  Patient Vitals for the past 24 hrs:   Temp Pulse Resp BP SpO2   03/23/18 1514 98.6 °F (37 °C) 69 18 146/64 93 %   03/23/18 1119 98.5 °F (36.9 °C) 65 18 152/63 97 %   03/23/18 0759 97.6 °F (36.4 °C) 73 19 157/75 98 %   03/23/18 0256 97.9 °F (36.6 °C) 75 19 174/68 95 %   03/22/18 2206 98.2 °F (36.8 °C) 80 18 143/75 95 %   03/22/18 2018 97.8 °F (36.6 °C) 76 16 126/69 96 %       Intake/Output Summary (Last 24 hours) at 03/23/18 1615  Last data filed at 03/23/18 1516   Gross per 24 hour   Intake             1970 ml   Output             1650 ml   Net              320 ml        PHYSICAL EXAM: Observed work with PT with slow deliberate mobility with a walker  General: WD, WN. Alert, cooperative, no acute distress    EENT:  EOMI. Anicteric sclerae. MMM  Resp:  CTA bilaterally, no wheezing or rales. No accessory muscle use  CV:  Regular  rhythm,  No edema  GI:  Soft, Non distended, Non tender.  +Bowel sounds  Neurologic:  Alert and oriented X 3, normal speech,   Psych:   Good insight. Not anxious nor agitated  Skin:  No rashes. No jaundice, multiple skin ulceration some with indurations concerning for an underlying Abcess? Reviewed most current lab test results and cultures  YES  Reviewed most current radiology test results   YES  Review and summation of old records today    NO  Reviewed patient's current orders and MAR    YES  PMH/SH reviewed - no change compared to H&P  ________________________________________________________________________  Care Plan discussed with:    Comments   Patient y    Family      RN y    Care Manager     Consultant   PT                     Multidiciplinary team rounds were held today with , nursing, pharmacist and clinical coordinator. Patient's plan of care was discussed; medications were reviewed and discharge planning was addressed. ________________________________________________________________________  Total NON critical care TIME:  30  Minutes    Total CRITICAL CARE TIME Spent:   Minutes non procedure based      Comments   >50% of visit spent in counseling and coordination of care     ________________________________________________________________________  Jama York MD     Procedures: see electronic medical records for all procedures/Xrays and details which were not copied into this note but were reviewed prior to creation of Plan. LABS:  I reviewed today's most current labs and imaging studies.   Pertinent labs include:  Recent Labs      03/23/18   0304  03/22/18   0036   WBC  10.0  19.1*   HGB  8.1*  10.9*   HCT  24.7*  32.3*   PLT  186  271     Recent Labs      03/23/18   0304  03/22/18   0036   NA  137  132*   K  4.1  4.3   CL  108  97   CO2  22  23   GLU  343*  514*   BUN  43*  52*   CREA  3.99*  4.99*   CA  8.3*  8.9   MG  2.1  2.3   PHOS  2.8   --    ALB   --   2.7*   TBILI   --   0.4   SGOT   --   14*   ALT   --   23       Signed: Jama York MD

## 2018-03-23 NOTE — PROGRESS NOTES
2100-- Pt BG at  was 515, Dr. Arianne Navarro notified. Orders to give one time dose of humalog 10 units NOW, orders to give humulin 20 units before each meal, one time order NOW. Will continue to monitor    2312--  Positive bld cx (positive for gram positive cocci in 3/4 bottles) result received from [facility] in lab. 18-  Dr. Art Cota Notified of positive bld cx result. No further orders at this time.

## 2018-03-23 NOTE — PROGRESS NOTES
Bedside shift change report given to Leora (oncoming nurse) by Deedee Wang (offgoing nurse). Report included the following information SBAR, Kardex, MAR, Accordion and Recent Results. Zone Phone for oncoming shift:   [phone]    Shift Summary: Debridement scheduled for today     LDAs               Peripheral IV 03/22/18 Left Antecubital (Active)   Site Assessment Clean, dry, & intact 3/23/2018  3:25 PM   Phlebitis Assessment 0 3/23/2018  3:25 PM   Infiltration Assessment 0 3/23/2018  3:25 PM   Dressing Status Clean, dry, & intact 3/23/2018  3:25 PM   Dressing Type Tape;Transparent 3/23/2018  3:25 PM   Hub Color/Line Status Pink; Infusing 3/23/2018  3:25 PM   Action Taken Blood drawn 3/22/2018 12:35 AM   Alcohol Cap Used Yes 3/22/2018  7:30 PM       Peripheral IV 03/22/18 Right (Active)   Site Assessment Clean, dry, & intact 3/23/2018  3:25 PM   Phlebitis Assessment 0 3/23/2018  3:25 PM   Infiltration Assessment 0 3/23/2018  3:25 PM   Dressing Status Clean, dry, & intact 3/23/2018  3:25 PM   Dressing Type Tape;Transparent 3/23/2018  3:25 PM   Hub Color/Line Status Green 3/23/2018  3:25 PM   Action Taken Blood drawn 3/22/2018 12:35 AM   Alcohol Cap Used Yes 3/22/2018  7:30 PM            Airway - Endotracheal Tube 03/23/18 Oral (Active)   Line Esau Lips 3/23/2018 12:00 AM                Intake & Output   Date 03/22/18 1900 - 03/23/18 0659 03/23/18 0700 - 03/24/18 0659   Shift 6703-2284 24 Hour Total 2091-9952 5785-4602 24 Hour Total   I  N  T  A  K  E   P.O. 450 1270 820  820      P. O. 450 1270 820  820    Shift Total  (mL/kg) 450  (3.1) 1270  (8.7) 820  (5.6)  820  (5.6)   O  U  T  P  U  T   Urine  (mL/kg/hr) 625 1025 800  (0.5)  800      Urine Voided 625 1025 800  800      Urine Occurrence(s) 1 x 1 x       Shift Total  (mL/kg) 625  (4.3) 1025  (7.1) 800  (5.5)  800  (5.5)   NET -175 245 20  20   Weight (kg) 145. 1 145. 1 145. 1 145. 1 145. 1      Last Bowel Movement Last Bowel Movement Date: 03/22/18   Glucose Checks [] N/A  [x] AC/HS  [] Q6  Concerns:   Nutrition Active Orders   Diet    DIET NPO Except Meds       Consults []PT  []OT  []Speech  []Case Management   Cardiac Monitoring []N/A [x]Yes Expires:

## 2018-03-23 NOTE — PROGRESS NOTES
Dr. Bakari Johnson paged for insulin orders; per Dr. Rosendo Lundberg, Dr. Bakari Johnson should be paged for insulin orders due to patient having surgery scheduled for today and patient being NPO.    2908  Spoke with Dr. Bakari Johnson regarding insulin orders; telephone orders given for 4 units of Humalog and 15 units of NPH.    1206  Dr. Bakari Johnson paged for insulin orders. 1111 [facility] with Dr. Bakari Johnson; telephone orders given for 7 units of Humalog then recheck in 2 hours. 1  Dr. Bakari Johnson paged for orders for insulin. 1550  2nd attempt made to page Dr. Bakari Johnson for insulin orders. 1631  3rd attempt made to page Dr. Bakari Johnson for insulin orders.

## 2018-03-23 NOTE — PROGRESS NOTES
Pharmacy Automatic Renal Dosing Protocol - Antimicrobials    Indication for Antimicrobials: SSTI, Bloodstream Infection    Current Regimen of Each Antimicrobial:  Zosyn 3.375g IV Q8H (Start Date 3/22; Day # 2)  Vancomycin 3000 mg x 1, followed by 1750 mg IV Q36H (Start 3/23; Day 1)    Previous Antimicrobial Therapy:      Goal Level: 15-20    Measured / Extrapolated Vancomycin Level:     Significant Cultures:   3/22 Wound - MRSA, light GNR - Prelim  3/22 Wound - MRSA, light GNR - Prelim  3/22 Blood - MRSA 3/4 - Prelim  3/22 MRSA - Present    Radiology / Imaging results: (X-ray, CT scan or MRI): No significant radiology results yet    Paralysis, amputations, malnutrition: None noted    Labs:  Recent Labs      18   0304  18   0036   CREA  3.99*  4.99*   BUN  43*  52*   WBC  10.0  19.1*     Temp (24hrs), Av.9 °F (36.6 °C), Min:97.5 °F (36.4 °C), Max:98.5 °F (36.9 °C)      Creatinine Clearance (mL/min) or Dialysis: 23.6 ml/min      Impression/Plan:   · Vancomycin being added for MRSA in blood and wounds  · Watch Zosyn + Vancomycin combination for nephrotoxicity as patient is already CKD. If Scr starts to climb, would recommend changing to Cefepime. +/- Flagyl. · Antimicrobial stop date TBD     Pharmacy will follow daily and adjust medications as appropriate for renal function and/or serum levels.     Thank you,  Munir Mcwilliams, PHARMD

## 2018-03-23 NOTE — DIABETES MGMT
DTC Progress Note    Recommendations/ Comments: If appropriate, please consider increasing NPH dosing to 36 units every 12 hours - this is 80% of pt's home basal dose. Current hospital DM medication:   -Humalog normal sensitivity correction  -NPH 2- units bid    Chart reviewed on Bienvenido Vickers for hypoglycemia. Patient is a 57 y.o. male with known history of Type 2 Diabetes on Novolin 70/30 - 65 units bid at home. A1c:   Lab Results   Component Value Date/Time    Hemoglobin A1c 10.4 (H) 03/23/2018 03:04 AM    Hemoglobin A1c 10.1 (H) 01/31/2018 02:49 AM       Recent Glucose Results:   Lab Results   Component Value Date/Time     (H) 03/23/2018 03:04 AM    GLUCPOC 344 (H) 03/23/2018 11:20 AM    GLUCPOC 355 (H) 03/23/2018 08:03 AM    GLUCPOC 515 (H) 03/22/2018 08:24 PM        Lab Results   Component Value Date/Time    Creatinine 3.99 (H) 03/23/2018 03:04 AM     Estimated Creatinine Clearance: 29.5 mL/min (based on Cr of 3.99). Active Orders   Diet    DIET NPO Except Meds        PO intake: No data found. Will continue to follow as needed.     Thank you    Alivia Levin, [address], [address]  Office: [phone]

## 2018-03-23 NOTE — ANESTHESIA PREPROCEDURE EVALUATION
Anesthetic History   No history of anesthetic complications            Review of Systems / Medical History  Patient summary reviewed, nursing notes reviewed and pertinent labs reviewed    Pulmonary  Within defined limits                 Neuro/Psych       CVA  TIA    Comments: H/O Meningitis  Diabetic neuropathy Cardiovascular    Hypertension: well controlled        Dysrhythmias : atrial fibrillation      Exercise tolerance: <4 METS: Uses walker  Comments: TTE (8/1/17):  EF=50-55% with trivial pulmonic regurgitation   GI/Hepatic/Renal         Renal disease: CRI      Comments: CRI, Stage IV Endo/Other    Diabetes: poorly controlled, type 2, using insulin    Morbid obesity and arthritis    Comments: H/O Blastic NK-cell lymphoma/splenic lymphoma Other Findings   Comments: Abscess of right lower back  Vitamin D Deficiency         Physical Exam    Airway  Mallampati: III  TM Distance: > 6 cm  Neck ROM: normal range of motion   Mouth opening: Normal     Cardiovascular  Regular rate and rhythm,  S1 and S2 normal,  no murmur, click, rub, or gallop  Rhythm: regular  Rate: normal         Dental    Dentition: Poor dentition  Comments: Multiple missing, some broken, none loose.    Pulmonary  Breath sounds clear to auscultation               Abdominal  GI exam deferred       Other Findings            Anesthetic Plan    ASA: 3  Anesthesia type: general    Monitoring Plan: BIS      Induction: Intravenous  Anesthetic plan and risks discussed with: Patient

## 2018-03-23 NOTE — WOUND CARE
Wound care Nurse Consult for POA abscess's to patients back and buttocks. Patient is a 58 y/o AAM with very poorly controlled DM and recurrent abscess's. Other   Past Medical History:   Diagnosis Date    A-fib [facility]) 2009    Resolved. 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[address]     Cancer [address] [address]     splenic lymphoma    Chronic kidney disease     CKD stage 4 due to type 2 diabetes mellitus ([facility] [address] [facility]     Diabetes [facility] [address]     Hypertension     Ill-defined condition     diabetic neuropathy james feet    Neuropathy     Other ill-defined conditions(799.89)     spinal meningitis    Other ill-defined conditions(799.89)     broken blood vessel to nose    Stroke ([facility] [address] 2007, 2017    per patient    Vitamin D deficiency      Past Surgical History:   Procedure Laterality Date    HX OTHER SURGICAL  02/06/2017    Excisional debridement of abscess right thigh & right lower back     HX OTHER SURGICAL  05/23/2017    I&D & excisional debridement (skin & subcutaneous tissue) back & right thigh    HX OTHER SURGICAL  01/12/2018    debridement of abscess back     Patient is going to OR today with Dr. Rosendo Lundberg to debride Mid lower back, Left buttock and left lower back abscesses.      MD wants orders for right upper back chronic abscess:   WOUND POA CONDITIONS        Wound Back Right;Upper (Active)   DRESSING STATUS Intact 3/23/2018 11:52 AM   DRESSING TYPE Foam 3/23/2018 11:52 AM   Non-Pressure Injury Partial thickness (epider/derm) 3/23/2018 11:52 AM   Wound Length (cm) 3 cm 3/23/2018 11:52 AM   Wound Width (cm) 1 cm 3/23/2018 11:52 AM   Wound Depth (cm) 0.1 3/23/2018 11:52 AM   Wound Surface area (cm^2) 3 cm^2 3/23/2018 11:52 AM   Condition of Base Granulation;Pink 3/23/2018 11:52 AM   Condition of Edges Open 3/23/2018 11:52 AM   Tissue Type Pink;Red 3/23/2018 11:52 AM   Tissue Type Percent Pink 50 3/23/2018 11:52 AM   Tissue Type Percent Red 50 3/23/2018 11:52 AM   Drainage Amount  Scant 3/23/2018 11:52 AM   Drainage Color Serosanguinous 3/23/2018 11:52 AM   Wound Odor None 3/23/2018 11:52 AM   Cleansing and Cleansing Agents  Normal saline 3/22/2018  8:00 PM   Dressing Type Applied Foam 3/23/2018 11:52 AM   Procedure Tolerated Well 3/23/2018 11:52 AM   Number of days:304       Wound Back Mid;Lower (Active)   DRESSING STATUS Removed;New drainage 3/22/2018  8:00 PM   DRESSING TYPE Foam 3/22/2018  8:00 PM   Drainage Amount  Moderate 3/22/2018  8:00 PM   Wound Odor Foul 3/22/2018  8:00 PM   Cleansing and Cleansing Agents  Normal saline 3/22/2018  8:00 PM   Dressing Type Applied 4 x 4;Foam 3/22/2018  8:00 PM   Procedure Tolerated Well 3/22/2018  8:00 PM   Number of days:236           Wound Buttocks Left (Active)   DRESSING STATUS Removed 3/22/2018  8:00 PM   DRESSING TYPE Foam 3/22/2018  8:00 PM   Wound Length (cm) 4 cm 3/23/2018  8:04 AM   Wound Width (cm) 4 cm 3/23/2018  8:04 AM   Wound Surface area (cm^2) 16 cm^2 3/23/2018  8:04 AM   Tissue Type Red;Yellow 3/23/2018  8:04 AM   Drainage Amount  Moderate 3/23/2018  8:04 AM   Drainage Color Yellow 3/23/2018  8:04 AM   Cleansing and Cleansing Agents  Normal saline 3/23/2018  8:04 AM   Dressing Type Applied Foam 3/23/2018  8:04 AM   Procedure Tolerated Well 3/23/2018  8:04 AM   Number of days:1           Right upper back wound: Cleanse with dermal wound cleanser, wipe clean with 4x4. Apply a smear of Carrysen wound gel to wound base or small piece of Aquacell-AG and cover wound securing with small foam dressing. Plan: Dr Stefania Carvajal to write orders for other x3 abscesses after debridement.     Viridiana Bowman RN, CWON, zone ph# [phone]

## 2018-03-23 NOTE — PROGRESS NOTES
Pt admitted with sepsis//GLF//abscess right back. Pt lives in [facility], [facility] sees NP Cheyenne Frederick, is morbidly obese, uses a glucometer, RW, and is being worked up for home IV abx. Will send consult to [facility] and [facility] to assess for co pay for abx. Pt has used [facility] in past.    Care Management Interventions  PCP Verified by CM:  Yes  Mode of Transport at Discharge: Self  Transition of Care Consult (CM Consult): Discharge Planning  MyChart Signup: No  Discharge Durable Medical Equipment: Yes  Health Maintenance Reviewed: Yes  Physical Therapy Consult: Yes  Occupational Therapy Consult: No  Speech Therapy Consult: No  Current Support Network: Lives with Caregiver  Plan discussed with Pt/Family/Caregiver: Yes  Freedom of Choice Offered: Yes  Discharge Location  Discharge Placement: Home

## 2018-03-24 LAB
BACTERIA SPEC CULT: ABNORMAL
BACTERIA SPEC CULT: ABNORMAL
GLUCOSE BLD STRIP.AUTO-MCNC: 338 MG/DL (ref 65–100)
GLUCOSE BLD STRIP.AUTO-MCNC: 384 MG/DL (ref 65–100)
GLUCOSE BLD STRIP.AUTO-MCNC: 397 MG/DL (ref 65–100)
GLUCOSE BLD STRIP.AUTO-MCNC: 410 MG/DL (ref 65–100)
GRAM STN SPEC: ABNORMAL
SERVICE CMNT-IMP: ABNORMAL

## 2018-03-24 PROCEDURE — 74011250637 HC RX REV CODE- 250/637: Performed by: INTERNAL MEDICINE

## 2018-03-24 PROCEDURE — 82962 GLUCOSE BLOOD TEST: CPT

## 2018-03-24 PROCEDURE — 77030018836 HC SOL IRR NACL ICUM -A

## 2018-03-24 PROCEDURE — 65660000000 HC RM CCU STEPDOWN

## 2018-03-24 PROCEDURE — 74011250637 HC RX REV CODE- 250/637: Performed by: SURGERY

## 2018-03-24 PROCEDURE — 77030011256 HC DRSG MEPILEX <16IN NO BORD MOLN -A

## 2018-03-24 PROCEDURE — 74011636637 HC RX REV CODE- 636/637: Performed by: INTERNAL MEDICINE

## 2018-03-24 PROCEDURE — 74011000258 HC RX REV CODE- 258: Performed by: INTERNAL MEDICINE

## 2018-03-24 PROCEDURE — 74011250636 HC RX REV CODE- 250/636: Performed by: INTERNAL MEDICINE

## 2018-03-24 PROCEDURE — 74011250636 HC RX REV CODE- 250/636: Performed by: SURGERY

## 2018-03-24 PROCEDURE — 77030018390 HC SPNG HEMSTAT2 J&J -B

## 2018-03-24 PROCEDURE — 77010033678 HC OXYGEN DAILY

## 2018-03-24 RX ORDER — SODIUM CHLORIDE 0.9 % (FLUSH) 0.9 %
5-10 SYRINGE (ML) INJECTION EVERY 8 HOURS
Status: DISCONTINUED | OUTPATIENT
Start: 2018-03-24 | End: 2018-03-29 | Stop reason: HOSPADM

## 2018-03-24 RX ORDER — INSULIN LISPRO 100 [IU]/ML
INJECTION, SOLUTION INTRAVENOUS; SUBCUTANEOUS
Status: DISCONTINUED | OUTPATIENT
Start: 2018-03-24 | End: 2018-03-29 | Stop reason: HOSPADM

## 2018-03-24 RX ORDER — ACETAMINOPHEN 325 MG/1
650 TABLET ORAL
Status: DISCONTINUED | OUTPATIENT
Start: 2018-03-24 | End: 2018-03-29 | Stop reason: HOSPADM

## 2018-03-24 RX ORDER — HYDROMORPHONE HYDROCHLORIDE 1 MG/ML
0.5 INJECTION, SOLUTION INTRAMUSCULAR; INTRAVENOUS; SUBCUTANEOUS
Status: DISCONTINUED | OUTPATIENT
Start: 2018-03-24 | End: 2018-03-28 | Stop reason: RX

## 2018-03-24 RX ORDER — HEPARIN SODIUM 5000 [USP'U]/ML
5000 INJECTION, SOLUTION INTRAVENOUS; SUBCUTANEOUS EVERY 8 HOURS
Status: DISCONTINUED | OUTPATIENT
Start: 2018-03-25 | End: 2018-03-29 | Stop reason: HOSPADM

## 2018-03-24 RX ORDER — DEXTROSE 50 % IN WATER (D50W) INTRAVENOUS SYRINGE
12.5-25 AS NEEDED
Status: DISCONTINUED | OUTPATIENT
Start: 2018-03-24 | End: 2018-03-29 | Stop reason: HOSPADM

## 2018-03-24 RX ORDER — SODIUM CHLORIDE 0.9 % (FLUSH) 0.9 %
5-10 SYRINGE (ML) INJECTION AS NEEDED
Status: DISCONTINUED | OUTPATIENT
Start: 2018-03-24 | End: 2018-03-29 | Stop reason: HOSPADM

## 2018-03-24 RX ORDER — ONDANSETRON 2 MG/ML
4 INJECTION INTRAMUSCULAR; INTRAVENOUS
Status: DISCONTINUED | OUTPATIENT
Start: 2018-03-24 | End: 2018-03-29 | Stop reason: HOSPADM

## 2018-03-24 RX ORDER — GABAPENTIN 300 MG/1
300 CAPSULE ORAL 2 TIMES DAILY
Status: DISCONTINUED | OUTPATIENT
Start: 2018-03-24 | End: 2018-03-29 | Stop reason: HOSPADM

## 2018-03-24 RX ORDER — DIPHENHYDRAMINE HYDROCHLORIDE 50 MG/ML
12.5 INJECTION, SOLUTION INTRAMUSCULAR; INTRAVENOUS
Status: ACTIVE | OUTPATIENT
Start: 2018-03-24 | End: 2018-03-24

## 2018-03-24 RX ORDER — MAGNESIUM SULFATE 100 %
4 CRYSTALS MISCELLANEOUS AS NEEDED
Status: DISCONTINUED | OUTPATIENT
Start: 2018-03-24 | End: 2018-03-29 | Stop reason: HOSPADM

## 2018-03-24 RX ORDER — NALOXONE HYDROCHLORIDE 0.4 MG/ML
0.4 INJECTION, SOLUTION INTRAMUSCULAR; INTRAVENOUS; SUBCUTANEOUS AS NEEDED
Status: DISCONTINUED | OUTPATIENT
Start: 2018-03-24 | End: 2018-03-29 | Stop reason: HOSPADM

## 2018-03-24 RX ADMIN — INSULIN HUMAN 20 UNITS: 100 INJECTION, SUSPENSION SUBCUTANEOUS at 17:00

## 2018-03-24 RX ADMIN — INSULIN LISPRO 10 UNITS: 100 INJECTION, SOLUTION INTRAVENOUS; SUBCUTANEOUS at 22:12

## 2018-03-24 RX ADMIN — CLONIDINE HYDROCHLORIDE 0.2 MG: 0.1 TABLET ORAL at 17:54

## 2018-03-24 RX ADMIN — HYDRALAZINE HYDROCHLORIDE 50 MG: 50 TABLET, FILM COATED ORAL at 17:01

## 2018-03-24 RX ADMIN — INSULIN HUMAN 20 UNITS: 100 INJECTION, SUSPENSION SUBCUTANEOUS at 09:24

## 2018-03-24 RX ADMIN — CLONIDINE HYDROCHLORIDE 0.2 MG: 0.1 TABLET ORAL at 09:21

## 2018-03-24 RX ADMIN — PIPERACILLIN SODIUM,TAZOBACTAM SODIUM 3.38 G: 3; .375 INJECTION, POWDER, FOR SOLUTION INTRAVENOUS at 06:08

## 2018-03-24 RX ADMIN — GABAPENTIN 300 MG: 300 CAPSULE ORAL at 09:21

## 2018-03-24 RX ADMIN — INSULIN LISPRO 7 UNITS: 100 INJECTION, SOLUTION INTRAVENOUS; SUBCUTANEOUS at 12:50

## 2018-03-24 RX ADMIN — CARVEDILOL 25 MG: 12.5 TABLET, FILM COATED ORAL at 09:21

## 2018-03-24 RX ADMIN — Medication 10 ML: at 22:14

## 2018-03-24 RX ADMIN — INSULIN LISPRO 10 UNITS: 100 INJECTION, SOLUTION INTRAVENOUS; SUBCUTANEOUS at 16:30

## 2018-03-24 RX ADMIN — GABAPENTIN 300 MG: 300 CAPSULE ORAL at 17:53

## 2018-03-24 RX ADMIN — CARVEDILOL 25 MG: 12.5 TABLET, FILM COATED ORAL at 17:01

## 2018-03-24 RX ADMIN — HYDRALAZINE HYDROCHLORIDE 50 MG: 50 TABLET, FILM COATED ORAL at 09:21

## 2018-03-24 RX ADMIN — PRAVASTATIN SODIUM 20 MG: 40 TABLET ORAL at 21:50

## 2018-03-24 RX ADMIN — PIPERACILLIN SODIUM,TAZOBACTAM SODIUM 3.38 G: 3; .375 INJECTION, POWDER, FOR SOLUTION INTRAVENOUS at 21:50

## 2018-03-24 RX ADMIN — INSULIN HUMAN 20 UNITS: 100 INJECTION, SUSPENSION SUBCUTANEOUS at 20:11

## 2018-03-24 RX ADMIN — DIGOXIN 0.12 MG: 125 TABLET ORAL at 09:23

## 2018-03-24 RX ADMIN — PIPERACILLIN SODIUM,TAZOBACTAM SODIUM 3.38 G: 3; .375 INJECTION, POWDER, FOR SOLUTION INTRAVENOUS at 14:52

## 2018-03-24 RX ADMIN — Medication 10 ML: at 09:26

## 2018-03-24 RX ADMIN — AMLODIPINE BESYLATE 10 MG: 5 TABLET ORAL at 09:21

## 2018-03-24 RX ADMIN — HYDROMORPHONE HYDROCHLORIDE 0.5 MG: 1 INJECTION, SOLUTION INTRAMUSCULAR; INTRAVENOUS; SUBCUTANEOUS at 10:55

## 2018-03-24 RX ADMIN — Medication 10 ML: at 10:56

## 2018-03-24 RX ADMIN — Medication 10 ML: at 14:56

## 2018-03-24 RX ADMIN — INSULIN LISPRO 7 UNITS: 100 INJECTION, SOLUTION INTRAVENOUS; SUBCUTANEOUS at 09:24

## 2018-03-24 RX ADMIN — HYDRALAZINE HYDROCHLORIDE 50 MG: 50 TABLET, FILM COATED ORAL at 21:50

## 2018-03-24 NOTE — PROGRESS NOTES
Hospitalist Progress Note    NAME: Osvaldo Henley   :  1955   MRN:  [id number]       Assessment / Plan:  Severe sepsis presumed due to infected skin abscesses  S/P  I& D  Growing MRSA in wounds and Blood Culture  Acidosis, lactic  --Patient meets criteria for severe sepsis and is highly suspected to have an infection with suspected source to be skin. -Cultures drawn in ED and I have ensured they were drawn by discussion with ED RN. -Lactic acid ordered in ED. repeat Q6hrs if elevated until <2 or trend is towards normal  -Discussed with ED attending regarding use of antibiotics. Antibiotic administered in the ED were vanco and I have added zosyn for known MRSA and to cover possible polymicrobial infection given new wounds and fall at home  -Requested Large bore IV x 2 sites. If unable to be attained have requested for ED to place central line and then will monitor CVP.   -Continue volume expansion with cystalloid IV fluids  Continue Vanco and Zosyn, Suspect this will be MRSA so will NOT change antibiotics until Official C/S  He will need long term antibiotics likely,   Repeat BC tomorrow or Monday, if repeat negative then will place a PICC line     Diabetes mellitus 2 uncontrolled with hyperglycemia, 2md to infection  Pseudohyponatremia, improved  Increase NPh to 40 units BID and Lispro Resistant SSI coverge  Continue to increase as needed    Severe dehydration from uncontrolled diabetes leading to   acute renal failure on chronic disease suspect nearing stage IV  -with drop in bs the sodium should improve  -IVF NS to be used as well to help with ARF on CKD       Hypertension, Benign essential   -remain on tele  -labetalol IV prn sbp >170  -check digoxin level given change in renal function and his statement of being compliant with medications  -resume all antihypertensives for now        I have personally reviewed the radiographs, laboratory data in Epic and decisions and statements above are based partially on this personal interpretation.     Code Status: Full Code  DVT Prophylaxis: Hep SQ  GI Prophylaxis: not indicated      Body mass index is 37.95 kg/(m^2). Subjective:     Chief Complaint / Reason for Physician Visit  3/24   BS are high  3/23 \" Im waiting to go to Surgery\"   3/22  I cam because my sugars were high. Almost Caridadsarah Found  Discussed with RN events overnight. Review of Systems:  Symptom Y/N Comments  Symptom Y/N Comments   Fever/Chills y   Chest Pain     Poor Appetite    Edema     Cough    Abdominal Pain     Sputum    Joint Pain     SOB/NAVARRO n   Pruritis/Rash     Nausea/vomit    Tolerating PT/OT     Diarrhea    Tolerating Diet     Constipation    Other y  BS were high     Could NOT obtain due to:      Objective:     VITALS:   Last 24hrs VS reviewed since prior progress note. Most recent are:  Patient Vitals for the past 24 hrs:   Temp Pulse Resp BP SpO2   03/24/18 1534 98 °F (36.7 °C) 67 18 187/73 95 %   03/24/18 0746 97.6 °F (36.4 °C) 69 18 159/80 95 %   03/24/18 0304 97.9 °F (36.6 °C) 66 18 140/70 99 %   03/23/18 2318 97.3 °F (36.3 °C) 66 20 143/65 96 %   03/23/18 2210 98.2 °F (36.8 °C) 65 21 135/68 95 %   03/23/18 2145 98.6 °F (37 °C) 65 22 133/55 94 %   03/23/18 2130 - 66 20 128/58 96 %   03/23/18 2125 - 66 18 134/58 94 %   03/23/18 2120 - 66 20 128/63 98 %   03/23/18 2115 - 66 25 123/63 99 %   03/23/18 2110 - 66 23 126/61 98 %   03/23/18 2105 - 66 (!) 31 119/42 97 %   03/23/18 2104 98.8 °F (37.1 °C) 65 24 114/56 96 %       Intake/Output Summary (Last 24 hours) at 03/24/18 1746  Last data filed at 03/24/18 1455   Gross per 24 hour   Intake          3474.17 ml   Output             1425 ml   Net          2049.17 ml        PHYSICAL EXAM:   General: WD, WN. Alert, cooperative, no acute distress    EENT:  EOMI. Anicteric sclerae. MMM  Resp:  CTA bilaterally, no wheezing or rales.   No accessory muscle use  CV:  Regular  rhythm,  No edema  GI:  Soft, Non distended, Non tender.  +Bowel sounds  Neurologic:  Alert and oriented X 3, normal speech,   Psych:   Good insight. Not anxious nor agitated  Skin:  No rashes. No jaundice, multiple skin ulceration some with indurations concerning for an underlying Abcess? Reviewed most current lab test results and cultures  YES  Reviewed most current radiology test results   YES  Review and summation of old records today    NO  Reviewed patient's current orders and MAR    YES  PMH/SH reviewed - no change compared to H&P  ________________________________________________________________________  Care Plan discussed with:    Comments   Patient y    Family      RN y    Care Manager     Consultant   PT                     Multidiciplinary team rounds were held today with , nursing, pharmacist and clinical coordinator. Patient's plan of care was discussed; medications were reviewed and discharge planning was addressed. ________________________________________________________________________  Total NON critical care TIME:  30  Minutes    Total CRITICAL CARE TIME Spent:   Minutes non procedure based      Comments   >50% of visit spent in counseling and coordination of care     ________________________________________________________________________  Gerhardt Martin, MD     Procedures: see electronic medical records for all procedures/Xrays and details which were not copied into this note but were reviewed prior to creation of Plan. LABS:  I reviewed today's most current labs and imaging studies.   Pertinent labs include:  Recent Labs      03/23/18   0304  03/22/18   0036   WBC  10.0  19.1*   HGB  8.1*  10.9*   HCT  24.7*  32.3*   PLT  186  271     Recent Labs      03/23/18   0304  03/22/18   0036   NA  137  132*   K  4.1  4.3   CL  108  97   CO2  22  23   GLU  343*  514*   BUN  43*  52*   CREA  3.99*  4.99*   CA  8.3*  8.9   MG  2.1  2.3   PHOS  2.8   --    ALB   --   2.7*   TBILI   --   0.4   SGOT   --   14*   ALT   --   23       Signed: Yissel TRAVIS Savage Burroughs MD

## 2018-03-24 NOTE — PERIOP NOTES
TRANSFER - OUT REPORT:    Verbal report given to [facility]) on Kristyn Madsen  being transferred to [facility](unit) for routine progression of care       Report consisted of patients Situation, Background, Assessment and   Recommendations(SBAR). Information from the following report(s) OR Summary, Intake/Output and MAR was reviewed with the receiving nurse. Opportunity for questions and clarification was provided.       Patient transported with:   O2 @ 3 liters  Registered Nurse

## 2018-03-24 NOTE — PROGRESS NOTES
Admit Date: 3/21/2018    POD 1 Day Post-Op    Procedure:  Procedure(s):  INCISION AND DRAINAGE BILATERAL BUTTOCKS AND BACK    Subjective:     Patient has no new complaints. Objective:     Blood pressure 159/80, pulse 69, temperature 97.6 °F (36.4 °C), resp. rate 18, height 6' 5\" (1.956 m), weight 320 lb (145.2 kg), SpO2 95 %. Temp (24hrs), Av.2 °F (36.8 °C), Min:97.3 °F (36.3 °C), Max:98.8 °F (37.1 °C)      Physical Exam:  GENERAL: alert, cooperative, no distress, appears stated age, LUNG: clear to auscultation bilaterally, HEART: regular rate and rhythm, ABDOMEN: soft, non-tender. Bowel sounds normal. No masses,  no organomegaly, EXTREMITIES:  extremities normal, atraumatic, no cyanosis or edema, SKIN: back wounds taken down, still some oozing from lateral skin edge of left superior low back wound, lower wound dry. No purulent fluid or remaining necrotic tissue either wound. Right upper back wound clean with granulation tissue x 2    Labs:   Recent Results (from the past 24 hour(s))   GLUCOSE, POC    Collection Time: 18 11:20 AM   Result Value Ref Range    Glucose (POC) 344 (H) 65 - 100 mg/dL    Performed by Jake Theodore, POC    Collection Time: 18  3:13 PM   Result Value Ref Range    Glucose (POC) 265 (H) 65 - 100 mg/dL    Performed by Leatha ARELLANO, WOUND Evelyn Hilts STAIN    Collection Time: 18  7:55 PM   Result Value Ref Range    Special Requests: NO SPECIAL REQUESTS      GRAM STAIN OCCASIONAL WBCS SEEN      GRAM STAIN 1+ GRAM POSITIVE COCCI IN CLUSTERS      Culture result: (A)       HEAVY  Preliminary report of Methicillin Resistant Staphylococcus aureus by antigen detection. Confirmation and Sensitivities to follow.       Culture result: (KNOWN MRSA)     Culture result:       PLEASE REFER TO PREVIOUS CULTURE [id number], COLLECTED 3/22/18 FOR SENSITIVITIES   GLUCOSE, POC    Collection Time: 18  9:06 PM   Result Value Ref Range    Glucose (POC) 244 (H) 65 - 100 mg/dL    Performed by Sloan Sheets*    GLUCOSE, POC    Collection Time: 03/24/18  7:48 AM   Result Value Ref Range    Glucose (POC) 338 (H) 65 - 100 mg/dL    Performed by Franco Hill        Data Review images and reports reviewed    Assessment:     Active Problems:    Severe sepsis ([address] (3/22/2018)        Plan/Recommendations/Medical Decision Making:     Continue present treatment   Bid saline dressing changes for now, with surgicel to left back wound x 1 until oozing stops    Esau Ngo MD, [facility]

## 2018-03-24 NOTE — PROGRESS NOTES
Patient's B/G 410 Dr. Maverick Watkins paged [phone]. [phone] Dr. Maverick Watkins paged again per [facility] to call if B/G is over 350. Dr. Maverick Watkins notified of patient B/G telephone order with read back received for 10 units of Humalog. One time dose of 20 units NPH for a total of 40 units NPH this evening. Bedside and Verbal shift change report given to Carolina Trejo RN (oncoming nurse) by Stuart Porter (offgoing nurse). Report included the following information SBAR and Kardex.

## 2018-03-24 NOTE — ANESTHESIA POSTPROCEDURE EVALUATION
Post-Anesthesia Evaluation and Assessment    Patient: Benigno Patel MRN: [id number]  SSN: [id number]    YOB: 1955  Age: 57 y.o. Sex: male       Cardiovascular Function/Vital Signs  Visit Vitals    /58    Pulse 66    Temp 37.1 °C (98.8 °F)    Resp 18    Ht 6' 5\" (1.956 m)    Wt 145.2 kg (320 lb)    SpO2 94%    BMI 37.95 kg/m2       Patient is status post general anesthesia for Procedure(s):  INCISION AND DRAINAGE BILATERAL BUTTOCKS AND BACK. Nausea/Vomiting: None    Postoperative hydration reviewed and adequate. Pain:  Pain Scale 1: Numeric (0 - 10) (03/23/18 2130)  Pain Intensity 1: 4 (03/23/18 2130)   Managed    Neurological Status:   Neuro (WDL): Exceptions to WDL (03/23/18 2104)  Neuro  Neurologic State: Drowsy; Eyes open to stimulus; Eyes open to voice;Sleeping (03/23/18 2104)  Orientation Level: Oriented to person;Oriented to place;Oriented to situation (03/23/18 2104)  Cognition: Follows commands (03/23/18 2104)  Speech: Clear (03/23/18 2104)  LUE Motor Response: Purposeful (03/23/18 2104)  LLE Motor Response: Purposeful (03/23/18 2104)  RUE Motor Response: Purposeful (03/23/18 2104)  RLE Motor Response: Purposeful (03/23/18 2104)   At baseline    Mental Status and Level of Consciousness: Arousable    Pulmonary Status:   O2 Device: Nasal cannula (03/23/18 2104)   Adequate oxygenation and airway patent    Complications related to anesthesia: None    Post-anesthesia assessment completed.  No concerns    Signed By: Dre Krishnan MD     March 23, 2018

## 2018-03-24 NOTE — OP NOTES
[address]  ACUTE CARE OP NOTE    Edmar Martinez  MR#: [id number]  : 1955  ACCOUNT #: [de-identified]   DATE OF SERVICE: 2018    PREOPERATIVE DIAGNOSIS:  Abscesses of buttock and back. POSTOPERATIVE DIAGNOSIS:  Abscesses of buttock and back. ANESTHESIA:  General.    SURGEON:  Kathryn Rodriguez MD.     ASSISTANT:  staff     PROCEDURE PERFORMED:  Debridement of abscess of right lower back and debridement of abscess of left lower back and left buttock. ESTIMATED BLOOD LOSS:  Was 300 mL. COMPLICATIONS:  None. SPECIMENS REMOVED:  Debrided tissue, not sent for pathology. IMPLANTS:  none     OPERATIVE SUMMARY:  The patient was taken to the operating room, placed initially on the operating table in supine position. General endotracheal anesthesia was induced. The patient was then turned to a right lateral decubitus position with a beanbag to assist in positioning. The patient's back and buttocks were then sterilely prepared and draped. The patient had previous history subcutaneous abscesses. He had been admitted 24 hours earlier with sepsis and pus draining from the wound, which was growing MRSA. Attention was first turned to a very small abscess just to the right of midline in the lower back. This was about 5 mm across and appeared likely shallow. This thinned out skin was excised revealing otherwise intact dermis below that level with no evidence of necrosis and no evidence of deep tracking. Attention was then turned to the smaller of the two left-sided abscesses, this will be in the left lower back just above the buttock. The site was draining pus. This was cultured. The opening which was about 5 mm across was probed and there was undermining in all directions for at least an inch. With electrocautery, a circular disk of skin was removed initially about an inch and a half across.   This revealed a cavity with necrotic fat and pus permeated subcutaneous fat. Aggressive sharp excisional debridement was then carried out utilizing scalpel, scissors, and electrocautery. All visible necrotic and grossly pus infiltrated tissue was excised. The depth in this area was to the deep subcutaneous tissue. Following debridement, the post-debridement dimension of the left lower back abscess debridement site was 5 x 5 cm length and width with 3 cm depth. The cavity was initially packed open with a dry lap pad. Attention was then turned to the left buttock abscess. There was an opening about 2 cm across with visible necrotic subcutaneous tissue and a small amount of necrotic skin. This was palpated digitally and there was substantial undermining here also. I made initially a circular incision with electrocautery revealing this cavity in the subcutaneous layer and necrosis and infiltration of subcutaneous tissue with pus. Using scalpel, scissors, and electrocautery, sharp excisional debridement was carried out of skin and subcutaneous tissue and a small portion of the underlying gluteus muscle. The necrotic process did not extend into the muscle. There were arms of infiltration of pus into the surrounding tissue and it was difficult sometimes to delineate the furthest extent of the purulent infiltration. Once all visible necrotic and pus infiltrated tissue had been removed, there was a wound which is 8 cm long x 5 cm wide x 3.5 cm deep. The portion of the exposed muscle was only about 2 cm across. The muscle appeared healthy. At each of the sites, electrocautery was utilized for control of visible bleeders. The lower site was packed with a lap pad while the upper site was inspected for hemostasis. The lower site was then inspected for hemostasis obtained by cautery at punctate bleeding sites. Once this was completed, then each cavity was lined with sheets of Surgicel and then packed open with Kerlix and covered by dry gauze and tape.   Patient tolerated surgery well and was taken to recovery in stable condition. Specimens as described were skin and subcutaneous tissue from abscess not sent for pathology and estimated blood loss was 300 mL.       MD JUAN Villarreal / REDD  D: 03/23/2018 22:09     T: 03/24/2018 09:11  JOB #: [id number]

## 2018-03-24 NOTE — BRIEF OP NOTE
BRIEF OPERATIVE NOTE    Date of Procedure: 3/23/2018   Preoperative Diagnosis: buttock and back abcess  Postoperative Diagnosis: buttock and back abcess    Procedure(s):  Debridement of abscesses left lower back, left buttock, right lower back. Surgeon(s) and Role:     * Nae Swan MD - Primary         Assistant Staff: None      Surgical Staff:  Circ-1: Juan Diego Mac RN  Circ-Relief: Macrina Browning RN; Thomas Wall RN  Scrub Tech-1: Jose L Lee  Surg Asst-Relief: Ana Bell  Float Staff: Vasu Hinson RN  Event Time In   Incision Start 1944   Incision Close 2042     Anesthesia: General   Estimated Blood Loss: 300 ml  Specimens:   ID Type Source Tests Collected by Time Destination   1 : ABSCESS OF LEFT UPPER Sherial Antoinette Wound Buttock CULTURE, ANAEROBIC AND AEROBIC, GRAM STAIN Nae Swan MD 3/23/2018 1945 Microbiology      Findings: One 5 mm very superficial skin abscess just off midline to right in lower back. One abscess with necrosis of SQ tissue left lower back. One abscess with necrotic SQ left buttock. Sites packed with Surgicel and Kerlix.   Complications: none  Implants: * No implants in log *

## 2018-03-24 NOTE — PROGRESS NOTES
Bedside and Verbal shift change report given to [place] (oncoming nurse) by Lee Ann Hopper RN   (offgoing nurse). Report included the following information SBAR, Kardex, MAR and Recent Results. Zone Phone for oncoming shift:   [phone]    Shift Summary:Patient rested quietly throughout night. Patient back from debridement of wounds on back around 2200. Patient aware of 1500mL fluid restriction. LDAs               Peripheral IV 03/22/18 Left Antecubital (Active)   Site Assessment Clean, dry, & intact 3/24/2018  3:51 AM   Phlebitis Assessment 0 3/24/2018  3:51 AM   Infiltration Assessment 0 3/24/2018  3:51 AM   Dressing Status Clean, dry, & intact 3/24/2018  3:51 AM   Dressing Type Tape;Transparent 3/24/2018  3:51 AM   Hub Color/Line Status Pink 3/24/2018  3:51 AM   Action Taken Blood drawn 3/22/2018 12:35 AM   Alcohol Cap Used Yes 3/22/2018  7:30 PM       Peripheral IV 03/22/18 Right (Active)   Site Assessment Clean, dry, & intact 3/24/2018  3:51 AM   Phlebitis Assessment 0 3/24/2018  3:51 AM   Infiltration Assessment 0 3/24/2018  3:51 AM   Dressing Status Clean, dry, & intact 3/24/2018  3:51 AM   Dressing Type Tape;Transparent 3/24/2018  3:51 AM   Hub Color/Line Status Green 3/24/2018  3:51 AM   Action Taken Blood drawn 3/22/2018 12:35 AM   Alcohol Cap Used Yes 3/22/2018  7:30 PM                        Intake & Output   Date 03/23/18 0700 - 03/24/18 0659 03/24/18 0700 - 03/25/18 0659   Shift [id number] 1900-0659 24 Hour Total 4133-4466 1538-6735 24 Hour Total   I  N  T  A  K  E   P.O.          P. O.        I.V.  (mL/kg/hr)  1474.2 1474.2         I.V.  50 50         Volume (lactated Ringers infusion)  500 500         Volume (0.9% sodium chloride infusion)  324.2 324.2         Volume (piperacillin-tazobactam (ZOSYN) 3.375 g in 0.9% sodium chloride (MBP/ADV) 100 mL)  600 600       Shift Total  (mL/kg) 820  (5.6) 1714.2  (11.8) 2534.2  (17.5)      O  U  T  P  U  T   Urine  (mL/kg/hr) 800  (0.5)  800         Urine Voided 800  800       Blood  300 300         Estimated Blood Loss  300 300       Shift Total  (mL/kg) 800  (5.5) 300  (2.1) 1100  (7.6)      NET 20 1414.2 1434.2      Weight (kg) 145. 1 145. 1 145. 1 145. 1 145. 1 145. 1      Last Bowel Movement Last Bowel Movement Date: 03/22/18   Glucose Checks [] N/A  [x] AC/HS  [] Q6  Concerns:   Nutrition Active Orders   There are no active orders of the following type(s): Diet.        Consults []PT  []OT  []Speech  [x]Case Management   Cardiac Monitoring []N/A [x]Yes Expires: 48 hours

## 2018-03-24 NOTE — PERIOP NOTES
TRANSFER - IN REPORT:    Verbal report received from Justice Judd RN(name) on Mariana Costa  being received from OR(unit) for routine post - op      Report consisted of patients Situation, Background, Assessment and   Recommendations(SBAR). Information from the following report(s) OR Summary was reviewed with the receiving nurse. Opportunity for questions and clarification was provided. Assessment completed upon patients arrival to unit and care assumed.

## 2018-03-25 LAB
ANION GAP SERPL CALC-SCNC: 8 MMOL/L (ref 5–15)
BACTERIA SPEC CULT: ABNORMAL
BACTERIA SPEC CULT: NORMAL
BUN SERPL-MCNC: 37 MG/DL (ref 6–20)
BUN/CREAT SERPL: 11 (ref 12–20)
CALCIUM SERPL-MCNC: 8.1 MG/DL (ref 8.5–10.1)
CHLORIDE SERPL-SCNC: 109 MMOL/L (ref 97–108)
CO2 SERPL-SCNC: 20 MMOL/L (ref 21–32)
CREAT SERPL-MCNC: 3.33 MG/DL (ref 0.7–1.3)
GLUCOSE BLD STRIP.AUTO-MCNC: 296 MG/DL (ref 65–100)
GLUCOSE BLD STRIP.AUTO-MCNC: 326 MG/DL (ref 65–100)
GLUCOSE BLD STRIP.AUTO-MCNC: 351 MG/DL (ref 65–100)
GLUCOSE BLD STRIP.AUTO-MCNC: 370 MG/DL (ref 65–100)
GLUCOSE SERPL-MCNC: 312 MG/DL (ref 65–100)
GRAM STN SPEC: ABNORMAL
POTASSIUM SERPL-SCNC: 4.7 MMOL/L (ref 3.5–5.1)
SERVICE CMNT-IMP: ABNORMAL
SERVICE CMNT-IMP: NORMAL
SODIUM SERPL-SCNC: 137 MMOL/L (ref 136–145)

## 2018-03-25 PROCEDURE — 82962 GLUCOSE BLOOD TEST: CPT

## 2018-03-25 PROCEDURE — 80048 BASIC METABOLIC PNL TOTAL CA: CPT | Performed by: SURGERY

## 2018-03-25 PROCEDURE — 36415 COLL VENOUS BLD VENIPUNCTURE: CPT | Performed by: SURGERY

## 2018-03-25 PROCEDURE — 74011250637 HC RX REV CODE- 250/637: Performed by: INTERNAL MEDICINE

## 2018-03-25 PROCEDURE — 74011250637 HC RX REV CODE- 250/637: Performed by: SURGERY

## 2018-03-25 PROCEDURE — 74011250636 HC RX REV CODE- 250/636: Performed by: INTERNAL MEDICINE

## 2018-03-25 PROCEDURE — 74011250636 HC RX REV CODE- 250/636: Performed by: SURGERY

## 2018-03-25 PROCEDURE — 65660000000 HC RM CCU STEPDOWN

## 2018-03-25 PROCEDURE — 74011636637 HC RX REV CODE- 636/637: Performed by: INTERNAL MEDICINE

## 2018-03-25 PROCEDURE — 74011000258 HC RX REV CODE- 258: Performed by: INTERNAL MEDICINE

## 2018-03-25 RX ORDER — VANCOMYCIN 2 GRAM/500 ML IN 0.9 % SODIUM CHLORIDE INTRAVENOUS
2000
Status: DISCONTINUED | OUTPATIENT
Start: 2018-03-26 | End: 2018-03-27

## 2018-03-25 RX ADMIN — INSULIN LISPRO 10 UNITS: 100 INJECTION, SOLUTION INTRAVENOUS; SUBCUTANEOUS at 17:33

## 2018-03-25 RX ADMIN — HYDRALAZINE HYDROCHLORIDE 50 MG: 50 TABLET, FILM COATED ORAL at 08:59

## 2018-03-25 RX ADMIN — HYDRALAZINE HYDROCHLORIDE 50 MG: 50 TABLET, FILM COATED ORAL at 22:17

## 2018-03-25 RX ADMIN — POLYETHYLENE GLYCOL 3350 17 G: 17 POWDER, FOR SOLUTION ORAL at 09:01

## 2018-03-25 RX ADMIN — INSULIN LISPRO 10 UNITS: 100 INJECTION, SOLUTION INTRAVENOUS; SUBCUTANEOUS at 22:17

## 2018-03-25 RX ADMIN — HEPARIN SODIUM 5000 UNITS: 5000 INJECTION, SOLUTION INTRAVENOUS; SUBCUTANEOUS at 16:03

## 2018-03-25 RX ADMIN — CARVEDILOL 25 MG: 12.5 TABLET, FILM COATED ORAL at 09:01

## 2018-03-25 RX ADMIN — INSULIN LISPRO 10 UNITS: 100 INJECTION, SOLUTION INTRAVENOUS; SUBCUTANEOUS at 12:17

## 2018-03-25 RX ADMIN — PIPERACILLIN SODIUM,TAZOBACTAM SODIUM 3.38 G: 3; .375 INJECTION, POWDER, FOR SOLUTION INTRAVENOUS at 06:04

## 2018-03-25 RX ADMIN — HEPARIN SODIUM 5000 UNITS: 5000 INJECTION, SOLUTION INTRAVENOUS; SUBCUTANEOUS at 08:01

## 2018-03-25 RX ADMIN — AMLODIPINE BESYLATE 10 MG: 5 TABLET ORAL at 09:01

## 2018-03-25 RX ADMIN — CLONIDINE HYDROCHLORIDE 0.2 MG: 0.1 TABLET ORAL at 17:32

## 2018-03-25 RX ADMIN — HEPARIN SODIUM 5000 UNITS: 5000 INJECTION, SOLUTION INTRAVENOUS; SUBCUTANEOUS at 23:19

## 2018-03-25 RX ADMIN — PRAVASTATIN SODIUM 20 MG: 40 TABLET ORAL at 22:16

## 2018-03-25 RX ADMIN — INSULIN LISPRO 5 UNITS: 100 INJECTION, SOLUTION INTRAVENOUS; SUBCUTANEOUS at 08:03

## 2018-03-25 RX ADMIN — CARVEDILOL 25 MG: 12.5 TABLET, FILM COATED ORAL at 17:32

## 2018-03-25 RX ADMIN — INSULIN HUMAN 60 UNITS: 100 INJECTION, SUSPENSION SUBCUTANEOUS at 17:34

## 2018-03-25 RX ADMIN — GABAPENTIN 300 MG: 300 CAPSULE ORAL at 17:32

## 2018-03-25 RX ADMIN — SODIUM CHLORIDE 50 ML/HR: 900 INJECTION, SOLUTION INTRAVENOUS at 23:33

## 2018-03-25 RX ADMIN — INSULIN LISPRO 7 UNITS: 100 INJECTION, SOLUTION INTRAVENOUS; SUBCUTANEOUS at 08:01

## 2018-03-25 RX ADMIN — DIGOXIN 0.12 MG: 125 TABLET ORAL at 09:00

## 2018-03-25 RX ADMIN — HYDROMORPHONE HYDROCHLORIDE 0.5 MG: 1 INJECTION, SOLUTION INTRAMUSCULAR; INTRAVENOUS; SUBCUTANEOUS at 14:22

## 2018-03-25 RX ADMIN — CLONIDINE HYDROCHLORIDE 0.2 MG: 0.1 TABLET ORAL at 08:59

## 2018-03-25 RX ADMIN — HYDRALAZINE HYDROCHLORIDE 50 MG: 50 TABLET, FILM COATED ORAL at 16:03

## 2018-03-25 RX ADMIN — Medication 10 ML: at 16:00

## 2018-03-25 RX ADMIN — VANCOMYCIN HYDROCHLORIDE 1750 MG: 10 INJECTION, POWDER, LYOPHILIZED, FOR SOLUTION INTRAVENOUS at 01:23

## 2018-03-25 RX ADMIN — CEFEPIME HYDROCHLORIDE 2 G: 2 INJECTION, POWDER, FOR SOLUTION INTRAVENOUS at 15:59

## 2018-03-25 RX ADMIN — GABAPENTIN 300 MG: 300 CAPSULE ORAL at 09:01

## 2018-03-25 RX ADMIN — Medication 10 ML: at 22:17

## 2018-03-25 NOTE — PROGRESS NOTES
Hospitalist Progress Note    NAME: Javier Azevedo   :  1955   MRN:  [id number]       Assessment / Plan:  Severe sepsis presumed due to infected skin abscesses  S/P  I& D  Growing MRSA in wounds and Blood Culture  Acidosis, lactic  --Patient meets criteria for severe sepsis and is highly suspected to have an infection with  ED were vanco and I have added zosyn for known MRSA  -Continue volume expansion with cystalloid IV fluids  Continue Vanco for MRSA,  Change Zosyn to Cefepime for Serratia and anaerobes  He will need long term antibiotics likely,   Repeat BC tomorrow or Monday, if repeat negative then will place a PICC line  Will also get ID consult to guide length of antibiotics     Diabetes mellitus 2 uncontrolled with hyperglycemia, 2md to infection  Pseudohyponatremia, improved  Increase NPh to 60 units BID ( on 65 units at home bid) and Lispro Resistant SSI coverge, continue to increase NPH  Continue to increase as needed    Severe dehydration from uncontrolled diabetes leading to   acute renal failure on CKD, Improving with IVF  -IVF NS follow renal functions       Hypertension, Benign essential   -remain on tele  -labetalol IV prn sbp >170  -check digoxin level given change in renal function and his statement of being compliant with medications  -resume all antihypertensives for now    OBESE  Body mass index is 37.95 kg/(m^2).       I have personally reviewed the radiographs, laboratory data in Epic and decisions and statements above are based partially on this personal interpretation.     Code Status: Full Code  DVT Prophylaxis: Hep SQ  GI Prophylaxis: not indicated         Subjective:     Chief Complaint / Reason for Physician Visit  3/25    3/24   BS are high  3/23 \" Im waiting to go to Surgery\"   3/22  I cam because my sugars were high. Almost Bita [place]  Discussed with RN events overnight.      Review of Systems:  Symptom Y/N Comments  Symptom Y/N Comments   Fever/Chills y   Chest Pain Poor Appetite    Edema     Cough    Abdominal Pain     Sputum    Joint Pain     SOB/NAVARRO n   Pruritis/Rash     Nausea/vomit    Tolerating PT/OT     Diarrhea    Tolerating Diet     Constipation    Other y  BS were high     Could NOT obtain due to:      Objective:     VITALS:   Last 24hrs VS reviewed since prior progress note. Most recent are:  Patient Vitals for the past 24 hrs:   Temp Pulse Resp BP SpO2   03/25/18 1539 98.6 °F (37 °C) 69 18 163/77 93 %   03/25/18 1204 97.8 °F (36.6 °C) 65 18 190/80 95 %   03/25/18 0820 97.5 °F (36.4 °C) 66 18 179/73 96 %   03/25/18 0314 97.6 °F (36.4 °C) 60 18 159/72 96 %   03/24/18 2305 - 62 19 155/67 97 %   03/1955 98.6 °F (37 °C) 62 18 157/70 97 %       Intake/Output Summary (Last 24 hours) at 03/25/18 1729  Last data filed at 03/25/18 1219   Gross per 24 hour   Intake          2621.66 ml   Output              850 ml   Net          1771.66 ml        PHYSICAL EXAM:   General: WD, WN. Alert, cooperative, no acute distress    EENT:  EOMI. Anicteric sclerae. MMM  Resp:  CTA bilaterally, no wheezing or rales. No accessory muscle use  CV:  Regular  rhythm,  No edema  GI:  Soft, Non distended, Non tender.  +Bowel sounds  Neurologic:  Alert and oriented X 3, normal speech,   Psych:   Good insight. Not anxious nor agitated  Skin:  No rashes. No jaundice, multiple skin ulceration some with indurations concerning for an underlying Abcess?   Reviewed most current lab test results and cultures  YES  Reviewed most current radiology test results   YES  Review and summation of old records today    NO  Reviewed patient's current orders and MAR    YES  PMH/SH reviewed - no change compared to H&P  ________________________________________________________________________  Care Plan discussed with:    Comments   Patient y    Family      RN y    Care Manager     Consultant   PT                     Multidiciplinary team rounds were held today with , nursing, pharmacist and clinical coordinator. Patient's plan of care was discussed; medications were reviewed and discharge planning was addressed. ________________________________________________________________________  Total NON critical care TIME:  30  Minutes    Total CRITICAL CARE TIME Spent:   Minutes non procedure based      Comments   >50% of visit spent in counseling and coordination of care     ________________________________________________________________________  Michela Apgar, MD     Procedures: see electronic medical records for all procedures/Xrays and details which were not copied into this note but were reviewed prior to creation of Plan. LABS:  I reviewed today's most current labs and imaging studies.   Pertinent labs include:  Recent Labs      18   0304   WBC  10.0   HGB  8.1*   HCT  24.7*   PLT  186     Recent Labs      18   0920  18   0304   NA  137  137   K  4.7  4.1   CL  109*  108   CO2  20*  22   GLU  312*  343*   BUN  37*  43*   CREA  3.33*  3.99*   CA  8.1*  8.3*   MG   --   2.1   PHOS   --   2.8       Signed: Michela Apgar, MD

## 2018-03-25 NOTE — PROGRESS NOTES
Pharmacy Automatic Renal Dosing Protocol - Antimicrobials    Indication for Antimicrobials: SSTI, Bloodstream Infection, abscess    Current Regimen of Each Antimicrobial:  Zosyn 3.375g IV Q8H (Start Date 3/22; Day 4)  Vancomycin 1750 mg IV Q36H (Start 3/23; Day 4)    Vancomycin Trough Goal Level: 15-20    Significant Cultures:   Microbiology Results (Current encounter)   Date/Time Order Name Specimen Source Lab Status   3/23/18 1955 CULTURE, WOUND W GRAM STAIN Abscess Final result   3/23/18 1955 CULTURE, ANAEROBIC Abscess Final result   3/22/18 0643 MRSA CULTURE Nasal Final result   3/22/18 0036 CULTURE, WOUND W GRAM STAIN Buttock Final result   3/22/18 0036 CULTURE, BLOOD, PAIRED Blood Preliminary result   3/22/18 0035 CULTURE, WOUND W GRAM STAIN Buttock Preliminary result         Radiology / Imaging results: (X-ray, CT scan or MRI): None    Paralysis, amputations, malnutrition: None noted    Labs:  Recent Labs      18   0920  18   0304   CREA  3.33*  3.99*   BUN  37*  43*   WBC   --   10.0     Temp (24hrs), Av.9 °F (36.6 °C), Min:97.5 °F (36.4 °C), Max:98.6 °F (37 °C)    Creatinine Clearance (mL/min) or Dialysis:   Estimated Creatinine Clearance: 35.3 mL/min (based on Cr of 3.33). Estimated Creatinine Clearance (using IBW):28.2 mL/min    Impression/Plan:   · Afebrile, SCr improving, Leukocytosis resolved  · BCx and WCx positive for MRSA, final; 3/24 WCx Serratia Marcescens intermed sens to Zosyn sensi to Cefepime/CTX and 3/22 WCx gm stain Providencia Rettgeri pan sensi, ano anaerobes isolated. I discussed regimens/sensitivities w/ Dr. Tammy Valdes and recommended switch to Cefepime. Dr. Tammy Valdes approved and prefers Cefepime over CTX given location of buttock wound. Patient is borderline for Cefepime 2gm IV q12h vs q24h interval, will initiate Cefepime 2gm IV q12h given abscess and improving SCr.   · Given improved SCr current regimen now has a predicted trough of 13.5, thus will adjust Vancomycin regimen to 2000mg (13.8mg/kg) IV q36h for a predicted trough at Css of 15.5. Next dose due 3/26 9am.  Will order a Vancomycin trough 3/26 8am to confirm new regimen. · Antimicrobial stop date to be determined; Dr. Rodriguez will discuss abx duration w/ ID 3/26     Pharmacy will follow daily and adjust medications as appropriate for renal function and/or serum levels.     Thank you,  Delonte Larkin, [facility]

## 2018-03-25 NOTE — PROGRESS NOTES
Bedside and Verbal shift change report given to Adrian Apley RN (oncoming nurse) by Lorene Martinez (offgoing nurse). Report included the following information SBAR, Kardex and Intake/Output.

## 2018-03-25 NOTE — PROGRESS NOTES
Bedside and Verbal shift change report given to [place] (oncoming nurse) by Boyd Peter RN   (offgoing nurse). Report included the following information SBAR, Kardex, MAR and Recent Results.

## 2018-03-25 NOTE — PROGRESS NOTES
Admit Date: 3/21/2018    POD 2 Day Post-Op    Procedure:  Procedure(s):  INCISION AND DRAINAGE BILATERAL BUTTOCKS AND BACK    Subjective:     Patient has no new complaints. Objective:     Blood pressure 190/80, pulse 65, temperature 97.8 °F (36.6 °C), resp. rate 18, height 6' 5\" (1.956 m), weight 320 lb (145.2 kg), SpO2 95 %. Temp (24hrs), Av.9 °F (36.6 °C), Min:97.5 °F (36.4 °C), Max:98.6 °F (37 °C)      Physical Exam:  GENERAL: alert, cooperative, no distress, appears stated age, LUNG: clear to auscultation bilaterally, HEART: regular rate and rhythm, ABDOMEN: soft, non-tender. Bowel sounds normal. No masses,  no organomegaly, EXTREMITIES:  extremities normal, atraumatic, no cyanosis or edema, SKIN: back wounds taken down, oozing stopped. No purulent fluid or remaining necrotic tissue either wound.   Right upper back wound clean with granulation tissue x 2    Labs:   Recent Results (from the past 24 hour(s))   GLUCOSE, POC    Collection Time: 18  4:07 PM   Result Value Ref Range    Glucose (POC) 410 (H) 65 - 100 mg/dL    Performed by Jony Ramirez, POC    Collection Time: 18  9:47 PM   Result Value Ref Range    Glucose (POC) 397 (H) 65 - 100 mg/dL    Performed by Piero Mcnally (PCT)    GLUCOSE, POC    Collection Time: 18  7:47 AM   Result Value Ref Range    Glucose (POC) 296 (H) 65 - 100 mg/dL    Performed by [address], BASIC    Collection Time: 18  9:20 AM   Result Value Ref Range    Sodium 137 136 - 145 mmol/L    Potassium 4.7 3.5 - 5.1 mmol/L    Chloride 109 (H) 97 - 108 mmol/L    CO2 20 (L) 21 - 32 mmol/L    Anion gap 8 5 - 15 mmol/L    Glucose 312 (H) 65 - 100 mg/dL    BUN 37 (H) 6 - 20 MG/DL    Creatinine 3.33 (H) 0.70 - 1.30 MG/DL    BUN/Creatinine ratio 11 (L) 12 - 20      GFR est AA 23 (L) >60 ml/min/1.73m2    GFR est non-AA 19 (L) >60 ml/min/1.73m2    Calcium 8.1 (L) 8.5 - 10.1 MG/DL   GLUCOSE, POC    Collection Time: 18 11:43 AM Result Value Ref Range    Glucose (POC) 326 (H) 65 - 100 mg/dL    Performed by Kaylan Mills        Data Review images and reports reviewed    Assessment:     Active Problems:    Severe sepsis ([address] (3/22/2018)        Plan/Recommendations/Medical Decision Making:     Continue present treatment   Bid saline dressing changes for now. Dr. Holland Share back tomorrow. Yonathan Frye.  Magalie Ayala MD, [facility]

## 2018-03-25 NOTE — PROGRESS NOTES
Hospitalist Progress Note    NAME: Michela Jesus   :  1955   MRN:  [id number]       Assessment / Plan:  Severe sepsis presumed due to infected skin abscesses  S/P  I& D  Growing MRSA in wounds and Blood Culture  Acidosis, lactic  --Patient meets criteria for severe sepsis and is highly suspected to have an infection with  ED were vanco and I have added zosyn for known MRSA  -Continue volume expansion with cystalloid IV fluids  Continue Vanco for MRSA,  Change Zosyn to Cefepime for Serratia and anaerobes  He will need long term antibiotics likely,   Repeat BC tomorrow or Monday, if repeat negative then will place a PICC line  Will also get ID consult to guide length of antibiotics     Diabetes mellitus 2 uncontrolled with hyperglycemia, 2md to infection  Pseudohyponatremia, improved  Increase NPh to 60 units BID ( on 65 units at home bid) and Lispro Resistant SSI coverge, continue to increase NPH  Continue to increase as needed    Severe dehydration from uncontrolled diabetes leading to   acute renal failure on CKD, Improving with IVF  -IVF NS follow renal functions       Hypertension, Benign essential   -remain on tele  -labetalol IV prn sbp >170  -check digoxin level given change in renal function and his statement of being compliant with medications  -resume all antihypertensives for now        I have personally reviewed the radiographs, laboratory data in Epic and decisions and statements above are based partially on this personal interpretation.     Code Status: Full Code  DVT Prophylaxis: Hep SQ  GI Prophylaxis: not indicated      Body mass index is 37.95 kg/(m^2). Subjective:     Chief Complaint / Reason for Physician Visit  3/25    3/24   BS are high  3/23 \" Im waiting to go to Surgery\"   3/22  I cam because my sugars were high. Almost Demario Hopper  Discussed with RN events overnight.      Review of Systems:  Symptom Y/N Comments  Symptom Y/N Comments   Fever/Chills y   Chest Pain     Poor Appetite    Edema     Cough    Abdominal Pain     Sputum    Joint Pain     SOB/NAVARRO n   Pruritis/Rash     Nausea/vomit    Tolerating PT/OT     Diarrhea    Tolerating Diet     Constipation    Other y  BS were high     Could NOT obtain due to:      Objective:     VITALS:   Last 24hrs VS reviewed since prior progress note. Most recent are:  Patient Vitals for the past 24 hrs:   Temp Pulse Resp BP SpO2   03/25/18 0820 97.5 °F (36.4 °C) 66 18 179/73 96 %   03/25/18 0314 97.6 °F (36.4 °C) 60 18 159/72 96 %   03/24/18 2305 - 62 19 155/67 97 %   03/1955 98.6 °F (37 °C) 62 18 157/70 97 %   03/24/18 1534 98 °F (36.7 °C) 67 18 187/73 95 %       Intake/Output Summary (Last 24 hours) at 03/25/18 1042  Last data filed at 03/25/18 0820   Gross per 24 hour   Intake          3661.66 ml   Output              950 ml   Net          2711.66 ml        PHYSICAL EXAM:   General: WD, WN. Alert, cooperative, no acute distress    EENT:  EOMI. Anicteric sclerae. MMM  Resp:  CTA bilaterally, no wheezing or rales. No accessory muscle use  CV:  Regular  rhythm,  No edema  GI:  Soft, Non distended, Non tender.  +Bowel sounds  Neurologic:  Alert and oriented X 3, normal speech,   Psych:   Good insight. Not anxious nor agitated  Skin:  No rashes. No jaundice, multiple skin ulceration some with indurations concerning for an underlying Abcess? Reviewed most current lab test results and cultures  YES  Reviewed most current radiology test results   YES  Review and summation of old records today    NO  Reviewed patient's current orders and MAR    YES  PMH/ reviewed - no change compared to H&P  ________________________________________________________________________  Care Plan discussed with:    Comments   Patient y    Family      RN y    Care Manager     Consultant   PT                     Multidiciplinary team rounds were held today with , nursing, pharmacist and clinical coordinator.   Patient's plan of care was discussed; medications were reviewed and discharge planning was addressed. ________________________________________________________________________  Total NON critical care TIME:  30  Minutes    Total CRITICAL CARE TIME Spent:   Minutes non procedure based      Comments   >50% of visit spent in counseling and coordination of care     ________________________________________________________________________  Trey Finn MD     Procedures: see electronic medical records for all procedures/Xrays and details which were not copied into this note but were reviewed prior to creation of Plan. LABS:  I reviewed today's most current labs and imaging studies.   Pertinent labs include:  Recent Labs      03/23/18   0304   WBC  10.0   HGB  8.1*   HCT  24.7*   PLT  186     Recent Labs      03/25/18   0920  03/23/18   0304   NA  137  137   K  4.7  4.1   CL  109*  108   CO2  20*  22   GLU  312*  343*   BUN  37*  43*   CREA  3.33*  3.99*   CA  8.1*  8.3*   MG   --   2.1   PHOS   --   2.8       Signed: Trey Finn MD

## 2018-03-26 LAB
BACTERIA SPEC CULT: ABNORMAL
BASOPHILS # BLD: 0 K/UL (ref 0–0.1)
BASOPHILS NFR BLD: 0 % (ref 0–1)
DATE LAST DOSE: ABNORMAL
DIFFERENTIAL METHOD BLD: ABNORMAL
EOSINOPHIL # BLD: 0.2 K/UL (ref 0–0.4)
EOSINOPHIL NFR BLD: 2 % (ref 0–7)
ERYTHROCYTE [DISTWIDTH] IN BLOOD BY AUTOMATED COUNT: 12.6 % (ref 11.5–14.5)
GLUCOSE BLD STRIP.AUTO-MCNC: 253 MG/DL (ref 65–100)
GLUCOSE BLD STRIP.AUTO-MCNC: 257 MG/DL (ref 65–100)
GLUCOSE BLD STRIP.AUTO-MCNC: 297 MG/DL (ref 65–100)
GLUCOSE BLD STRIP.AUTO-MCNC: 297 MG/DL (ref 65–100)
HCT VFR BLD AUTO: 23.7 % (ref 36.6–50.3)
HGB BLD-MCNC: 7.7 G/DL (ref 12.1–17)
IMM GRANULOCYTES # BLD: 0.1 K/UL (ref 0–0.04)
IMM GRANULOCYTES NFR BLD AUTO: 1 % (ref 0–0.5)
LYMPHOCYTES # BLD: 1.2 K/UL (ref 0.8–3.5)
LYMPHOCYTES NFR BLD: 16 % (ref 12–49)
MCH RBC QN AUTO: 28.7 PG (ref 26–34)
MCHC RBC AUTO-ENTMCNC: 32.5 G/DL (ref 30–36.5)
MCV RBC AUTO: 88.4 FL (ref 80–99)
MONOCYTES # BLD: 0.5 K/UL (ref 0–1)
MONOCYTES NFR BLD: 7 % (ref 5–13)
NEUTS SEG # BLD: 5.6 K/UL (ref 1.8–8)
NEUTS SEG NFR BLD: 74 % (ref 32–75)
NRBC # BLD: 0.04 K/UL (ref 0–0.01)
NRBC BLD-RTO: 0.5 PER 100 WBC
PLATELET # BLD AUTO: 208 K/UL (ref 150–400)
PMV BLD AUTO: 12 FL (ref 8.9–12.9)
RBC # BLD AUTO: 2.68 M/UL (ref 4.1–5.7)
REPORTED DOSE,DOSE: ABNORMAL UNITS
REPORTED DOSE/TIME,TMG: 100
SERVICE CMNT-IMP: ABNORMAL
VANCOMYCIN TROUGH SERPL-MCNC: 14.5 UG/ML (ref 5–10)
WBC # BLD AUTO: 7.5 K/UL (ref 4.1–11.1)

## 2018-03-26 PROCEDURE — 80202 ASSAY OF VANCOMYCIN: CPT | Performed by: INTERNAL MEDICINE

## 2018-03-26 PROCEDURE — 74011000258 HC RX REV CODE- 258: Performed by: INTERNAL MEDICINE

## 2018-03-26 PROCEDURE — 74011250636 HC RX REV CODE- 250/636: Performed by: INTERNAL MEDICINE

## 2018-03-26 PROCEDURE — 74011250636 HC RX REV CODE- 250/636: Performed by: SURGERY

## 2018-03-26 PROCEDURE — 74011250637 HC RX REV CODE- 250/637: Performed by: INTERNAL MEDICINE

## 2018-03-26 PROCEDURE — 74011636637 HC RX REV CODE- 636/637: Performed by: INTERNAL MEDICINE

## 2018-03-26 PROCEDURE — 74011250637 HC RX REV CODE- 250/637: Performed by: SURGERY

## 2018-03-26 PROCEDURE — 82962 GLUCOSE BLOOD TEST: CPT

## 2018-03-26 PROCEDURE — 85025 COMPLETE CBC W/AUTO DIFF WBC: CPT | Performed by: INTERNAL MEDICINE

## 2018-03-26 PROCEDURE — 36415 COLL VENOUS BLD VENIPUNCTURE: CPT | Performed by: INTERNAL MEDICINE

## 2018-03-26 PROCEDURE — 65660000000 HC RM CCU STEPDOWN

## 2018-03-26 RX ADMIN — CLONIDINE HYDROCHLORIDE 0.2 MG: 0.1 TABLET ORAL at 08:26

## 2018-03-26 RX ADMIN — CEFEPIME HYDROCHLORIDE 2 G: 2 INJECTION, POWDER, FOR SOLUTION INTRAVENOUS at 15:08

## 2018-03-26 RX ADMIN — GABAPENTIN 300 MG: 300 CAPSULE ORAL at 17:25

## 2018-03-26 RX ADMIN — INSULIN LISPRO 7 UNITS: 100 INJECTION, SOLUTION INTRAVENOUS; SUBCUTANEOUS at 11:58

## 2018-03-26 RX ADMIN — CARVEDILOL 25 MG: 12.5 TABLET, FILM COATED ORAL at 17:25

## 2018-03-26 RX ADMIN — HEPARIN SODIUM 5000 UNITS: 5000 INJECTION, SOLUTION INTRAVENOUS; SUBCUTANEOUS at 08:27

## 2018-03-26 RX ADMIN — CARVEDILOL 25 MG: 12.5 TABLET, FILM COATED ORAL at 08:26

## 2018-03-26 RX ADMIN — INSULIN LISPRO 7 UNITS: 100 INJECTION, SOLUTION INTRAVENOUS; SUBCUTANEOUS at 17:24

## 2018-03-26 RX ADMIN — CEFEPIME HYDROCHLORIDE 2 G: 2 INJECTION, POWDER, FOR SOLUTION INTRAVENOUS at 01:44

## 2018-03-26 RX ADMIN — HYDRALAZINE HYDROCHLORIDE 75 MG: 50 TABLET, FILM COATED ORAL at 15:10

## 2018-03-26 RX ADMIN — DIGOXIN 0.12 MG: 125 TABLET ORAL at 08:26

## 2018-03-26 RX ADMIN — CLONIDINE HYDROCHLORIDE 0.2 MG: 0.1 TABLET ORAL at 17:25

## 2018-03-26 RX ADMIN — HEPARIN SODIUM 5000 UNITS: 5000 INJECTION, SOLUTION INTRAVENOUS; SUBCUTANEOUS at 15:10

## 2018-03-26 RX ADMIN — PRAVASTATIN SODIUM 20 MG: 40 TABLET ORAL at 21:45

## 2018-03-26 RX ADMIN — ERYTHROPOIETIN 10000 UNITS: 10000 INJECTION, SOLUTION INTRAVENOUS; SUBCUTANEOUS at 18:31

## 2018-03-26 RX ADMIN — INSULIN HUMAN 70 UNITS: 100 INJECTION, SUSPENSION SUBCUTANEOUS at 17:24

## 2018-03-26 RX ADMIN — INSULIN HUMAN 60 UNITS: 100 INJECTION, SUSPENSION SUBCUTANEOUS at 09:05

## 2018-03-26 RX ADMIN — GABAPENTIN 300 MG: 300 CAPSULE ORAL at 08:26

## 2018-03-26 RX ADMIN — Medication 10 ML: at 15:11

## 2018-03-26 RX ADMIN — INSULIN LISPRO 7 UNITS: 100 INJECTION, SOLUTION INTRAVENOUS; SUBCUTANEOUS at 09:05

## 2018-03-26 RX ADMIN — Medication 10 ML: at 21:45

## 2018-03-26 RX ADMIN — AMLODIPINE BESYLATE 10 MG: 5 TABLET ORAL at 08:26

## 2018-03-26 RX ADMIN — INSULIN LISPRO 4 UNITS: 100 INJECTION, SOLUTION INTRAVENOUS; SUBCUTANEOUS at 21:44

## 2018-03-26 RX ADMIN — HYDRALAZINE HYDROCHLORIDE 75 MG: 50 TABLET, FILM COATED ORAL at 21:45

## 2018-03-26 RX ADMIN — HYDRALAZINE HYDROCHLORIDE 50 MG: 50 TABLET, FILM COATED ORAL at 08:26

## 2018-03-26 NOTE — PROGRESS NOTES
Bedside and Verbal shift change report given to Quynh Yeboah RN (oncoming nurse) by Domo Tran (offgoing nurse). Report included the following information SBAR, Kardex and Intake/Output.

## 2018-03-26 NOTE — PROGRESS NOTES
Bedside and Verbal shift change report given to Mariano Jones RN (oncoming nurse) by Christinia Sever, RN   (offgoing nurse). Report included the following information SBAR, Kardex, MAR and Recent Results.

## 2018-03-26 NOTE — PROGRESS NOTES
Nephrology Progress Note     Reagan Key     www. [facility]                  Phone - [phone]   Patient: Yolande Torres   YOB: 1955    Date- 3/26/2018     CC: Follow up for arf on ckd         Subjective: Interval History:     3/26: pt feeling fine but wants more to drink --very thirsty. Does c/o pain from his buttock abscess. BP's are elevated. ROS: no fever/chills. No HEENT complaints. No SOB. No chest pain. No abd pain, no N/V. No joint pain. No skin rashes, but +painful abscess. Assessment:     PEDRO likely due to dehydration from hyperglycemia. Renal function improving with Bun/creat down to 37/3.3 on 3/26. Chronic renal insufficiency, stage IV with a baseline creat of 2.9 - 3.5. Mild hyponatremia, improved. Anemia. Hb down to 7.7 on 3/26. Likely from a combination of renal disease and iron deficiency. Hypertension. Sepsis, improved. Buttock abscess     Plan:     Continue ivf  Check iron stores along with CBC and chemistries in AM.  Continue EPO. Increase hydralazine. Allow more fluids  Watch Vanco levels! Do not see any recent ones so ordered a random for AM.    Care Plan discussed with: pt and nurse    Review of Systems: Pertinent items are noted in HPI.   Objective:   Vitals:    03/25/18 2328 03/26/18 0307 03/26/18 0723 03/26/18 1132   BP: 164/79 164/74 163/77 163/77   Pulse: 67 67 66 62   Resp: 18 17 18 18   Temp: 98.6 °F (37 °C) 98.6 °F (37 °C) 97.8 °F (36.6 °C) 97.6 °F (36.4 °C)   SpO2: 99% 97% 95% 96%   Weight:       Height:         Last 3 Recorded Weights in this Encounter    03/22/18 0014   Weight: 145.2 kg (320 lb)     Patient Vitals for the past 8 hrs:   BP Temp Pulse Resp SpO2   03/26/18 1132 163/77 97.6 °F (36.4 °C) 62 18 96 %   03/26/18 0723 163/77 97.8 °F (36.6 °C) 66 18 95 %     03/26 0701 - 03/26 1900  In: 360 [P.O.:360]  Out: 375 [Urine:375]  03/24 1901 - 03/26 0700  In: 5005 [P.O.:2135; I.V.:2870]  Out: 1325 [TPUPY:84]  Physical Exam:   General: Morbidly obese man in no distress  Neck:              Supple; no JVD  Resp:             Lungs clear. No accessory muscle use  CV:  RRR; no gallop or rub  Extrem:           No edema  GI:  Soft and non-tender; non distended  Neurologic:  Alert and oriented X 3. Non Focal; normal speech; no tremor  Skin:               No rashes or lesions seen (buttocks not examined)  Psych:             Normal affect and mood      Chart reviewed. Pertinent Notes reviewed.      Medications list  reviewed       Data Review :  Recent Labs      03/25/18   0920   NA  137   K  4.7   CL  109*   CO2  20*   GLU  312*   BUN  37*   CREA  3.33*   CA  8.1*     Recent Labs      03/26/18   0223   WBC  7.5   HGB  7.7*   HCT  23.7*   PLT  208     Lab Results   Component Value Date/Time    Specimen Description: PBC 07/26/2013 05:28 PM    Specimen Description: BLOOD 09/30/2009 04:55 PM     Current Facility-Administered Medications   Medication    insulin NPH (NOVOLIN N, HUMULIN N) injection 70 Units    hydrALAZINE (APRESOLINE) tablet 75 mg    vancomycin (VANCOCIN) 2000 mg in  ml infusion    cefepime (MAXIPIME) 2 g in 0.9% sodium chloride (MBP/ADV) 100 mL    sodium chloride (NS) flush 5-10 mL    sodium chloride (NS) flush 5-10 mL    gabapentin (NEURONTIN) capsule 300 mg    acetaminophen (TYLENOL) tablet 650 mg    HYDROmorphone (PF) (DILAUDID) injection 0.5 mg    naloxone (NARCAN) injection 0.4 mg    ondansetron (ZOFRAN) injection 4 mg    heparin (porcine) injection 5,000 Units    insulin lispro (HUMALOG) injection    glucose chewable tablet 16 g    dextrose (D50W) injection syrg 12.5-25 g    glucagon (GLUCAGEN) injection 1 mg    epoetin renuka (EPOGEN;PROCRIT) injection 10,000 Units    0.9% sodium chloride infusion    oxyCODONE-acetaminophen (PERCOCET) 5-325 mg per tablet 1-2 Tab    amLODIPine (NORVASC) tablet 10 mg    carvedilol (COREG) tablet 25 mg    cloNIDine HCl (CATAPRES) tablet 0.2 mg    digoxin (LANOXIN) tablet 0.125 mg    pravastatin (PRAVACHOL) tablet 20 mg    labetalol (NORMODYNE;TRANDATE) injection 10 mg    polyethylene glycol (MIRALAX) packet 17 g    influenza vaccine 2017-18 (3 yrs+)(PF) (FLUZONE QUAD/FLUARIX QUAD) injection 0.5 mL       Shannan Mixon MD  [facility]  [email or web address]  [address] [place] [address], [address] [facility]  Phone - [phone]         Fax - [phone]  [facility] Office  [address] [place] [address]  Phone - [phone]        Fax - [phone]

## 2018-03-26 NOTE — PROGRESS NOTES
Pt admitted with sepsis//GLF//abscess right back. Pt lives in [facility], full code, [facility], sees NP Lulu Srinivasan, is morbidly obese, uses a glucometer, RW, and is being worked up for home IV abx. Will send consult to [facility] and [facility] to assess for co pay for abx. Pt has used [facility] in past.    08  MD-please write CRM order for IV abx, duration, labs and to whom the results are called to, and order to remove the PICC line after IV abx completion.   Thanks

## 2018-03-26 NOTE — PROGRESS NOTES
Hospitalist Progress Note    NAME: Benigno Patel   :  1955   MRN:  [id number]       Assessment / Plan:  Severe sepsis presumed due to infected skin abscesses  S/P  I& D with Multiple debrided wound, clean margins  Growing MRSA in wounds and Blood Culture  Acidosis, lactic  --Patient meets criteria for severe sepsis and is highly suspected to have an infection with  ED were vanco and I have added zosyn for known MRSA  -Continue volume expansion with cystalloid IV fluids  Continue Vanco for MRSA,  Change Zosyn to Cefepime for Serratia and anaerobes  Repeat BC today  if repeat negative then will place a PICC line if repeat BC are negative  Will also get Surgeon ID consult to guide length of antibiotics     Diabetes mellitus 2 uncontrolled with hyperglycemia, 2md to infection  Pseudohyponatremia, improved  Increase NPh to 60 units BID ( on 65 units at home bid) and Lispro Resistant SSI coverge, continue to increase NPH  Continue to increase as needed    Severe dehydration from uncontrolled diabetes leading to   acute renal failure on CKD, Improving with IVF  -IVF NS follow renal functions       Hypertension, Benign essential   -remain on tele  -labetalol IV prn sbp >170  -check digoxin level given change in renal function and his statement of being compliant with medications  -resume all antihypertensives for now    OBESE  Body mass index is 37.95 kg/(m^2).       I have personally reviewed the radiographs, laboratory data in Epic and decisions and statements above are based partially on this personal interpretation.     Code Status: Full Code  DVT Prophylaxis: Hep SQ  GI Prophylaxis: not indicated         Subjective:     Chief Complaint / Reason for Physician Visit  3/26   So my sugars seem to be better  3/25  I cant feel that thing ( meaning wounds)  3/24   BS are high  3/23 \" Im waiting to go to Surgery\"   3/22  I cam because my sugars were high. Almost Truro Me  Discussed with RN events overnight. Review of Systems:  Symptom Y/N Comments  Symptom Y/N Comments   Fever/Chills y   Chest Pain     Poor Appetite    Edema     Cough    Abdominal Pain     Sputum    Joint Pain     SOB/NAVARRO n   Pruritis/Rash     Nausea/vomit    Tolerating PT/OT     Diarrhea    Tolerating Diet     Constipation    Other y  BS were high     Could NOT obtain due to:      Objective:     VITALS:   Last 24hrs VS reviewed since prior progress note. Most recent are:  Patient Vitals for the past 24 hrs:   Temp Pulse Resp BP SpO2   03/26/18 1523 98.4 °F (36.9 °C) 68 19 168/73 93 %   03/26/18 1132 97.6 °F (36.4 °C) 62 18 163/77 96 %   03/26/18 0723 97.8 °F (36.6 °C) 66 18 163/77 95 %   03/26/18 0307 98.6 °F (37 °C) 67 17 164/74 97 %   03/25/18 2328 98.6 °F (37 °C) 67 18 164/79 99 %   03/25/18 1933 98.6 °F (37 °C) 74 18 193/70 93 %       Intake/Output Summary (Last 24 hours) at 03/26/18 1648  Last data filed at 03/26/18 1135   Gross per 24 hour   Intake          2743.33 ml   Output              850 ml   Net          1893.33 ml        PHYSICAL EXAM:   General: WD, WN. Alert, cooperative, no acute distress    EENT:  EOMI. Anicteric sclerae. MMM  Resp:  CTA bilaterally, no wheezing or rales. No accessory muscle use  CV:  Regular  rhythm,  No edema  GI:  Soft, Non distended, Non tender.  +Bowel sounds  Neurologic:  Alert and oriented X 3, normal speech,   Psych:   Good insight. Not anxious nor agitated  Skin:  No rashes. No jaundice, multiple skin CRATERs the biggest is 2 fist size opening on Left Buttocks.   All nice pink flesh without drainage    Reviewed most current lab test results and cultures  YES  Reviewed most current radiology test results   YES  Review and summation of old records today    NO  Reviewed patient's current orders and MAR    YES  PMH/SH reviewed - no change compared to H&P  ________________________________________________________________________  Care Plan discussed with:    Comments   Patient y    Family      RN y    Care Manager     Consultant   PT                     Multidiciplinary team rounds were held today with , nursing, pharmacist and clinical coordinator. Patient's plan of care was discussed; medications were reviewed and discharge planning was addressed. ________________________________________________________________________  Total NON critical care TIME:  30  Minutes    Total CRITICAL CARE TIME Spent:   Minutes non procedure based      Comments   >50% of visit spent in counseling and coordination of care     ________________________________________________________________________  Albertina Johnson MD     Procedures: see electronic medical records for all procedures/Xrays and details which were not copied into this note but were reviewed prior to creation of Plan. LABS:  I reviewed today's most current labs and imaging studies.   Pertinent labs include:  Recent Labs      03/26/18   0223   WBC  7.5   HGB  7.7*   HCT  23.7*   PLT  208     Recent Labs      03/25/18   0920   NA  137   K  4.7   CL  109*   CO2  20*   GLU  312*   BUN  37*   CREA  3.33*   CA  8.1*       Signed: Albertina Johnson MD

## 2018-03-26 NOTE — PROGRESS NOTES
Attempted to draw vanc trough and paired blood coulters unsuccessful. PICC team called this am 755 voice message left. Gill Blanchard from [facility] team called back and stated they would come draw labs this evening.

## 2018-03-27 LAB
ALBUMIN SERPL-MCNC: 2.1 G/DL (ref 3.5–5)
ANION GAP SERPL CALC-SCNC: 8 MMOL/L (ref 5–15)
BUN SERPL-MCNC: 31 MG/DL (ref 6–20)
BUN/CREAT SERPL: 11 (ref 12–20)
CALCIUM SERPL-MCNC: 8 MG/DL (ref 8.5–10.1)
CHLORIDE SERPL-SCNC: 110 MMOL/L (ref 97–108)
CK SERPL-CCNC: 150 U/L (ref 39–308)
CO2 SERPL-SCNC: 20 MMOL/L (ref 21–32)
CREAT SERPL-MCNC: 2.87 MG/DL (ref 0.7–1.3)
ERYTHROCYTE [DISTWIDTH] IN BLOOD BY AUTOMATED COUNT: 12.6 % (ref 11.5–14.5)
GLUCOSE BLD STRIP.AUTO-MCNC: 228 MG/DL (ref 65–100)
GLUCOSE BLD STRIP.AUTO-MCNC: 320 MG/DL (ref 65–100)
GLUCOSE BLD STRIP.AUTO-MCNC: 329 MG/DL (ref 65–100)
GLUCOSE BLD STRIP.AUTO-MCNC: 337 MG/DL (ref 65–100)
GLUCOSE SERPL-MCNC: 252 MG/DL (ref 65–100)
HCT VFR BLD AUTO: 22.1 % (ref 36.6–50.3)
HGB BLD-MCNC: 7.1 G/DL (ref 12.1–17)
IRON SATN MFR SERPL: 30 % (ref 20–50)
IRON SERPL-MCNC: 49 UG/DL (ref 35–150)
MCH RBC QN AUTO: 28.4 PG (ref 26–34)
MCHC RBC AUTO-ENTMCNC: 32.1 G/DL (ref 30–36.5)
MCV RBC AUTO: 88.4 FL (ref 80–99)
NRBC # BLD: 0.02 K/UL (ref 0–0.01)
NRBC BLD-RTO: 0.3 PER 100 WBC
PHOSPHATE SERPL-MCNC: 3.7 MG/DL (ref 2.6–4.7)
PLATELET # BLD AUTO: 203 K/UL (ref 150–400)
PMV BLD AUTO: 12.1 FL (ref 8.9–12.9)
POTASSIUM SERPL-SCNC: 4.1 MMOL/L (ref 3.5–5.1)
RBC # BLD AUTO: 2.5 M/UL (ref 4.1–5.7)
SERVICE CMNT-IMP: ABNORMAL
SODIUM SERPL-SCNC: 138 MMOL/L (ref 136–145)
TIBC SERPL-MCNC: 164 UG/DL (ref 250–450)
VANCOMYCIN SERPL-MCNC: 13.6 UG/ML
WBC # BLD AUTO: 6.7 K/UL (ref 4.1–11.1)

## 2018-03-27 PROCEDURE — 87040 BLOOD CULTURE FOR BACTERIA: CPT | Performed by: INTERNAL MEDICINE

## 2018-03-27 PROCEDURE — 65660000000 HC RM CCU STEPDOWN

## 2018-03-27 PROCEDURE — 82550 ASSAY OF CK (CPK): CPT | Performed by: INTERNAL MEDICINE

## 2018-03-27 PROCEDURE — 74011250636 HC RX REV CODE- 250/636: Performed by: SURGERY

## 2018-03-27 PROCEDURE — 74011000258 HC RX REV CODE- 258: Performed by: INTERNAL MEDICINE

## 2018-03-27 PROCEDURE — 74011250637 HC RX REV CODE- 250/637: Performed by: INTERNAL MEDICINE

## 2018-03-27 PROCEDURE — 74011250636 HC RX REV CODE- 250/636: Performed by: INTERNAL MEDICINE

## 2018-03-27 PROCEDURE — 74011250637 HC RX REV CODE- 250/637: Performed by: SURGERY

## 2018-03-27 PROCEDURE — 74011636637 HC RX REV CODE- 636/637: Performed by: INTERNAL MEDICINE

## 2018-03-27 PROCEDURE — 77030019607 HC DSG BURN S&N -A

## 2018-03-27 PROCEDURE — 82962 GLUCOSE BLOOD TEST: CPT

## 2018-03-27 PROCEDURE — 80069 RENAL FUNCTION PANEL: CPT | Performed by: INTERNAL MEDICINE

## 2018-03-27 PROCEDURE — 83540 ASSAY OF IRON: CPT | Performed by: INTERNAL MEDICINE

## 2018-03-27 PROCEDURE — 36415 COLL VENOUS BLD VENIPUNCTURE: CPT | Performed by: INTERNAL MEDICINE

## 2018-03-27 PROCEDURE — 77030018836 HC SOL IRR NACL ICUM -A

## 2018-03-27 PROCEDURE — 80202 ASSAY OF VANCOMYCIN: CPT | Performed by: INTERNAL MEDICINE

## 2018-03-27 PROCEDURE — 85027 COMPLETE CBC AUTOMATED: CPT | Performed by: INTERNAL MEDICINE

## 2018-03-27 RX ORDER — CLOPIDOGREL BISULFATE 75 MG/1
75 TABLET ORAL DAILY
Status: DISCONTINUED | OUTPATIENT
Start: 2018-03-27 | End: 2018-03-29 | Stop reason: HOSPADM

## 2018-03-27 RX ORDER — SODIUM CHLORIDE 9 MG/ML
INJECTION INTRAMUSCULAR; INTRAVENOUS; SUBCUTANEOUS
Status: COMPLETED
Start: 2018-03-27 | End: 2018-03-27

## 2018-03-27 RX ORDER — SODIUM CHLORIDE 9 MG/ML
50 INJECTION, SOLUTION INTRAVENOUS CONTINUOUS
Status: DISCONTINUED | OUTPATIENT
Start: 2018-03-27 | End: 2018-03-27

## 2018-03-27 RX ADMIN — Medication 10 ML: at 09:04

## 2018-03-27 RX ADMIN — SODIUM CHLORIDE 50 ML/HR: 900 INJECTION, SOLUTION INTRAVENOUS at 09:03

## 2018-03-27 RX ADMIN — HEPARIN SODIUM 5000 UNITS: 5000 INJECTION, SOLUTION INTRAVENOUS; SUBCUTANEOUS at 03:03

## 2018-03-27 RX ADMIN — INSULIN HUMAN 70 UNITS: 100 INJECTION, SUSPENSION SUBCUTANEOUS at 18:20

## 2018-03-27 RX ADMIN — Medication 1 CAPSULE: at 11:56

## 2018-03-27 RX ADMIN — HYDRALAZINE HYDROCHLORIDE 75 MG: 50 TABLET, FILM COATED ORAL at 09:15

## 2018-03-27 RX ADMIN — Medication 5 ML: at 22:04

## 2018-03-27 RX ADMIN — DAPTOMYCIN 900 MG: 500 INJECTION, POWDER, LYOPHILIZED, FOR SOLUTION INTRAVENOUS at 13:31

## 2018-03-27 RX ADMIN — HEPARIN SODIUM 5000 UNITS: 5000 INJECTION, SOLUTION INTRAVENOUS; SUBCUTANEOUS at 09:15

## 2018-03-27 RX ADMIN — HYDRALAZINE HYDROCHLORIDE 75 MG: 50 TABLET, FILM COATED ORAL at 22:03

## 2018-03-27 RX ADMIN — HYDROMORPHONE HYDROCHLORIDE 0.5 MG: 1 INJECTION, SOLUTION INTRAMUSCULAR; INTRAVENOUS; SUBCUTANEOUS at 10:17

## 2018-03-27 RX ADMIN — Medication 10 ML: at 11:54

## 2018-03-27 RX ADMIN — GABAPENTIN 300 MG: 300 CAPSULE ORAL at 18:19

## 2018-03-27 RX ADMIN — CARVEDILOL 25 MG: 12.5 TABLET, FILM COATED ORAL at 18:19

## 2018-03-27 RX ADMIN — CLOPIDOGREL BISULFATE 75 MG: 75 TABLET ORAL at 09:10

## 2018-03-27 RX ADMIN — CEFEPIME HYDROCHLORIDE 2 G: 2 INJECTION, POWDER, FOR SOLUTION INTRAVENOUS at 03:03

## 2018-03-27 RX ADMIN — DIGOXIN 0.12 MG: 125 TABLET ORAL at 09:11

## 2018-03-27 RX ADMIN — HEPARIN SODIUM 5000 UNITS: 5000 INJECTION, SOLUTION INTRAVENOUS; SUBCUTANEOUS at 18:19

## 2018-03-27 RX ADMIN — INSULIN HUMAN 70 UNITS: 100 INJECTION, SUSPENSION SUBCUTANEOUS at 09:17

## 2018-03-27 RX ADMIN — CLONIDINE HYDROCHLORIDE 0.2 MG: 0.1 TABLET ORAL at 09:09

## 2018-03-27 RX ADMIN — Medication 10 ML: at 13:30

## 2018-03-27 RX ADMIN — INSULIN LISPRO 4 UNITS: 100 INJECTION, SOLUTION INTRAVENOUS; SUBCUTANEOUS at 09:16

## 2018-03-27 RX ADMIN — GABAPENTIN 300 MG: 300 CAPSULE ORAL at 09:14

## 2018-03-27 RX ADMIN — SODIUM CHLORIDE 10 ML: 9 INJECTION INTRAMUSCULAR; INTRAVENOUS; SUBCUTANEOUS at 11:54

## 2018-03-27 RX ADMIN — CARVEDILOL 25 MG: 12.5 TABLET, FILM COATED ORAL at 09:09

## 2018-03-27 RX ADMIN — INSULIN LISPRO 10 UNITS: 100 INJECTION, SOLUTION INTRAVENOUS; SUBCUTANEOUS at 11:55

## 2018-03-27 RX ADMIN — CLONIDINE HYDROCHLORIDE 0.2 MG: 0.1 TABLET ORAL at 18:20

## 2018-03-27 RX ADMIN — HYDRALAZINE HYDROCHLORIDE 75 MG: 50 TABLET, FILM COATED ORAL at 18:19

## 2018-03-27 RX ADMIN — INSULIN LISPRO 10 UNITS: 100 INJECTION, SOLUTION INTRAVENOUS; SUBCUTANEOUS at 18:20

## 2018-03-27 RX ADMIN — Medication 10 ML: at 12:05

## 2018-03-27 RX ADMIN — INSULIN LISPRO 5 UNITS: 100 INJECTION, SOLUTION INTRAVENOUS; SUBCUTANEOUS at 22:04

## 2018-03-27 RX ADMIN — CEFTRIAXONE SODIUM 2 G: 2 INJECTION, POWDER, FOR SOLUTION INTRAMUSCULAR; INTRAVENOUS at 11:57

## 2018-03-27 RX ADMIN — AMLODIPINE BESYLATE 10 MG: 5 TABLET ORAL at 09:08

## 2018-03-27 NOTE — WOUND CARE
Wound care consult from Dr Madhuri Coronado for wound care to multiple back abscess's. Patient just had all dressing changes done by staff this am and staff requested I see him tomorrow. Currently he is getting BID NS wet to dry dressing changes per Dr Marlene Resendiz orders. Will defer seeing patient until tomorrow.   Neelam Salvador RN, CWON, zone ph# [phone]

## 2018-03-27 NOTE — PROGRESS NOTES
Surgery    I will inspect wounds tomorrow to determine ongoing wound management.     Artie Enriquez MD

## 2018-03-27 NOTE — PROGRESS NOTES
Nephrology Progress Note     Reagan Key     [facility]. [facility]                  Phone - [phone]   Patient: Comfort Mullins   YOB: 1955    Date- 3/27/2018     CC: Follow up for arf on ckd         Subjective: Interval History:   -cr. Improving  bp labile    ROS-No c/o sob, fever. No c/o nausea or vomiting  No c/o chest pain     Assessment:   PEDRO likely due to dehydration from hyperglycemia  ckd 4bl. cr. 2.9-3.5  Anemia  Sepsis  Hyponatremia  HYPERTENSION  Buttock abscess     Plan:   Stop ivf  Continue norvasc 10, coreg 25/25, clonidine 0.2/0.2 , hydrlazine 50/50/50  If bp remains high increase clonidine to tid    WE WILL SIGN OFF. CALL US IF NEEDED  Care Plan discussed with: pt  Review of Systems: Pertinent items are noted in HPI. Objective:   Vitals:    03/27/18 0715 03/27/18 0721 03/27/18 0911 03/27/18 1106   BP: (!) 213/96 181/84  132/79   Pulse: 64 64 73 65   Resp: 18 18  18   Temp: 97.5 °F (36.4 °C) 97.5 °F (36.4 °C)  97.6 °F (36.4 °C)   SpO2: 97% 97%  96%   Weight:       Height:         Last 3 Recorded Weights in this Encounter    03/22/18 0014   Weight: 145.2 kg (320 lb)     Patient Vitals for the past 8 hrs:   BP Temp Pulse Resp SpO2   03/27/18 1106 132/79 97.6 °F (36.4 °C) 65 18 96 %   03/27/18 0911 - - 73 - -   03/27/18 0721 181/84 97.5 °F (36.4 °C) 64 18 97 %   03/27/18 0715 (!) 213/96 97.5 °F (36.4 °C) 64 18 97 %     03/27 0701 - 03/27 1900  In: 440 [P.O.:440]  Out: 500 [Urine:500]  03/25 1901 - 03/27 0700  In: 4999.2 [P.O.:2020; I.V.:2979.2]  Out: 1625 [Urine:1625]  Physical Exam:   GEN:  NAD  NECK:  Supple, no thyromegaly  RESP: CTA  b/l, no  wheezing, decreased at base  CVS: RRR,S1,S2   NEURO: non focal, normal speech  ABDO:  soft , non tender, No hepatosplenomegaly      Chart reviewed. Pertinent Notes reviewed.      Medications list  reviewed       Data Review :  Recent Labs      03/27/18   0311  03/25/18   0920   NA  138  137   K  4.1  4.7 CL  110*  109*   CO2  20*  20*   GLU  252*  312*   BUN  31*  37*   CREA  2.87*  3.33*   CA  8.0*  8.1*   PHOS  3.7   --    ALB  2.1*   --      Recent Labs      03/27/18   0311  03/26/18   0223   WBC  6.7  7.5   HGB  7.1*  7.7*   HCT  22.1*  23.7*   PLT  203  208     Lab Results   Component Value Date/Time    Specimen Description: PBC 07/26/2013 05:28 PM    Specimen Description: BLOOD 09/30/2009 04:55 PM     Current Facility-Administered Medications   Medication    clopidogrel (PLAVIX) tablet 75 mg    DAPTOmycin (CUBICIN) 900 mg in 0.9% sodium chloride 50 mL IVPB RF formulation    cefTRIAXone (ROCEPHIN) 2 g in 0.9% sodium chloride (MBP/ADV) 50 mL MBP    lactobac ac& pc-s.therm-b.anim (ROSY Q/RISAQUAD)    ADDaptor    insulin NPH (NOVOLIN N, HUMULIN N) injection 70 Units    hydrALAZINE (APRESOLINE) tablet 75 mg    sodium chloride (NS) flush 5-10 mL    sodium chloride (NS) flush 5-10 mL    gabapentin (NEURONTIN) capsule 300 mg    acetaminophen (TYLENOL) tablet 650 mg    HYDROmorphone (PF) (DILAUDID) injection 0.5 mg    naloxone (NARCAN) injection 0.4 mg    ondansetron (ZOFRAN) injection 4 mg    heparin (porcine) injection 5,000 Units    insulin lispro (HUMALOG) injection    glucose chewable tablet 16 g    dextrose (D50W) injection syrg 12.5-25 g    glucagon (GLUCAGEN) injection 1 mg    epoetin renuka (EPOGEN;PROCRIT) injection 10,000 Units    oxyCODONE-acetaminophen (PERCOCET) 5-325 mg per tablet 1-2 Tab    amLODIPine (NORVASC) tablet 10 mg    carvedilol (COREG) tablet 25 mg    cloNIDine HCl (CATAPRES) tablet 0.2 mg    digoxin (LANOXIN) tablet 0.125 mg    labetalol (NORMODYNE;TRANDATE) injection 10 mg    polyethylene glycol (MIRALAX) packet 17 g    influenza vaccine 2017-18 (3 yrs+)(PF) (FLUZONE QUAD/FLUARIX QUAD) injection 0.5 mL       Curt Santana MD  [facility]  [email or web address]  [facility] [address], [address]  [place], [address]  Phone - [phone]         Fax - [phone] Office  [address] [facility] [address] [place] [address]  Phone - [phone]        Fax - [phone]

## 2018-03-27 NOTE — PROGRESS NOTES
Hospitalist Progress Note    NAME: Lina Bravo   :  1955   MRN:  [id number]       Assessment / Plan:    Severe sepsis and MRSA bacteremia due to infected skin abscesses  -S/P  I& D of Multiple buttock / back wounds  -initially on cefepime and vancomycin. Switched to Rocephin / Daptomcyin 3/27) based on wound and blood culture results (MRSA and Serratia 3/22). Appreciate ID input  -repeat Blood cultures today.   -hold on PICC until blood is cleared   -surgery following  -probiotic     Diabetes mellitus 2 uncontrolled with hyperglycemia  -on 70/30 at home  -continue NPH 70 units BID  -add 10 units premeal humalog + continue with correctional SSI prn    PEDRO on CKD 4  Hyponatremia  -from dehyration related to hyperglycemia.    -Cr trending down. Renal following.      Hypertension  -continue norvasc, coreg, clonidine and hydralazine. Adjust prn    Obesity  Body mass index is 37.95 kg/(m^2).       Code Status: Full Code  DVT Prophylaxis: Hep SQ  GI Prophylaxis: not indicated         Subjective:     Chief Complaint / Reason for Physician Visit  Follow up of sepsis, bacteremia, uncontrolled DM,   Tolerating diet. BG improved from 500 but still around 200-300    Review of Systems:  Symptom Y/N Comments  Symptom Y/N Comments   Fever/Chills n   Chest Pain     Poor Appetite    Edema     Cough    Abdominal Pain     Sputum    Joint Pain     SOB/NAVARRO n   Pruritis/Rash     Nausea/vomit    Tolerating PT/OT     Diarrhea    Tolerating Diet     Constipation    Other       Could NOT obtain due to:      Objective:     VITALS:   Last 24hrs VS reviewed since prior progress note.  Most recent are:  Patient Vitals for the past 24 hrs:   Temp Pulse Resp BP SpO2   18 0911 - 73 - - -   18 0721 97.5 °F (36.4 °C) 64 18 181/84 97 %   18 0715 97.5 °F (36.4 °C) 64 18 (!) 213/96 97 %   18 2333 98.2 °F (36.8 °C) 62 20 149/69 97 %   18 1846 98.3 °F (36.8 °C) 68 20 148/63 94 %   18 1523 98.4 °F (36.9 °C) 68 19 168/73 93 %   03/26/18 1132 97.6 °F (36.4 °C) 62 18 163/77 96 %       Intake/Output Summary (Last 24 hours) at 03/27/18 1017  Last data filed at 03/27/18 1005   Gross per 24 hour   Intake          3015.83 ml   Output             1150 ml   Net          1865.83 ml        PHYSICAL EXAM:   General: WD, WN. Alert, cooperative, no acute distress    EENT:  EOMI. Anicteric sclerae. MMM  Resp:  CTA bilaterally, no wheezing or rales. No accessory muscle use  CV:  Regular  rhythm,  No edema  GI:  Soft, Non distended, Non tender.  +Bowel sounds  Neurologic:  Alert and oriented X 3, normal speech,   Psych:   Good insight. Not anxious nor agitated  Skin:  No rashes. No jaundice, multiple buttock wounds, 2 large once on left buttocks. Reviewed most current lab test results and cultures  YES  Reviewed most current radiology test results   YES  Review and summation of old records today    NO  Reviewed patient's current orders and MAR    YES  PMH/SH reviewed - no change compared to H&P  ________________________________________________________________________  Care Plan discussed with:    Comments   Patient y    Family      RN y    Care Manager     Consultant  y ID                     Multidiciplinary team rounds were held today with , nursing, pharmacist and clinical coordinator. Patient's plan of care was discussed; medications were reviewed and discharge planning was addressed. ________________________________________________________________________  Total NON critical care TIME:  25  Minutes    Total CRITICAL CARE TIME Spent:   Minutes non procedure based      Comments   >50% of visit spent in counseling and coordination of care     ________________________________________________________________________  Loraine Torrez MD     Procedures: see electronic medical records for all procedures/Xrays and details which were not copied into this note but were reviewed prior to creation of Plan. LABS:  I reviewed today's most current labs and imaging studies.   Pertinent labs include:  Recent Labs      03/27/18 0311 03/26/18   0223   WBC  6.7  7.5   HGB  7.1*  7.7*   HCT  22.1*  23.7*   PLT  203  208     Recent Labs      03/27/18 0311 03/25/18   0920   NA  138  137   K  4.1  4.7   CL  110*  109*   CO2  20*  20*   GLU  252*  312*   BUN  31*  37*   CREA  2.87*  3.33*   CA  8.0*  8.1*   PHOS  3.7   --    ALB  2.1*   --        Signed: Rashida Rivas MD

## 2018-03-27 NOTE — PROGRESS NOTES
Vancomycin level of 14.5 was drawn approx 39 hours after last dose; continue same regimen for now; anticipate level to be in goal range of 15-20.     Pharmacy to monitor and adjust based on renal function  ALVIN Florence

## 2018-03-27 NOTE — PROGRESS NOTES
Problem: Falls - Risk of  Goal: *Absence of Falls  Document Vishnu Fall Risk and appropriate interventions in the flowsheet.    Outcome: Progressing Towards Goal  Fall Risk Interventions:  Mobility Interventions: Communicate number of staff needed for ambulation/transfer, Patient to call before getting OOB, Utilize walker, cane, or other assitive device    Mentation Interventions: Adequate sleep, hydration, pain control, Door open when patient unattended, Increase mobility, Update white board    Medication Interventions: Patient to call before getting OOB, Teach patient to arise slowly, Utilize gait belt for transfers/ambulation    Elimination Interventions: Call light in reach, Urinal in reach    History of Falls Interventions: Door open when patient unattended, Investigate reason for fall

## 2018-03-27 NOTE — PROGRESS NOTES
Pharmacy Automatic Renal Dosing Protocol - Antimicrobials    Indication for Antimicrobials: MRSA Bacteremia     Current Regimen of Each Antimicrobial:  Daptomycin 900 mg Q24H (6.2 mg/kg) (Start Date 3/27/18; Day # 1)  Ceftriaxone 2 g IV Q24H (Start Date 3/27/18; Day #1)    Previous Antimicrobial Therapy:  Vancomycin IV  Cefepime IV    Significant Cultures:   3/22 & 3/23 Wound cx - MRSA, P. Rettgeri, S. Marcescens - Prelim  3/22 Blood - MRSA - Prelim  3/22 Nares - MRSA - Final  3/23 Anaerobic - Negative - Final    Paralysis, amputations, malnutrition: None noted    Labs:  Recent Labs      18   0311  18   0223  18   0920   CREA  2.87*   --   3.33*   BUN  31*   --   37*   WBC  6.7  7.5   --      Temp (24hrs), Av.9 °F (36.6 °C), Min:97.5 °F (36.4 °C), Max:98.4 °F (36.9 °C)      Creatinine Clearance (mL/min) or Dialysis: 32.8      Impression/Plan:   · Discussed patient with Dr. Yosef Shea   · Renal function has improved, Will dose Daptomycin at ~ 6 mg/kg Q24H, CK ordered  · Daptomycin being used due to Vancomycin SANJUANA for MRSA = 2  · Cefepime being changed to Ceftriaxone 2g IV Q24H to cover P. Rettgeri, S. Marcescens  · Antimicrobial stop date : 14 days from first negative culture     Pharmacy will follow daily and adjust medications as appropriate for renal function and/or serum levels. Thank you,  JAKOB CohnD          Recommended duration of therapy  [email or web address]/Pharmacy/Clinical%20Companion/Duration%20of%20ABX%20therapy. docx    Renal Dosing  [email or web address]/[place]/[facility]/Pharmacy/Clinical%20Companion/Renal%[email or web address]. pdf

## 2018-03-27 NOTE — PROGRESS NOTES
Spiritual Care Assessment/Progress Note  [facility]      NAME: Yolande Torres      MRN: [id number]  AGE: 57 y.o.  SEX: male  Synagogue Affiliation: No Temple   Language: English     3/27/2018     Total Time (in minutes): 17     Spiritual Assessment begun in [facility] 3 MED TELE through conversation with:         [x]Patient        [] Family    [] Friend(s)        Reason for Consult: Initial/Spiritual assessment, patient floor     Spiritual beliefs: (Please include comment if needed)     [x] Involved in a ale tradition/spiritual practice:     [] Supported by a ale community:      [] Claims no spiritual orientation:      [] Seeking spiritual identity:           [] Adheres to an individual form of spirituality:      [] Not able to assess:                     Identified resources for coping:      [x] Prayer                  [] Devotional reading               [] Music                  [] Guided Imagery     [x] Family/friends                 [] Pet visits     [] Other:        Interventions offered during this visit: (See comments for more details)    Patient Interventions: Coping skills reviewed/reinforced, Affirmation of emotions/emotional suffering, Affirmation of ale, Prayer (actual), Synagogue beliefs/image of God discussed, Iconic (affirming the presence of God/Higher Power), Initial/Spiritual assessment, patient floor           Plan of Care:     [] Discuss Spiritual/Cultural needs    [] Support AMD and/or advance care planning process      [] Support grieving process   [] Coordinate Rites/Rituals    [] Coordination with community clergy   [] No spiritual needs identified at this time   [] Detailed Plan of Care below (See Comments)  [] Make referral to Music Therapy  [] Make referral to Pet Therapy     [] Make referral to Addiction services  [] Make referral to [facility]  [] Make referral to Spiritual Care Partner  [] No future visits requested           Initial Spiritual Assessment in 1. Pt who recognized  from recent hospitalization engaged with  who provided active listening. Shared health concerns along with associated feelings and the determination to continue fighting especially for his grand-children and children while holding on to ale and dialogue with God. Pt's ale is very important to him. Expressed thoughts and beliefs as  affirmed. Asked  which [employer]  pastors at indicating interest to attend. Added that he felt visit was providential and had been uplifting. Concluded visit with prayer. CARA Quiñones. Estrellita

## 2018-03-27 NOTE — CONSULTS
Infectious Disease Consult    Date of Consultation:  March 27, 2018    Referring Physician:  Dr Kwabena Davis:     Patient is a 57 y.o. male who is being seen for  Wound infection , recommend antibiotic & duration. Pt seen & examined . Chart & labs reviewed. Wounds examined . Full consult to follow. IMPRESSION:     · Sepsis  · MRSA bacteremia BC +3/22 (4/4), no repeat BC obtained  · Wound cultures + for MRSA , Serratia marcescens 3/22, s/p wound debridement  · Poorly controlled Diabetes Type 2 , A1c 10.4  · PEDRO on CKD4 ,Cr 2.87 today       PLAN:     · Repeat BC  · Change to Daptomycin IV /Ceftriaxone. Pt will need at least 2 weeks of antibiotics.    · CK now   · Daily wound care  · Blood sugar control, d/w patient  · He will need to be off statin while on Daptomycin IV  · Out patient follow up in office 10 to 14 days following discharge         Thank You   Alex Delgado

## 2018-03-27 NOTE — PROGRESS NOTES
Surgery    Wouinds are fairly clean. Having carrasyn gel packing placed. I have written for Wound Care consult. I have resumed Plavix.     Miriam Bean MD

## 2018-03-28 LAB
GLUCOSE BLD STRIP.AUTO-MCNC: 246 MG/DL (ref 65–100)
GLUCOSE BLD STRIP.AUTO-MCNC: 256 MG/DL (ref 65–100)
GLUCOSE BLD STRIP.AUTO-MCNC: 256 MG/DL (ref 65–100)
GLUCOSE BLD STRIP.AUTO-MCNC: 285 MG/DL (ref 65–100)
HGB BLD-MCNC: 7.8 G/DL (ref 12.1–17)
SERVICE CMNT-IMP: ABNORMAL

## 2018-03-28 PROCEDURE — 74011250637 HC RX REV CODE- 250/637: Performed by: SURGERY

## 2018-03-28 PROCEDURE — 74011250637 HC RX REV CODE- 250/637: Performed by: INTERNAL MEDICINE

## 2018-03-28 PROCEDURE — 74011250636 HC RX REV CODE- 250/636: Performed by: INTERNAL MEDICINE

## 2018-03-28 PROCEDURE — 77030011256 HC DRSG MEPILEX <16IN NO BORD MOLN -A

## 2018-03-28 PROCEDURE — 74011636637 HC RX REV CODE- 636/637: Performed by: INTERNAL MEDICINE

## 2018-03-28 PROCEDURE — 65660000000 HC RM CCU STEPDOWN

## 2018-03-28 PROCEDURE — 74011250636 HC RX REV CODE- 250/636: Performed by: SURGERY

## 2018-03-28 PROCEDURE — 36415 COLL VENOUS BLD VENIPUNCTURE: CPT | Performed by: INTERNAL MEDICINE

## 2018-03-28 PROCEDURE — 77030019607 HC DSG BURN S&N -A

## 2018-03-28 PROCEDURE — 97530 THERAPEUTIC ACTIVITIES: CPT

## 2018-03-28 PROCEDURE — 85018 HEMOGLOBIN: CPT | Performed by: INTERNAL MEDICINE

## 2018-03-28 PROCEDURE — 74011000258 HC RX REV CODE- 258: Performed by: INTERNAL MEDICINE

## 2018-03-28 PROCEDURE — 82962 GLUCOSE BLOOD TEST: CPT

## 2018-03-28 RX ORDER — HYDROMORPHONE HYDROCHLORIDE 2 MG/ML
0.5 INJECTION, SOLUTION INTRAMUSCULAR; INTRAVENOUS; SUBCUTANEOUS
Status: DISCONTINUED | OUTPATIENT
Start: 2018-03-28 | End: 2018-03-29 | Stop reason: HOSPADM

## 2018-03-28 RX ORDER — INSULIN LISPRO 100 [IU]/ML
10 INJECTION, SOLUTION INTRAVENOUS; SUBCUTANEOUS
Status: DISCONTINUED | OUTPATIENT
Start: 2018-03-28 | End: 2018-03-29 | Stop reason: HOSPADM

## 2018-03-28 RX ADMIN — CLONIDINE HYDROCHLORIDE 0.2 MG: 0.1 TABLET ORAL at 17:41

## 2018-03-28 RX ADMIN — GABAPENTIN 300 MG: 300 CAPSULE ORAL at 17:41

## 2018-03-28 RX ADMIN — Medication 10 ML: at 21:24

## 2018-03-28 RX ADMIN — CEFTRIAXONE SODIUM 2 G: 2 INJECTION, POWDER, FOR SOLUTION INTRAMUSCULAR; INTRAVENOUS at 11:01

## 2018-03-28 RX ADMIN — CLOPIDOGREL BISULFATE 75 MG: 75 TABLET ORAL at 09:48

## 2018-03-28 RX ADMIN — AMLODIPINE BESYLATE 10 MG: 5 TABLET ORAL at 09:47

## 2018-03-28 RX ADMIN — HYDRALAZINE HYDROCHLORIDE 75 MG: 50 TABLET, FILM COATED ORAL at 21:23

## 2018-03-28 RX ADMIN — Medication 10 ML: at 17:43

## 2018-03-28 RX ADMIN — INSULIN LISPRO 7 UNITS: 100 INJECTION, SOLUTION INTRAVENOUS; SUBCUTANEOUS at 11:52

## 2018-03-28 RX ADMIN — INSULIN LISPRO 10 UNITS: 100 INJECTION, SOLUTION INTRAVENOUS; SUBCUTANEOUS at 17:42

## 2018-03-28 RX ADMIN — Medication 1 CAPSULE: at 09:52

## 2018-03-28 RX ADMIN — CARVEDILOL 25 MG: 12.5 TABLET, FILM COATED ORAL at 17:42

## 2018-03-28 RX ADMIN — HYDRALAZINE HYDROCHLORIDE 75 MG: 50 TABLET, FILM COATED ORAL at 09:51

## 2018-03-28 RX ADMIN — INSULIN HUMAN 70 UNITS: 100 INJECTION, SUSPENSION SUBCUTANEOUS at 09:42

## 2018-03-28 RX ADMIN — HYDROMORPHONE HYDROCHLORIDE 0.5 MG: 1 INJECTION, SOLUTION INTRAMUSCULAR; INTRAVENOUS; SUBCUTANEOUS at 09:38

## 2018-03-28 RX ADMIN — INSULIN LISPRO 4 UNITS: 100 INJECTION, SOLUTION INTRAVENOUS; SUBCUTANEOUS at 21:23

## 2018-03-28 RX ADMIN — INSULIN LISPRO 7 UNITS: 100 INJECTION, SOLUTION INTRAVENOUS; SUBCUTANEOUS at 17:42

## 2018-03-28 RX ADMIN — CARVEDILOL 25 MG: 12.5 TABLET, FILM COATED ORAL at 09:47

## 2018-03-28 RX ADMIN — ERYTHROPOIETIN 10000 UNITS: 10000 INJECTION, SOLUTION INTRAVENOUS; SUBCUTANEOUS at 21:24

## 2018-03-28 RX ADMIN — Medication 5 ML: at 05:46

## 2018-03-28 RX ADMIN — HYDRALAZINE HYDROCHLORIDE 75 MG: 50 TABLET, FILM COATED ORAL at 17:41

## 2018-03-28 RX ADMIN — HEPARIN SODIUM 5000 UNITS: 5000 INJECTION, SOLUTION INTRAVENOUS; SUBCUTANEOUS at 17:42

## 2018-03-28 RX ADMIN — CLONIDINE HYDROCHLORIDE 0.2 MG: 0.1 TABLET ORAL at 09:48

## 2018-03-28 RX ADMIN — GABAPENTIN 300 MG: 300 CAPSULE ORAL at 09:50

## 2018-03-28 RX ADMIN — HEPARIN SODIUM 5000 UNITS: 5000 INJECTION, SOLUTION INTRAVENOUS; SUBCUTANEOUS at 23:47

## 2018-03-28 RX ADMIN — HEPARIN SODIUM 5000 UNITS: 5000 INJECTION, SOLUTION INTRAVENOUS; SUBCUTANEOUS at 09:50

## 2018-03-28 RX ADMIN — DAPTOMYCIN 900 MG: 500 INJECTION, POWDER, LYOPHILIZED, FOR SOLUTION INTRAVENOUS at 11:51

## 2018-03-28 RX ADMIN — DIGOXIN 0.12 MG: 125 TABLET ORAL at 09:49

## 2018-03-28 RX ADMIN — INSULIN HUMAN 70 UNITS: 100 INJECTION, SUSPENSION SUBCUTANEOUS at 17:43

## 2018-03-28 NOTE — PROGRESS NOTES
Hospitalist Progress Note    NAME: Maryellen Arguelles   :  1955   MRN:  [id number]       Assessment / Plan:    Severe sepsis and MRSA bacteremia due to infected skin abscesses  -S/P  I& D of Multiple buttock / back wounds  -initially on cefepime and vancomycin. Switched to Rocephin / Daptomcyin 3/27 based on wound and blood culture results (MRSA and Serratia 3/22). Appreciate ID input  -repeat Blood cultures 3/27 No growth so far. Can insert PICC tomorrow or when okay with ID.     -surgery following  -probiotic     Diabetes mellitus 2 uncontrolled with hyperglycemia  -on  at home  -continue NPH 70 units BID  -added 10 units premeal humalog + continue with correctional SSI prn    PEDRO on CKD 4  Hyponatremia  -from dehyration related to hyperglycemia.    -Cr trending down. IVF stopped. Renal following       Hypertension  -continue norvasc, coreg, clonidine and hydralazine. Adjust prn    Obesity  Body mass index is 37.95 kg/(m^2).       Code Status: Full Code  DVT Prophylaxis: Hep SQ  GI Prophylaxis: not indicated         Subjective:     Chief Complaint / Reason for Physician Visit  Follow up of sepsis, bacteremia, uncontrolled DM,   Tolerating diet. BG improved from 500 but still around 200-300    Review of Systems:  Symptom Y/N Comments  Symptom Y/N Comments   Fever/Chills n   Chest Pain     Poor Appetite    Edema     Cough    Abdominal Pain     Sputum    Joint Pain     SOB/NAVARRO n   Pruritis/Rash     Nausea/vomit    Tolerating PT/OT     Diarrhea    Tolerating Diet     Constipation    Other       Could NOT obtain due to:      Objective:     VITALS:   Last 24hrs VS reviewed since prior progress note.  Most recent are:  Patient Vitals for the past 24 hrs:   Temp Pulse Resp BP SpO2   18 1107 98.4 °F (36.9 °C) 76 22 194/87 97 %   18 0949 - 76 - - -   18 0803 97.9 °F (36.6 °C) 69 20 (!) 224/93 96 %   18 0328 98.2 °F (36.8 °C) 70 16 146/74 96 %   18 2202 98.8 °F (37.1 °C) 65 16 159/67 96 %   03/27/18 1941 98.2 °F (36.8 °C) 67 16 (!) 228/92 95 %   03/27/18 1827 - - - 193/75 -   03/27/18 1515 97.8 °F (36.6 °C) 69 18 (!) 218/87 95 %       Intake/Output Summary (Last 24 hours) at 03/28/18 1312  Last data filed at 03/28/18 1159   Gross per 24 hour   Intake              940 ml   Output             2210 ml   Net            -1270 ml        PHYSICAL EXAM:   General: WD, WN. Alert, cooperative, no acute distress    EENT:  EOMI. Anicteric sclerae. MMM  Resp:  CTA bilaterally, no wheezing or rales. No accessory muscle use  CV:  Regular  rhythm,  No edema  GI:  Soft, Non distended, Non tender.  +Bowel sounds  Neurologic:  Alert and oriented X 3, normal speech,   Psych:   Good insight. Not anxious nor agitated  Skin:  No rashes. No jaundice, multiple buttock wounds, 2 large once on left buttocks. Reviewed most current lab test results and cultures  YES  Reviewed most current radiology test results   YES  Review and summation of old records today    NO  Reviewed patient's current orders and MAR    YES  PMH/SH reviewed - no change compared to H&P  ________________________________________________________________________  Care Plan discussed with:    Comments   Patient y    Family      RN y    Care Manager     Consultant  y ID                     Multidiciplinary team rounds were held today with , nursing, pharmacist and clinical coordinator. Patient's plan of care was discussed; medications were reviewed and discharge planning was addressed.      ________________________________________________________________________  Total NON critical care TIME:  25  Minutes    Total CRITICAL CARE TIME Spent:   Minutes non procedure based      Comments   >50% of visit spent in counseling and coordination of care     ________________________________________________________________________  Kameron Forbes MD     Procedures: see electronic medical records for all procedures/Xrays and details which were not copied into this note but were reviewed prior to creation of Plan. LABS:  I reviewed today's most current labs and imaging studies.   Pertinent labs include:  Recent Labs      03/28/18   0606  03/27/18 0311 03/26/18 0223   WBC   --   6.7  7.5   HGB  7.8*  7.1*  7.7*   HCT   --   22.1*  23.7*   PLT   --   203  208     Recent Labs      03/27/18 0311   NA  138   K  4.1   CL  110*   CO2  20*   GLU  252*   BUN  31*   CREA  2.87*   CA  8.0*   PHOS  3.7   ALB  2.1*       Signed: Elayne Cranker, MD

## 2018-03-28 NOTE — PROGRESS NOTES
Infectious Disease Progress Note    IMPRESSION:      · Sepsis  · MRSA bacteremia BC +3/22 (4/), repeat BC no growth. · Multiple wounds on back, buttock  · Wound cultures + for MRSA , Serratia marcescens 3/22, s/p wound debridement  · Poorly controlled Diabetes Type 2 , A1c 10.4  · PEDRO on CKD4 ,Cr 2.87 today         PLAN:      · Continue  Daptomycin 900 mg IV q daily  /Ceftriaxone 2 g IV daily . Pt will need  2 weeks of antibiotics. · Weekly CBC, CMP, CK fax reports to my office [phone]  · Daily wound care  · Blood sugar control, d/w patient  · He will need to be off statin while on Daptomycin IV  · Out patient follow up in office on 4/10 at 3pm         Subjective:     Pt seen with wound care    Review of Systems:  A comprehensive review of systems was negative except for that written in the History of Present Illness. Objective:     Blood pressure (!) 224/93, pulse 69, temperature 97.9 °F (36.6 °C), resp. rate 20, height 6' 5\" (1.956 m), weight 145.2 kg (320 lb), SpO2 96 %. Temp (24hrs), Av.1 °F (36.7 °C), Min:97.6 °F (36.4 °C), Max:98.8 °F (37.1 °C)      Lines:  peripheral            Physical Exam:   General:  Alert, cooperative,   Eyes:  Sclera anicteric. Pupils equally round and reactive to light. Mouth/Throat: Mucous membranes normal, oral pharynx clear   Neck: Supple   Lungs:   Clear to auscultation Back - Wounds seen on back, buttock with wound care . Clean , no necrosis, discharge( see pics)   CV:  Regular rate and rhythm,no murmur, click, rub or gallop   Abdomen:   Soft, non-tender.  bowel sounds normal. non-distended   Extremities: No edema                       Data Review:   CBC:   Recent Labs      18   0606  18   0311  18   0223   WBC   --   6.7  7.5   RBC   --   2.50*  2.68*   HGB  7.8*  7.1*  7.7*   HCT   --   22.1*  23.7*   PLT   --   203  208   GRANS   --    --   74   LYMPH   --    --   16   EOS   --    --   2     CMP:   Recent Labs      18   0311   GLU  252* NA  138   K  4.1   CL  110*   CO2  20*   BUN  31*   CREA  2.87*   CA  8.0*   AGAP  8   BUCR  11*   ALB  2.1*       Studies:      Lab Results   Component Value Date/Time    Culture result: NO GROWTH AFTER 20 HOURS 03/27/2018 12:19 PM    Culture result: NO ANAEROBES ISOLATED 03/23/2018 07:55 PM    Culture result: (A) 03/23/2018 07:55 PM     HEAVY  Preliminary report of Methicillin Resistant Staphylococcus aureus by antigen detection. Confirmation and Sensitivities to follow. Culture result: (KNOWN MRSA) 03/23/2018 07:55 PM    Culture result:  03/23/2018 07:55 PM     PLEASE REFER TO PREVIOUS CULTURE [id number], COLLECTED 3/22/18 FOR SENSITIVITIES        XR Results (most recent):    Results from Hospital Encounter encounter on 07/29/17   XR CHEST PORT   Narrative INDICATION:  weakness     COMPARISON: 5/22/2017    FINDINGS: Single AP portable view of the chest obtained at 1607 demonstrates a  stable cardiomediastinal silhouette. The lungs are hypoinspiratory but clear  bilaterally. No osseous abnormalities are seen. Impression IMPRESSION: No evidence of acute cardiopulmonary process. Patient Active Problem List   Diagnosis Code    DM (diabetes mellitus) ([facility] E11.9    Hairy cell leukemia, in remission ([facility] C91.41    Morbid obesity ([address] E66.01    HTN (hypertension) I10    A-fib ([facility] [address] I48.91    Vitamin D deficiency E55.9    CKD (chronic kidney disease), stage IV ([address] N18.4    Acute renal failure ([address] N17.9    Abscess L02.91    Stroke ([address] I63.9    Type 2 diabetes mellitus with nephropathy ([address] E11.21    Abscess of back L02.212    Diabetes mellitus, insulin dependent (IDDM), uncontrolled (HCC) E10.65    Severe sepsis (HCC) A41.9, R65.20         ICD-10-CM ICD-9-CM    1. Abscess of back L02.212 682.2    2. Hyperglycemia R73.9 790.29    3. Azotemia R79.89 790.6        I have discussed the diagnosis with the patient and the intended plan as seen in the above orders.      I have discussed medication side effects and warnings with the patient as well.     Reviewed test results at length with patient    Anti-infectives:      Daptomycin IV D1   Ceftriaxone IV D1     Francheska Dunn antonio

## 2018-03-28 NOTE — WOUND CARE
Wound care Nurse Consult from Dr Sharolyn Mortimer with Dr Irena Galarza at bedside, Ann SHIPMAN. Patient has x4 back/buttock abscess with x3 debrided this hospitalization and 4th is a healing one. Right upper back: healing, partial thickness wound; no picture  Right lower back: debrided  Upon admit at bedside 3/22 by Dr Eleno Carney, full thickness wound        Left upper buttock and left buttock full thickness wounds debrided by Dr Sharolyn Mortimer in [address] [place] [address] 3/23. WOUND POA CONDITIONS        Wound Back Right;Upper (Active)   DRESSING STATUS Removed 3/28/2018 10:13 AM   DRESSING TYPE Silver products; Foam 3/28/2018 10:13 AM   Non-Pressure Injury Partial thickness (epider/derm) 3/28/2018 10:13 AM   Wound Length (cm) 1 cm 3/28/2018 10:13 AM   Wound Width (cm) 3 cm 3/28/2018 10:13 AM   Wound Depth (cm) 0.2 3/28/2018 10:13 AM   Wound Surface area (cm^2) 3 cm^2 3/28/2018 10:13 AM   Condition of Base Leon 3/28/2018 10:13 AM   Condition of Edges Open 3/28/2018 10:13 AM   Epithelialization (%) 10 3/28/2018 10:13 AM   Tissue Type Pink 3/28/2018 10:13 AM   Tissue Type Percent Pink 100 3/28/2018 10:13 AM   Tissue Type Percent Red 50 3/23/2018 11:52 AM   Drainage Amount  Scant 3/28/2018 10:13 AM   Drainage Color Serosanguinous 3/28/2018 10:13 AM   Wound Odor None 3/28/2018 10:13 AM   Cleansing and Cleansing Agents  Normal saline 3/28/2018 10:13 AM   Dressing Type Applied Moist to dry; Foam 3/28/2018 10:13 AM   Procedure Tolerated Well 3/28/2018 10:13 AM   Number of days:309           Wound Buttocks Left (Active)   DRESSING STATUS Removed 3/28/2018 10:13 AM   DRESSING TYPE Moist to dry 3/28/2018 10:13 AM   Non-Pressure Injury Full thickness (subcut/muscle) 3/28/2018 10:13 AM   Wound Length (cm) 12 cm 3/28/2018 10:13 AM   Wound Width (cm) 9 cm 3/28/2018 10:13 AM   Wound Depth (cm) 4.8 3/28/2018 10:13 AM   Wound Surface area (cm^2) 108 cm^2 3/28/2018 10:13 AM   Condition of Base Eschar;Slough;Granulation 3/28/2018 10:13 AM   Condition of Edges Open 3/28/2018 10:13 AM   Tissue Type Black; Yellow;Red 3/28/2018 10:13 AM   Tissue Type Percent Black 10 % 3/28/2018 10:13 AM   Tissue Type Percent Red 85 3/28/2018 10:13 AM   Tissue Type Percent Yellow 5 3/28/2018 10:13 AM   Drainage Amount  Small  3/28/2018 10:13 AM   Drainage Color Serosanguinous 3/28/2018 10:13 AM   Wound Odor None 3/28/2018 10:13 AM   Cleansing and Cleansing Agents  Normal saline 3/28/2018 10:13 AM   Dressing Type Applied Packing;Moist to dry 3/28/2018 10:13 AM   Procedure Tolerated Well 3/28/2018 10:13 AM   Number of days:6       Wound Buttocks Left;Upper (Active)   DRESSING STATUS Removed 3/28/2018 10:13 AM   DRESSING TYPE Packing;Moist to dry 3/28/2018 10:13 AM   Non-Pressure Injury Full thickness (subcut/muscle) 3/28/2018 10:13 AM   Wound Length (cm) 5 cm 3/28/2018 10:13 AM   Wound Width (cm) 5 cm 3/28/2018 10:13 AM   Wound Depth (cm) 2.5 3/28/2018 10:13 AM   Wound Surface area (cm^2) 25 cm^2 3/28/2018 10:13 AM   Condition of Base Granulation;Slough 3/28/2018 10:13 AM   Condition of Edges Open 3/28/2018 10:13 AM   Tissue Type Red;Yellow 3/28/2018 10:13 AM   Tissue Type Percent Red 95 3/28/2018 10:13 AM   Tissue Type Percent Yellow 5 3/28/2018 10:13 AM   Drainage Amount  Small  3/28/2018 10:13 AM   Drainage Color Serosanguinous 3/28/2018 10:13 AM   Wound Odor None 3/28/2018 10:13 AM   Periwound Skin Condition Intact 3/28/2018 10:13 AM   Cleansing and Cleansing Agents  Normal saline 3/28/2018 10:13 AM   Dressing Type Applied Packing;Moist to dry 3/28/2018 10:13 AM   Procedure Tolerated Well 3/28/2018 10:13 AM   Number of days:5       Wound Back Right; Lower (Active)   DRESSING STATUS Removed 3/28/2018 10:13 AM   DRESSING TYPE Packing 3/28/2018 10:13 AM   Non-Pressure Injury Full thickness (subcut/muscle) 3/28/2018 10:13 AM   Wound Length (cm) 3 cm 3/28/2018 10:13 AM   Wound Width (cm) 5.5 cm 3/28/2018 10:13 AM   Wound Depth (cm) 2 3/28/2018 10:13 AM   Wound Surface area (cm^2) 16.5 cm^2 3/28/2018 10:13 AM Condition of Base Granulation 3/28/2018 10:13 AM   Condition of Edges Open 3/28/2018 10:13 AM   Tissue Type Red 3/28/2018 10:13 AM   Tissue Type Percent Red 100 3/28/2018 10:13 AM   Drainage Amount  Small  3/28/2018 10:13 AM   Drainage Color Serosanguinous 3/28/2018 10:13 AM   Wound Odor None 3/28/2018 10:13 AM   Periwound Skin Condition Intact 3/28/2018 10:13 AM   Cleansing and Cleansing Agents  Normal saline 3/28/2018 10:13 AM   Dressing Type Applied Packing;Moist to dry 3/28/2018 10:13 AM   Procedure Tolerated Well 3/28/2018 10:13 AM   Number of days:5           Patient is a very non-compliant diabetic and has a history of multiple abscess to back, buttocks and thighs. Wounds are very clean and will benefit from a daily wound care change now of NS wet to dry packings. Left buttock and left upper buttock and right lower back and right upper back wounds: daily cleanse with NS moist 4x4's by wiping wound bed clean. Fill each wound with NS moist Kerlix gauze packing. Cover x2 buttock wounds with dry 4x4's, ABD pad Exu-dry pad cover AND right lower back and upper back wounds with foam dressings dressing.     Casey Canavan, RN, CWON, zone ph# [phone]

## 2018-03-28 NOTE — DIABETES MGMT
DTC Progress Note    Recommendations/ Comments: If appropriate, please consider resuming mealtime insulin. Would recommend Lispro 10 units ac meals. Current hospital DM medication:   -Humalog normal sensitivity correction  -NPH 70- units bid    Chart reviewed on [facility] for hypoglycemia. Patient is a 57 y.o. male with known history of Type 2 Diabetes on Novolin 70/30 - 65 units bid at home. A1c:   Lab Results   Component Value Date/Time    Hemoglobin A1c 10.4 (H) 03/23/2018 03:04 AM    Hemoglobin A1c 10.1 (H) 01/31/2018 02:49 AM       Recent Glucose Results:   Lab Results   Component Value Date/Time    GLUCPOC 246 (H) 03/28/2018 07:37 AM    GLUCPOC 337 (H) 03/27/2018 08:37 PM    GLUCPOC 320 (H) 03/27/2018 04:28 PM        Lab Results   Component Value Date/Time    Creatinine 2.87 (H) 03/27/2018 03:11 AM     Estimated Creatinine Clearance: 41 mL/min (based on Cr of 2.87). Active Orders   Diet    DIET DIABETIC WITH OPTIONS Consistent Carb 1800kcal; Regular; FR 1800ML        PO intake:   Patient Vitals for the past 72 hrs:   % Diet Eaten   03/28/18 1000 100 %   03/27/18 1339 100 %   03/27/18 1005 100 %   03/26/18 1758 100 %   03/26/18 1135 100 %   03/26/18 0831 100 %   03/25/18 1734 100 %       Will continue to follow as needed.     Thank you    Genna Leal, [address], [place]   Office:  [phone]

## 2018-03-28 NOTE — PROGRESS NOTES
Problem: Mobility Impaired (Adult and Pediatric)  Goal: *Acute Goals and Plan of Care (Insert Text)  Physical Therapy Goals  Initiated 3/22/2018  1. Patient will move from supine to sit and sit to supine , scoot up and down and roll side to side in bed with independence within 7 day(s). 2.  Patient will transfer from bed to chair and chair to bed with modified independence using the least restrictive device within 7 day(s). 3.  Patient will perform sit to stand with modified independence within 7 day(s). 4.  Patient will ambulate with modified independence for 656 feet with the least restrictive device within 7 day(s). 5.  Patient will ascend/descend 4 stairs with dual handrail(s) with modified independence within 7 day(s). physical Therapy TREATMENT  Patient: Rosa Handy (73 y.o. male)  Date: 3/28/2018  Diagnosis: Severe sepsis (HCC)  buttock and back abcess <principal problem not specified>  Procedure(s) (LRB):  INCISION AND DRAINAGE BILATERAL BUTTOCKS AND BACK (Bilateral) 5 Days Post-Op  Precautions: Contact, Fall  Chart, physical therapy assessment, plan of care and goals were reviewed. ASSESSMENT:  Pt was received in supine and cleared by nursing to mobilize. He needed max encouragement to work with therapy. He performed bed mobility at an independent level. Once sitting EOB, lunch arrived and pt refused to even get up to the chair, he wanted to stay sitting up on the EOB to eat. He expressed his frustration with having multiple nurses and physicians entering his room all day. He was left sitting EOB while nursing was giving him insulin before lunch. He stated he has been up walking in the room with the walker.    Progression toward goals:  []    Improving appropriately and progressing toward goals  [x]    Improving slowly and progressing toward goals  []    Not making progress toward goals and plan of care will be adjusted     PLAN:  Patient continues to benefit from skilled intervention to address the above impairments. Continue treatment per established plan of care. Discharge Recommendations:  Home Health  Further Equipment Recommendations for Discharge:  RW if does not have one     SUBJECTIVE:   Patient stated All I want to do right now is lay here and eat my lunch! Areta Files    OBJECTIVE DATA SUMMARY:   Critical Behavior:  Neurologic State: Alert  Orientation Level: Oriented X4  Cognition: Follows commands  Safety/Judgement: Awareness of environment  Functional Mobility Training:  Bed Mobility:  Rolling: Independent  Supine to Sit: Independent     Scooting: Independent        Transfers:   pt refused                                Balance:  Sitting: Intact  Ambulation/Gait Training:            pt refused                 Pain:  Pain Scale 1: Numeric (0 - 10)  Pain Intensity 1: 0              Activity Tolerance:   WFL  Please refer to the flowsheet for vital signs taken during this treatment.   After treatment:   []    Patient left in no apparent distress sitting up in chair  [x]    Patient left in no apparent distress in bed (EOB)  [x]    Call bell left within reach  [x]    Nursing notified  []    Caregiver present  []    Bed alarm activated    COMMUNICATION/COLLABORATION:   The patients plan of care was discussed with: Registered Nurse    Jim Henning PT, DPT   Time Calculation: 15 mins

## 2018-03-28 NOTE — PROGRESS NOTES
Pt admitted with sepsis//GLF//abscess right back.  Pt lives in [facility], [place], [facility], sees NP Antonio Starr, is morbidly obese, uses a glucometer, RW, and is being worked up for home IV abx. Mee December send consult to [facility] and [facility] to assess for co pay for abx.  Pt has used [facility] in past.  HCP ran a test co pay cost for IV abx:  ~$1078/week. Awaiting confirmation of cost.  I will discuss this with the pt and MD.      11a  D/C plans discussed with pt who states he is retired from [employer]. His NOK is Walt Rodriguez, girlfriend and mother of his 2 kids who are 25 and 11 yo. He lives with her, their 2 kids, and her 2 kids(older one and baby). She will transport him home at d/c. He is aware of the co pay and willing to proceed. Staff-please plan on d/c tomorrow, if all in agreement, after giving the daily dose of both IV ABX. He is interested in the infusion center whom I've contacted and sent orders to. Await call back from [facility].

## 2018-03-29 VITALS
HEIGHT: 77 IN | RESPIRATION RATE: 18 BRPM | HEART RATE: 67 BPM | OXYGEN SATURATION: 97 % | TEMPERATURE: 98.3 F | DIASTOLIC BLOOD PRESSURE: 76 MMHG | WEIGHT: 315 LBS | BODY MASS INDEX: 37.19 KG/M2 | SYSTOLIC BLOOD PRESSURE: 162 MMHG

## 2018-03-29 LAB
GLUCOSE BLD STRIP.AUTO-MCNC: 136 MG/DL (ref 65–100)
GLUCOSE BLD STRIP.AUTO-MCNC: 187 MG/DL (ref 65–100)
GLUCOSE BLD STRIP.AUTO-MCNC: 216 MG/DL (ref 65–100)
SERVICE CMNT-IMP: ABNORMAL

## 2018-03-29 PROCEDURE — 74011636637 HC RX REV CODE- 636/637: Performed by: INTERNAL MEDICINE

## 2018-03-29 PROCEDURE — 77030018719 HC DRSG PTCH ANTIMIC J&J -A

## 2018-03-29 PROCEDURE — 74011000258 HC RX REV CODE- 258: Performed by: INTERNAL MEDICINE

## 2018-03-29 PROCEDURE — 74011250636 HC RX REV CODE- 250/636: Performed by: SURGERY

## 2018-03-29 PROCEDURE — 74011250637 HC RX REV CODE- 250/637: Performed by: SURGERY

## 2018-03-29 PROCEDURE — C1751 CATH, INF, PER/CENT/MIDLINE: HCPCS

## 2018-03-29 PROCEDURE — 74011250637 HC RX REV CODE- 250/637: Performed by: INTERNAL MEDICINE

## 2018-03-29 PROCEDURE — 90471 IMMUNIZATION ADMIN: CPT

## 2018-03-29 PROCEDURE — 90686 IIV4 VACC NO PRSV 0.5 ML IM: CPT | Performed by: INTERNAL MEDICINE

## 2018-03-29 PROCEDURE — 74011250636 HC RX REV CODE- 250/636: Performed by: INTERNAL MEDICINE

## 2018-03-29 PROCEDURE — 82962 GLUCOSE BLOOD TEST: CPT

## 2018-03-29 PROCEDURE — 74011000250 HC RX REV CODE- 250: Performed by: INTERNAL MEDICINE

## 2018-03-29 PROCEDURE — 36569 INSJ PICC 5 YR+ W/O IMAGING: CPT | Performed by: INTERNAL MEDICINE

## 2018-03-29 PROCEDURE — 76937 US GUIDE VASCULAR ACCESS: CPT

## 2018-03-29 PROCEDURE — 77030018786 HC NDL GD F/USND BARD -B

## 2018-03-29 PROCEDURE — 02HV33Z INSERTION OF INFUSION DEVICE INTO SUPERIOR VENA CAVA, PERCUTANEOUS APPROACH: ICD-10-PCS | Performed by: INTERNAL MEDICINE

## 2018-03-29 RX ORDER — SODIUM CHLORIDE 0.9 % (FLUSH) 0.9 %
10-30 SYRINGE (ML) INJECTION AS NEEDED
Status: DISCONTINUED | OUTPATIENT
Start: 2018-03-29 | End: 2018-03-29 | Stop reason: HOSPADM

## 2018-03-29 RX ORDER — SODIUM CHLORIDE 0.9 % (FLUSH) 0.9 %
10 SYRINGE (ML) INJECTION EVERY 24 HOURS
Status: DISCONTINUED | OUTPATIENT
Start: 2018-03-29 | End: 2018-03-29 | Stop reason: HOSPADM

## 2018-03-29 RX ORDER — SODIUM CHLORIDE 0.9 % (FLUSH) 0.9 %
10-40 SYRINGE (ML) INJECTION EVERY 8 HOURS
Status: DISCONTINUED | OUTPATIENT
Start: 2018-03-29 | End: 2018-03-29 | Stop reason: HOSPADM

## 2018-03-29 RX ORDER — HEPARIN 100 UNIT/ML
300 SYRINGE INTRAVENOUS AS NEEDED
Status: DISCONTINUED | OUTPATIENT
Start: 2018-03-29 | End: 2018-03-29 | Stop reason: HOSPADM

## 2018-03-29 RX ORDER — BACITRACIN 500 UNIT/G
1 PACKET (EA) TOPICAL AS NEEDED
Status: DISCONTINUED | OUTPATIENT
Start: 2018-03-29 | End: 2018-03-29 | Stop reason: HOSPADM

## 2018-03-29 RX ORDER — HYDROCODONE BITARTRATE AND ACETAMINOPHEN 5; 325 MG/1; MG/1
1 TABLET ORAL
Qty: 30 TAB | Refills: 0 | Status: SHIPPED | OUTPATIENT
Start: 2018-03-29

## 2018-03-29 RX ADMIN — HEPARIN SODIUM 5000 UNITS: 5000 INJECTION, SOLUTION INTRAVENOUS; SUBCUTANEOUS at 08:30

## 2018-03-29 RX ADMIN — DIGOXIN 0.12 MG: 125 TABLET ORAL at 08:30

## 2018-03-29 RX ADMIN — HYDRALAZINE HYDROCHLORIDE 75 MG: 50 TABLET, FILM COATED ORAL at 08:29

## 2018-03-29 RX ADMIN — Medication 10 ML: at 05:20

## 2018-03-29 RX ADMIN — AMLODIPINE BESYLATE 10 MG: 5 TABLET ORAL at 08:29

## 2018-03-29 RX ADMIN — Medication 10 ML: at 15:14

## 2018-03-29 RX ADMIN — HYDROMORPHONE HYDROCHLORIDE 0.5 MG: 2 INJECTION, SOLUTION INTRAMUSCULAR; INTRAVENOUS; SUBCUTANEOUS at 15:14

## 2018-03-29 RX ADMIN — INSULIN LISPRO 10 UNITS: 100 INJECTION, SOLUTION INTRAVENOUS; SUBCUTANEOUS at 12:04

## 2018-03-29 RX ADMIN — CARVEDILOL 25 MG: 12.5 TABLET, FILM COATED ORAL at 08:29

## 2018-03-29 RX ADMIN — INSULIN LISPRO 10 UNITS: 100 INJECTION, SOLUTION INTRAVENOUS; SUBCUTANEOUS at 08:26

## 2018-03-29 RX ADMIN — HEPARIN SODIUM 5000 UNITS: 5000 INJECTION, SOLUTION INTRAVENOUS; SUBCUTANEOUS at 15:57

## 2018-03-29 RX ADMIN — INSULIN LISPRO 4 UNITS: 100 INJECTION, SOLUTION INTRAVENOUS; SUBCUTANEOUS at 12:03

## 2018-03-29 RX ADMIN — LABETALOL HYDROCHLORIDE 10 MG: 5 INJECTION INTRAVENOUS at 02:06

## 2018-03-29 RX ADMIN — INFLUENZA VIRUS VACCINE 0.5 ML: 15; 15; 15; 15 SUSPENSION INTRAMUSCULAR at 15:58

## 2018-03-29 RX ADMIN — CLOPIDOGREL BISULFATE 75 MG: 75 TABLET ORAL at 08:29

## 2018-03-29 RX ADMIN — GABAPENTIN 300 MG: 300 CAPSULE ORAL at 08:29

## 2018-03-29 RX ADMIN — INSULIN HUMAN 70 UNITS: 100 INJECTION, SUSPENSION SUBCUTANEOUS at 17:03

## 2018-03-29 RX ADMIN — Medication 10 ML: at 12:06

## 2018-03-29 RX ADMIN — DAPTOMYCIN 900 MG: 500 INJECTION, POWDER, LYOPHILIZED, FOR SOLUTION INTRAVENOUS at 12:06

## 2018-03-29 RX ADMIN — Medication 10 ML: at 12:07

## 2018-03-29 RX ADMIN — INSULIN LISPRO 3 UNITS: 100 INJECTION, SOLUTION INTRAVENOUS; SUBCUTANEOUS at 08:25

## 2018-03-29 RX ADMIN — CEFTRIAXONE SODIUM 2 G: 2 INJECTION, POWDER, FOR SOLUTION INTRAMUSCULAR; INTRAVENOUS at 11:02

## 2018-03-29 RX ADMIN — Medication 1 CAPSULE: at 08:29

## 2018-03-29 RX ADMIN — CLONIDINE HYDROCHLORIDE 0.2 MG: 0.1 TABLET ORAL at 08:29

## 2018-03-29 RX ADMIN — HYDRALAZINE HYDROCHLORIDE 75 MG: 50 TABLET, FILM COATED ORAL at 15:57

## 2018-03-29 RX ADMIN — INSULIN HUMAN 70 UNITS: 100 INJECTION, SUSPENSION SUBCUTANEOUS at 12:17

## 2018-03-29 NOTE — DISCHARGE INSTRUCTIONS
HOSPITALIST DISCHARGE INSTRUCTIONS    NAME: Nelia Juarez   :  1955   MRN:  [id number]     Date/Time:  3/29/2018 12:58 PM    ADMIT DATE: 3/21/2018   DISCHARGE DATE: 3/29/2018         · It is important that you take the medication exactly as they are prescribed. · Keep your medication in the bottles provided by the pharmacist and keep a list of the medication names, dosages, and times to be taken in your wallet. · Do not take other medications without consulting your doctor. What to do at Home    Recommended diet:  Diabetic Diet    Recommended activity: PT/OT per Home Health      If you have questions regarding the hospital related prescriptions or hospital related issues please call [facility] at [phone]. You can always direct your questions to your primary care doctor if you are unable to reach your hospital physician; your PCP works as an extension of your hospital doctor just like your hospital doctor is an extension of your PCP for your time at the hospital [facility])    If you experience any of the following symptoms then please call your primary care physician or return to the emergency room if you cannot get hold of your doctor:    Fever, chills, nausea, vomiting, or persistent diarrhea  Worsening weakness or new problems with your speech or balance  Dark stools or visible blood in your stools  New Leg swelling or shortness of breath as these could be signs of a clot    Additional Instructions: You will need to complete 2 weeks of antibiotics.   The last dose in scheduled for April 10    You need blood work completed weekly and results sent to Dr Funmilayo Rich office  Weekly CBC, CMP, CK fax reports to the office [phone]    Stop your pravastatin while you are on the antibiotics    Home health will help guide and oversee your wound care        Information obtained by :  I understand that if any problems occur once I am at home I am to contact my physician. I understand and acknowledge receipt of the instructions indicated above.                                                                                                                                            Physician's or R.N.'s Signature                                                                  Date/Time                                                                                                                                              Patient or Representative Signature

## 2018-03-29 NOTE — PROGRESS NOTES
Staff-please plan on d/c today ~2-3p if all in agreement, after giving the daily dose of both IV ABX. Awaiting word from the infusion center regarding his IV abx infusion slot for tomorrow. Please contact family for transport . [phone]  He is to be at the infusion center at 3p tomorrow.   On AVS.

## 2018-03-29 NOTE — PROGRESS NOTES
Patient discharged to home with family. Copy of discharge instructions, prescriptions and all patient belongings given to patient prior to discharge. Patient discharged with PICC line for further antibiotic therapy. Peripherial IV removed prior to discharge.

## 2018-03-29 NOTE — PROGRESS NOTES
PICC (Peripherally Inserted Central Catheter) line insertion  procedure note :   Spoke with Dr. Zaira Rivera about Creatinine and ok to proceed with PICC. Procedure explained to patient along with risks and benefits  and patient agreed to proceed. Informed consent obtained from Patient. Patient teaching completed. Timeout completed. Pre-procedure assessment done. Maximum sterile barrier precautions observed throughout procedure. Lidocaine 1%  3.0ml sq given prior to cannulation. Cannulated Basilic vein using ultrasound guidance and modified seldinger technique. Inserted 5  Chinese double  lumen PICC to Right arm using [employer] Tip Location System and  [address]. Pt has  sinus   rhythm. PICC tip location was confirmed by 3 CG tip positioning system, indicating tall P wave and no negative deflection before P wave which would indicate that the PICC tip is properly placed in the distal SVC or at the [place]. PICC tip location was  confirmed by 2 PICC nurses and 3CG printout placed on patient's chart. Blood return verified and flushed with 20 ml normal saline in each port. Sterile dressing applied with biopatch, statLock and occlusive dressing as per protocol. Curos caps applied to each port. Patient tolerated procedure well with minimal blood loss ( less than 5 ml.)   Patient education material provided. PICC procedure performed by  : Annabel Mendiola RN. PICC nurse  Assisted by : Elena Alexander RN  PICC nurse  Reason for access : Long Term Antibiotics  Complications related to insertion  : none  X-Ray : not applicable  Notified primary nurse  hat  PICC line can be used.    Total Trimmed Length :  43cm   External Length : 0  cm   PICC line site arm circumference: 43   cm   PICC catheter occupies  14 % of vein  Type of PICC: Bard Solo Power PICC   Ref # :    [id number]     Lot # :  [id number]   Expiration Date : 05/31/2019    Annabel Mendiola RN, PICC Nurse, Vascular Access Team.

## 2018-03-29 NOTE — CONSULTS
Infectious Disease Consult  Ryan Leone MD FAC    Date of Consultation:  March 28, 2018    Referring Physician: Devorah Collado    Subjective:     Patient is a 57 y.o. male who is being seen for MRSA bacteremia , multiple wounds on back, recommendation for antibiotics & duration. IMPRESSION:      · Sepsis  · MRSA bacteremia BC +3/22 (4/4), no repeat BC obtained  · Wound cultures + for MRSA , Serratia marcescens 3/22, s/p wound debridement  · Poorly controlled Diabetes Type 2 , A1c 10.4  · PEDRO on CKD4 ,Cr 2.87 today         PLAN:      · Repeat BC  · Change to Daptomycin IV /Ceftriaxone. Pt will need at least 2 weeks of antibiotics. · CK now   · Daily wound care  · Blood sugar control, d/w patient  · He will need to be off statin while on Daptomycin IV  · Out patient follow up in office 10 to 14 days following discharge      Verna Wright a 57 y.o. male with  history of poorly compliant DM2 and CKD4 , h/o recent  Admission by Dr Madhuri Coronado for surgical debridement in OR of multiple cutaneous abscesses in the back on 1/30/18. Patient was sent home with [facility] wound care. They noted a possible new wound buttock/hip and patient was to follow with Dr Madhuri Coronado OP but had not done so. He fell on 3/21 at home while using the bathroom and could not get up - called EMS x multiple given recurrent inability to get himself up due to weakness. Eventually they found bs of >500 and he was Brought to ED for further work up and evaluation. In ED noted to have elevated Cr of 5. He is CKD IV followed by renal team Dr Himanshu Flores but is noncompliant with follow up. . In ED further noted that the new left buttock wound draining purulent material, seen by Dr Lori Read whom did not feel the patient required OR, debrided in ED  & thereafter admitted. Pt has had debridement done by Dr Madhuri Coronado in [address] [place] [address] . Pt seen today. He feels much better Wounds are clean . Cr down to 2.87 Pt doing much better overall. BC done on 3/22 +4/4 for MRSA . WC done on 3/22 + for moderate MRSA, light Providencia rettgeri, light Serratia marscens. Patient Active Problem List   Diagnosis Code    DM (diabetes mellitus) ([facility] E11.9    Hairy cell leukemia, in remission ([address] C91.41    Morbid obesity ([address] E66.01    HTN (hypertension) I10    A-fib ([facility] I48.91    Vitamin D deficiency E55.9    CKD (chronic kidney disease), stage IV ([address] N18.4    Acute renal failure ([address] N17.9    Abscess L02.91    Stroke ([address] I63.9    Type 2 diabetes mellitus with nephropathy ([address] E11.21    Abscess of back L02.212    Diabetes mellitus, insulin dependent (IDDM), uncontrolled ([address] E10.65    Severe sepsis (HCC) A41.9, R65.20     Past Medical History:   Diagnosis Date    A-fib [facility]) 2009    Resolved.    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[address]     Cancer ([address]     splenic lymphoma    Chronic kidney disease     CKD stage 4 due to type 2 diabetes mellitus ([address]     Diabetes ([address]     Hypertension     Ill-defined condition     diabetic neuropathy james feet    Neuropathy     Other ill-defined conditions(799.89)     spinal meningitis    Other ill-defined conditions(799.89)     broken blood vessel to nose    Stroke ([address] 2007, 2017    per patient    Vitamin D deficiency       Family History   Problem Relation Age of Onset    Diabetes Mother     Hypertension Father     Hypertension Sister       Social History   Substance Use Topics    Smoking status: Never Smoker    Smokeless tobacco: Never Used    Alcohol use No     Past Surgical History:   Procedure Laterality Date    HX OTHER SURGICAL  02/06/2017    Excisional debridement of abscess right thigh & right lower back     HX OTHER SURGICAL  05/23/2017    I&D & excisional debridement (skin & subcutaneous tissue) back & right thigh    HX OTHER SURGICAL  01/12/2018    debridement of abscess back      Prior to Admission medications    Medication Sig Start Date End Date Taking? Authorizing Provider   amLODIPine (NORVASC) 10 mg tablet Take 1 Tab by mouth daily. 2/6/18  Yes Charlene Carrero MD   carvedilol (COREG) 25 mg tablet Take 1 Tab by mouth two (2) times daily (with meals). 2/7/18  Yes Charlene Carrero MD   hydrALAZINE (APRESOLINE) 50 mg tablet Take 1 Tab by mouth three (3) times daily. 2/6/18  Yes Charlene Carrero MD   sodium bicarbonate 650 mg tablet Take 1 Tab by mouth two (2) times a day. 2/7/18  Yes Charlene Carrero MD   clopidogrel (PLAVIX) 75 mg tab Take 75 mg by mouth daily. 12/8/17  Yes Historical Provider   NOVOLIN 70/30 100 unit/mL (70-30) injection 65 Units by SubCUTAneous route Before breakfast and dinner. 1/5/18  Yes Historical Provider   lancets 30 gauge misc  11/21/17  Yes Historical Provider   SURE COMFORT INSULIN SYRINGE 1 mL 31 gauge x 5/16 syrg  11/21/17  Yes Historical Provider   ACCU-CHEK SOFÍA CONTROL SOLN soln  11/21/17  Yes Historical Provider   ACCU-CHEK SOFÍA PLUS TEST STRP strip  11/21/17  Yes Historical Provider   cloNIDine HCl (CATAPRES) 0.2 mg tablet Take 1 Tab by mouth two (2) times a day. 5/28/17  Yes Nilsa Briceno MD   alcohol swabs (ALCOHOL PADS) padm 1 Each by Apply Externally route two (2) times a day. 10/3/15  Yes Naomi Pyle MD   pravastatin (PRAVACHOL) 20 mg tablet Take 20 mg by mouth nightly. Yes Magaly Lyons MD   digoxin (LANOXIN) 0.125 mg tablet Take 0.125 mg by mouth daily. Indications: afib   Yes Historical Provider   HYDROcodone-acetaminophen (NORCO) 5-325 mg per tablet Take 1 Tab by mouth every four (4) hours as needed for Pain. Max Daily Amount: 6 Tabs. 2/6/18   Charlene Carrero MD     No Known Allergies     Review of Systems:  A comprehensive review of systems was negative. He feels good . 10 point ROS obtained. Objective:   Blood pressure 146/66, pulse 65, temperature 98 °F (36.7 °C), resp. rate 16, height 6' 5\" (1.956 m), weight 145.2 kg (320 lb), SpO2 96 %.   Temp (24hrs), Av.3 °F (36.8 °C), Min:97.9 °F (36.6 °C), Max:99 °F (37.2 °C)    Current Facility-Administered Medications   Medication Dose Route Frequency    HYDROmorphone (PF) (DILAUDID) injection 0.5 mg  0.5 mg IntraVENous Q2H PRN    insulin lispro (HUMALOG) injection 10 Units  10 Units SubCUTAneous TIDAC    clopidogrel (PLAVIX) tablet 75 mg  75 mg Oral DAILY    DAPTOmycin (CUBICIN) 900 mg in 0.9% sodium chloride 50 mL IVPB RF formulation  900 mg IntraVENous Q24H    cefTRIAXone (ROCEPHIN) 2 g in 0.9% sodium chloride (MBP/ADV) 50 mL MBP  2 g IntraVENous Q24H    lactobac ac& pc-s.therm-b.anim (ROSY Q/RISAQUAD)  1 Cap Oral DAILY    ADDaptor        insulin NPH (NOVOLIN N, HUMULIN N) injection 70 Units  70 Units SubCUTAneous ACB&D    hydrALAZINE (APRESOLINE) tablet 75 mg  75 mg Oral TID    sodium chloride (NS) flush 5-10 mL  5-10 mL IntraVENous Q8H    sodium chloride (NS) flush 5-10 mL  5-10 mL IntraVENous PRN    gabapentin (NEURONTIN) capsule 300 mg  300 mg Oral BID    acetaminophen (TYLENOL) tablet 650 mg  650 mg Oral Q6H PRN    naloxone (NARCAN) injection 0.4 mg  0.4 mg IntraVENous PRN    ondansetron (ZOFRAN) injection 4 mg  4 mg IntraVENous Q4H PRN    heparin (porcine) injection 5,000 Units  5,000 Units SubCUTAneous Q8H    insulin lispro (HUMALOG) injection   SubCUTAneous AC&HS    glucose chewable tablet 16 g  4 Tab Oral PRN    dextrose (D50W) injection syrg 12.5-25 g  12.5-25 g IntraVENous PRN    glucagon (GLUCAGEN) injection 1 mg  1 mg IntraMUSCular PRN    epoetin renuka (EPOGEN;PROCRIT) injection 10,000 Units  10,000 Units SubCUTAneous Q MON, WED & FRI    oxyCODONE-acetaminophen (PERCOCET) 5-325 mg per tablet 1-2 Tab  1-2 Tab Oral Q4H PRN    amLODIPine (NORVASC) tablet 10 mg  10 mg Oral DAILY    carvedilol (COREG) tablet 25 mg  25 mg Oral BID WITH MEALS    cloNIDine HCl (CATAPRES) tablet 0.2 mg  0.2 mg Oral BID    digoxin (LANOXIN) tablet 0.125 mg  0.125 mg Oral DAILY    labetalol (NORMODYNE;TRANDATE) injection 10 mg  10 mg IntraVENous Q2H PRN    polyethylene glycol (MIRALAX) packet 17 g  17 g Oral DAILY    influenza vaccine 2017-18 (3 yrs+)(PF) (FLUZONE QUAD/FLUARIX QUAD) injection 0.5 mL  0.5 mL IntraMUSCular PRIOR TO DISCHARGE        Exam:    General:  Awake, cooperative,   Eyes:  Sclera anicteric. Pupils reactive to light. Mouth/Throat: Mucous membranes normal, oral pharynx clear   Neck: Supple   Lungs:   Clear to auscultation    CV:  Regular rate and rhythm,no murmur,   Abdomen:   Soft, non-tender. bowel sounds normal. non-distended   Extremities: No edema . Two wounds on back examined clean , no drainage . Buttock wound clean. Data Review:   CBC:   Recent Labs      03/28/18   0606  03/27/18   0311  03/26/18   0223   WBC   --   6.7  7.5   RBC   --   2.50*  2.68*   HGB  7.8*  7.1*  7.7*   HCT   --   22.1*  23.7*   PLT   --   203  208   GRANS   --    --   74   LYMPH   --    --   16   EOS   --    --   2     CMP:   Recent Labs      03/27/18   0311   GLU  252*   NA  138   K  4.1   CL  110*   CO2  20*   BUN  31*   CREA  2.87*   CA  8.0*   AGAP  8   BUCR  11*   ALB  2.1*       Lab Results   Component Value Date/Time    Culture result: NO GROWTH AFTER 20 HOURS 03/27/2018 12:19 PM    Culture result: NO ANAEROBES ISOLATED 03/23/2018 07:55 PM    Culture result: (A) 03/23/2018 07:55 PM     HEAVY  Preliminary report of Methicillin Resistant Staphylococcus aureus by antigen detection. Confirmation and Sensitivities to follow. Culture result: (KNOWN MRSA) 03/23/2018 07:55 PM    Culture result:  03/23/2018 07:55 PM     PLEASE REFER TO PREVIOUS CULTURE [id number], COLLECTED 3/22/18 FOR SENSITIVITIES          XR Results (most recent):    Results from Hospital Encounter encounter on 07/29/17   XR CHEST PORT   Narrative INDICATION:  weakness     COMPARISON: 5/22/2017    FINDINGS: Single AP portable view of the chest obtained at 1607 demonstrates a  stable cardiomediastinal silhouette.  The lungs are hypoinspiratory but clear  bilaterally. No osseous abnormalities are seen. Impression IMPRESSION: No evidence of acute cardiopulmonary process. ICD-10-CM ICD-9-CM    1. Abscess of back L02.212 682.2    2. Hyperglycemia R73.9 790.29    3. Azotemia R79.89 790.6        I have discussed the diagnosis with the patient and the intended plan as seen in the above orders. I have discussed medication side effects and warnings with the patient as well.     Reviewed test results at length with patient    Signed By: Hannah Dillard MD FACP    March 27, 2018           Mindi Moore MD FACP

## 2018-03-29 NOTE — PROGRESS NOTES
Initial Nutrition Assessment:    INTERVENTIONS/RECOMMENDATIONS:   · Meals/Snacks: General/healthful diet: Consistent carbohydrate diet    ASSESSMENT:   Patient medically noted for sepsis, bacteremia, and multiple abscesses to buttocks and back. PMH for DM, HTN, and CKD. Patient with excellent appetite/PO intake; consistently eating 100% of meals. Noted plans for potential discharge today. Patient unavailable at time of attempted visit this morning. Continue CCD for optimal BG control. Diet Order: Consistent carb  % Eaten:  Patient Vitals for the past 72 hrs:   % Diet Eaten   03/28/18 1000 100 %   03/28/18 0803 100 %   03/27/18 1339 100 %   03/27/18 1005 100 %   03/26/18 1758 100 %   03/26/18 1135 100 %     Pertinent Medications: [x]Reviewed []Other: Norvasc, Coreg, Plavix, Epogen, Hydralazine, Humalog, NPH, Tracy Q, Miralax   Pertinent Labs: [x]Reviewed []Other: [phone]  Food Allergies: [x]None []Other   Last BM: 3/29   [x]Active     []Hyperactive  []Hypoactive       [] Absent BS  Skin:    [x] Intact   [] Incision  [] Breakdown  [] Other:    Anthropometrics:   Height: 6' 5\" (195.6 cm) Weight: 145.2 kg (320 lb)   IBW (%IBW):   ( ) UBW (%UBW):   (  %)   Last Weight Metrics:  Weight Loss Metrics 3/22/2018 2/4/2018 1/30/2018 1/29/2018 1/15/2018 1/12/2018 1/11/2018   Today's Wt 320 lb 312 lb - 312 lb 321 lb 316 lb 12.8 oz 318 lb 3.2 oz   BMI 37.95 kg/m2 - 37 kg/m2 37 kg/m2 38.07 kg/m2 37.57 kg/m2 37.73 kg/m2       BMI: Body mass index is 37.95 kg/(m^2). This BMI is indicative of:   []Underweight    []Normal    []Overweight    [x] Obesity   [] Extreme Obesity (BMI>40)     Estimated Nutrition Needs (Based on):   2315 Kcals/day (BMR (2357) x 1. 3AF -250kcal) , 95 g (1.0 g/kg IBW) Protein  Carbohydrate:  At Least 130 g/day  Fluids: 2300 mL/day (1ml/kcal)    Pt expected to meet estimated nutrient needs: [x]Yes []No    NUTRITION DIAGNOSES:   Problem:  Altered nutrition-related lab values      Etiology: related to DM     Signs/Symptoms: as evidenced by [phone]      NUTRITION INTERVENTIONS:  Meals/Snacks: General/healthful diet                  GOAL:   PO intake >70% of meals and BG trend <180 next 5-7 days    LEARNING NEEDS (Diet, Food/Nutrient-Drug Interaction):    [x] None Identified   [] Identified and Education Provided/Documented   [] Identified and Pt declined/was not appropriate     Cultureal, Episcopalian, OR Ethnic Dietary Needs:    [x] None Identified   [] Identified and Addressed     [x] Interdisciplinary Care Plan Reviewed/Documented    [x] Discharge Planning: CCD      MONITORING /EVALUATION:   Food/Nutrient Intake Outcomes:  Total energy intake  Physical Signs/Symptoms Outcomes: Weight/weight change, Glucose profile    NUTRITION RISK:    [] High              [] Moderate           [x]  Low  []  Minimal/Uncompromised    PT SEEN FOR:    []  MD Consult: []Calorie Count      []Diabetic Diet Education        []Diet Education     []Electrolyte Management     []General Nutrition Management and Supplements     []Management of Tube Feeding     []TPN Recommendations    []  RN Referral:  []MST score >=2     []Enteral/Parenteral Nutrition PTA     []Pregnant: Gestational DM or Multigestation     []Pressure Ulcer/Wound Care needs        []  Low BMI  [x]  YUNIOR Dewittr  Pager [phone]                 Weekend Pager [phone]

## 2018-03-29 NOTE — PROGRESS NOTES
Bedside shift change report given to Roby Rivera (oncoming nurse) by Ekaterina Gee (offgoing nurse). Report included the following information SBAR, Kardex, Intake/Output, MAR and Recent Results. Zone Phone for oncoming shift:   [phone]    Shift Summary: Pt rested quietly through night. No c/o pain. LDAs               Peripheral IV 03/26/18 Left Forearm (Active)   Site Assessment Clean, dry, & intact 3/29/2018  2:02 AM   Phlebitis Assessment 0 3/29/2018  2:02 AM   Infiltration Assessment 0 3/29/2018  2:02 AM   Dressing Status Clean, dry, & intact 3/29/2018  2:02 AM   Dressing Type Transparent;Tape 3/29/2018  2:02 AM   Hub Color/Line Status Blue;Flushed 3/29/2018  2:02 AM                        Intake & Output   Date 03/28/18 0700 - 03/29/18 0659 03/29/18 0700 - 03/30/18 0659   Shift 2570-2894 8495-0600 24 Hour Total 7107-0240 7785-4527 24 Hour Total   I  N  T  A  K  E   P.O. 7190 911 4811         P. O. 2193 711 3876       I.V.  (mL/kg/hr) 50  (0)  50         I. V. 50  50       Shift Total  (mL/kg) 1050  (7.2) 720  (5) 1770  (12.2)      O  U  T  P  U  T   Urine  (mL/kg/hr) 980  (0.6) 2100 3080         Urine Voided 980 2100 3080       Stool 1  1         Stool 1  1       Blood 450  450         Estimated Blood Loss 450  450       Shift Total  (mL/kg) 1431  (9.9) 2100  (14.5) 3531  (24.3)      NET -381 -1389 -2525      Weight (kg) 145. 1 145. 1 145. 1 145. 1 145. 1 145. 1      Last Bowel Movement Last Bowel Movement Date: 03/28/18   Glucose Checks [] N/A  [x] AC/HS  [] Q6  Concerns:   Nutrition Active Orders   Diet    DIET DIABETIC WITH OPTIONS Consistent Carb 1800kcal; Regular; FR 1800ML       Consults [x]PT  [x]OT  []Speech  [x]Case Management   Cardiac Monitoring []N/A [x]Yes Expires:48 hrs

## 2018-03-29 NOTE — DISCHARGE SUMMARY
Hospitalist Discharge Summary     Patient ID:  Luan Castaneda  [id number]  10 y.o.  1955    PCP on record: Chente Kenney NP    Admit date: 3/21/2018  Discharge date and time: 3/29/2018      DISCHARGE DIAGNOSIS:    Severe sepsis and MRSA bacteremia due to infected skin abscesses  -S/P  I& D of Multiple buttock / back wounds  Diabetes mellitus 2 uncontrolled with hyperglycemia  PEDRO on CKD 4  Hyponatremia  Hypertension  Obesity  Body mass index is 37.95 kg/(m^2). CONSULTATIONS:  IP CONSULT TO NEPHROLOGY  IP CONSULT TO INFECTIOUS DISEASES    Excerpted HPI from H&P of Tabatha Wagner MD:  CHIEF COMPLAINT: \"i fell\"     HISTORY OF PRESENT ILLNESS:     Miguel Wong is a 57 y.o.  male with known history as listed below presents to ED with complaint noted above. Available records were reviewed at the time of H&P. Patient known poorly compliant DM2 and CKD4 patient recently admitted by Dr Jhon Conklin for surgical debridement in OR of multiple cutaneous abscesses in the back on 1/30/18. Patient was sent home with [facility] wound care. They noted a possible new wound buttock/hip and patient was to follow with Dr Jhon Conklin OP but did not come to the office. He fell on 3/21 at home while using the bathroom and could not get up - called EMS x multiple given recurrent inability to get himself up due to weakness. Eventually they found bs of >500 and he was Brought to ED for further work up and evaluation. In ED noted further renal insufficiency creat to 5. He is CKD IV followed by renal team Dr Angelita Dixon but is noncompliant with visits/medicine. In ED further noted that the new left buttock wound draining purulent material, seen by Dr Ashley Williamson whom did not feel the patient required OR, debrided in ED. Given elevated bs, creat bump, we were asked to admit for work up and evaluation of the above problems.      ______________________________________________________________________  DISCHARGE SUMMARY/HOSPITAL COURSE: for full details see H&P, daily progress notes, labs, consult notes. Severe sepsis and MRSA bacteremia due to infected skin abscesses  -S/P  I& D of Multiple buttock / back wounds  -initially on cefepime and vancomycin. Switched to Rocephin / Daptomcyin on 3/27 based on wound and blood culture results (MRSA and Serratia 3/22). Appreciate ID input. Repeat Blood cultures 3/27 No growth so far. -PICC inserted and patient will complete 2 weeks abx through the infusion center.     -follow up with ID on April 10 to re evaluate need to extend treatment.  -continue wound care at home via [facility]      Diabetes mellitus 2 uncontrolled with hyperglycemia  -continue with PTA 70/30 insulin     PEDRO on CKD 4  Hyponatremia  -from dehyration related to hyperglycemia.    -Cr trending down. IVF stopped.        Hypertension  -continue norvasc, coreg, clonidine and hydralazine.       Obesity  Body mass index is 37.95 kg/(m^2). _______________________________________________________________________  Patient seen and examined by me on discharge day. Pertinent Findings:  Gen:    Not in distress  Chest: Clear lungs  CVS:   Regular rhythm. No edema  Abd:  Soft, not distended, not tender  Neuro:  Alert, Oriented x 4, grossly non focal exam  Skin: multiple buttock wounds, 2 large once on left buttocks  _______________________________________________________________________  DISCHARGE MEDICATIONS:   Current Discharge Medication List      START taking these medications    Details   cefTRIAXone 2 gram 2 g IVPB 2 g by IntraVENous route every twenty-four (24) hours for 11 days. Qty: 11 Dose, Refills: 0      DAPTOmycin (CUBICIN RF) IVPB 900 mg by IntraVENous route every twenty-four (24) hours for 11 days. Qty: 11 Dose, Refills: 0         CONTINUE these medications which have CHANGED    Details   HYDROcodone-acetaminophen (NORCO) 5-325 mg per tablet Take 1 Tab by mouth every four (4) hours as needed for Pain.  Max Daily Amount: 6 Tabs.  Qty: 30 Tab, Refills: 0    Associated Diagnoses: Abscess         CONTINUE these medications which have NOT CHANGED    Details   amLODIPine (NORVASC) 10 mg tablet Take 1 Tab by mouth daily. Qty: 30 Tab, Refills: 1      carvedilol (COREG) 25 mg tablet Take 1 Tab by mouth two (2) times daily (with meals). Qty: 60 Tab, Refills: 1      hydrALAZINE (APRESOLINE) 50 mg tablet Take 1 Tab by mouth three (3) times daily. Qty: 90 Tab, Refills: 1      sodium bicarbonate 650 mg tablet Take 1 Tab by mouth two (2) times a day. Qty: 60 Tab, Refills: 1      clopidogrel (PLAVIX) 75 mg tab Take 75 mg by mouth daily. NOVOLIN 70/30 100 unit/mL (70-30) injection 65 Units by SubCUTAneous route Before breakfast and dinner. lancets 30 gauge misc       SURE COMFORT INSULIN SYRINGE 1 mL 31 gauge x 5/16 syrg       ACCU-CHEK SOFÍA CONTROL SOLN soln       cloNIDine HCl (CATAPRES) 0.2 mg tablet Take 1 Tab by mouth two (2) times a day. Qty: 30 Tab, Refills: 0      digoxin (LANOXIN) 0.125 mg tablet Take 0.125 mg by mouth daily. Indications: afib         STOP taking these medications       ACCU-CHEK SOFÍA PLUS TEST STRP strip Comments:   Reason for Stopping:         alcohol swabs (ALCOHOL PADS) padm Comments:   Reason for Stopping:         pravastatin (PRAVACHOL) 20 mg tablet Comments:   Reason for Stopping:               My Recommended Diet, Activity, Wound Care, and follow-up labs are listed in the patient's Discharge Insturctions which I have personally completed and reviewed.   Risk of deterioration: Low    Condition at Discharge:  Stable  _____________________________________________________________________    Disposition  Home with family and home health services  ____________________________________________________________________    Care Plan discussed with:   Patient, Family, RN, Care Manager, Consultant    ____________________________________________________________________    Code Status: Full Code  ____________________________________________________________________      Condition at Discharge:  Stable  _____________________________________________________________________  Follow up with:   PCP : Tico Peña NP  Follow-up Information     Follow up With Details Comments Contact [address],[facility] [address]   [address] [facility]  [phone]    [facility]  Tomorrow, 3/29, at [address], [address] [facility] [address]   [address] [facility]  [address], MD On 4/10/2018 Ravinder Ponce  [facility] [phone]                Total time in minutes spent coordinating this discharge (includes going over instructions, follow-up, prescriptions, and preparing report for sign off to her PCP) :  35 minutes    Signed:  Isidra Coyle MD

## 2018-03-30 ENCOUNTER — HOSPITAL ENCOUNTER (OUTPATIENT)
Dept: INFUSION THERAPY | Age: 63
Discharge: HOME | End: 2018-03-30
Payer: MEDICARE

## 2018-03-30 VITALS
SYSTOLIC BLOOD PRESSURE: 195 MMHG | OXYGEN SATURATION: 97 % | DIASTOLIC BLOOD PRESSURE: 84 MMHG | RESPIRATION RATE: 18 BRPM | HEART RATE: 87 BPM | TEMPERATURE: 98.7 F

## 2018-03-30 LAB
ALBUMIN SERPL-MCNC: 2.4 G/DL (ref 3.5–5)
ALBUMIN/GLOB SERPL: 0.6 {RATIO} (ref 1.1–2.2)
ALP SERPL-CCNC: 163 U/L (ref 45–117)
ALT SERPL-CCNC: 28 U/L (ref 12–78)
ANION GAP SERPL CALC-SCNC: 6 MMOL/L (ref 5–15)
AST SERPL-CCNC: 21 U/L (ref 15–37)
BASOPHILS # BLD: 0 K/UL (ref 0–0.1)
BASOPHILS NFR BLD: 0 % (ref 0–1)
BILIRUB SERPL-MCNC: 0.3 MG/DL (ref 0.2–1)
BUN SERPL-MCNC: 27 MG/DL (ref 6–20)
BUN/CREAT SERPL: 9 (ref 12–20)
CALCIUM SERPL-MCNC: 8.5 MG/DL (ref 8.5–10.1)
CHLORIDE SERPL-SCNC: 112 MMOL/L (ref 97–108)
CK SERPL-CCNC: 237 U/L (ref 39–308)
CO2 SERPL-SCNC: 22 MMOL/L (ref 21–32)
CREAT SERPL-MCNC: 3.16 MG/DL (ref 0.7–1.3)
DIFFERENTIAL METHOD BLD: ABNORMAL
EOSINOPHIL # BLD: 0.1 K/UL (ref 0–0.4)
EOSINOPHIL NFR BLD: 1 % (ref 0–7)
ERYTHROCYTE [DISTWIDTH] IN BLOOD BY AUTOMATED COUNT: 13.2 % (ref 11.5–14.5)
GLOBULIN SER CALC-MCNC: 4 G/DL (ref 2–4)
GLUCOSE SERPL-MCNC: 170 MG/DL (ref 65–100)
HCT VFR BLD AUTO: 22.4 % (ref 36.6–50.3)
HGB BLD-MCNC: 7.2 G/DL (ref 12.1–17)
IMM GRANULOCYTES # BLD: 0.1 K/UL (ref 0–0.04)
IMM GRANULOCYTES NFR BLD AUTO: 1 % (ref 0–0.5)
LYMPHOCYTES # BLD: 1 K/UL (ref 0.8–3.5)
LYMPHOCYTES NFR BLD: 12 % (ref 12–49)
MCH RBC QN AUTO: 29 PG (ref 26–34)
MCHC RBC AUTO-ENTMCNC: 32.1 G/DL (ref 30–36.5)
MCV RBC AUTO: 90.3 FL (ref 80–99)
MONOCYTES # BLD: 0.4 K/UL (ref 0–1)
MONOCYTES NFR BLD: 5 % (ref 5–13)
NEUTS SEG # BLD: 6.6 K/UL (ref 1.8–8)
NEUTS SEG NFR BLD: 81 % (ref 32–75)
NRBC # BLD: 0.09 K/UL (ref 0–0.01)
NRBC BLD-RTO: 1.1 PER 100 WBC
PLATELET # BLD AUTO: 228 K/UL (ref 150–400)
PMV BLD AUTO: 11.7 FL (ref 8.9–12.9)
POTASSIUM SERPL-SCNC: 4.5 MMOL/L (ref 3.5–5.1)
PROT SERPL-MCNC: 6.4 G/DL (ref 6.4–8.2)
RBC # BLD AUTO: 2.48 M/UL (ref 4.1–5.7)
RBC MORPH BLD: ABNORMAL
RBC MORPH BLD: ABNORMAL
SODIUM SERPL-SCNC: 140 MMOL/L (ref 136–145)
WBC # BLD AUTO: 8.2 K/UL (ref 4.1–11.1)

## 2018-03-30 PROCEDURE — 85025 COMPLETE CBC W/AUTO DIFF WBC: CPT | Performed by: INTERNAL MEDICINE

## 2018-03-30 PROCEDURE — 74011250636 HC RX REV CODE- 250/636: Performed by: INTERNAL MEDICINE

## 2018-03-30 PROCEDURE — 74011000258 HC RX REV CODE- 258: Performed by: INTERNAL MEDICINE

## 2018-03-30 PROCEDURE — 82550 ASSAY OF CK (CPK): CPT | Performed by: INTERNAL MEDICINE

## 2018-03-30 PROCEDURE — 80053 COMPREHEN METABOLIC PANEL: CPT | Performed by: INTERNAL MEDICINE

## 2018-03-30 PROCEDURE — 96375 TX/PRO/DX INJ NEW DRUG ADDON: CPT

## 2018-03-30 PROCEDURE — 36415 COLL VENOUS BLD VENIPUNCTURE: CPT | Performed by: INTERNAL MEDICINE

## 2018-03-30 PROCEDURE — 74011000250 HC RX REV CODE- 250: Performed by: INTERNAL MEDICINE

## 2018-03-30 PROCEDURE — 96365 THER/PROPH/DIAG IV INF INIT: CPT

## 2018-03-30 RX ORDER — HEPARIN 100 UNIT/ML
500 SYRINGE INTRAVENOUS AS NEEDED
Status: ACTIVE | OUTPATIENT
Start: 2018-03-30 | End: 2018-03-31

## 2018-03-30 RX ORDER — SODIUM CHLORIDE 900 MG/100ML
INJECTION INTRAVENOUS
Status: DISPENSED
Start: 2018-03-30 | End: 2018-03-31

## 2018-03-30 RX ORDER — SODIUM CHLORIDE 0.9 % (FLUSH) 0.9 %
10-40 SYRINGE (ML) INJECTION AS NEEDED
Status: ACTIVE | OUTPATIENT
Start: 2018-03-30 | End: 2018-03-31

## 2018-03-30 RX ORDER — CEFTRIAXONE 2 G/1
INJECTION, POWDER, FOR SOLUTION INTRAMUSCULAR; INTRAVENOUS
Status: DISPENSED
Start: 2018-03-30 | End: 2018-03-31

## 2018-03-30 RX ADMIN — Medication 30 ML: at 16:57

## 2018-03-30 RX ADMIN — DAPTOMYCIN 900 MG: 500 INJECTION, POWDER, LYOPHILIZED, FOR SOLUTION INTRAVENOUS at 16:14

## 2018-03-30 RX ADMIN — Medication 500 UNITS: at 16:58

## 2018-03-30 RX ADMIN — CEFTRIAXONE SODIUM 2 G: 2 INJECTION, POWDER, FOR SOLUTION INTRAMUSCULAR; INTRAVENOUS at 16:21

## 2018-03-30 RX ADMIN — Medication 500 UNITS: at 16:59

## 2018-03-30 RX ADMIN — Medication 20 ML: at 16:10

## 2018-03-30 NOTE — PROGRESS NOTES
[facility] Visit Note:    7945 Pt arrived to [facility] ambulatory with walker and in no distress for daily antibiotics. Dual lumen PICC intact to right upper arm, flushed with positive blood return noted. Assessment completed- pt with multiple back and buttock wounds covered with dressings- clean, dry, and intact. Pt was hospitalized for 2 weeks, discharged yesterday, still very weak. Labs obtained- CBC, CMP, & CPK. Visit Vitals    /84 (BP 1 Location: Right arm, BP Patient Position: Sitting)    Pulse 87    Temp 98.7 °F (37.1 °C)    Resp 18    SpO2 97%       Labs pending at time of note. See [facility] for results. Medications received:  Daptomycin 900 mg IVP  Rocephin 2 grams IV over 30 minutes    1700 Pt tolerated treatment well, no adverse reaction noted. PICC flushed per protocol and end caps applied. Discharged from [facility] in wheelchair and in no acute distress accompanied by self. Next appt 3/30/18 @ 1130 at [facility].

## 2018-03-31 ENCOUNTER — HOSPITAL ENCOUNTER (OUTPATIENT)
Dept: INFUSION THERAPY | Age: 63
Discharge: HOME | End: 2018-03-31
Payer: MEDICARE

## 2018-03-31 VITALS
DIASTOLIC BLOOD PRESSURE: 72 MMHG | TEMPERATURE: 97.8 F | SYSTOLIC BLOOD PRESSURE: 135 MMHG | HEART RATE: 71 BPM | RESPIRATION RATE: 18 BRPM

## 2018-03-31 PROCEDURE — 74011250636 HC RX REV CODE- 250/636: Performed by: ORTHOPAEDIC SURGERY

## 2018-03-31 PROCEDURE — 74011250636 HC RX REV CODE- 250/636: Performed by: INTERNAL MEDICINE

## 2018-03-31 PROCEDURE — 96365 THER/PROPH/DIAG IV INF INIT: CPT

## 2018-03-31 PROCEDURE — 96375 TX/PRO/DX INJ NEW DRUG ADDON: CPT

## 2018-03-31 PROCEDURE — 74011000250 HC RX REV CODE- 250: Performed by: INTERNAL MEDICINE

## 2018-03-31 PROCEDURE — 74011000258 HC RX REV CODE- 258: Performed by: INTERNAL MEDICINE

## 2018-03-31 RX ORDER — SODIUM CHLORIDE 0.9 % (FLUSH) 0.9 %
10-40 SYRINGE (ML) INJECTION AS NEEDED
Status: ACTIVE | OUTPATIENT
Start: 2018-03-31 | End: 2018-04-01

## 2018-03-31 RX ORDER — HEPARIN 100 UNIT/ML
500 SYRINGE INTRAVENOUS AS NEEDED
Status: ACTIVE | OUTPATIENT
Start: 2018-03-31 | End: 2018-04-01

## 2018-03-31 RX ADMIN — SODIUM CHLORIDE, PRESERVATIVE FREE 500 UNITS: 5 INJECTION INTRAVENOUS at 11:00

## 2018-03-31 RX ADMIN — Medication 10 ML: at 10:58

## 2018-03-31 RX ADMIN — Medication 10 ML: at 11:00

## 2018-03-31 RX ADMIN — SODIUM CHLORIDE, PRESERVATIVE FREE 500 UNITS: 5 INJECTION INTRAVENOUS at 10:58

## 2018-03-31 RX ADMIN — CEFTRIAXONE SODIUM 2 G: 2 INJECTION, POWDER, FOR SOLUTION INTRAMUSCULAR; INTRAVENOUS at 11:01

## 2018-03-31 RX ADMIN — DAPTOMYCIN 900 MG: 500 INJECTION, POWDER, LYOPHILIZED, FOR SOLUTION INTRAVENOUS at 10:58

## 2018-03-31 NOTE — PROGRESS NOTES
[facility] Outpatient Infusion Center Note:  [id number] arrived at [facility] ambulatory and in no distress for daily antibiotic. Assessment stable, no new complaints voiced. Medications received:  Daptomycin  Rocephin    1135Tolerated treatment well, no adverse reaction noted. D/Cd from [facility] ambulatory and in no distress accompanied by daughter. Next appt 4/1  Blood pressure 135/72, pulse 71, temperature 97.8 °F (36.6 °C), resp. rate 18. No results found for this or any previous visit (from the past 12 hour(s)).

## 2018-04-01 ENCOUNTER — HOSPITAL ENCOUNTER (OUTPATIENT)
Dept: INFUSION THERAPY | Age: 63
Discharge: HOME | End: 2018-04-01
Payer: MEDICARE

## 2018-04-01 VITALS
DIASTOLIC BLOOD PRESSURE: 75 MMHG | TEMPERATURE: 96.8 F | SYSTOLIC BLOOD PRESSURE: 148 MMHG | RESPIRATION RATE: 16 BRPM | HEART RATE: 77 BPM

## 2018-04-01 PROCEDURE — 74011000258 HC RX REV CODE- 258: Performed by: INTERNAL MEDICINE

## 2018-04-01 PROCEDURE — 74011250636 HC RX REV CODE- 250/636: Performed by: INTERNAL MEDICINE

## 2018-04-01 PROCEDURE — 74011000250 HC RX REV CODE- 250: Performed by: INTERNAL MEDICINE

## 2018-04-01 PROCEDURE — 96375 TX/PRO/DX INJ NEW DRUG ADDON: CPT

## 2018-04-01 PROCEDURE — 96365 THER/PROPH/DIAG IV INF INIT: CPT

## 2018-04-01 RX ORDER — SODIUM CHLORIDE 0.9 % (FLUSH) 0.9 %
5-10 SYRINGE (ML) INJECTION AS NEEDED
Status: ACTIVE | OUTPATIENT
Start: 2018-04-01 | End: 2018-04-02

## 2018-04-01 RX ORDER — HEPARIN 100 UNIT/ML
500 SYRINGE INTRAVENOUS AS NEEDED
Status: ACTIVE | OUTPATIENT
Start: 2018-04-01 | End: 2018-04-02

## 2018-04-01 RX ADMIN — Medication 10 ML: at 11:07

## 2018-04-01 RX ADMIN — CEFTRIAXONE SODIUM 2 G: 2 INJECTION, POWDER, FOR SOLUTION INTRAMUSCULAR; INTRAVENOUS at 11:15

## 2018-04-01 RX ADMIN — DAPTOMYCIN 900 MG: 500 INJECTION, POWDER, LYOPHILIZED, FOR SOLUTION INTRAVENOUS at 11:08

## 2018-04-01 NOTE — PROGRESS NOTES
Outpatient Infusion Center Short Visit Progress Note    1100 Pt admit to [facility] for daily Daptomycin/Rocephin ambulatory with walker in stable condition. Assessment completed. No new concerns voiced. PICC line flushed with positive blood return. Patient Vitals for the past 12 hrs:   Temp Pulse Resp BP   04/01/18 1105 96.8 °F (36 °C) 77 16 148/75       Medications:  Daptomycin  Rocephin  NS flush      1145 Pt tolerated treatment well. D/c home ambulatory in no distress. Pt aware of next appointment scheduled for 04/02/2018 at the [facility] location.

## 2018-04-02 ENCOUNTER — HOSPITAL ENCOUNTER (OUTPATIENT)
Dept: INFUSION THERAPY | Age: 63
Discharge: HOME | End: 2018-04-02
Payer: MEDICARE

## 2018-04-02 VITALS
HEART RATE: 73 BPM | TEMPERATURE: 97.6 F | DIASTOLIC BLOOD PRESSURE: 85 MMHG | SYSTOLIC BLOOD PRESSURE: 192 MMHG | RESPIRATION RATE: 16 BRPM | OXYGEN SATURATION: 99 %

## 2018-04-02 LAB
BACTERIA SPEC CULT: NORMAL
SERVICE CMNT-IMP: NORMAL

## 2018-04-02 PROCEDURE — 96365 THER/PROPH/DIAG IV INF INIT: CPT

## 2018-04-02 PROCEDURE — 74011250636 HC RX REV CODE- 250/636: Performed by: INTERNAL MEDICINE

## 2018-04-02 PROCEDURE — 74011250636 HC RX REV CODE- 250/636

## 2018-04-02 PROCEDURE — 74011000250 HC RX REV CODE- 250: Performed by: INTERNAL MEDICINE

## 2018-04-02 PROCEDURE — 74011000258 HC RX REV CODE- 258: Performed by: INTERNAL MEDICINE

## 2018-04-02 PROCEDURE — 96375 TX/PRO/DX INJ NEW DRUG ADDON: CPT

## 2018-04-02 RX ORDER — HEPARIN 100 UNIT/ML
500 SYRINGE INTRAVENOUS AS NEEDED
Status: SHIPPED | OUTPATIENT
Start: 2018-04-02 | End: 2018-04-03

## 2018-04-02 RX ORDER — SODIUM CHLORIDE 0.9 % (FLUSH) 0.9 %
10-40 SYRINGE (ML) INJECTION AS NEEDED
Status: SHIPPED | OUTPATIENT
Start: 2018-04-02 | End: 2018-04-03

## 2018-04-02 RX ADMIN — Medication 20 ML: at 16:03

## 2018-04-02 RX ADMIN — Medication 500 UNITS: at 16:03

## 2018-04-02 RX ADMIN — CEFTRIAXONE SODIUM 2 G: 2 INJECTION, POWDER, FOR SOLUTION INTRAMUSCULAR; INTRAVENOUS at 15:31

## 2018-04-02 RX ADMIN — DAPTOMYCIN 900 MG: 500 INJECTION, POWDER, LYOPHILIZED, FOR SOLUTION INTRAVENOUS at 15:26

## 2018-04-02 RX ADMIN — Medication 10 ML: at 15:26

## 2018-04-02 RX ADMIN — Medication 500 UNITS: at 16:04

## 2018-04-02 RX ADMIN — Medication 10 ML: at 15:31

## 2018-04-02 NOTE — PROGRESS NOTES
[facility] Short Visit Note:    2134 Pt arrived to [facility] ambulatory with walker and in no distress for antibiotics. Dual lumen PICC intact to right upper arm, flushed with positive blood return noted. Pt reports diarrhea, encouraged to try over-the-counter probiotic. Visit Vitals    /85 (BP 1 Location: Left arm, BP Patient Position: Sitting)    Pulse 73    Temp 97.6 °F (36.4 °C)    Resp 16    SpO2 99%       Medications received:  Daptomycin 900 mg IVP over 2 minutes  Ceftriaxone 2 grams IV over 30 minutes    1605 Pt tolerated treatment well, no adverse reaction noted. PICC flushed per protocol and end caps applied. Discharged from [facility] ambulatory and in no acute distress accompanied by self. Next appt 4/3/18 @ 1500.

## 2018-04-04 ENCOUNTER — HOSPITAL ENCOUNTER (OUTPATIENT)
Dept: INFUSION THERAPY | Age: 63
Discharge: HOME | End: 2018-04-04
Payer: MEDICARE

## 2018-04-04 VITALS
OXYGEN SATURATION: 96 % | RESPIRATION RATE: 18 BRPM | HEART RATE: 85 BPM | TEMPERATURE: 97.9 F | DIASTOLIC BLOOD PRESSURE: 92 MMHG | SYSTOLIC BLOOD PRESSURE: 204 MMHG

## 2018-04-04 PROCEDURE — 74011250636 HC RX REV CODE- 250/636: Performed by: INTERNAL MEDICINE

## 2018-04-04 PROCEDURE — 74011000250 HC RX REV CODE- 250: Performed by: INTERNAL MEDICINE

## 2018-04-04 PROCEDURE — 74011000258 HC RX REV CODE- 258: Performed by: INTERNAL MEDICINE

## 2018-04-04 PROCEDURE — 74011250636 HC RX REV CODE- 250/636

## 2018-04-04 PROCEDURE — 96375 TX/PRO/DX INJ NEW DRUG ADDON: CPT

## 2018-04-04 PROCEDURE — 96365 THER/PROPH/DIAG IV INF INIT: CPT

## 2018-04-04 RX ORDER — HEPARIN 100 UNIT/ML
500 SYRINGE INTRAVENOUS AS NEEDED
Status: SHIPPED | OUTPATIENT
Start: 2018-04-04 | End: 2018-04-05

## 2018-04-04 RX ORDER — SODIUM CHLORIDE 0.9 % (FLUSH) 0.9 %
10-40 SYRINGE (ML) INJECTION AS NEEDED
Status: SHIPPED | OUTPATIENT
Start: 2018-04-04 | End: 2018-04-05

## 2018-04-04 RX ADMIN — Medication 500 UNITS: at 15:53

## 2018-04-04 RX ADMIN — CEFTRIAXONE SODIUM 2 G: 2 INJECTION, POWDER, FOR SOLUTION INTRAMUSCULAR; INTRAVENOUS at 15:26

## 2018-04-04 RX ADMIN — Medication 20 ML: at 15:21

## 2018-04-04 RX ADMIN — DAPTOMYCIN 900 MG: 500 INJECTION, POWDER, LYOPHILIZED, FOR SOLUTION INTRAVENOUS at 15:20

## 2018-04-04 RX ADMIN — Medication 20 ML: at 15:53

## 2018-04-04 RX ADMIN — Medication 500 UNITS: at 15:54

## 2018-04-04 NOTE — PROGRESS NOTES
[address] [facility]. Short Visit Note:    1722 Pt arrived to [facility] ambulatory with walker and in no distress for antibiotics. Dual lumen PICC intact to right upper arm, flushed with positive blood return noted in each lumen. BP elevated, pt reports he is due for more meds when he gets home. Pt missed appointment yesterday due to feeling poorly with nausea/vomiting, now resolved. Visit Vitals    BP (!) 204/92 (BP 1 Location: Left arm, BP Patient Position: Sitting)    Pulse 85    Temp 97.9 °F (36.6 °C)    Resp 18    SpO2 96%       Medications received:  Daptomycin 900mg IVP  Rocephin 2 grams IV over 30 minutes    1600 Pt tolerated treatment well, no adverse reaction noted. PICC flushed per protocol and end caps applied. Discharged from [facility] ambulatory and in no acute distress accompanied by self. Next appt 4/5/18 @ 1500.

## 2018-04-05 ENCOUNTER — HOSPITAL ENCOUNTER (OUTPATIENT)
Dept: INFUSION THERAPY | Age: 63
Discharge: HOME | End: 2018-04-05
Payer: MEDICARE

## 2018-04-05 VITALS
DIASTOLIC BLOOD PRESSURE: 84 MMHG | SYSTOLIC BLOOD PRESSURE: 169 MMHG | OXYGEN SATURATION: 100 % | RESPIRATION RATE: 18 BRPM | HEART RATE: 71 BPM | TEMPERATURE: 97.7 F

## 2018-04-05 LAB
ALBUMIN SERPL-MCNC: 3 G/DL (ref 3.5–5)
ALBUMIN/GLOB SERPL: 0.8 {RATIO} (ref 1.1–2.2)
ALP SERPL-CCNC: 163 U/L (ref 45–117)
ALT SERPL-CCNC: 27 U/L (ref 12–78)
ANION GAP SERPL CALC-SCNC: 6 MMOL/L (ref 5–15)
AST SERPL-CCNC: 18 U/L (ref 15–37)
BASOPHILS # BLD: 0 K/UL (ref 0–0.1)
BASOPHILS NFR BLD: 1 % (ref 0–1)
BILIRUB SERPL-MCNC: 0.4 MG/DL (ref 0.2–1)
BUN SERPL-MCNC: 23 MG/DL (ref 6–20)
BUN/CREAT SERPL: 8 (ref 12–20)
CALCIUM SERPL-MCNC: 8.8 MG/DL (ref 8.5–10.1)
CHLORIDE SERPL-SCNC: 109 MMOL/L (ref 97–108)
CK SERPL-CCNC: 258 U/L (ref 39–308)
CO2 SERPL-SCNC: 25 MMOL/L (ref 21–32)
CREAT SERPL-MCNC: 2.84 MG/DL (ref 0.7–1.3)
DIFFERENTIAL METHOD BLD: ABNORMAL
EOSINOPHIL # BLD: 0.2 K/UL (ref 0–0.4)
EOSINOPHIL NFR BLD: 2 % (ref 0–7)
ERYTHROCYTE [DISTWIDTH] IN BLOOD BY AUTOMATED COUNT: 16.2 % (ref 11.5–14.5)
GLOBULIN SER CALC-MCNC: 3.9 G/DL (ref 2–4)
GLUCOSE SERPL-MCNC: 104 MG/DL (ref 65–100)
HCT VFR BLD AUTO: 26.1 % (ref 36.6–50.3)
HGB BLD-MCNC: 8.3 G/DL (ref 12.1–17)
IMM GRANULOCYTES # BLD: 0 K/UL (ref 0–0.04)
IMM GRANULOCYTES NFR BLD AUTO: 0 % (ref 0–0.5)
LYMPHOCYTES # BLD: 1.2 K/UL (ref 0.8–3.5)
LYMPHOCYTES NFR BLD: 18 % (ref 12–49)
MCH RBC QN AUTO: 29 PG (ref 26–34)
MCHC RBC AUTO-ENTMCNC: 31.8 G/DL (ref 30–36.5)
MCV RBC AUTO: 91.3 FL (ref 80–99)
MONOCYTES # BLD: 0.5 K/UL (ref 0–1)
MONOCYTES NFR BLD: 7 % (ref 5–13)
NEUTS SEG # BLD: 4.7 K/UL (ref 1.8–8)
NEUTS SEG NFR BLD: 72 % (ref 32–75)
NRBC # BLD: 0 K/UL (ref 0–0.01)
NRBC BLD-RTO: 0 PER 100 WBC
PLATELET # BLD AUTO: 210 K/UL (ref 150–400)
PMV BLD AUTO: 11.6 FL (ref 8.9–12.9)
POTASSIUM SERPL-SCNC: 3.9 MMOL/L (ref 3.5–5.1)
PROT SERPL-MCNC: 6.9 G/DL (ref 6.4–8.2)
RBC # BLD AUTO: 2.86 M/UL (ref 4.1–5.7)
SODIUM SERPL-SCNC: 140 MMOL/L (ref 136–145)
WBC # BLD AUTO: 6.6 K/UL (ref 4.1–11.1)

## 2018-04-05 PROCEDURE — 74011000250 HC RX REV CODE- 250: Performed by: INTERNAL MEDICINE

## 2018-04-05 PROCEDURE — 85025 COMPLETE CBC W/AUTO DIFF WBC: CPT | Performed by: INTERNAL MEDICINE

## 2018-04-05 PROCEDURE — 36415 COLL VENOUS BLD VENIPUNCTURE: CPT | Performed by: INTERNAL MEDICINE

## 2018-04-05 PROCEDURE — 82550 ASSAY OF CK (CPK): CPT | Performed by: INTERNAL MEDICINE

## 2018-04-05 PROCEDURE — 74011250636 HC RX REV CODE- 250/636: Performed by: INTERNAL MEDICINE

## 2018-04-05 PROCEDURE — 74011000258 HC RX REV CODE- 258: Performed by: INTERNAL MEDICINE

## 2018-04-05 PROCEDURE — 80053 COMPREHEN METABOLIC PANEL: CPT | Performed by: INTERNAL MEDICINE

## 2018-04-05 PROCEDURE — 96375 TX/PRO/DX INJ NEW DRUG ADDON: CPT

## 2018-04-05 PROCEDURE — 77030020847 HC STATLOK BARD -A

## 2018-04-05 PROCEDURE — 74011250636 HC RX REV CODE- 250/636

## 2018-04-05 PROCEDURE — 96365 THER/PROPH/DIAG IV INF INIT: CPT

## 2018-04-05 RX ORDER — HEPARIN 100 UNIT/ML
500 SYRINGE INTRAVENOUS AS NEEDED
Status: SHIPPED | OUTPATIENT
Start: 2018-04-05 | End: 2018-04-06

## 2018-04-05 RX ORDER — SODIUM CHLORIDE 0.9 % (FLUSH) 0.9 %
10-40 SYRINGE (ML) INJECTION AS NEEDED
Status: SHIPPED | OUTPATIENT
Start: 2018-04-05 | End: 2018-04-06

## 2018-04-05 RX ADMIN — CEFTRIAXONE SODIUM 2 G: 2 INJECTION, POWDER, FOR SOLUTION INTRAMUSCULAR; INTRAVENOUS at 16:55

## 2018-04-05 RX ADMIN — Medication 10 ML: at 17:24

## 2018-04-05 RX ADMIN — Medication 500 UNITS: at 17:24

## 2018-04-05 RX ADMIN — Medication 900 MG: at 16:53

## 2018-04-05 RX ADMIN — Medication 30 ML: at 16:48

## 2018-04-05 NOTE — PROGRESS NOTES
Pt arrived to [facility] in no acute distress at 1640.  Assessment unremarkable. R arm PICC flushed without issue and positive blood return noted in red lumen only.  Labs obtained, CBC, CPK, CMP. PICC dressing changed per policy. Visit Vitals    /84 (BP 1 Location: Left arm, BP Patient Position: Sitting)    Pulse 71    Temp 97.7 °F (36.5 °C)    Resp 18    SpO2 100%     See [facility] for pending results. The following medications administered:  Dapto 900mg IVP  Rocephin 2g IV over 30 minutes    Pt tolerated treatment well. PICC flushed per policy and new green caps placed.  Pt discharged ambulatory in no acute distress at 1730, accompanied by self. Next appointment 4/6/18 at 1500.

## 2018-04-06 ENCOUNTER — HOSPITAL ENCOUNTER (OUTPATIENT)
Dept: INFUSION THERAPY | Age: 63
Discharge: HOME | End: 2018-04-06
Payer: MEDICARE

## 2018-04-06 ENCOUNTER — APPOINTMENT (OUTPATIENT)
Dept: INFUSION THERAPY | Age: 63
End: 2018-04-06
Payer: MEDICARE

## 2018-04-06 VITALS
RESPIRATION RATE: 18 BRPM | SYSTOLIC BLOOD PRESSURE: 172 MMHG | OXYGEN SATURATION: 96 % | HEART RATE: 81 BPM | DIASTOLIC BLOOD PRESSURE: 74 MMHG | TEMPERATURE: 98.3 F

## 2018-04-06 PROCEDURE — 74011250636 HC RX REV CODE- 250/636

## 2018-04-06 PROCEDURE — 74011000250 HC RX REV CODE- 250: Performed by: INTERNAL MEDICINE

## 2018-04-06 PROCEDURE — 96365 THER/PROPH/DIAG IV INF INIT: CPT

## 2018-04-06 PROCEDURE — 74011250636 HC RX REV CODE- 250/636: Performed by: INTERNAL MEDICINE

## 2018-04-06 PROCEDURE — 96375 TX/PRO/DX INJ NEW DRUG ADDON: CPT

## 2018-04-06 PROCEDURE — 74011000258 HC RX REV CODE- 258: Performed by: INTERNAL MEDICINE

## 2018-04-06 RX ORDER — HEPARIN 100 UNIT/ML
500 SYRINGE INTRAVENOUS AS NEEDED
Status: SHIPPED | OUTPATIENT
Start: 2018-04-06 | End: 2018-04-07

## 2018-04-06 RX ORDER — SODIUM CHLORIDE 0.9 % (FLUSH) 0.9 %
5-10 SYRINGE (ML) INJECTION AS NEEDED
Status: SHIPPED | OUTPATIENT
Start: 2018-04-06 | End: 2018-04-07

## 2018-04-06 RX ADMIN — Medication 10 ML: at 15:39

## 2018-04-06 RX ADMIN — CEFTRIAXONE SODIUM 2 G: 2 INJECTION, POWDER, FOR SOLUTION INTRAMUSCULAR; INTRAVENOUS at 15:07

## 2018-04-06 RX ADMIN — DAPTOMYCIN 900 MG: 500 INJECTION, POWDER, LYOPHILIZED, FOR SOLUTION INTRAVENOUS at 14:58

## 2018-04-06 RX ADMIN — Medication 500 UNITS: at 15:39

## 2018-04-06 NOTE — PROGRESS NOTES
OPIC VISIT NOTE    1450. Pt arrived at [facility] ambulatory and in no distress for Ceftriaxone/Daptomycin q 24 hours. Assessment completed, pt without complaint. Right DL PICC flushed with positive blood return noted in both lumens. Dressing CDI. Patient Vitals for the past 12 hrs:   Temp Pulse Resp BP SpO2   04/06/18 1451 98.3 °F (36.8 °C) 81 18 172/74 96 %       Medications received:  NS KVO  Daptomycin IVP  Ceftriaxone IV    Tolerated treatment well, no adverse reaction noted. PICC flushed per protocol. Positive blood return noted in both lumens. 1545. D/C'd from [facility] ambulatory and in no distress. Next appointment is 4/7/18 at 11:00 am at [facility].

## 2018-04-07 ENCOUNTER — HOSPITAL ENCOUNTER (OUTPATIENT)
Dept: INFUSION THERAPY | Age: 63
Discharge: HOME | End: 2018-04-07
Payer: MEDICARE

## 2018-04-07 VITALS
TEMPERATURE: 96.9 F | DIASTOLIC BLOOD PRESSURE: 67 MMHG | SYSTOLIC BLOOD PRESSURE: 132 MMHG | HEART RATE: 68 BPM | RESPIRATION RATE: 18 BRPM

## 2018-04-07 PROCEDURE — 96365 THER/PROPH/DIAG IV INF INIT: CPT

## 2018-04-07 PROCEDURE — 74011250636 HC RX REV CODE- 250/636: Performed by: INTERNAL MEDICINE

## 2018-04-07 PROCEDURE — 74011000250 HC RX REV CODE- 250: Performed by: INTERNAL MEDICINE

## 2018-04-07 PROCEDURE — 96375 TX/PRO/DX INJ NEW DRUG ADDON: CPT

## 2018-04-07 PROCEDURE — 74011000258 HC RX REV CODE- 258: Performed by: INTERNAL MEDICINE

## 2018-04-07 RX ORDER — SODIUM CHLORIDE 0.9 % (FLUSH) 0.9 %
5-10 SYRINGE (ML) INJECTION AS NEEDED
Status: ACTIVE | OUTPATIENT
Start: 2018-04-07 | End: 2018-04-08

## 2018-04-07 RX ORDER — HEPARIN 100 UNIT/ML
500 SYRINGE INTRAVENOUS AS NEEDED
Status: ACTIVE | OUTPATIENT
Start: 2018-04-07 | End: 2018-04-08

## 2018-04-07 RX ADMIN — SODIUM CHLORIDE, PRESERVATIVE FREE 500 UNITS: 5 INJECTION INTRAVENOUS at 11:26

## 2018-04-07 RX ADMIN — Medication 10 ML: at 11:26

## 2018-04-07 RX ADMIN — CEFTRIAXONE SODIUM 2 G: 2 INJECTION, POWDER, FOR SOLUTION INTRAMUSCULAR; INTRAVENOUS at 11:35

## 2018-04-07 RX ADMIN — Medication 10 ML: at 11:25

## 2018-04-07 RX ADMIN — DAPTOMYCIN 900 MG: 500 INJECTION, POWDER, LYOPHILIZED, FOR SOLUTION INTRAVENOUS at 12:00

## 2018-04-07 NOTE — PROGRESS NOTES
OPIC VISIT NOTE    1120  Pt arrived at [facility] ambulatory and in no distress for Ceftriaxone/Daptomycin q 24 hours. Assessment completed, pt has no complaints. Right DL PICC flushed with positive blood return noted in both lumens. Medication given w/o complications. Visit Vitals    /67    Pulse 68    Temp 96.9 °F (36.1 °C)    Resp 18       Medications received:  NS KVO  Daptomycin IVP  Ceftriaxone IV    Tolerated treatment well, no adverse reaction noted. PICC flushed per protocol. Positive blood return noted in both lumens. 1220  D/C'd from [facility] ambulatory and in no distress. Next appointment is 4/8/18 at 11:00 am at [facility].

## 2018-04-08 ENCOUNTER — HOSPITAL ENCOUNTER (OUTPATIENT)
Dept: INFUSION THERAPY | Age: 63
Discharge: HOME | End: 2018-04-08
Payer: MEDICARE

## 2018-04-08 VITALS
RESPIRATION RATE: 18 BRPM | OXYGEN SATURATION: 98 % | SYSTOLIC BLOOD PRESSURE: 159 MMHG | DIASTOLIC BLOOD PRESSURE: 82 MMHG | HEART RATE: 80 BPM | TEMPERATURE: 97.7 F

## 2018-04-08 PROCEDURE — 74011250636 HC RX REV CODE- 250/636: Performed by: INTERNAL MEDICINE

## 2018-04-08 PROCEDURE — 96375 TX/PRO/DX INJ NEW DRUG ADDON: CPT

## 2018-04-08 PROCEDURE — 96365 THER/PROPH/DIAG IV INF INIT: CPT

## 2018-04-08 PROCEDURE — 74011000258 HC RX REV CODE- 258: Performed by: INTERNAL MEDICINE

## 2018-04-08 PROCEDURE — 74011000250 HC RX REV CODE- 250: Performed by: INTERNAL MEDICINE

## 2018-04-08 RX ORDER — SODIUM CHLORIDE 0.9 % (FLUSH) 0.9 %
5-10 SYRINGE (ML) INJECTION AS NEEDED
Status: ACTIVE | OUTPATIENT
Start: 2018-04-08 | End: 2018-04-09

## 2018-04-08 RX ORDER — HEPARIN 100 UNIT/ML
500 SYRINGE INTRAVENOUS AS NEEDED
Status: ACTIVE | OUTPATIENT
Start: 2018-04-08 | End: 2018-04-09

## 2018-04-08 RX ADMIN — Medication 10 ML: at 11:12

## 2018-04-08 RX ADMIN — CEFTRIAXONE SODIUM 2 G: 2 INJECTION, POWDER, FOR SOLUTION INTRAMUSCULAR; INTRAVENOUS at 11:13

## 2018-04-08 RX ADMIN — SODIUM CHLORIDE, PRESERVATIVE FREE 500 UNITS: 5 INJECTION INTRAVENOUS at 11:12

## 2018-04-08 RX ADMIN — Medication 10 ML: at 11:44

## 2018-04-08 RX ADMIN — DAPTOMYCIN 900 MG: 500 INJECTION, POWDER, LYOPHILIZED, FOR SOLUTION INTRAVENOUS at 11:11

## 2018-04-08 RX ADMIN — SODIUM CHLORIDE, PRESERVATIVE FREE 500 UNITS: 5 INJECTION INTRAVENOUS at 11:44

## 2018-04-08 NOTE — PROGRESS NOTES
OPIC VISIT NOTE    1110  Pt arrived at [facility] ambulatory and in no distress for Ceftriaxone/Daptomycin q 24 hours. Assessment completed, pt has no complaints. Right DL PICC flushed with positive blood return noted in both lumens. Medication given w/o complications. Visit Vitals    /82    Pulse 80    Temp 97.7 °F (36.5 °C)    Resp 18    SpO2 98%       Medications received:  Daptomycin IVP  Ceftriaxone IV    Tolerated treatment well, no adverse reaction noted. PICC flushed per protocol. Positive blood return noted in both lumens. 1155  D/C'd from [facility] ambulatory and in no distress. Next appointment is 4/9/18 at [facility].

## 2018-04-09 ENCOUNTER — HOSPITAL ENCOUNTER (OUTPATIENT)
Dept: INFUSION THERAPY | Age: 63
Discharge: HOME | End: 2018-04-09
Payer: MEDICARE

## 2018-04-09 VITALS
RESPIRATION RATE: 18 BRPM | OXYGEN SATURATION: 98 % | SYSTOLIC BLOOD PRESSURE: 164 MMHG | DIASTOLIC BLOOD PRESSURE: 72 MMHG | HEART RATE: 74 BPM | TEMPERATURE: 97.1 F

## 2018-04-09 PROCEDURE — 74011000250 HC RX REV CODE- 250: Performed by: INTERNAL MEDICINE

## 2018-04-09 PROCEDURE — 96375 TX/PRO/DX INJ NEW DRUG ADDON: CPT

## 2018-04-09 PROCEDURE — 74011250636 HC RX REV CODE- 250/636

## 2018-04-09 PROCEDURE — 74011000258 HC RX REV CODE- 258: Performed by: INTERNAL MEDICINE

## 2018-04-09 PROCEDURE — 74011250636 HC RX REV CODE- 250/636: Performed by: INTERNAL MEDICINE

## 2018-04-09 PROCEDURE — 96365 THER/PROPH/DIAG IV INF INIT: CPT

## 2018-04-09 RX ORDER — HEPARIN 100 UNIT/ML
500 SYRINGE INTRAVENOUS AS NEEDED
Status: ACTIVE | OUTPATIENT
Start: 2018-04-09 | End: 2018-04-10

## 2018-04-09 RX ORDER — SODIUM CHLORIDE 0.9 % (FLUSH) 0.9 %
10-40 SYRINGE (ML) INJECTION AS NEEDED
Status: DISCONTINUED | OUTPATIENT
Start: 2018-04-09 | End: 2018-04-13 | Stop reason: HOSPADM

## 2018-04-09 RX ADMIN — Medication 10 ML: at 15:37

## 2018-04-09 RX ADMIN — CEFTRIAXONE SODIUM 2 G: 2 INJECTION, POWDER, FOR SOLUTION INTRAMUSCULAR; INTRAVENOUS at 15:34

## 2018-04-09 RX ADMIN — Medication 10 ML: at 15:31

## 2018-04-09 RX ADMIN — Medication 500 UNITS: at 15:37

## 2018-04-09 RX ADMIN — Medication 10 ML: at 16:07

## 2018-04-09 RX ADMIN — DAPTOMYCIN 900 MG: 500 INJECTION, POWDER, LYOPHILIZED, FOR SOLUTION INTRAVENOUS at 15:32

## 2018-04-09 RX ADMIN — Medication 500 UNITS: at 16:07

## 2018-04-09 RX ADMIN — Medication 10 ML: at 15:34

## 2018-04-09 NOTE — PROGRESS NOTES
1525 Pt arrived at [facility] ambulatory and in no distress for daptomycin/ rocephin. Assessment completed, no new complaints voiced. Right DL PICC flushes well with blood return    Visit Vitals    /72 (BP 1 Location: Left arm, BP Patient Position: Sitting)    Pulse 74    Temp 97.1 °F (36.2 °C)    Resp 18    SpO2 98%       Medications received:  Daptomycin 900 mg IV push  Rocephin 2 grams IV over 30 min    Both lumens flushed with NS and heparin and capped/ secured for tomorrow    1610 Tolerated treatment well, no adverse reaction noted. D/Cd from [facility] ambulatory and in no distress.   Next appt tomorrow

## 2018-04-10 ENCOUNTER — HOSPITAL ENCOUNTER (OUTPATIENT)
Dept: INFUSION THERAPY | Age: 63
Discharge: HOME | End: 2018-04-10
Payer: MEDICARE

## 2018-04-10 VITALS
HEART RATE: 70 BPM | DIASTOLIC BLOOD PRESSURE: 74 MMHG | SYSTOLIC BLOOD PRESSURE: 185 MMHG | RESPIRATION RATE: 18 BRPM | TEMPERATURE: 97.8 F | OXYGEN SATURATION: 97 %

## 2018-04-10 PROCEDURE — 96375 TX/PRO/DX INJ NEW DRUG ADDON: CPT

## 2018-04-10 PROCEDURE — 74011250636 HC RX REV CODE- 250/636

## 2018-04-10 PROCEDURE — 96365 THER/PROPH/DIAG IV INF INIT: CPT

## 2018-04-10 PROCEDURE — 74011000250 HC RX REV CODE- 250: Performed by: INTERNAL MEDICINE

## 2018-04-10 PROCEDURE — 74011250636 HC RX REV CODE- 250/636: Performed by: INTERNAL MEDICINE

## 2018-04-10 PROCEDURE — 74011000258 HC RX REV CODE- 258: Performed by: INTERNAL MEDICINE

## 2018-04-10 RX ORDER — SODIUM CHLORIDE 0.9 % (FLUSH) 0.9 %
10-40 SYRINGE (ML) INJECTION AS NEEDED
Status: ACTIVE | OUTPATIENT
Start: 2018-04-10 | End: 2018-04-11

## 2018-04-10 RX ORDER — HEPARIN 100 UNIT/ML
500 SYRINGE INTRAVENOUS AS NEEDED
Status: ACTIVE | OUTPATIENT
Start: 2018-04-10 | End: 2018-04-11

## 2018-04-10 RX ADMIN — Medication 10 ML: at 15:57

## 2018-04-10 RX ADMIN — DAPTOMYCIN 900 MG: 500 INJECTION, POWDER, LYOPHILIZED, FOR SOLUTION INTRAVENOUS at 15:27

## 2018-04-10 RX ADMIN — Medication 30 ML: at 15:27

## 2018-04-10 RX ADMIN — Medication 500 UNITS: at 15:58

## 2018-04-10 RX ADMIN — CEFTRIAXONE SODIUM 2 G: 2 INJECTION, POWDER, FOR SOLUTION INTRAMUSCULAR; INTRAVENOUS at 15:30

## 2018-04-10 NOTE — PROGRESS NOTES
Pt arrived to [facility] ambulatory in no acute distress at 1520 for antibiotics.  Assessment unremarkable. R arm PICC flushed without issue and positive blood return noted in each lumen. Visit Vitals    /74 (BP 1 Location: Left arm, BP Patient Position: Sitting)    Pulse 70    Temp 97.8 °F (36.6 °C)    Resp 18    SpO2 97%     The following medications administered:  Dapto 900mg IVP  Rocephin 2g IV over 30 minutes    Pt tolerated treatment well. PICC flushed per policy and new green caps placed.  Pt discharged ambulatory in no acute distress at 1600, accompanied by self. Next appointment 4/11/18 at 1600.

## 2018-04-11 ENCOUNTER — HOSPITAL ENCOUNTER (OUTPATIENT)
Dept: INFUSION THERAPY | Age: 63
Discharge: HOME | End: 2018-04-11
Payer: MEDICARE

## 2018-04-11 VITALS
DIASTOLIC BLOOD PRESSURE: 86 MMHG | TEMPERATURE: 97.5 F | SYSTOLIC BLOOD PRESSURE: 195 MMHG | RESPIRATION RATE: 18 BRPM | HEART RATE: 73 BPM

## 2018-04-11 PROCEDURE — 74011000258 HC RX REV CODE- 258: Performed by: INTERNAL MEDICINE

## 2018-04-11 PROCEDURE — 96365 THER/PROPH/DIAG IV INF INIT: CPT

## 2018-04-11 PROCEDURE — 74011250636 HC RX REV CODE- 250/636: Performed by: INTERNAL MEDICINE

## 2018-04-11 PROCEDURE — 74011000272 HC RX REV CODE- 272: Performed by: INTERNAL MEDICINE

## 2018-04-11 PROCEDURE — 96375 TX/PRO/DX INJ NEW DRUG ADDON: CPT

## 2018-04-11 PROCEDURE — 74011250636 HC RX REV CODE- 250/636

## 2018-04-11 RX ADMIN — CEFTRIAXONE SODIUM 2 G: 2 INJECTION, POWDER, FOR SOLUTION INTRAMUSCULAR; INTRAVENOUS at 16:50

## 2018-04-11 RX ADMIN — DAPTOMYCIN 900 MG: 500 INJECTION, POWDER, LYOPHILIZED, FOR SOLUTION INTRAVENOUS at 16:42

## 2018-04-11 NOTE — PROGRESS NOTES
Outpatient Infusion Center Progress Note    6911 Pt admit to [facility] for Daptomycin/Rocephin ambulatory with walker in stable condition. Assessment completed. No new concerns voiced. PICC line flushed with positive blood return in both lumens. Visit Vitals    /86 (BP 1 Location: Left arm, BP Patient Position: Sitting)    Pulse 73    Temp 97.5 °F (36.4 °C)    Resp 18       Medications:  Daptomycin 900 MG slow IVP  Rocephin 2 GM - infused over 30 minutes    PICC line flushed well per policy at conclusion with positive blood return in both lumens. PICC line secured with net dressing. 1725 Pt tolerated treatment well. D/c home ambulatory with walker in no distress. Pt aware of next appointment scheduled for 04/12/18.

## 2018-04-12 ENCOUNTER — HOSPITAL ENCOUNTER (OUTPATIENT)
Dept: INFUSION THERAPY | Age: 63
Discharge: HOME | End: 2018-04-12
Payer: MEDICARE

## 2018-04-12 ENCOUNTER — OFFICE VISIT (OUTPATIENT)
Dept: SURGERY | Age: 63
End: 2018-04-12

## 2018-04-12 VITALS
OXYGEN SATURATION: 100 % | DIASTOLIC BLOOD PRESSURE: 78 MMHG | HEART RATE: 74 BPM | TEMPERATURE: 98.4 F | RESPIRATION RATE: 18 BRPM | SYSTOLIC BLOOD PRESSURE: 176 MMHG

## 2018-04-12 VITALS
HEART RATE: 92 BPM | OXYGEN SATURATION: 96 % | BODY MASS INDEX: 35.54 KG/M2 | WEIGHT: 301 LBS | DIASTOLIC BLOOD PRESSURE: 84 MMHG | SYSTOLIC BLOOD PRESSURE: 202 MMHG | TEMPERATURE: 98.3 F | HEIGHT: 77 IN

## 2018-04-12 DIAGNOSIS — S21.202D WOUND OF LEFT SIDE OF BACK, SUBSEQUENT ENCOUNTER: Primary | ICD-10-CM

## 2018-04-12 DIAGNOSIS — S31.829D WOUND OF BUTTOCK, LEFT, SUBSEQUENT ENCOUNTER: ICD-10-CM

## 2018-04-12 LAB
ALBUMIN SERPL-MCNC: 3.2 G/DL (ref 3.5–5)
ALBUMIN/GLOB SERPL: 0.9 {RATIO} (ref 1.1–2.2)
ALP SERPL-CCNC: 153 U/L (ref 45–117)
ALT SERPL-CCNC: 31 U/L (ref 12–78)
ANION GAP SERPL CALC-SCNC: 9 MMOL/L (ref 5–15)
AST SERPL-CCNC: 27 U/L (ref 15–37)
BASOPHILS # BLD: 0 K/UL (ref 0–0.1)
BASOPHILS NFR BLD: 0 % (ref 0–1)
BILIRUB SERPL-MCNC: 0.3 MG/DL (ref 0.2–1)
BUN SERPL-MCNC: 29 MG/DL (ref 6–20)
BUN/CREAT SERPL: 9 (ref 12–20)
CALCIUM SERPL-MCNC: 8.7 MG/DL (ref 8.5–10.1)
CHLORIDE SERPL-SCNC: 106 MMOL/L (ref 97–108)
CK SERPL-CCNC: 664 U/L (ref 39–308)
CO2 SERPL-SCNC: 23 MMOL/L (ref 21–32)
CREAT SERPL-MCNC: 3.27 MG/DL (ref 0.7–1.3)
DIFFERENTIAL METHOD BLD: ABNORMAL
EOSINOPHIL # BLD: 0.2 K/UL (ref 0–0.4)
EOSINOPHIL NFR BLD: 5 % (ref 0–7)
ERYTHROCYTE [DISTWIDTH] IN BLOOD BY AUTOMATED COUNT: 15.4 % (ref 11.5–14.5)
GLOBULIN SER CALC-MCNC: 3.6 G/DL (ref 2–4)
GLUCOSE SERPL-MCNC: 261 MG/DL (ref 65–100)
HCT VFR BLD AUTO: 26.6 % (ref 36.6–50.3)
HGB BLD-MCNC: 8.5 G/DL (ref 12.1–17)
IMM GRANULOCYTES # BLD: 0 K/UL (ref 0–0.04)
IMM GRANULOCYTES NFR BLD AUTO: 0 % (ref 0–0.5)
LYMPHOCYTES # BLD: 1.1 K/UL (ref 0.8–3.5)
LYMPHOCYTES NFR BLD: 26 % (ref 12–49)
MCH RBC QN AUTO: 29.1 PG (ref 26–34)
MCHC RBC AUTO-ENTMCNC: 32 G/DL (ref 30–36.5)
MCV RBC AUTO: 91.1 FL (ref 80–99)
MONOCYTES # BLD: 0.3 K/UL (ref 0–1)
MONOCYTES NFR BLD: 7 % (ref 5–13)
NEUTS SEG # BLD: 2.6 K/UL (ref 1.8–8)
NEUTS SEG NFR BLD: 62 % (ref 32–75)
NRBC # BLD: 0 K/UL (ref 0–0.01)
NRBC BLD-RTO: 0 PER 100 WBC
PLATELET # BLD AUTO: 163 K/UL (ref 150–400)
PMV BLD AUTO: 11.6 FL (ref 8.9–12.9)
POTASSIUM SERPL-SCNC: 4.3 MMOL/L (ref 3.5–5.1)
PROT SERPL-MCNC: 6.8 G/DL (ref 6.4–8.2)
RBC # BLD AUTO: 2.92 M/UL (ref 4.1–5.7)
SODIUM SERPL-SCNC: 138 MMOL/L (ref 136–145)
WBC # BLD AUTO: 4.2 K/UL (ref 4.1–11.1)

## 2018-04-12 PROCEDURE — 74011250636 HC RX REV CODE- 250/636: Performed by: INTERNAL MEDICINE

## 2018-04-12 PROCEDURE — 96365 THER/PROPH/DIAG IV INF INIT: CPT

## 2018-04-12 PROCEDURE — 36415 COLL VENOUS BLD VENIPUNCTURE: CPT | Performed by: INTERNAL MEDICINE

## 2018-04-12 PROCEDURE — 77030020847 HC STATLOK BARD -A

## 2018-04-12 PROCEDURE — 74011000272 HC RX REV CODE- 272: Performed by: INTERNAL MEDICINE

## 2018-04-12 PROCEDURE — 96375 TX/PRO/DX INJ NEW DRUG ADDON: CPT

## 2018-04-12 PROCEDURE — 74011250636 HC RX REV CODE- 250/636

## 2018-04-12 PROCEDURE — 85025 COMPLETE CBC W/AUTO DIFF WBC: CPT | Performed by: INTERNAL MEDICINE

## 2018-04-12 PROCEDURE — 74011000258 HC RX REV CODE- 258: Performed by: INTERNAL MEDICINE

## 2018-04-12 PROCEDURE — 82550 ASSAY OF CK (CPK): CPT | Performed by: INTERNAL MEDICINE

## 2018-04-12 PROCEDURE — 80053 COMPREHEN METABOLIC PANEL: CPT | Performed by: INTERNAL MEDICINE

## 2018-04-12 RX ORDER — HEPARIN 100 UNIT/ML
500 SYRINGE INTRAVENOUS AS NEEDED
Status: SHIPPED | OUTPATIENT
Start: 2018-04-12 | End: 2018-04-13

## 2018-04-12 RX ORDER — SODIUM CHLORIDE 0.9 % (FLUSH) 0.9 %
10-40 SYRINGE (ML) INJECTION AS NEEDED
Status: SHIPPED | OUTPATIENT
Start: 2018-04-12 | End: 2018-04-13

## 2018-04-12 RX ADMIN — Medication 500 UNITS: at 16:01

## 2018-04-12 RX ADMIN — DAPTOMYCIN 900 MG: 500 INJECTION, POWDER, LYOPHILIZED, FOR SOLUTION INTRAVENOUS at 15:18

## 2018-04-12 RX ADMIN — Medication 30 ML: at 16:01

## 2018-04-12 RX ADMIN — Medication 10 ML: at 15:22

## 2018-04-12 RX ADMIN — CEFTRIAXONE SODIUM 2 G: 2 INJECTION, POWDER, FOR SOLUTION INTRAMUSCULAR; INTRAVENOUS at 15:22

## 2018-04-12 RX ADMIN — Medication 20 ML: at 15:15

## 2018-04-12 RX ADMIN — Medication 500 UNITS: at 16:00

## 2018-04-12 NOTE — PROGRESS NOTES
The patient is status post debridement of abscesses of back and buttock. He has no complaints. The dressings at home are being changed by his daughters. On examination there are three wounds, two in the back and one on the left buttock. Each has excellent granulation tissue except for one area of necrotic subcutaneous tissue on the medial aspect of the left buttock wound. This wound was approximately 8 cm in a semicircular manner and greatest depth 3-1/2 cm's. Sharp excisional debridement was carried out of subcutaneous tissue in the left buttock wound medial aspect with a #15 blade scalpel. There was about 1 cc of blood loss. Wound size after debridement was 8.5 cm transverse dimension and 3.5 cm deep. Saline Surgi Gauze were applied to all the wounds as packing and then covered by dry gauze and tape. The patient's family will continue with dressing changes daily. He will follow up again in four weeks. Final Diagnosis:  Open wounds of back and buttock, excisional debridement subcutaneous tissue left buttock wound.     MedDATA/gwo

## 2018-04-12 NOTE — MR AVS SNAPSHOT
[address], [address] [facility] [address] 
[phone] Patient: Vivian Scales 
MRN: [id number] :1955 Visit Information Date & Time Provider Department Dept. Phone Encounter #  
 2018  1:40 PM Georges Lyn MD [facility] [address] [phone] [id number] Your Appointments 2018 11:00 AM  
New Patient with Amarilis Regalado MD  
[facility]) Appt Note: HOSPITAL FOLLOW UP; pt r/s due to infusion schedule [address] [id number]  
[facility] Upcoming Health Maintenance Date Due Hepatitis C Screening 1955 FOOT EXAM Q1 1963 MICROALBUMIN Q1 1963 EYE EXAM RETINAL OR DILATED Q1 1963 DTaP/Tdap/Td series (1 - Tdap) 1974 FOBT Q 1 YEAR AGE 50-75 2003 ZOSTER VACCINE AGE 60> 2013 MEDICARE YEARLY EXAM 3/26/2018 GLAUCOMA SCREENING Q2Y 2018 Pneumococcal 65+ High/Highest Risk (2 of 2 - PPSV23) 2018 LIPID PANEL Q1 2018 HEMOGLOBIN A1C Q6M 2018 Allergies as of 2018  Review Complete On: 2018 By: Georges Lyn MD  
 No Known Allergies Current Immunizations  Reviewed on 2018 Name Date Influenza Vaccine (Quad) PF 3/29/2018  3:58 PM  
 ZZZ-RETIRED (DO NOT USE) Pneumococcal Vaccine (Unspecified Type) 2012 Not reviewed this visit Vitals BP Pulse Temp Height(growth percentile) Weight(growth percentile) SpO2  
 (!) 202/84 (BP 1 Location: Left arm, BP Patient Position: Sitting) 92 98.3 °F (36.8 °C) 6' 5\" (1.956 m) 301 lb (136.5 kg) 96% BMI Smoking Status 35.69 kg/m2 Never Smoker BMI and BSA Data Body Mass Index Body Surface Area  
 35.69 kg/m 2 2.72 m 2 Preferred Pharmacy Pharmacy Name Phone [id number] [facility] [phone] Your Updated Medication List  
  
   
This list is accurate as of 4/12/18  2:20 PM.  Always use your most recent med list.  
  
  
  
  
 ACCU-CHEK SOFÍA CONTROL SOLN Soln Generic drug:  Blood Glucose Control High&Low  
  
 amLODIPine 10 mg tablet Commonly known as:  Jay Jay Nunez Take 1 Tab by mouth daily. carvedilol 25 mg tablet Commonly known as:  Marco A Beets Take 1 Tab by mouth two (2) times daily (with meals). cloNIDine HCl 0.2 mg tablet Commonly known as:  CATAPRES Take 1 Tab by mouth two (2) times a day. clopidogrel 75 mg Tab Commonly known as:  PLAVIX Take 75 mg by mouth daily. digoxin 0.125 mg tablet Commonly known as:  LANOXIN Take 0.125 mg by mouth daily. Indications: afib  
  
 hydrALAZINE 50 mg tablet Commonly known as:  APRESOLINE Take 1 Tab by mouth three (3) times daily. HYDROcodone-acetaminophen 5-325 mg per tablet Commonly known as:  Geraldine [place] Take 1 Tab by mouth every four (4) hours as needed for Pain. Max Daily Amount: 6 Tabs. lancets 30 gauge Misc NovoLIN 70/30 U-100 Insulin 100 unit/mL (70-30) injection Generic drug:  insulin NPH/insulin regular 65 Units by SubCUTAneous route Before breakfast and dinner. sodium bicarbonate 650 mg tablet Take 1 Tab by mouth two (2) times a day. SURE COMFORT INSULIN SYRINGE 1 mL 31 gauge x 5/16 Syrg Generic drug:  Insulin Syringe-Needle U-100 To-Do List   
 04/12/2018  3:00 PM  
  Appointment with [address] [place] [address] at [facility] ([phone])  
  
 04/13/2018  4:00 PM  
  Appointment with [address] [place] [address] at [facility] ([phone]) Introducing [facility]!    
 Birgit Andersen introduces [facility] patient portal. Now you can access parts of your medical record, email your doctor's office, and request medication refills online. 1. In your internet browser, go to [email or web address] 2. Click on the First Time User? Click Here link in the Sign In box. You will see the New Member Sign Up page. 3. Enter your [employer] Access Code exactly as it appears below. You will not need to use this code after youve completed the sign-up process. If you do not sign up before the expiration date, you must request a new code. · [employer] Access Code: [email or web address] Expires: 7/11/2018  2:20 PM 
 
4. Enter the last four digits of your Social Security Number (xxxx) and Date of Birth (mm/dd/yyyy) as indicated and click Submit. You will be taken to the next sign-up page. 5. Create a [employer] ID. This will be your [employer] login ID and cannot be changed, so think of one that is secure and easy to remember. 6. Create a [employer] password. You can change your password at any time. 7. Enter your Password Reset Question and Answer. This can be used at a later time if you forget your password. 8. Enter your e-mail address. You will receive e-mail notification when new information is available in [address] 9. Click Sign Up. You can now view and download portions of your medical record. 10. Click the Download Summary menu link to download a portable copy of your medical information. If you have questions, please visit the Frequently Asked Questions section of the [employer] website. Remember, [employer] is NOT to be used for urgent needs. For medical emergencies, dial 911. Now available from your iPhone and Android! Please provide this summary of care documentation to your next provider. Your primary care clinician is listed as Jannet Scruggs. If you have any questions after today's visit, please call [phone].

## 2018-04-12 NOTE — PROGRESS NOTES
1. Have you been to the ER, urgent care clinic since your last visit? Hospitalized since your last visit? No    2. Have you seen or consulted any other health care providers outside of the [address] [place] [address] since your last visit? Include any pap smears or colon screening.  No

## 2018-04-13 ENCOUNTER — HOSPITAL ENCOUNTER (OUTPATIENT)
Dept: INFUSION THERAPY | Age: 63
Discharge: HOME | End: 2018-04-13
Payer: MEDICARE

## 2018-04-13 VITALS
TEMPERATURE: 98.3 F | RESPIRATION RATE: 20 BRPM | SYSTOLIC BLOOD PRESSURE: 154 MMHG | DIASTOLIC BLOOD PRESSURE: 77 MMHG | HEART RATE: 70 BPM | OXYGEN SATURATION: 98 %

## 2018-04-13 PROCEDURE — 99212 OFFICE O/P EST SF 10 MIN: CPT

## 2018-04-13 PROCEDURE — 74011000258 HC RX REV CODE- 258: Performed by: INTERNAL MEDICINE

## 2018-04-13 PROCEDURE — 74011250636 HC RX REV CODE- 250/636: Performed by: INTERNAL MEDICINE

## 2018-04-13 PROCEDURE — 96365 THER/PROPH/DIAG IV INF INIT: CPT

## 2018-04-13 PROCEDURE — 36589 REMOVAL TUNNELED CV CATH: CPT

## 2018-04-13 RX ORDER — SODIUM CHLORIDE 0.9 % (FLUSH) 0.9 %
10-40 SYRINGE (ML) INJECTION AS NEEDED
Status: DISCONTINUED | OUTPATIENT
Start: 2018-04-13 | End: 2018-04-17 | Stop reason: HOSPADM

## 2018-04-13 RX ORDER — HEPARIN 100 UNIT/ML
500 SYRINGE INTRAVENOUS AS NEEDED
Status: SHIPPED | OUTPATIENT
Start: 2018-04-13 | End: 2018-04-14

## 2018-04-13 RX ADMIN — Medication 10 ML: at 16:05

## 2018-04-13 RX ADMIN — CEFTRIAXONE SODIUM 2 G: 2 INJECTION, POWDER, FOR SOLUTION INTRAMUSCULAR; INTRAVENOUS at 15:35

## 2018-04-13 RX ADMIN — Medication 10 ML: at 15:35

## 2018-04-13 NOTE — PROGRESS NOTES
0 Pt arrived at [facility] ambulatory and in no distress for rocephin and PICC removal.  Assessment completed, no new complaints voiced. Labs reviewed with Dr. Donte Gonzalez earlier today, orders to hold Daptomycin, give rocephin and pull PICC at end of treatment today. Visit Vitals    /77 (BP 1 Location: Left arm, BP Patient Position: Sitting)    Pulse 70    Temp 98.3 °F (36.8 °C)    Resp 20    SpO2 98%       Medications received:  Rocephin 2 grams IV over 30 min    Pt positioned supine in chair, Right PICC removed. Pressure applied, no bleeding. Gauze and tegaderm dressing applied. Pt instructed to remove in 2 days, wash area and cover site with band aid until scab forms. Written instructions reviewed with pt and copy signed and given. Pt observed x 30 min post removal. Reviewed signs and symptoms of infection and symptoms to report to physician. [place] well, no adverse reaction noted. D/Cd from [facility] ambulatory and in no distress.   No more appts at [facility], pt will follow up with Dr. Donet Gonzalez

## 2018-04-13 NOTE — DISCHARGE INSTRUCTIONS
OUTPATIENT INFUSION CENTER    DISCHARGE INSTRUCTIONS FOR:  REMOVAL OF PICC LINE    Now that your PICC Line has been removed, please follow the instructions below regarding your site care: You will be kept resting in a supine position for 30  minutes immediately after the PICC has been removed. Avoid heavy lifting or excessive use of the extremity for 24 hours. If drainage or bleeding is present, apply direct pressure until it has stopped. If bleeding persists, continue pressure and seek emergency medical care. The area under the dressing must be kept clean and dry for 2 days. Do not submerge the area in water for 2 days. You may shower, but cover the dressing with clear plastic wrap and apply tape around the edges. Remove the plastic wrap after showering. After 2 days, the dressing may be removed. Before removing the dressing, wash hands thoroughly with soap and water. Inspect the site. Gently wash with warm soapy water. Pat dry and re-cover with a band aid until a scab forms. Report to your physician any of the following:    Chills and fever;  Swelling, redness, pain or if site is warm to the touch; Any pus or unusual drainage from the site; Any questions or concerns.     Milton Garcia, Signature: ______________________________ 4/13/2018  Raine Baker RN

## 2018-04-14 ENCOUNTER — APPOINTMENT (OUTPATIENT)
Dept: INFUSION THERAPY | Age: 63
End: 2018-04-14
Payer: MEDICARE

## 2018-05-03 ENCOUNTER — TELEPHONE (OUTPATIENT)
Dept: SURGERY | Age: 63
End: 2018-05-03

## 2018-05-03 NOTE — TELEPHONE ENCOUNTER
Tried to call Mr Orlando Zamudio.  Mail box full, could not leave message. Mr Orlando Zamudio returned call. Pt states there is some yellowish drainage from large buttock wound. Spoke with [place] @ [employer]. Bandage on large buttock wound soaked with yellowish drainage at last visit. Pt does not have a fever or elevated BP. All wound sites look good. Pt needed to confirm appt date with our office. Spoke with Mr Orlando Zamudio, appt confirmed with Dr Henrik Williamson on 5/9/18 at 9:40 am.  Home health nurse will call next week after next visit if pt needs to be seen sooner than 5/9/18.

## 2018-05-08 ENCOUNTER — OFFICE VISIT (OUTPATIENT)
Dept: SURGERY | Age: 63
End: 2018-05-08

## 2018-05-08 VITALS
DIASTOLIC BLOOD PRESSURE: 76 MMHG | WEIGHT: 306.8 LBS | HEIGHT: 77 IN | OXYGEN SATURATION: 99 % | RESPIRATION RATE: 16 BRPM | SYSTOLIC BLOOD PRESSURE: 128 MMHG | HEART RATE: 83 BPM | BODY MASS INDEX: 36.22 KG/M2

## 2018-05-08 DIAGNOSIS — S21.202D: Primary | ICD-10-CM

## 2018-05-08 PROBLEM — L02.212 ABSCESS OF BACK: Status: RESOLVED | Noted: 2018-01-30 | Resolved: 2018-05-08

## 2018-05-08 PROBLEM — R65.20 SEVERE SEPSIS (HCC): Status: RESOLVED | Noted: 2018-03-22 | Resolved: 2018-05-08

## 2018-05-08 PROBLEM — L02.91 ABSCESS: Status: RESOLVED | Noted: 2017-05-22 | Resolved: 2018-05-08

## 2018-05-08 PROBLEM — A41.9 SEVERE SEPSIS (HCC): Status: RESOLVED | Noted: 2018-03-22 | Resolved: 2018-05-08

## 2018-05-08 NOTE — PROGRESS NOTES
1. Have you been to the ER, urgent care clinic since your last visit? Hospitalized since your last visit? No    2. Have you seen or consulted any other health care providers outside of the [address] [place] [address] since your last visit? Include any pap smears or colon screening.  No

## 2018-05-08 NOTE — PROGRESS NOTES
The patient is status post debridement of skin and subcutaneous tissue at multiple abscess on the back. He has no complaints. Dressings are being changed at home. On examination the three open areas on the back all have clean granulation on the entire surface. The smallest wound is about 2 cm across, the next wound is about 3 cm across and the largest wound is approximately 6 cm across. Gel dressings were applied to all sites. The patient will continue dressing changes by the patient's daughter and by home health. He will follow up in one month. The patient reports his blood sugar is now much better controlled. Final Diagnosis:  Open wounds of back at previously debrided abscess sites.

## 2018-05-08 NOTE — MR AVS SNAPSHOT
[address] [facility] [address], [address], [address] [place] [address] [facility] 
[phone] Patient: Cassy Jeffries 
MRN: [id number] :1955 Visit Information Date & Time Provider Department Dept. Phone Encounter #  
 2018 10:30 AM Deann Fournier MD [facility] Dr. Beltran Jones [phone] [id number] Follow-up Instructions Return in about 4 weeks (around 2018). Follow-up and Disposition History Your Appointments 2018 11:00 AM  
ESTABLISHED PATIENT with Deann Fournier MD  
[facility] Dr. Beltran Jones ([facility]) Appt Note: Debridement abscesses rt & lf back & lf buttock 3/23/18  
 [address] [facility], [address] [facility], [address] [address]  
[address], [address] [facility], [address] [facility] [address] [address] Upcoming Health Maintenance Date Due Hepatitis C Screening 1955 FOOT EXAM Q1 1963 MICROALBUMIN Q1 1963 EYE EXAM RETINAL OR DILATED Q1 1963 DTaP/Tdap/Td series (1 - Tdap) 1974 FOBT Q 1 YEAR AGE 50-75 2003 ZOSTER VACCINE AGE 60> 2013 MEDICARE YEARLY EXAM 3/26/2018 GLAUCOMA SCREENING Q2Y 2018 Pneumococcal 65+ High/Highest Risk (2 of 2 - PPSV23) 2018 LIPID PANEL Q1 2018 Influenza Age 5 to Adult 2018 HEMOGLOBIN A1C Q6M 2018 Allergies as of 2018  Review Complete On: 2018 By: Deann Fournier MD  
 No Known Allergies Current Immunizations  Reviewed on 2018 Name Date Influenza Vaccine (Quad) PF 3/29/2018  3:58 PM  
 ZZZ-RETIRED (DO NOT USE) Pneumococcal Vaccine (Unspecified Type) 2012 Not reviewed this visit You Were Diagnosed With   
  
 Codes Comments Open wound of back without complication, left, subsequent encounter    -  Primary ICD-10-CM: S21.202D ICD-9-CM: V58.89, 876.0 Vitals BP Pulse Resp Height(growth percentile) Weight(growth percentile) SpO2  
 128/76 (BP 1 Location: Left arm, BP Patient Position: Sitting) 83 16 6' 5\" (1.956 m) 306 lb 12.8 oz (139.2 kg) 99% BMI Smoking Status 36.38 kg/m2 Never Smoker BMI and BSA Data Body Mass Index Body Surface Area  
 36.38 kg/m 2 2.75 m 2 Preferred Pharmacy Pharmacy Name Phone [id number] [facility] [phone] Your Updated Medication List  
  
   
This list is accurate as of 5/8/18  4:30 PM.  Always use your most recent med list.  
  
  
  
  
 ACCU-CHEK SOFÍA CONTROL SOLN Soln Generic drug:  Blood Glucose Control High&Low  
  
 amLODIPine 10 mg tablet Commonly known as:  Caralee Dupes Take 1 Tab by mouth daily. carvedilol 25 mg tablet Commonly known as:  Mandy Sri Take 1 Tab by mouth two (2) times daily (with meals). cloNIDine HCl 0.2 mg tablet Commonly known as:  CATAPRES Take 1 Tab by mouth two (2) times a day. clopidogrel 75 mg Tab Commonly known as:  PLAVIX Take 75 mg by mouth daily. digoxin 0.125 mg tablet Commonly known as:  LANOXIN Take 0.125 mg by mouth daily. Indications: afib  
  
 hydrALAZINE 50 mg tablet Commonly known as:  APRESOLINE Take 1 Tab by mouth three (3) times daily. HYDROcodone-acetaminophen 5-325 mg per tablet Commonly known as:  Marvelyn Code Take 1 Tab by mouth every four (4) hours as needed for Pain. Max Daily Amount: 6 Tabs. lancets 30 gauge Misc NovoLIN 70/30 U-100 Insulin 100 unit/mL (70-30) injection Generic drug:  insulin NPH/insulin regular 65 Units by SubCUTAneous route Before breakfast and dinner. sodium bicarbonate 650 mg tablet Take 1 Tab by mouth two (2) times a day. SURE COMFORT INSULIN SYRINGE 1 mL 31 gauge x 5/16 Syrg Generic drug:  Insulin Syringe-Needle U-100 Follow-up Instructions Return in about 4 weeks (around 6/5/2018). Introducing \[facility]\"" & HEALTH SERVICES! [employer] introduces [employer] patient portal. Now you can access parts of your medical record, email your doctor's office, and request medication refills online. 1. In your internet browser, go to [email or web address] 2. Click on the First Time User? Click Here link in the Sign In box. You will see the New Member Sign Up page. 3. Enter your [employer] Access Code exactly as it appears below. You will not need to use this code after youve completed the sign-up process. If you do not sign up before the expiration date, you must request a new code. · [employer] Access Code: [email or web address] Expires: 7/11/2018  2:20 PM 
 
4. Enter the last four digits of your Social Security Number (xxxx) and Date of Birth (mm/dd/yyyy) as indicated and click Submit. You will be taken to the next sign-up page. 5. Create a [employer] ID. This will be your [employer] login ID and cannot be changed, so think of one that is secure and easy to remember. 6. Create a [employer] password. You can change your password at any time. 7. Enter your Password Reset Question and Answer. This can be used at a later time if you forget your password. 8. Enter your e-mail address. You will receive e-mail notification when new information is available in [address] 9. Click Sign Up. You can now view and download portions of your medical record. 10. Click the Download Summary menu link to download a portable copy of your medical information. If you have questions, please visit the Frequently Asked Questions section of the [employer] website. Remember, [employer] is NOT to be used for urgent needs. For medical emergencies, dial 911. Now available from your iPhone and Android! Please provide this summary of care documentation to your next provider. Your primary care clinician is listed as Donald Gómez.  If you have any questions after today's visit, please call [phone].

## 2018-06-04 ENCOUNTER — APPOINTMENT (OUTPATIENT)
Dept: CT IMAGING | Age: 63
End: 2018-06-04
Attending: EMERGENCY MEDICINE
Payer: MEDICARE

## 2018-06-04 ENCOUNTER — APPOINTMENT (OUTPATIENT)
Dept: GENERAL RADIOLOGY | Age: 63
End: 2018-06-04
Attending: EMERGENCY MEDICINE
Payer: MEDICARE

## 2018-06-04 ENCOUNTER — HOSPITAL ENCOUNTER (EMERGENCY)
Age: 63
Discharge: HOME | End: 2018-06-05
Attending: EMERGENCY MEDICINE | Admitting: EMERGENCY MEDICINE
Payer: MEDICARE

## 2018-06-04 VITALS
SYSTOLIC BLOOD PRESSURE: 168 MMHG | HEART RATE: 93 BPM | RESPIRATION RATE: 25 BRPM | BODY MASS INDEX: 36.23 KG/M2 | OXYGEN SATURATION: 93 % | HEIGHT: 77 IN | TEMPERATURE: 98.6 F | DIASTOLIC BLOOD PRESSURE: 95 MMHG | WEIGHT: 306.88 LBS

## 2018-06-04 DIAGNOSIS — R50.9 FEVER, UNSPECIFIED FEVER CAUSE: ICD-10-CM

## 2018-06-04 DIAGNOSIS — N17.9 ACUTE RENAL FAILURE SUPERIMPOSED ON CHRONIC KIDNEY DISEASE, UNSPECIFIED CKD STAGE, UNSPECIFIED ACUTE RENAL FAILURE TYPE (HCC): ICD-10-CM

## 2018-06-04 DIAGNOSIS — N18.9 ACUTE RENAL FAILURE SUPERIMPOSED ON CHRONIC KIDNEY DISEASE, UNSPECIFIED CKD STAGE, UNSPECIFIED ACUTE RENAL FAILURE TYPE (HCC): ICD-10-CM

## 2018-06-04 DIAGNOSIS — J18.9 COMMUNITY ACQUIRED PNEUMONIA, UNSPECIFIED LATERALITY: Primary | ICD-10-CM

## 2018-06-04 LAB
ALBUMIN SERPL-MCNC: 2.4 G/DL (ref 3.5–5)
ALBUMIN/GLOB SERPL: 0.5 {RATIO} (ref 1.1–2.2)
ALP SERPL-CCNC: 151 U/L (ref 45–117)
ALT SERPL-CCNC: 25 U/L (ref 12–78)
AMORPH CRY URNS QL MICRO: ABNORMAL
ANION GAP SERPL CALC-SCNC: 11 MMOL/L (ref 5–15)
APPEARANCE UR: ABNORMAL
AST SERPL-CCNC: 16 U/L (ref 15–37)
BACTERIA URNS QL MICRO: NEGATIVE /HPF
BASOPHILS # BLD: 0 K/UL (ref 0–0.1)
BASOPHILS NFR BLD: 0 % (ref 0–1)
BILIRUB SERPL-MCNC: 0.4 MG/DL (ref 0.2–1)
BILIRUB UR QL: NEGATIVE
BUN SERPL-MCNC: 33 MG/DL (ref 6–20)
BUN/CREAT SERPL: 7 (ref 12–20)
CALCIUM SERPL-MCNC: 9 MG/DL (ref 8.5–10.1)
CHLORIDE SERPL-SCNC: 102 MMOL/L (ref 97–108)
CO2 SERPL-SCNC: 20 MMOL/L (ref 21–32)
COLOR UR: ABNORMAL
CREAT SERPL-MCNC: 4.51 MG/DL (ref 0.7–1.3)
DIFFERENTIAL METHOD BLD: ABNORMAL
EOSINOPHIL # BLD: 0.1 K/UL (ref 0–0.4)
EOSINOPHIL NFR BLD: 0 % (ref 0–7)
EPITH CASTS URNS QL MICRO: ABNORMAL /LPF
ERYTHROCYTE [DISTWIDTH] IN BLOOD BY AUTOMATED COUNT: 13 % (ref 11.5–14.5)
GLOBULIN SER CALC-MCNC: 4.7 G/DL (ref 2–4)
GLUCOSE BLD STRIP.AUTO-MCNC: 305 MG/DL (ref 65–100)
GLUCOSE BLD STRIP.AUTO-MCNC: 344 MG/DL (ref 65–100)
GLUCOSE BLD STRIP.AUTO-MCNC: 378 MG/DL (ref 65–100)
GLUCOSE SERPL-MCNC: 364 MG/DL (ref 65–100)
GLUCOSE UR STRIP.AUTO-MCNC: >1000 MG/DL
HCT VFR BLD AUTO: 27.3 % (ref 36.6–50.3)
HGB BLD-MCNC: 9.5 G/DL (ref 12.1–17)
HGB UR QL STRIP: ABNORMAL
IMM GRANULOCYTES # BLD: 0.1 K/UL (ref 0–0.04)
IMM GRANULOCYTES NFR BLD AUTO: 1 % (ref 0–0.5)
KETONES UR QL STRIP.AUTO: NEGATIVE MG/DL
LACTATE SERPL-SCNC: 1.5 MMOL/L (ref 0.4–2)
LEUKOCYTE ESTERASE UR QL STRIP.AUTO: NEGATIVE
LYMPHOCYTES # BLD: 1.4 K/UL (ref 0.8–3.5)
LYMPHOCYTES NFR BLD: 9 % (ref 12–49)
MCH RBC QN AUTO: 29.8 PG (ref 26–34)
MCHC RBC AUTO-ENTMCNC: 34.8 G/DL (ref 30–36.5)
MCV RBC AUTO: 85.6 FL (ref 80–99)
MONOCYTES # BLD: 1.2 K/UL (ref 0–1)
MONOCYTES NFR BLD: 8 % (ref 5–13)
NEUTS SEG # BLD: 12 K/UL (ref 1.8–8)
NEUTS SEG NFR BLD: 81 % (ref 32–75)
NITRITE UR QL STRIP.AUTO: NEGATIVE
NRBC # BLD: 0 K/UL (ref 0–0.01)
NRBC BLD-RTO: 0 PER 100 WBC
PH UR STRIP: 5.5 [PH] (ref 5–8)
PLATELET # BLD AUTO: 160 K/UL (ref 150–400)
PMV BLD AUTO: 12.8 FL (ref 8.9–12.9)
POTASSIUM SERPL-SCNC: 4.6 MMOL/L (ref 3.5–5.1)
PROT SERPL-MCNC: 7.1 G/DL (ref 6.4–8.2)
PROT UR STRIP-MCNC: >300 MG/DL
RBC # BLD AUTO: 3.19 M/UL (ref 4.1–5.7)
RBC #/AREA URNS HPF: ABNORMAL /HPF (ref 0–5)
SERVICE CMNT-IMP: ABNORMAL
SODIUM SERPL-SCNC: 133 MMOL/L (ref 136–145)
SP GR UR REFRACTOMETRY: 1.03 (ref 1–1.03)
UA: UC IF INDICATED,UAUC: ABNORMAL
UROBILINOGEN UR QL STRIP.AUTO: 0.2 EU/DL (ref 0.2–1)
WBC # BLD AUTO: 14.8 K/UL (ref 4.1–11.1)
WBC URNS QL MICRO: ABNORMAL /HPF (ref 0–4)

## 2018-06-04 PROCEDURE — 51701 INSERT BLADDER CATHETER: CPT

## 2018-06-04 PROCEDURE — 99285 EMERGENCY DEPT VISIT HI MDM: CPT

## 2018-06-04 PROCEDURE — 96374 THER/PROPH/DIAG INJ IV PUSH: CPT

## 2018-06-04 PROCEDURE — 77030005563 HC CATH URETH INT MMGH -A

## 2018-06-04 PROCEDURE — 74176 CT ABD & PELVIS W/O CONTRAST: CPT

## 2018-06-04 PROCEDURE — 71045 X-RAY EXAM CHEST 1 VIEW: CPT

## 2018-06-04 PROCEDURE — 36415 COLL VENOUS BLD VENIPUNCTURE: CPT | Performed by: EMERGENCY MEDICINE

## 2018-06-04 PROCEDURE — 74011636637 HC RX REV CODE- 636/637: Performed by: EMERGENCY MEDICINE

## 2018-06-04 PROCEDURE — 81001 URINALYSIS AUTO W/SCOPE: CPT | Performed by: EMERGENCY MEDICINE

## 2018-06-04 PROCEDURE — 83605 ASSAY OF LACTIC ACID: CPT | Performed by: EMERGENCY MEDICINE

## 2018-06-04 PROCEDURE — 74011250636 HC RX REV CODE- 250/636: Performed by: EMERGENCY MEDICINE

## 2018-06-04 PROCEDURE — 74011250637 HC RX REV CODE- 250/637: Performed by: EMERGENCY MEDICINE

## 2018-06-04 PROCEDURE — 93005 ELECTROCARDIOGRAM TRACING: CPT

## 2018-06-04 PROCEDURE — 80053 COMPREHEN METABOLIC PANEL: CPT | Performed by: EMERGENCY MEDICINE

## 2018-06-04 PROCEDURE — 85025 COMPLETE CBC W/AUTO DIFF WBC: CPT | Performed by: EMERGENCY MEDICINE

## 2018-06-04 PROCEDURE — 82962 GLUCOSE BLOOD TEST: CPT

## 2018-06-04 PROCEDURE — 87040 BLOOD CULTURE FOR BACTERIA: CPT | Performed by: EMERGENCY MEDICINE

## 2018-06-04 PROCEDURE — 96361 HYDRATE IV INFUSION ADD-ON: CPT

## 2018-06-04 RX ORDER — ONDANSETRON 4 MG/1
4 TABLET, FILM COATED ORAL
Qty: 20 TAB | Refills: 0 | Status: SHIPPED | OUTPATIENT
Start: 2018-06-04

## 2018-06-04 RX ORDER — SODIUM CHLORIDE 0.9 % (FLUSH) 0.9 %
5-10 SYRINGE (ML) INJECTION AS NEEDED
Status: DISCONTINUED | OUTPATIENT
Start: 2018-06-04 | End: 2018-06-05 | Stop reason: HOSPADM

## 2018-06-04 RX ORDER — ACETAMINOPHEN 325 MG/1
650 TABLET ORAL
Status: COMPLETED | OUTPATIENT
Start: 2018-06-04 | End: 2018-06-04

## 2018-06-04 RX ORDER — DOXYCYCLINE 100 MG/1
100 TABLET ORAL 2 TIMES DAILY
Qty: 20 TAB | Refills: 0 | Status: SHIPPED | OUTPATIENT
Start: 2018-06-04 | End: 2018-06-14

## 2018-06-04 RX ORDER — DOXYCYCLINE HYCLATE 100 MG
100 TABLET ORAL
Status: COMPLETED | OUTPATIENT
Start: 2018-06-04 | End: 2018-06-04

## 2018-06-04 RX ADMIN — INSULIN HUMAN 10 UNITS: 100 INJECTION, SOLUTION PARENTERAL at 19:24

## 2018-06-04 RX ADMIN — DOXYCYCLINE HYCLATE 100 MG: 100 TABLET, COATED ORAL at 22:33

## 2018-06-04 RX ADMIN — ACETAMINOPHEN 650 MG: 325 TABLET ORAL at 22:33

## 2018-06-04 RX ADMIN — SODIUM CHLORIDE 1000 ML: 900 INJECTION, SOLUTION INTRAVENOUS at 18:31

## 2018-06-04 NOTE — ED TRIAGE NOTES
Pt states he is at the ER due to unable to hold urine. Pt very confused. Pt very weak. Pt with vomit on clothes. Placed in gown and pt proceeded to void all over himself.

## 2018-06-04 NOTE — ED PROVIDER NOTES
EMERGENCY DEPARTMENT HISTORY AND PHYSICAL EXAM      Date: 6/4/2018  Patient Name: Joy Handy    History of Presenting Illness     Chief Complaint   Patient presents with    Vomiting     onset yesterday       History Provided By: Patient    HPI: Joy Handy, 70 y.o. male with PMHx significant for HTN, DM, CKD, and CA, presents ambulatory to the ED with cc of N/V x 3 days. Pt endorses associated chills, cough with yellow sputum production, and rhinorrhea. Pt denies any exacerbating or relieving factors. Pt explains that he has been having worsening cold-like sxs for the past 3 days. Pt specifically denies fever, abdominal pain, diarrhea, or constipation. There are no other complaints, changes, or physical findings at this time. PCP: Anni Craig NP    Current Facility-Administered Medications   Medication Dose Route Frequency Provider Last Rate Last Dose    sodium chloride (NS) flush 5-10 mL  5-10 mL IntraVENous PRN Elizabeth Duncan MD        acetaminophen (TYLENOL) tablet 650 mg  650 mg Oral NOW Elizabeth Duncan MD        doxycycline (VIBRA-TABS) tablet 100 mg  100 mg Oral NOW Elizabeth Duncan MD         Current Outpatient Prescriptions   Medication Sig Dispense Refill    ondansetron hcl (ZOFRAN) 4 mg tablet Take 1 Tab by mouth every eight (8) hours as needed for Nausea. 20 Tab 0    doxycycline (ADOXA) 100 mg tablet Take 1 Tab by mouth two (2) times a day for 10 days. 20 Tab 0    HYDROcodone-acetaminophen (NORCO) 5-325 mg per tablet Take 1 Tab by mouth every four (4) hours as needed for Pain. Max Daily Amount: 6 Tabs. 30 Tab 0    amLODIPine (NORVASC) 10 mg tablet Take 1 Tab by mouth daily. 30 Tab 1    carvedilol (COREG) 25 mg tablet Take 1 Tab by mouth two (2) times daily (with meals). 60 Tab 1    hydrALAZINE (APRESOLINE) 50 mg tablet Take 1 Tab by mouth three (3) times daily. 90 Tab 1    sodium bicarbonate 650 mg tablet Take 1 Tab by mouth two (2) times a day.  60 Tab 1    clopidogrel (PLAVIX) 75 mg tab Take 75 mg by mouth daily.  NOVOLIN 70/30 100 unit/mL (70-30) injection 65 Units by SubCUTAneous route Before breakfast and dinner.  lancets 30 gauge misc       SURE COMFORT INSULIN SYRINGE 1 mL 31 gauge x 5/16 syrg       ACCU-CHEK SOFÍA CONTROL SOLN soln       cloNIDine HCl (CATAPRES) 0.2 mg tablet Take 1 Tab by mouth two (2) times a day. 30 Tab 0    digoxin (LANOXIN) 0.125 mg tablet Take 0.125 mg by mouth daily. Indications: afib         Past History     Past Medical History:  Past Medical History:   Diagnosis Date    A-fib [facility]) 2009    Resolved.    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[address]     Cancer ([address]     splenic lymphoma    Chronic kidney disease     CKD stage 4 due to type 2 diabetes mellitus ([address]     Diabetes ([address]     Hypertension     Ill-defined condition     diabetic neuropathy james feet    Neuropathy     Other ill-defined conditions(799.89)     spinal meningitis    Other ill-defined conditions(799.89)     broken blood vessel to nose    Stroke ([address] 2007, 2017    per patient    Vitamin D deficiency        Past Surgical History:  Past Surgical History:   Procedure Laterality Date    HX OTHER SURGICAL  02/06/2017    Excisional debridement of abscess right thigh & right lower back     HX OTHER SURGICAL  05/23/2017    I&D & excisional debridement (skin & subcutaneous tissue) back & right thigh    HX OTHER SURGICAL  01/12/2018    debridement of abscess back    HX OTHER SURGICAL  03/23/2018    bedridement abscess lower right back,lower left backand buttuck       Family History:  Family History   Problem Relation Age of Onset    Diabetes Mother     Hypertension Father     Hypertension Sister        Social History:  Social History   Substance Use Topics    Smoking status: Never Smoker    Smokeless tobacco: Never Used    Alcohol use No       Allergies:  No Known Allergies      Review of Systems   Review of Systems   Constitutional: Positive for chills. Negative for fever. HENT: Positive for rhinorrhea. Respiratory: Positive for cough. Negative for shortness of breath. Cardiovascular: Negative for chest pain. Gastrointestinal: Positive for nausea. Negative for constipation, diarrhea and vomiting. Neurological: Negative for weakness and numbness. All other systems reviewed and are negative. Physical Exam   Physical Exam   Constitutional: He is oriented to person, place, and time. He appears well-developed and well-nourished. HENT:   Head: Normocephalic and atraumatic. Dry mucous membranes   Eyes: Conjunctivae and EOM are normal.   Neck: Normal range of motion. Neck supple. Cardiovascular: Normal rate and regular rhythm. No tachycardia   Pulmonary/Chest: Effort normal and breath sounds normal. No respiratory distress. Abdominal: Soft. He exhibits no distension. There is no tenderness. Musculoskeletal: Normal range of motion. Neurological: He is alert and oriented to person, place, and time. Skin: Skin is warm and dry. Pt feels warm to the touch   Psychiatric: He has a normal mood and affect. Nursing note and vitals reviewed.       Diagnostic Study Results     Labs -     Recent Results (from the past 12 hour(s))   GLUCOSE, POC    Collection Time: 06/04/18  5:29 PM   Result Value Ref Range    Glucose (POC) 378 (H) 65 - 100 mg/dL    Performed by [facility]    EKG, 12 LEAD, INITIAL    Collection Time: 06/04/18  6:16 PM   Result Value Ref Range    Ventricular Rate 89 BPM    Atrial Rate 89 BPM    P-R Interval 174 ms    QRS Duration 100 ms    Q-T Interval 338 ms    QTC Calculation (Bezet) 411 ms    Calculated P Axis 27 degrees    Calculated R Axis 3 degrees    Calculated T Axis 10 degrees    Diagnosis       Normal sinus rhythm  Nonspecific T wave abnormality  When compared with ECG of 29-JUL-2017 16:25,  No significant change was found LACTIC ACID    Collection Time: 06/04/18  6:22 PM   Result Value Ref Range    Lactic acid 1.5 0.4 - 2.0 MMOL/L   METABOLIC PANEL, COMPREHENSIVE    Collection Time: 06/04/18  6:22 PM   Result Value Ref Range    Sodium 133 (L) 136 - 145 mmol/L    Potassium 4.6 3.5 - 5.1 mmol/L    Chloride 102 97 - 108 mmol/L    CO2 20 (L) 21 - 32 mmol/L    Anion gap 11 5 - 15 mmol/L    Glucose 364 (H) 65 - 100 mg/dL    BUN 33 (H) 6 - 20 MG/DL    Creatinine 4.51 (H) 0.70 - 1.30 MG/DL    BUN/Creatinine ratio 7 (L) 12 - 20      GFR est AA 16 (L) >60 ml/min/1.73m2    GFR est non-AA 13 (L) >60 ml/min/1.73m2    Calcium 9.0 8.5 - 10.1 MG/DL    Bilirubin, total 0.4 0.2 - 1.0 MG/DL    ALT (SGPT) 25 12 - 78 U/L    AST (SGOT) 16 15 - 37 U/L    Alk. phosphatase 151 (H) 45 - 117 U/L    Protein, total 7.1 6.4 - 8.2 g/dL    Albumin 2.4 (L) 3.5 - 5.0 g/dL    Globulin 4.7 (H) 2.0 - 4.0 g/dL    A-G Ratio 0.5 (L) 1.1 - 2.2     CBC WITH AUTOMATED DIFF    Collection Time: 06/04/18  6:22 PM   Result Value Ref Range    WBC 14.8 (H) 4.1 - 11.1 K/uL    RBC 3.19 (L) 4.10 - 5.70 M/uL    HGB 9.5 (L) 12.1 - 17.0 g/dL    HCT 27.3 (L) 36.6 - 50.3 %    MCV 85.6 80.0 - 99.0 FL    MCH 29.8 26.0 - 34.0 PG    MCHC 34.8 30.0 - 36.5 g/dL    RDW 13.0 11.5 - 14.5 %    PLATELET 402 557 - 650 K/uL    MPV 12.8 8.9 - 12.9 FL    NRBC 0.0 0  WBC    ABSOLUTE NRBC 0.00 0.00 - 0.01 K/uL    NEUTROPHILS 81 (H) 32 - 75 %    LYMPHOCYTES 9 (L) 12 - 49 %    MONOCYTES 8 5 - 13 %    EOSINOPHILS 0 0 - 7 %    BASOPHILS 0 0 - 1 %    IMMATURE GRANULOCYTES 1 (H) 0.0 - 0.5 %    ABS. NEUTROPHILS 12.0 (H) 1.8 - 8.0 K/UL    ABS. LYMPHOCYTES 1.4 0.8 - 3.5 K/UL    ABS. MONOCYTES 1.2 (H) 0.0 - 1.0 K/UL    ABS. EOSINOPHILS 0.1 0.0 - 0.4 K/UL    ABS. BASOPHILS 0.0 0.0 - 0.1 K/UL    ABS. IMM.  GRANS. 0.1 (H) 0.00 - 0.04 K/UL    DF AUTOMATED     GLUCOSE, POC    Collection Time: 06/04/18  8:02 PM   Result Value Ref Range    Glucose (POC) 344 (H) 65 - 100 mg/dL    Performed by [facility] URINALYSIS W/ REFLEX CULTURE    Collection Time: 06/04/18  9:15 PM   Result Value Ref Range    Color YELLOW/STRAW      Appearance CLOUDY (A) CLEAR      Specific gravity 1.026 1.003 - 1.030      pH (UA) 5.5 5.0 - 8.0      Protein >300 (A) NEG mg/dL    Glucose >1000 (A) NEG mg/dL    Ketone NEGATIVE  NEG mg/dL    Bilirubin NEGATIVE  NEG      Blood SMALL (A) NEG      Urobilinogen 0.2 0.2 - 1.0 EU/dL    Nitrites NEGATIVE  NEG      Leukocyte Esterase NEGATIVE  NEG      WBC 0-4 0 - 4 /hpf    RBC 5-10 0 - 5 /hpf    Epithelial cells MANY (A) FEW /lpf    Bacteria NEGATIVE  NEG /hpf    UA:UC IF INDICATED CULTURE NOT INDICATED BY UA RESULT CNI      Amorphous Crystals 2+ (A) NEG   GLUCOSE, POC    Collection Time: 06/04/18 10:20 PM   Result Value Ref Range    Glucose (POC) 305 (H) 65 - 100 mg/dL    Performed by Zenaida Sunshine        Radiologic Studies -     CT Results  (Last 48 hours)               06/04/18 2151  CT ABD PELV WO CONT Final result    Impression:  IMPRESSION:   Stable hypodense lesions in the right hepatic lobe, which remain of   indeterminate etiology. Recommend further evaluation with contrast-enhanced CT   or MR on a nonemergent basis. Stable splenomegaly. 5 mm pulmonary nodule right   lower lobe. No acute abnormality. Guidelines for Management of Incidental Pulmonary Nodules Detected on CT Images:   from the Fleischner Society 2017       LOW-RISK PATIENTS:       LESS THAN OR EQUAL TO 6 MM:  No routine follow-up needed. GREATER THAN 6-8 MM: CT at 6-12 months, if multiple at 3-6 months, then consider   CT at 18-24 months. GREATER THAN 8 MM:  Consider CT at 3 months, PET/CT, or tissue sampling. If   multiple CT at 3-6 months, then consider CT at 18-24 months. HIGH-RISK PATIENTS:       LESS THAN OR EQUAL TO 6 MM:  Optional CT at 12 months. GREATER THAN 6-8 MM: CT at 6-12 months, if multiple at 3-6 months, then CT at   18-24 months.    GREATER THAN 8 MM:  Consider CT at 3 months, PET/CT, or tissue sampling. If   multiple CT at 3-6 months, then at 18-24 months. Guidelines for Management of Incidental Pulmonary Nodules Detected on CT : A   Statement from the [address] [place] [address] 2017\"  Nolberto Santos. Radiology   2017; 000:1-16               Narrative:  INDICATION: Vomiting, abdominal pain, fever       COMPARISON: 5/22/2017       TECHNIQUE:    Thin axial images were obtained through the abdomen and pelvis. Coronal and   sagittal reconstructions were generated. Oral contrast was not administered. CT   dose reduction was achieved through use of a standardized protocol tailored for   this examination and automatic exposure control for dose modulation. The absence of intravenous contrast material reduces the sensitivity for   evaluation of the solid parenchymal organs of the abdomen. FINDINGS:    LUNG BASES: There is a 5 mm pulmonary nodule in the right lower lobe. INCIDENTALLY IMAGED HEART AND MEDIASTINUM: Unremarkable. LIVER: Hypodense foci within the liver are stable but cannot be characterized   given the lack of intravenous contrast.   GALLBLADDER: Unremarkable. SPLEEN: Splenomegaly is again noted. PANCREAS: No mass or ductal dilatation. ADRENALS: Unremarkable. KIDNEYS/URETERS: Small right renal cysts are unchanged. STOMACH: Unremarkable. SMALL BOWEL: No dilatation or wall thickening. COLON: No dilatation or wall thickening. APPENDIX: Unremarkable. PERITONEUM: No ascites or pneumoperitoneum. RETROPERITONEUM: No lymphadenopathy or aortic aneurysm. REPRODUCTIVE ORGANS: There is no pelvic mass or lymphadenopathy. URINARY BLADDER: No mass or calculus. BONES: Degenerative changes are seen in the lumbar spine, sacroiliac joints, and   hip joints bilaterally. ADDITIONAL COMMENTS: N/A               CXR Results  (Last 48 hours)               06/04/18 1958  XR CHEST PORT Final result    Impression:  Impression: No acute process. Narrative:   Indication: Cough, fever       Comparison: 7/29/2017       Portable exam of the chest obtained at 1950 demonstrates normal heart size. There is no acute process in the lung fields. The osseous structures are   unremarkable. Medical Decision Making   I am the first provider for this patient. I reviewed the vital signs, available nursing notes, past medical history, past surgical history, family history and social history. Vital Signs-Reviewed the patient's vital signs. Patient Vitals for the past 12 hrs:   Temp Pulse Resp BP SpO2   06/04/18 2230 - 94 27 - 92 %   06/04/18 2222 (!) 101 °F (38.3 °C) - - - -   06/04/18 2215 - 94 (!) 32 - 95 %   06/04/18 2200 - 91 26 - 96 %   06/04/18 2151 - - - (!) 168/95 -   06/04/18 2115 - 89 30 174/76 95 %   06/04/18 2100 - 90 23 182/79 96 %   06/04/18 2045 - 94 21 176/85 94 %   06/04/18 2030 - 90 (!) 35 175/85 -   06/04/18 2015 - 92 (!) 41 181/79 -   06/04/18 2000 - 90 29 154/79 94 %   06/04/18 1945 - 90 (!) 39 187/77 94 %   06/04/18 1930 - 89 29 185/72 95 %   06/04/18 1927 (!) 101.5 °F (38.6 °C) - - - -   06/04/18 1915 - 90 23 - 92 %   06/04/18 1900 - 90 - 164/65 93 %   06/04/18 1718 100 °F (37.8 °C) 94 16 112/72 97 %       Pulse Oximetry Analysis - 97% on RA    Cardiac Monitor:   Rate: 91 bpm  Rhythm: Normal Sinus Rhythm      EKG interpretation: (Preliminary) 18:16  Rhythm: normal sinus rhythm; and regular . Rate (approx.): 89 bpm; Axis: normal; FL interval: normal; QRS interval: normal ; ST/T wave: nonspecific T wave abnormality. Written by DONAVAN Patel, as dictated by Corina Woods M.D. Records Reviewed: Nursing Notes, Old Medical Records, Previous electrocardiograms, Previous Radiology Studies and Previous Laboratory Studies    Provider Notes (Medical Decision Making):   Patient presents with nausea and vomiting.  Differential includes gastritis/GERD, pancreatitis cholelithiasis, cholecystitis, hepatitis, renal pathology, ACS, gastroenteritis, infection such as UTI/PNA. Will obtain EKG, labs and possibly imaging. Pt has low grade temp. Will get rectal temp and do sepsis work-up as well. ED Course:   Initial assessment performed. The patients presenting problems have been discussed, and they are in agreement with the care plan formulated and outlined with them. I have encouraged them to ask questions as they arise throughout their visit. PROGRESS NOTE:  10:30 PM  Pt reports that he is feeling better and he has no SOB or nausea. Pt's lab work reassuring. Slight WBC. Still has 101 temperature, but was never given Tylenol. Given the cough and saturations at 94% on RA, will treat with Doxycycline. Pt promises to f/y with PCP and Nephrology about his slight PEDRO. Advised pt to return if any changes including SOB, N/V, persistent fevers arise. Written by Adelaida Kidd, ED Scribe, as dictated by Gifty Watkins M.D. Gifty Watkins M.D    Critical Care Time:   0    Disposition:  10:33 PM  The patient has been re-evaluated and is ready for discharge. Reviewed available results with patient. Counseled patient on diagnosis and care plan. Patient has expressed understanding, and all questions have been answered. Patient agrees with plan and agrees to follow up as recommended, or return to the ED if their symptoms worsen. Discharge instructions have been provided and explained to the patient, along with reasons to return to the ED. PLAN:  1. Current Discharge Medication List      START taking these medications    Details   ondansetron hcl (ZOFRAN) 4 mg tablet Take 1 Tab by mouth every eight (8) hours as needed for Nausea. Qty: 20 Tab, Refills: 0      doxycycline (ADOXA) 100 mg tablet Take 1 Tab by mouth two (2) times a day for 10 days. Qty: 20 Tab, Refills: 0           2.    Follow-up Information     Follow up With Details Comments [id number] Nata Enriquez NP Schedule an appointment as soon as possible for a visit  [facility] [place] [id number]  [phone]          Return to ED if worse     Diagnosis     Clinical Impression:   1. Community acquired pneumonia, unspecified laterality    2. Acute renal failure superimposed on chronic kidney disease, unspecified CKD stage, unspecified acute renal failure type ([facility]    3. Fever, unspecified fever cause        Attestations: This note is prepared by Nicole Hooper, acting as Scribe for Vern Orellana M.D. Vern Orellana M.D: The scribe's documentation has been prepared under my direction and personally reviewed by me in its entirety. I confirm that the note above accurately reflects all work, treatment, procedures, and medical decision making performed by me.

## 2018-06-05 LAB
ATRIAL RATE: 89 BPM
CALCULATED P AXIS, ECG09: 27 DEGREES
CALCULATED R AXIS, ECG10: 3 DEGREES
CALCULATED T AXIS, ECG11: 10 DEGREES
DIAGNOSIS, 93000: NORMAL
P-R INTERVAL, ECG05: 174 MS
Q-T INTERVAL, ECG07: 338 MS
QRS DURATION, ECG06: 100 MS
QTC CALCULATION (BEZET), ECG08: 411 MS
VENTRICULAR RATE, ECG03: 89 BPM

## 2018-06-05 NOTE — ED NOTES
Dr. Sven Oliveira reviewed discharge instructions with the patient. The patient verbalized understanding. Pt waiting on a ride to leave.

## 2018-06-05 NOTE — DISCHARGE INSTRUCTIONS
Learning About Fever  What is a fever? A fever is a high body temperature. It's one way your body fights being sick. A fever shows that the body is responding to infection or other illnesses, both minor and severe. A fever is a symptom, not an illness by itself. A fever can be a sign that you are ill, but most fevers are not caused by a serious problem. You may have a fever with a minor illness, such as a cold. But sometimes a very serious infection may cause little or no fever. It is important to look at other symptoms, other conditions you have, and how you feel in general. In children, notice how they act and see what symptoms they complain of. What is a normal body temperature? A normal body temperature is about 98. 6ºF. Some people have a normal temperature that is a little higher or a little lower than this. Your temperature may be a little lower in the morning than it is later in the day. It may go up during hot weather or when you exercise, wear heavy clothes, or take a hot bath. Your temperature may also be different depending on how you take it. A temperature taken in the mouth (oral) or under the arm may be a little lower than your core temperature (rectal). What is a fever temperature? A core temperature of 100.4°F or above is considered a fever. What can cause a fever? A fever may be caused by:  · Infections. This is the most common cause of a fever. Examples of infections that can cause a fever include the flu, a kidney infection, or pneumonia. · Some medicines. · Severe trauma or injury, such as a heart attack, stroke, heatstroke, or burns. · Other medical conditions, such as arthritis and some cancers. How can you treat a fever at home? · Ask your doctor if you can take an over-the-counter pain medicine, such as acetaminophen (Tylenol), ibuprofen (Advil, Motrin), or naproxen (Aleve). Be safe with medicines. Read and follow all instructions on the label.   · To prevent dehydration, drink plenty of fluids. Choose water and other caffeine-free clear liquids until you feel better. If you have kidney, heart, or liver disease and have to limit fluids, talk with your doctor before you increase the amount of fluids you drink. Follow-up care is a key part of your treatment and safety. Be sure to make and go to all appointments, and call your doctor if you are having problems. It's also a good idea to know your test results and keep a list of the medicines you take. Where can you learn more? Go to http://nader-star.info/. Enter J933 in the search box to learn more about \"Learning About Fever. \"  Current as of: March 20, 2017  Content Version: 11.4  © 3784-5535 Healthwise, Incorporated. Care instructions adapted under license by [employer] (which disclaims liability or warranty for this information). If you have questions about a medical condition or this instruction, always ask your healthcare professional. [place] [address] any warranty or liability for your use of this information.

## 2018-06-05 NOTE — ED NOTES
Spoke with pt's roommate, who said that someone should be here to pick the pt up \"any minute now\".

## 2018-06-07 LAB
BACTERIA SPEC CULT: ABNORMAL
SERVICE CMNT-IMP: ABNORMAL

## 2018-06-07 NOTE — PROGRESS NOTES
Discussed patient with Dr. Nico Coughlin. Pt presented with chills/cough/fever/nausea. Was ultimately diagnosed with PNA. Discharged with Adoxa. Both bottles are now positive. Pt should not be called at this time, but should be called when the sensitivity report returns.

## 2018-08-06 ENCOUNTER — OFFICE VISIT (OUTPATIENT)
Dept: SURGERY | Age: 63
End: 2018-08-06

## 2018-08-06 VITALS
BODY MASS INDEX: 33.84 KG/M2 | HEIGHT: 77 IN | DIASTOLIC BLOOD PRESSURE: 89 MMHG | HEART RATE: 69 BPM | SYSTOLIC BLOOD PRESSURE: 158 MMHG | OXYGEN SATURATION: 99 % | TEMPERATURE: 97.9 F | RESPIRATION RATE: 20 BRPM | WEIGHT: 286.6 LBS

## 2018-08-06 DIAGNOSIS — S21.209D BACK WOUND, UNSPECIFIED LATERALITY, SUBSEQUENT ENCOUNTER: Primary | ICD-10-CM

## 2018-08-06 PROBLEM — L98.422 NON-PRESSURE CHRONIC ULCER OF BACK, WITH FAT LAYER EXPOSED (HCC): Status: ACTIVE | Noted: 2018-08-06

## 2018-08-06 PROBLEM — L98.411 NON-PRESSURE CHRONIC ULCER OF BUTTOCK LIMITED TO BREAKDOWN OF SKIN (HCC): Status: ACTIVE | Noted: 2018-08-06

## 2018-08-06 RX ORDER — LIDOCAINE HYDROCHLORIDE AND EPINEPHRINE 10; 10 MG/ML; UG/ML
10 INJECTION, SOLUTION INFILTRATION; PERINEURAL ONCE
Qty: 1 VIAL | Refills: 0
Start: 2018-08-06 | End: 2018-08-06

## 2018-08-06 NOTE — MR AVS SNAPSHOT
[address], [address], [address] [place] [address] [facility] 
[phone] Patient: Evangelista Dia 
MRN: [id number] :1955 Visit Information Date & Time Provider Department Dept. Phone Encounter #  
 2018 10:40 AM Joan Daugherty MD [facility] [address] [phone] [id number] Your Appointments 2018  9:40 AM  
ESTABLISHED PATIENT with Jaon Daugherty MD  
[facility] [address] ([facility]) Appt Note: 2 wk f/u wound check [address] [facility] [address], [phone]  
[address] [facility] [place] [place] [address], [address] [place] [address] [address] [phone] Upcoming Health Maintenance Date Due Hepatitis C Screening 1955 FOOT EXAM Q1 1963 MICROALBUMIN Q1 1963 EYE EXAM RETINAL OR DILATED Q1 1963 DTaP/Tdap/Td series (1 - Tdap) 1974 FOBT Q 1 YEAR AGE 50-75 2003 ZOSTER VACCINE AGE 60> 2013 GLAUCOMA SCREENING Q2Y 2018 Pneumococcal 65+ High/Highest Risk (2 of 2 - PPSV23) 2018 LIPID PANEL Q1 2018 Influenza Age 5 to Adult 2018 HEMOGLOBIN A1C Q6M 2018 Allergies as of 2018  Review Complete On: 2018 By: Phuong Salmon LPN No Known Allergies Current Immunizations  Reviewed on 2018 Name Date Influenza Vaccine (Quad) PF 3/29/2018  3:58 PM  
 ZZZ-RETIRED (DO NOT USE) Pneumococcal Vaccine (Unspecified Type) 2012 Not reviewed this visit Vitals BP Pulse Temp Resp Height(growth percentile) Weight(growth percentile) 158/89 (BP 1 Location: Right arm, BP Patient Position: Sitting) 69 97.9 °F (36.6 °C) (Oral) 20 6' 5\" (1.956 m) 286 lb 9.6 oz (130 kg) SpO2 BMI Smoking Status 99% 33.99 kg/m2 Never Smoker BMI and BSA Data  Body Mass Index Body Surface Area  
 33.99 kg/m 2 2.66 m 2  
  
  
 Preferred Pharmacy Pharmacy Name Phone [facility]Y #[id number] [facility] [phone] Your Updated Medication List  
  
   
This list is accurate as of 8/6/18 11:36 AM.  Always use your most recent med list.  
  
  
  
  
 ACCU-CHEK SOFÍA CONTROL SOLN Soln Generic drug:  Blood Glucose Control High&Low  
  
 amLODIPine 10 mg tablet Commonly known as:  Sabas Sy Take 1 Tab by mouth daily. carvedilol 25 mg tablet Commonly known as:  Isrrael Liter Take 1 Tab by mouth two (2) times daily (with meals). cloNIDine HCl 0.2 mg tablet Commonly known as:  CATAPRES Take 1 Tab by mouth two (2) times a day. clopidogrel 75 mg Tab Commonly known as:  PLAVIX Take 75 mg by mouth daily. digoxin 0.125 mg tablet Commonly known as:  LANOXIN Take 0.125 mg by mouth daily. Indications: afib  
  
 hydrALAZINE 50 mg tablet Commonly known as:  APRESOLINE Take 1 Tab by mouth three (3) times daily. HYDROcodone-acetaminophen 5-325 mg per tablet Commonly known as:  Cindy Fothergill Take 1 Tab by mouth every four (4) hours as needed for Pain. Max Daily Amount: 6 Tabs. lancets 30 gauge Misc NovoLIN 70/30 U-100 Insulin 100 unit/mL (70-30) injection Generic drug:  insulin NPH/insulin regular 65 Units by SubCUTAneous route Before breakfast and dinner. ondansetron hcl 4 mg tablet Commonly known as:  Rozina Aritaing Take 1 Tab by mouth every eight (8) hours as needed for Nausea. sodium bicarbonate 650 mg tablet Take 1 Tab by mouth two (2) times a day. SURE COMFORT INSULIN SYRINGE 1 mL 31 gauge x 5/16 Syrg Generic drug:  Insulin Syringe-Needle U-100 Introducing [facility]! [address] introduces [employer] patient portal. Now you can access parts of your medical record, email your doctor's office, and request medication refills online.    
 
1. In your internet browser, go to [email or web address] 2. Click on the First Time User? Click Here link in the Sign In box. You will see the New Member Sign Up page. 3. Enter your [employer] Access Code exactly as it appears below. You will not need to use this code after youve completed the sign-up process. If you do not sign up before the expiration date, you must request a new code. · [employer] Access Code: [email or web address] Expires: 11/4/2018 10:06 AM 
 
4. Enter the last four digits of your Social Security Number (xxxx) and Date of Birth (mm/dd/yyyy) as indicated and click Submit. You will be taken to the next sign-up page. 5. Create a [employer] ID. This will be your Int[employer] login ID and cannot be changed, so think of one that is secure and easy to remember. 6. Create a [employer] password. You can change your password at any time. 7. Enter your Password Reset Question and Answer. This can be used at a later time if you forget your password. 8. Enter your e-mail address. You will receive e-mail notification when new information is available in [address] 9. Click Sign Up. You can now view and download portions of your medical record. 10. Click the Download Summary menu link to download a portable copy of your medical information. If you have questions, please visit the Frequently Asked Questions section of the [employer] website. Remember, [employer] is NOT to be used for urgent needs. For medical emergencies, dial 911. Now available from your iPhone and Android! Please provide this summary of care documentation to your next provider. Your primary care clinician is listed as Lucy Sandoval. If you have any questions after today's visit, please call [phone].

## 2018-08-06 NOTE — PROGRESS NOTES
The patient is presenting today in follow up after incision and drainage and debridement of abscesses of back at  [facility] in June. His daughter is changing dressings with Medi Honey daily. 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 The patient has diabetes mellitus poorly controlled. On examination on the right lower back just off midline there is an area of clean granulation about 3 cm across with 1 (one) area of tracking which is approximately 2 cm deep. There is a small amount of yellow necrotic skin and subcutaneous tissue on one side. Compressing the area did not yield purulence. There is no particular induration around the outside. After informed consent was obtained and standard time out was performed, the site was marked and sterilely draped. Local anesthesia Xylocaine 1% with epinephrine was infiltrated with a total of 5 cc's. Using a #10 blade scalpel, a sharp excisional debridement was carried skin and subcutaneous necrotic tissue about 1-1/2 cm x 1 cm x 0.3 cm was excised. The wound was then packed open with gauze. Dry gauze dressing applied. The patient also pointed to an area where he had a wound on the left buttock. That area was inspected which revealed a small dry scab, but no active wound is seen. There was no tenderness. There was minimal induration at the site. That site was not debrided. There was another mirror image wound on the opposite right buttock which was a shallow ulcer 1 cm across with clean granulation and no associated induration or tenderness. Dry dressing applied there. The patient will continue wound care at home with his daughter with Ridge Jackson and with instructions to pack the wound to full depth on the back. He will follow up in 2 weeks. Final Diagnosis: Nonhealing wound of back at prior abscess site, ulcer of right buttock, excisional debridement of skin and cutaneous tissue right lower back.

## 2018-08-06 NOTE — PROGRESS NOTES
1. Have you been to the ER, urgent care clinic since your last visit? Hospitalized since your last visit? yes/Yoan Baxter 6/18/back wound    2. Have you seen or consulted any other health care providers outside of the [address] [place] [address] since your last visit? yes Include any pap smears or colon screening. Discussed advanced directive. Patient states that he does not have an advanced directive. [facility]  OFFICE PROCEDURE PROGRESS NOTE        Chart reviewed for the following:   IOtilio LPN, have reviewed the History, Physical and updated the Allergic reactions for [address] performed immediately prior to start of procedure:   Marty Colón LPN, have performed the following reviews on Clint Godoy prior to the start of the procedure:            * Patient was identified by name and date of birth   * Agreement on procedure being performed was verified  * Risks and Benefits explained to the patient  * Procedure site verified and marked as necessary  * Patient was positioned for comfort  * Consent was signed and verified     Time: 11:20am      Date of procedure: 8/6/2018    Procedure performed by:  Stuart Garcia MD    Provider assisted by: JESSICA Steele    Patient assisted by: Self    How tolerated by patient: Pt tolerated procedure well.     Post Procedural Pain Scale: 0/10    Comments: None

## 2018-08-13 NOTE — H&P
Surgery History and Physical    Subjective:      Lina Bravo is a 57 y.o. male  With poorly controlled diabetes. He has had recurrent skjn and subcutaneous abscesses. He presented to the [facility] yesterday with several new abscesses on the back too large for outpatient treatment. He denies anginal chest pain or dyspnea. Past Medical History:   Diagnosis Date    A-fib [facility]) 2009    Resolved. 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     Cancer ([facility]     splenic lymphoma    Chronic kidney disease     CKD stage 4 due to type 2 diabetes mellitus ([facility]     Diabetes ([facility]     Hypertension     Ill-defined condition     diabetic neuropathy james feet    Neuropathy     Other ill-defined conditions(799.89)     spinal meningitis    Other ill-defined conditions(799.89)     broken blood vessel to nose    Stroke ([facility] 2007, 2017    per patient    Vitamin D deficiency      Past Surgical History:   Procedure Laterality Date    HX OTHER SURGICAL  02/06/2017    Excisional debridement of abscess right thigh & right lower back     HX OTHER SURGICAL  05/23/2017    I&D & excisional debridement (skin & subcutaneous tissue) back & right thigh    HX OTHER SURGICAL  01/12/2018    debridement of abscess back      Family History   Problem Relation Age of Onset    Diabetes Mother     Hypertension Father     Hypertension Sister      Social History   Substance Use Topics    Smoking status: Never Smoker    Smokeless tobacco: Never Used    Alcohol use No      Prior to Admission medications    Medication Sig Start Date End Date Taking?  Authorizing Provider   carvedilol (COREG) 12.5 mg tablet  10/31/17  Yes Historical Provider   clopidogrel (PLAVIX) 75 mg tab  12/8/17  Yes Historical Provider   doxycycline (VIBRA-TABS) 100 mg tablet  10/30/17  Yes Historical Provider   hydroCHLOROthiazide (MICROZIDE) 12.5 mg capsule  12/8/17  Yes Historical Provider   NOVOLIN 70/30 100 unit/mL (70-30) injection  18  Yes Historical Provider   cloNIDine HCl (CATAPRES) 0.2 mg tablet Take 1 Tab by mouth two (2) times a day. 17  Yes Mary Grimm MD   insulin lispro protamine/insulin lispro (HUMALOG MIX 75/25) 100 unit/mL (75-25) injection 60 Units by SubCUTAneous route Before breakfast and dinner. 16  Yes Austyn Price NP   amLODIPine (NORVASC) 10 mg tablet Take 1 Tab by mouth daily. 10/3/15  Yes Carmen Castro MD   pravastatin (PRAVACHOL) 20 mg tablet Take 20 mg by mouth nightly. Yes Magaly Lyons MD   digoxin (LANOXIN) 0.125 mg tablet Take 0.125 mg by mouth daily. Indications: afib   Yes Historical Provider   lancets 30 gauge misc  17   Historical Provider   SURE COMFORT INSULIN SYRINGE 1 mL 31 gauge x  syrg  17   Historical Provider   ACCU-CHEK SOFÍA CONTROL SOLN soln  17   Historical Provider   ACCU-CHEK SOFÍA PLUS TEST STRP strip  17   Historical Provider   oxyCODONE-acetaminophen (PERCOCET) 5-325 mg per tablet Take 1-2 Tabs by mouth every four (4) hours as needed for Pain. Max Daily Amount: 12 Tabs. 18   Holger Hebert MD   alcohol swabs (ALCOHOL PADS) padm 1 Each by Apply Externally route two (2) times a day. 10/3/15   Carmen Castro MD      No Known Allergies    Review of Systems:  Pertinent items are noted in the History of Present Illness. Objective:      Patient Vitals for the past 8 hrs:   BP Temp Pulse Resp SpO2 Height Weight   18 1354 169/71 98 °F (36.7 °C) 74 18 98 % 6' 5\" (1.956 m) 141.6 kg (312 lb 2.7 oz)       Temp (24hrs), Av °F (36.7 °C), Min:98 °F (36.7 °C), Max:98 °F (36.7 °C)      Physical Exam:    WD obese man,  NAD  HEAD/NECK: No jaundice, mass or thyromegaly. LUNGS: Clear bilaterally without wheeze, rhonchi or rales. Gretta Alas HEART: Regular without murmur, gallop or rub.   BACK - the central area that was debrided has clean granulation and is about 2 cm across and 1 cm deep. He has to the right of that in the lower back an area about 4 cm across which has multiple punctate sites with purulence. There is induration. He has an area about 2 cm across in the right upper back of similar type and 1 area about a centimeter across in the left upper back of a similar appearance. Abdomen:  Soft, non tender, no mass or hernia noted. NEURO: Patient is alert and oriented x 3 and moves all extremities equally. Facial movement is symmetrical. Speech was normal.   EXT: Without edema. Assessment:     Multiple abscesses of back, diabetes. Plan:     Debridement of abscesses of back. .    Signed By: Joe Mcleod MD     January 30, 2018 (0) independent

## 2018-08-17 ENCOUNTER — TELEPHONE (OUTPATIENT)
Dept: SURGERY | Age: 63
End: 2018-08-17

## 2018-08-21 ENCOUNTER — TELEPHONE (OUTPATIENT)
Dept: SURGERY | Age: 63
End: 2018-08-21

## 2018-08-21 NOTE — TELEPHONE ENCOUNTER
Mr Crespo Rather called to schedule appt because he has a new abscess on left buttock. Appt scheduled on Monday, 8/20/18.

## 2018-08-21 NOTE — TELEPHONE ENCOUNTER
Mr Srinivasan Rose Mary showed\" for 8/20/18 appt. Mail box full on phone, could not leave a message. Mr Gardiner Naun called office back. Pt had no transportation for 8/20/18 appt. Rescheduled for 8/23/18.

## 2018-08-23 ENCOUNTER — OFFICE VISIT (OUTPATIENT)
Dept: SURGERY | Age: 63
End: 2018-08-23

## 2018-08-23 VITALS
HEIGHT: 77 IN | SYSTOLIC BLOOD PRESSURE: 158 MMHG | TEMPERATURE: 97.8 F | HEART RATE: 85 BPM | BODY MASS INDEX: 32.8 KG/M2 | OXYGEN SATURATION: 100 % | WEIGHT: 277.8 LBS | DIASTOLIC BLOOD PRESSURE: 80 MMHG

## 2018-08-23 DIAGNOSIS — L02.31 ABSCESS OF BUTTOCK, LEFT: Primary | ICD-10-CM

## 2018-08-23 RX ORDER — LIDOCAINE HYDROCHLORIDE AND EPINEPHRINE 10; 10 MG/ML; UG/ML
10 INJECTION, SOLUTION INFILTRATION; PERINEURAL ONCE
Qty: 1 VIAL | Refills: 0
Start: 2018-08-23 | End: 2018-08-23

## 2018-08-23 RX ORDER — PRAVASTATIN SODIUM 20 MG/1
20 TABLET ORAL
COMMUNITY

## 2018-08-23 RX ORDER — HYDROCHLOROTHIAZIDE 12.5 MG/1
12.5 CAPSULE ORAL DAILY
COMMUNITY

## 2018-08-23 NOTE — PROGRESS NOTES
Chief Complaint   Patient presents with    Skin Problem     eval new abscess left buttuck last seen 8/13/18       1. Have you been to the ER, urgent care clinic since your last visit? Hospitalized since your last visit? No    2. Have you seen or consulted any other health care providers outside of the [address] [place] [address] since your last visit? Include any pap smears or colon screening. No     Discussed advanced directive. Patient states that he does not have an advanced directive.

## 2018-08-23 NOTE — MR AVS SNAPSHOT
[address], [address], [address] [place] [address] [facility] 
[phone] Patient: Jorge Ballard 
MRN: [id number] :1955 Visit Information Date & Time Provider Department Dept. Phone Encounter #  
 2018  1:20 PM Primitivo Matthews MD [facility] Dr. Beltran Jones [phone] [id number] Follow-up Instructions Return in about 1 week (around 2018). Follow-up and Disposition History Your Appointments 2018  2:20 PM  
POST OP with Primitivo Matthews MD  
[facility] Dr. Beltran Jones ([facility]) Appt Note: I&D ABSCESS LF BUTTOCK 18  
 [address], [address], [address] [phone]  
[address], [address], [address] [facility] [address] [phone] Upcoming Health Maintenance Date Due Hepatitis C Screening 1955 FOOT EXAM Q1 1963 MICROALBUMIN Q1 1963 EYE EXAM RETINAL OR DILATED Q1 1963 DTaP/Tdap/Td series (1 - Tdap) 1974 FOBT Q 1 YEAR AGE 50-75 2003 ZOSTER VACCINE AGE 60> 2013 GLAUCOMA SCREENING Q2Y 2018 Pneumococcal 65+ High/Highest Risk (2 of 2 - PPSV23) 2018 LIPID PANEL Q1 2018 Influenza Age 5 to Adult 2018 HEMOGLOBIN A1C Q6M 2018 Allergies as of 2018  Review Complete On: 2018 By: Primitivo Matthews MD  
 No Known Allergies Current Immunizations  Reviewed on 2018 Name Date Influenza Vaccine (Quad) PF 3/29/2018  3:58 PM  
 ZZZ-RETIRED (DO NOT USE) Pneumococcal Vaccine (Unspecified Type) 2012 Not reviewed this visit You Were Diagnosed With   
  
 Codes Comments Abscess of buttock, left    -  Primary ICD-10-CM: L02.31 
ICD-9-CM: 682.5 Vitals BP Pulse Temp Height(growth percentile) Weight(growth percentile) SpO2 158/80 (BP 1 Location: Right arm, BP Patient Position: Sitting) 85 97.8 °F (36.6 °C) 6' 5\" (1.956 m) 277 lb 12.8 oz (126 kg) 100% BMI Smoking Status 32.94 kg/m2 Never Smoker Vitals History BMI and BSA Data Body Mass Index Body Surface Area 32.94 kg/m 2 2.62 m 2 Preferred Pharmacy Pharmacy Name Phone [facility]/PHARMACY #[id number] [phone] Your Updated Medication List  
  
   
This list is accurate as of 8/23/18  4:55 PM.  Always use your most recent med list.  
  
  
  
  
 ACCU-CHEK SOFÍA CONTROL SOLN Soln Generic drug:  Blood Glucose Control High&Low  
  
 amLODIPine 10 mg tablet Commonly known as:  Marie [place]ables Take 1 Tab by mouth daily. carvedilol 25 mg tablet Commonly known as:  [place] Take 1 Tab by mouth two (2) times daily (with meals). cloNIDine HCl 0.2 mg tablet Commonly known as:  CATAPRES Take 1 Tab by mouth two (2) times a day. clopidogrel 75 mg Tab Commonly known as:  PLAVIX Take 75 mg by mouth daily. digoxin 0.125 mg tablet Commonly known as:  LANOXIN Take 0.125 mg by mouth daily. Indications: afib  
  
 hydrALAZINE 50 mg tablet Commonly known as:  APRESOLINE Take 1 Tab by mouth three (3) times daily. hydroCHLOROthiazide 12.5 mg capsule Commonly known as:  Son Ny Take 12.5 mg by mouth daily. HYDROcodone-acetaminophen 5-325 mg per tablet Commonly known as:  Adis Cassie Take 1 Tab by mouth every four (4) hours as needed for Pain. Max Daily Amount: 6 Tabs. lancets 30 gauge Misc  
  
 lidocaine-EPINEPHrine 1 %-1:100,000 injection Commonly known as:  XYLOCAINE 10 mL by IntraDERMal route once for 1 dose. Indications: ADMINISTRATION OF LOCAL ANESTHESIA, Lot #-EV   [id number]  20mL btl - only 10 mL used NovoLIN 70/30 U-100 Insulin 100 unit/mL (70-30) injection Generic drug:  insulin NPH/insulin regular 65 Units by SubCUTAneous route Before breakfast and dinner. ondansetron hcl 4 mg tablet Commonly known as:  Laruth Downer Take 1 Tab by mouth every eight (8) hours as needed for Nausea. pravastatin 20 mg tablet Commonly known as:  PRAVACHOL Take 20 mg by mouth nightly.  
  
 sodium bicarbonate 650 mg tablet Take 1 Tab by mouth two (2) times a day. SURE COMFORT INSULIN SYRINGE 1 mL 31 gauge x 5/16 Syrg Generic drug:  Insulin Syringe-Needle U-100 We Performed the Following AEROBIC BACTERIAL CULTURE [id number] CPT(R)] Follow-up Instructions Return in about 1 week (around 8/30/2018). Introducing hospitals & HEALTH SERVICES! [employer] introduces [employer] patient portal. Now you can access parts of your medical record, email your doctor's office, and request medication refills online. 1. In your internet browser, go to [email or web address] 2. Click on the First Time User? Click Here link in the Sign In box. You will see the New Member Sign Up page. 3. Enter your [employer] Access Code exactly as it appears below. You will not need to use this code after youve completed the sign-up process. If you do not sign up before the expiration date, you must request a new code. · [employer] Access Code: [email or web address] Expires: 11/4/2018 10:06 AM 
 
4. Enter the last four digits of your Social Security Number (xxxx) and Date of Birth (mm/dd/yyyy) as indicated and click Submit. You will be taken to the next sign-up page. 5. Create a [employer] ID. This will be your [employer] login ID and cannot be changed, so think of one that is secure and easy to remember. 6. Create a [employer] password. You can change your password at any time. 7. Enter your Password Reset Question and Answer. This can be used at a later time if you forget your password. 8. Enter your e-mail address.  You will receive e-mail notification when new information is available in [employer]. 9. Click Sign Up. You can now view and download portions of your medical record. 10. Click the Download Summary menu link to download a portable copy of your medical information. If you have questions, please visit the Frequently Asked Questions section of the [employer] website. Remember, [employer] is NOT to be used for urgent needs. For medical emergencies, dial 911. Now available from your iPhone and Android! Please provide this summary of care documentation to your next provider. Your primary care clinician is listed as Lucy Sandoval. If you have any questions after today's visit, please call [phone].

## 2018-08-23 NOTE — PROGRESS NOTES
The patient has previous history of multiple abscesses of back and buttock which have typically grown MRSA. The patient reports his blood sugars are now much better controlled than previously. He reports that he developed pain and then drainage out of the left buttock about a week ago. On examination there is an opening left buttock from which has some purulent drainage. There is a very soft fluctuant area there. There is no real tenderness or erythema. I recommended incision and drainage of the abscess of the left buttock. This can be done under local anesthesia in the office. Risks versus benefits were reviewed with the patient. The patient agreed and wished to proceed. After standard time out was performed, the site was marked and then sterilely prepared and draped. Local anesthesia with Xylocaine 1% with epinephrine was injected into the skin and subcutaneous tissue next to the open site, about 5 cc's was utilized. There is a skin opening just a few millimeters across. With an #11 blade scalpel, initially a 1 cm incision was made. I was then able to probe the abscess cavity and I extended the incision another centimeter proceeding inferiorly. The cavity had only a small amount of purulence in it. I think it had for the most part drained. There was a small amount of necrotic subcutaneous tissue that was removed. The cavity was packed with 1/2 inch Iodoform gauze. ABD was placed over the wound and held in place by the patient's underwear. The patient also on the back had a site where he previously had an I&D of an abscess and there was some exuberant granulation tissue there that was treated with silver nitrate. A dry dressing applied there. The patient will have home health start in 2 days and there will be 3x per week packing changes of the abscess cavity with 1/2 Iodoform. The patient will see me in follow up in the office in one week. I do not think he requires antibiotics.  I did take a culture of the abscess cavity. Final Diagnosis:  Incision and drainage of abscess of left buttock.

## 2018-08-24 ENCOUNTER — TELEPHONE (OUTPATIENT)
Dept: SURGERY | Age: 63
End: 2018-08-24

## 2018-08-24 NOTE — TELEPHONE ENCOUNTER
Dr Eden Toledo I&D'd left buttock abscess 8/23/18. Dressing change for home health starting Saturday, 8/25/18, packing change using 1/2 inch Iodoform, cover with dry gauze & tape 3 times a week. Next appt with Dr Eden Toledo on 8/30/18.

## 2018-08-27 LAB — BACTERIA SPEC AEROBE CULT: ABNORMAL

## 2018-08-30 ENCOUNTER — OFFICE VISIT (OUTPATIENT)
Dept: SURGERY | Age: 63
End: 2018-08-30

## 2018-08-30 VITALS
BODY MASS INDEX: 33.23 KG/M2 | TEMPERATURE: 97.7 F | HEART RATE: 79 BPM | SYSTOLIC BLOOD PRESSURE: 153 MMHG | OXYGEN SATURATION: 100 % | DIASTOLIC BLOOD PRESSURE: 85 MMHG | WEIGHT: 281.4 LBS | HEIGHT: 77 IN

## 2018-08-30 DIAGNOSIS — Z09 POSTOPERATIVE EXAMINATION: Primary | ICD-10-CM

## 2018-08-30 DIAGNOSIS — Z22.322 MRSA (METHICILLIN RESISTANT STAPH AUREUS) CULTURE POSITIVE: ICD-10-CM

## 2018-08-30 NOTE — PROGRESS NOTES
The patient is status post incision and drainage of abscess of buttock on 8/23/18. Culture did grow MRSA. The patient reports minimal discomfort in the area. He reports minimal drainage. On examination after removal of the Iodoform packing, the cavity on the left buttock is about 2.5 cm deep and about 1.5 cm wide. There is a small amount of fibrinous debris with no drainage and minimal induration in the tissues. The area appears fairly quiet. The cavity was repacked with 1/4-inch Iodoform and covered by dry gauze dressing. The patient's packing is being changed three times per week by the home health nurses. The patient will see me again in the office in follow up in two weeks. Final Diagnosis: Status post incision and drainage of abscess left buttock.

## 2018-08-30 NOTE — MR AVS SNAPSHOT
[address], [address] [facility], [address] 
[phone] Patient: Geneva Woodall 
MRN: [id number] :1955 Visit Information Date & Time Provider Department Dept. Phone Encounter #  
 2018  2:20 PM Darrell Dwyer MD [facility] Dr[address] [phone] [id number] Upcoming Health Maintenance Date Due Hepatitis C Screening 1955 FOOT EXAM Q1 1963 MICROALBUMIN Q1 1963 EYE EXAM RETINAL OR DILATED Q1 1963 DTaP/Tdap/Td series (1 - Tdap) 1974 FOBT Q 1 YEAR AGE 50-75 2003 ZOSTER VACCINE AGE 60> 2013 GLAUCOMA SCREENING Q2Y 2018 Pneumococcal 65+ High/Highest Risk (2 of 2 - PPSV23) 2018 LIPID PANEL Q1 2018 Influenza Age 5 to Adult 2018 MEDICARE YEARLY EXAM 2018 HEMOGLOBIN A1C Q6M 2018 Allergies as of 2018  Review Complete On: 2018 By: Darrell Dwyer MD  
 No Known Allergies Current Immunizations  Reviewed on 2018 Name Date Influenza Vaccine (Quad) PF 3/29/2018  3:58 PM  
 ZZZ-RETIRED (DO NOT USE) Pneumococcal Vaccine (Unspecified Type) 2012 Not reviewed this visit You Were Diagnosed With   
  
 Codes Comments Postoperative examination    -  Primary ICD-10-CM: D62 ICD-9-CM: V67.00 MRSA (methicillin resistant staph aureus) culture positive     ICD-10-CM: Z22.200 ICD-9-CM: V02.54 Vitals BP Pulse Temp Height(growth percentile) Weight(growth percentile) SpO2  
 153/85 (BP 1 Location: Right arm, BP Patient Position: Sitting) 79 97.7 °F (36.5 °C) 6' 5\" (1.956 m) 281 lb 6.4 oz (127.6 kg) 100% BMI Smoking Status 33.37 kg/m2 Never Smoker Vitals History BMI and BSA Data Body Mass Index Body Surface Area  
 33.37 kg/m 2 2.63 m 2 Preferred Pharmacy Pharmacy Name Phone [facility]/PHARMACY #[id number] [facility] [phone] Your Updated Medication List  
  
   
This list is accurate as of 8/30/18  2:45 PM.  Always use your most recent med list.  
  
  
  
  
 ACCU-CHEK SOFÍA CONTROL SOLN Soln Generic drug:  Blood Glucose Control High&Low  
  
 amLODIPine 10 mg tablet Commonly known as:  Roselyn Salchris Take 1 Tab by mouth daily. carvedilol 25 mg tablet Commonly known as:  Kathlean Due Take 1 Tab by mouth two (2) times daily (with meals). cloNIDine HCl 0.2 mg tablet Commonly known as:  CATAPRES Take 1 Tab by mouth two (2) times a day. clopidogrel 75 mg Tab Commonly known as:  PLAVIX Take 75 mg by mouth daily. digoxin 0.125 mg tablet Commonly known as:  LANOXIN Take 0.125 mg by mouth daily. Indications: afib  
  
 hydrALAZINE 50 mg tablet Commonly known as:  APRESOLINE Take 1 Tab by mouth three (3) times daily. hydroCHLOROthiazide 12.5 mg capsule Commonly known as:  Artie Drilling Take 12.5 mg by mouth daily. HYDROcodone-acetaminophen 5-325 mg per tablet Commonly known as:  John Songster Take 1 Tab by mouth every four (4) hours as needed for Pain. Max Daily Amount: 6 Tabs. lancets 30 gauge Misc NovoLIN 70/30 U-100 Insulin 100 unit/mL (70-30) injection Generic drug:  insulin NPH/insulin regular 65 Units by SubCUTAneous route Before breakfast and dinner. ondansetron hcl 4 mg tablet Commonly known as:  Marcie Bene Take 1 Tab by mouth every eight (8) hours as needed for Nausea. pravastatin 20 mg tablet Commonly known as:  PRAVACHOL Take 20 mg by mouth nightly.  
  
 sodium bicarbonate 650 mg tablet Take 1 Tab by mouth two (2) times a day. SURE COMFORT INSULIN SYRINGE 1 mL 31 gauge x 5/16 Syrg Generic drug:  Insulin Syringe-Needle U-100 Introducing [facility] HEALTH SERVICES!    
 Ad[employer] Padilla introduces [employer] patient portal. Now you can access parts of your medical record, email your doctor's office, and request medication refills online. 1. In your internet browser, go to [email or web address] 2. Click on the First Time User? Click Here link in the Sign In box. You will see the New Member Sign Up page. 3. Enter your [employer] Access Code exactly as it appears below. You will not need to use this code after youve completed the sign-up process. If you do not sign up before the expiration date, you must request a new code. · [employer] Access Code: [email or web address] Expires: 11/4/2018 10:06 AM 
 
4. Enter the last four digits of your Social Security Number (xxxx) and Date of Birth (mm/dd/yyyy) as indicated and click Submit. You will be taken to the next sign-up page. 5. Create a [employer] ID. This will be your [employer] login ID and cannot be changed, so think of one that is secure and easy to remember. 6. Create a [employer] password. You can change your password at any time. 7. Enter your Password Reset Question and Answer. This can be used at a later time if you forget your password. 8. Enter your e-mail address. You will receive e-mail notification when new information is available in [address] 9. Click Sign Up. You can now view and download portions of your medical record. 10. Click the Download Summary menu link to download a portable copy of your medical information. If you have questions, please visit the Frequently Asked Questions section of the [employer] website. Remember, [employer] is NOT to be used for urgent needs. For medical emergencies, dial 911. Now available from your iPhone and Android! Please provide this summary of care documentation to your next provider. Your primary care clinician is listed as Lucy Sandoval. If you have any questions after today's visit, please call [phone].

## 2018-08-30 NOTE — PROGRESS NOTES
Chief Complaint   Patient presents with    Follow-up     I&D left buttuck 8/23/18     1. Have you been to the ER, urgent care clinic since your last visit? Hospitalized since your last visit? No    2. Have you seen or consulted any other health care providers outside of the [address] [place] [address] since your last visit? Include any pap smears or colon screening.  No

## 2018-09-10 ENCOUNTER — TELEPHONE (OUTPATIENT)
Dept: SURGERY | Age: 63
End: 2018-09-10

## 2018-09-10 NOTE — TELEPHONE ENCOUNTER
[facility] health Called. Mr Jessica Wong fell last week and is in [facility]. Luis Hooper the home health nurse can not get anyone. Gave her Wilber moon as emergence contact. will call back if she gets someone.

## 2018-09-11 NOTE — TELEPHONE ENCOUNTER
Confirmed with Tom Escobedo at [place] that Mr [facility] admitted to [facility] on 9/9/18. Will cancel 9/12/18 appt with Dr Silvia Mcclelland.

## 2020-01-02 ENCOUNTER — TELEPHONE (OUTPATIENT)
Dept: SURGERY | Age: 65
End: 2020-01-02

## 2020-01-02 NOTE — TELEPHONE ENCOUNTER
Spoke with home health nurse. Our office has received several home health orders regarding Mr Kareem Pascual has not been seen in this office since 8/30/18. Dr America Quezada will not sign home health orders.

## 2020-11-05 NOTE — PROGRESS NOTES
Surgery    No complaint. Afebrile, VSS. Alert. Packing changed at all 4 new back debridement sites and 1 old site. All clean. Blood sugar not yet controlled. Continue vancomycin.     Richard Curry MD

## 2022-09-09 NOTE — PROGRESS NOTES
Chief Complaint: right weakness  Has no new symptoms feels stronger. Discussed going to inpatient rehab for his gait and dysmetria but he declined stating pressing needs at home. He wants to return to his house and have home health instead. Assesment and Plan    1. Stroke  · Goal systolic blood pressure 601 or below  · Continue plavix  · Cleared by pt  · Follow up with neuro in 2 weeks     MRI  shows multiple stroke left basal ganglia  B12 pending   MRA Carotids  No flow limiting stenosis  TSH pending   Echo EF 55% HGBA1C  9.7    Homocysteine pending Lipids         2. Uncontrolled diabetes II  Continue insulin      3. Hypertension  Continue antihypertensives. No need to hold     4. Hyperlipidemia  Continue pravachol      5. Memory loss  To be assessed out patient. 6. afib  On calcium channel blocker      Allergies  Review of patient's allergies indicates no known allergies. Medications  No current facility-administered medications for this encounter. Current Outpatient Prescriptions   Medication Sig    hydroCHLOROthiazide (MICROZIDE) 12.5 mg capsule Take 1 Cap by mouth daily for 30 days.  clopidogrel (PLAVIX) 75 mg tab Take 1 Tab by mouth daily for 30 days.  carvedilol (COREG) 12.5 mg tablet Take 1 Tab by mouth two (2) times daily (with meals) for 30 days.  cloNIDine HCl (CATAPRES) 0.2 mg tablet Take 1 Tab by mouth two (2) times a day.  insulin lispro protamine/insulin lispro (HUMALOG MIX 75/25) 100 unit/mL (75-25) injection 60 Units by SubCUTAneous route Before breakfast and dinner.  amLODIPine (NORVASC) 10 mg tablet Take 1 Tab by mouth daily.  alcohol swabs (ALCOHOL PADS) padm 1 Each by Apply Externally route two (2) times a day.  pravastatin (PRAVACHOL) 20 mg tablet Take 20 mg by mouth nightly.  digoxin (LANOXIN) 0.125 mg tablet Take 0.125 mg by mouth daily.  Indications: afib        Medical History  Past Medical History:   Diagnosis Date    A-fib [facility]) 2009 Resolved. 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     [facility]     splenic lymphoma    CKD stage 4 due to type 2 diabetes mellitus [facility]     Diabetes ([facility]     Hypertension     Neuropathy [facility]     Other ill-defined conditions     spinal meningitis    Other ill-defined conditions     broken blood vessel to nose    Stroke ([facility] 2007    per patient    Vitamin D deficiency      Review of Systems   Constitutional: Negative for chills and fever. HENT: Negative for ear pain. Eyes: Negative for pain and discharge. Respiratory: Negative for cough and hemoptysis. Cardiovascular: Negative for chest pain and claudication. Gastrointestinal: Negative for constipation and diarrhea. Genitourinary: Negative for flank pain and hematuria. Musculoskeletal: Negative for back pain and myalgias. Skin: Negative for itching and rash. Neurological: Positive for sensory change and focal weakness. Negative for headaches. Endo/Heme/Allergies: Negative for environmental allergies. Does not bruise/bleed easily. Psychiatric/Behavioral: Negative for depression and hallucinations. Exam:    Visit Vitals    /87 (BP 1 Location: Left arm, BP Patient Position: At rest)    Pulse 71    Temp 98 °F (36.7 °C)    Resp 18    Ht 6' 5\" (1.956 m)    Wt 324 lb 4.8 oz (147.1 kg)    SpO2 97%    BMI 38.46 kg/m2      General: Well developed, well nourished. Head: Normocephalic, atraumatic, anicteric sclera   Neck Normal ROM, No thyromegally   Lungs:  Clear to auscultation bilaterally, No wheezes or rubs   Cardiac: Regular rate and rhythm with no murmurs. Abd: Bowel sounds were audible. No tenderness on palpation   Ext: No pedal edema   Skin: No overt signs of rash or insect bites      NeurologicExam:  Mental Status: Alert and oriented to person place and time   Speech: Fluent no aphasia or dysarthria.    Cranial Nerves:  II - XII Inact   Motor:  Full and symmetric strength of upper and lower proximal and distal muscles. Normal bulk and tone. Reflexes:   +1/4 over the proximal tendons of the upper and lower extremities. +0/4 over distal tendons. .   Sensory:   Symmetric distal reduction in sensory perception affecting all modalities. Gait:  Limps to the right. Tremor:   No tremor noted. Cerebellar:  dysmetric on the right    Neurovascular: No carotid bruits. No JVD         Imaging    CT Results (most recent):    Results from [place] encounter on 07/29/17   CT CODE NEURO HEAD WO CONTRAST   Narrative EXAM:  CT CODE NEURO HEAD WO CONTRAST    INDICATION:   code s    COMPARISON: CT 5/9/2016. CONTRAST:  None. TECHNIQUE: Unenhanced CT of the head was performed using 5 mm images. Brain and  bone windows were generated. CT dose reduction was achieved through use of a  standardized protocol tailored for this examination and automatic exposure  control for dose modulation. FINDINGS:  The ventricles and sulci are normal in size, shape and configuration and  midline. There is mild periventricular and subcortical white matter disease. There is no intracranial hemorrhage, extra-axial collection, mass, mass effect  or midline shift. The basilar cisterns are open. No acute infarct is  identified. The bone windows demonstrate no abnormalities. The visualized  portions of the paranasal sinuses and mastoid air cells are clear. IMPRESSION: No acute intracranial abnormality. MRI Results (most recent):    Results from [place] encounter on 07/29/17   MRA BRAIN WO CONT   Narrative CLINICAL HISTORY: Right-sided weakness    INDICATION: R sided weakness. COMPARISON:  CT 7/29/2017    TECHNIQUE: MR examination of the brain includes axial and sagittal T1, axial T2,  axial FLAIR, axial gradient echo, axial DWI, coronal T2. Coronal T2    Contrast: None   Next, 3-D time-of-flight MRA of the brain was performed.  Multiplanar  reconstructions were obtained. FINDINGS:   There are small scattered infarctions (3) in the periventricular white matter on  the left and in the left basal ganglia posterior limb internal capsule as well. Largest infarct lies in the periventricular white matter posteriorly on the left  than the left frontal lobe measures 12 x 7 mm in size Sulcal and ventricular  prominence. Confluent periventricular scattered foci of increased T2 signal  intensity in the corona radiata and centrum semiovale. There is no Chiari or syrinx. The pituitary and infundibulum are grossly  unremarkable. There is no skull base mass. Cerebellopontine angles are grossly  unremarkable. The major intracranial vascular flow-voids are unremarkable. The  cavernous sinuses are symmetric. Optic chiasm and infundibulum grossly  unremarkable. Orbits are grossly symmetric. Dural venous sinuses are grossly  patent. The brain architecture is normal. There is no evidence of midline shift  or mass-effect. There are no extra-axial fluid collections. The A2 segments are within normal limits. There are A1 segments bilaterally. M1  segments are symmetric. M2 segments demonstrate symmetric arborization. . P1  segments are patent. . The internal carotid, anterior cerebral, and middle  cerebral arteries are patent. There is no flow-limiting intracranial stenosis. There is no aneurysm. There are no sizable posterior communicating arteries. IMPRESSION:   Small scattered acute infarctions in the periventricular white matter on the  left and in the left basal ganglia. There is no associated hemorrhage or mass  effect. Severe chronic microvascular ischemic change and moderate to severe cerebral  atrophy for patient age. No aneurysm, dissection or evidence of hemodynamically significant stenosis. The findings were called to Romayne Graver the patient's RN on 7/30/2017 at 10:25  AM by Dr. Dale Mcniel. [phone]                    .   Lab Review    Lab Results   Component Value Date/Time    WBC 5.1 08/02/2017 03:23 AM    HCT 32.0 08/02/2017 03:23 AM    HGB 11.1 08/02/2017 03:23 AM    PLATELET 155 39/73/9602 03:23 AM       Lab Results   Component Value Date/Time    Sodium 136 08/02/2017 03:23 AM    Potassium 3.6 08/02/2017 03:23 AM    Chloride 104 08/02/2017 03:23 AM    CO2 25 08/02/2017 03:23 AM    Glucose 166 08/02/2017 03:23 AM    BUN 26 08/02/2017 03:23 AM    Creatinine 2.72 08/02/2017 03:23 AM    Calcium 8.4 08/02/2017 03:23 AM       Lab Results   Component Value Date/Time    Hemoglobin A1c 9.7 07/30/2017 04:53 AM        Lab Results   Component Value Date/Time    Vitamin B12 373 07/31/2017 03:49 AM    Folate 9.2 07/31/2017 03:49 AM       Lab Results   Component Value Date/Time    Cholesterol, total 189 07/30/2017 04:53 AM    HDL Cholesterol 34 07/30/2017 04:53 AM    LDL, calculated 103.6 07/30/2017 04:53 AM    VLDL, calculated 51.4 07/30/2017 04:53 AM    Triglyceride 257 07/30/2017 04:53 AM    CHOL/HDL Ratio 5.6 07/30/2017 04:53 AM 07-Sep-2022

## 2023-07-21 NOTE — PROGRESS NOTES
End of Shift Nursing Note    Bedside shift change report given to Ayza Magdaleno Enriquez (oncoming nurse) by Meka BOOTH (offgoing nurse). Report included the following information SBAR, Kardex, OR Summary, Intake/Output, MAR and Recent Results. Zone Phone:   [phone]    Significant changes during shift:    none   Non-emergent issues for physician to address:   none     Number times ambulated in hallway past shift: 0      Number of times OOB to chair past shift: 0    POD #: 1     Vital Signs:    Temp: 98.1 °F (36.7 °C)     Pulse (Heart Rate): 76     BP: 146/82     Resp Rate: 18     O2 Sat (%): 96 %    Lines & Drains:     Urinary Catheter? NO   Placement Date: 0   Medical Necessity: 0  Central Line? NO   Placement Date: 0   Medical Necessity: 0  PICC Line? NO   Placement Date: 0   Medical Necessity: 0    NG tube [] in [] removed [x] not applicable   Drains [] in [] removed [x] not applicable     Skin Integrity:      Wounds: yes   Dressings Present: yes    Wound Concerns: yes      GI:na    Current diet:  DIET DIABETIC WITH OPTIONS Consistent Carb 1800kcal; Regular    Nausea: no  Vomiting: NO  Bowel Sounds: NO  Flatus: NO  Last Bowel Movement:2/5/17   Appearance: na    Respiratory:  Supplemental O2: YES      Device:NC     via 2 Liters/min     Incentive Spirometer: YES  Volume:   Coughing and Deep Breathing: YES  Oral Care: YES  Understanding (patient/family education): YES   Getting out of bed: YES  Head of bed elevation: YES    Patient Safety:    Falls Score: 2  Mobility Score: 1  Bed Alarm On? NO  Sitter? NO      Opportunity for questions and clarification was given to oncoming nurse. Patient bed is in low position, side rails are up x 2, door & observation blinds open as needed, call bell within reach and patient not in distress.     Kahlil Middleton RN no strength deficits were identified

## (undated) DEVICE — TRAY PREP DRY W/ PREM GLV 2 APPL 6 SPNG 2 UNDPD 1 OVERWRAP

## (undated) DEVICE — HANDLE LT SNAP ON ULT DURABLE LENS FOR TRUMPF ALC DISPOSABLE

## (undated) DEVICE — (D)SYR 10ML 1/5ML GRAD NSAF -- PKGING CHANGE USE ITEM 338027

## (undated) DEVICE — TOWEL SURG W17XL27IN STD BLU COT NONFENESTRATED PREWASHED

## (undated) DEVICE — NEEDLE HYPO 25GA L1.5IN BVL ORIENTED ECLIPSE

## (undated) DEVICE — INTENDED FOR TISSUE SEPARATION, AND OTHER PROCEDURES THAT REQUIRE A SHARP SURGICAL BLADE TO PUNCTURE OR CUT.: Brand: BARD-PARKER ® CARBON RIB-BACK BLADES

## (undated) DEVICE — CURITY IDOFORM PACKING STRIP: Brand: CURITY

## (undated) DEVICE — SPONGE HEMOSTAT CELLULS 4X8IN -- SURGICEL

## (undated) DEVICE — ROCKER SWITCH PENCIL BLADE ELECTRODE, HOLSTER: Brand: EDGE

## (undated) DEVICE — SOLUTION IV 1000ML 0.9% SOD CHL

## (undated) DEVICE — GOWN,SIRUS,FABRNF,2XL,18/CS: Brand: MEDLINE

## (undated) DEVICE — GAUZE SPONGES,12 PLY: Brand: CURITY

## (undated) DEVICE — CURITY PLAIN PACKING STRIP: Brand: CURITY

## (undated) DEVICE — IODOFORM PACKING STRIP: Brand: CURITY

## (undated) DEVICE — SURGICAL PROCEDURE PACK BASIN MAJ SET CUST NO CAUT

## (undated) DEVICE — REM POLYHESIVE ADULT PATIENT RETURN ELECTRODE: Brand: VALLEYLAB

## (undated) DEVICE — DRAPE,EXTREMITY,89X128,STERILE: Brand: MEDLINE

## (undated) DEVICE — BASIC PACK: Brand: CONVERTORS

## (undated) DEVICE — ABDOMINAL PAD: Brand: DERMACEA

## (undated) DEVICE — KENDALL SCD EXPRESS SLEEVES, KNEE LENGTH, X-LARGE: Brand: KENDALL SCD

## (undated) DEVICE — SWAB CULT LIQ STUART AGR AERB MOD IN BRK SGL RAYON TIP PLAS 220099] BECTON DICKINSON MICRO]

## (undated) DEVICE — SPONGE LAP 18X18IN STRL -- 5/PK

## (undated) DEVICE — DBD-PACK,LAPAROTOMY,2 REINFORCED GOWNS: Brand: MEDLINE

## (undated) DEVICE — KERLIX BANDAGE ROLL: Brand: KERLIX

## (undated) DEVICE — 1200 GUARD II KIT W/5MM TUBE W/O VAC TUBE: Brand: GUARDIAN

## (undated) DEVICE — GAUZE,PACKING STRIP,PLAIN,1"X5YD,STRL,LF: Brand: CURAD

## (undated) DEVICE — DERMACEA GAUZE FLUFF ROLL: Brand: DERMACEA

## (undated) DEVICE — DRAPE,U/ SHT,SPLIT,PLAS,STERIL: Brand: MEDLINE

## (undated) DEVICE — INFECTION CONTROL KIT SYS

## (undated) DEVICE — FRAZIER SUCTION INSTRUMENT 12 FR W/CONTROL VENT & OBTURATOR: Brand: FRAZIER

## (undated) DEVICE — SUTURE VCRL SZ 3-0 L27IN ABSRB UD L26MM SH 1/2 CIR J416H

## (undated) DEVICE — SYR 10ML LUER LOK 1/5ML GRAD --

## (undated) DEVICE — DRAPE,REIN 53X77,STERILE: Brand: MEDLINE

## (undated) DEVICE — SHEET, T, LAPAROTOMY, STERILE: Brand: MEDLINE

## (undated) DEVICE — CULTURETTE SGL EVAC TUBE PALL -- 100/CA

## (undated) DEVICE — SUTURE CHROMIC GUT SZ 2-0 L27IN ABSRB BRN L26MM SH 1/2 CIR G123H

## (undated) DEVICE — KIT INFECTION CTRL ST FRAN --

## (undated) DEVICE — DEVON™ KNEE AND BODY STRAP 60" X 3" (1.5 M X 7.6 CM): Brand: DEVON

## (undated) DEVICE — 3M(TM) MEDIPORE(TM) H SOFT CLOTH TAPE 2866: Brand: 3M™ MEDIPORE™